# Patient Record
Sex: MALE | Race: WHITE | NOT HISPANIC OR LATINO | Employment: OTHER | URBAN - METROPOLITAN AREA
[De-identification: names, ages, dates, MRNs, and addresses within clinical notes are randomized per-mention and may not be internally consistent; named-entity substitution may affect disease eponyms.]

---

## 2017-01-19 ENCOUNTER — HOSPITAL ENCOUNTER (OUTPATIENT)
Dept: MRI IMAGING | Facility: CLINIC | Age: 67
Discharge: HOME | End: 2017-01-19
Payer: MEDICARE

## 2017-01-19 ENCOUNTER — ALLSCRIPTS OFFICE VISIT (OUTPATIENT)
Dept: OTHER | Facility: OTHER | Age: 67
End: 2017-01-19

## 2017-01-19 DIAGNOSIS — M48.061 SPINAL STENOSIS OF LUMBAR REGION: ICD-10-CM

## 2017-01-19 PROCEDURE — 72148 MRI LUMBAR SPINE W/O DYE: CPT

## 2017-01-31 ENCOUNTER — ALLSCRIPTS OFFICE VISIT (OUTPATIENT)
Dept: OTHER | Facility: OTHER | Age: 67
End: 2017-01-31

## 2017-01-31 DIAGNOSIS — I48.91 ATRIAL FIBRILLATION (HCC): ICD-10-CM

## 2017-01-31 DIAGNOSIS — D50.0 IRON DEFICIENCY ANEMIA DUE TO CHRONIC BLOOD LOSS: ICD-10-CM

## 2017-01-31 DIAGNOSIS — E78.2 MIXED HYPERLIPIDEMIA: ICD-10-CM

## 2017-03-02 ENCOUNTER — ALLSCRIPTS OFFICE VISIT (OUTPATIENT)
Dept: OTHER | Facility: OTHER | Age: 67
End: 2017-03-02

## 2017-03-03 ENCOUNTER — ALLSCRIPTS OFFICE VISIT (OUTPATIENT)
Dept: OTHER | Facility: OTHER | Age: 67
End: 2017-03-03

## 2017-03-03 DIAGNOSIS — S39.012A STRAIN OF MUSCLE, FASCIA AND TENDON OF LOWER BACK, INITIAL ENCOUNTER: ICD-10-CM

## 2017-03-09 ENCOUNTER — GENERIC CONVERSION - ENCOUNTER (OUTPATIENT)
Dept: OTHER | Facility: OTHER | Age: 67
End: 2017-03-09

## 2017-03-23 ENCOUNTER — GENERIC CONVERSION - ENCOUNTER (OUTPATIENT)
Dept: OTHER | Facility: OTHER | Age: 67
End: 2017-03-23

## 2017-03-27 ENCOUNTER — ALLSCRIPTS OFFICE VISIT (OUTPATIENT)
Dept: OTHER | Facility: OTHER | Age: 67
End: 2017-03-27

## 2017-03-29 ENCOUNTER — GENERIC CONVERSION - ENCOUNTER (OUTPATIENT)
Dept: OTHER | Facility: OTHER | Age: 67
End: 2017-03-29

## 2017-04-24 ENCOUNTER — TRANSCRIBE ORDERS (OUTPATIENT)
Dept: ADMINISTRATIVE | Facility: HOSPITAL | Age: 67
End: 2017-04-24

## 2017-04-24 ENCOUNTER — ALLSCRIPTS OFFICE VISIT (OUTPATIENT)
Dept: OTHER | Facility: OTHER | Age: 67
End: 2017-04-24

## 2017-04-24 DIAGNOSIS — I63.9 CEREBRAL INFARCTION (HCC): ICD-10-CM

## 2017-04-24 DIAGNOSIS — R42 DIZZINESS AND GIDDINESS: ICD-10-CM

## 2017-04-24 DIAGNOSIS — I69.990 APRAXIA, LATE EFFECT OF CARDIOVASCULAR DISEASE: Primary | ICD-10-CM

## 2017-05-08 ENCOUNTER — HOSPITAL ENCOUNTER (OUTPATIENT)
Dept: MRI IMAGING | Facility: CLINIC | Age: 67
Discharge: HOME | End: 2017-05-08
Payer: MEDICARE

## 2017-05-08 DIAGNOSIS — I63.9 CEREBRAL INFARCTION (HCC): ICD-10-CM

## 2017-05-08 PROCEDURE — 70551 MRI BRAIN STEM W/O DYE: CPT

## 2017-06-19 ENCOUNTER — APPOINTMENT (OUTPATIENT)
Dept: PHYSICAL THERAPY | Facility: MEDICAL CENTER | Age: 67
End: 2017-06-19
Payer: MEDICARE

## 2017-06-19 DIAGNOSIS — R42 DIZZINESS AND GIDDINESS: ICD-10-CM

## 2017-06-19 PROCEDURE — G8991 OTHER PT/OT GOAL STATUS: HCPCS

## 2017-06-19 PROCEDURE — 97140 MANUAL THERAPY 1/> REGIONS: CPT

## 2017-06-19 PROCEDURE — 97161 PT EVAL LOW COMPLEX 20 MIN: CPT

## 2017-06-19 PROCEDURE — G8990 OTHER PT/OT CURRENT STATUS: HCPCS

## 2017-06-20 ENCOUNTER — GENERIC CONVERSION - ENCOUNTER (OUTPATIENT)
Dept: OTHER | Facility: OTHER | Age: 67
End: 2017-06-20

## 2017-06-27 ENCOUNTER — APPOINTMENT (OUTPATIENT)
Dept: PHYSICAL THERAPY | Facility: MEDICAL CENTER | Age: 67
End: 2017-06-27
Payer: MEDICARE

## 2017-07-24 ENCOUNTER — ALLSCRIPTS OFFICE VISIT (OUTPATIENT)
Dept: OTHER | Facility: OTHER | Age: 67
End: 2017-07-24

## 2017-07-24 DIAGNOSIS — G62.9 POLYNEUROPATHY: ICD-10-CM

## 2017-09-05 ENCOUNTER — TRANSCRIBE ORDERS (OUTPATIENT)
Dept: ADMINISTRATIVE | Facility: HOSPITAL | Age: 67
End: 2017-09-05

## 2017-09-05 ENCOUNTER — ALLSCRIPTS OFFICE VISIT (OUTPATIENT)
Dept: OTHER | Facility: OTHER | Age: 67
End: 2017-09-05

## 2017-09-05 DIAGNOSIS — G95.9 DISEASE OF SPINAL CORD (HCC): Primary | ICD-10-CM

## 2017-09-05 DIAGNOSIS — I48.91 ATRIAL FIBRILLATION (HCC): ICD-10-CM

## 2017-09-05 DIAGNOSIS — I10 ESSENTIAL (PRIMARY) HYPERTENSION: ICD-10-CM

## 2017-09-05 DIAGNOSIS — G14 POSTPOLIO SYNDROME: ICD-10-CM

## 2017-09-05 DIAGNOSIS — G62.9 POLYNEUROPATHY: ICD-10-CM

## 2017-09-05 DIAGNOSIS — G95.9 DISEASE OF SPINAL CORD (HCC): ICD-10-CM

## 2017-09-15 ENCOUNTER — ALLSCRIPTS OFFICE VISIT (OUTPATIENT)
Dept: OTHER | Facility: OTHER | Age: 67
End: 2017-09-15

## 2017-10-10 ENCOUNTER — HOSPITAL ENCOUNTER (OUTPATIENT)
Dept: MRI IMAGING | Facility: CLINIC | Age: 67
Discharge: HOME | End: 2017-10-10
Attending: PSYCHIATRY & NEUROLOGY
Payer: MEDICARE

## 2017-10-10 DIAGNOSIS — G95.9 DISEASE OF SPINAL CORD (HCC): ICD-10-CM

## 2017-10-10 PROCEDURE — 72141 MRI NECK SPINE W/O DYE: CPT

## 2017-11-13 ENCOUNTER — APPOINTMENT (EMERGENCY)
Dept: CT IMAGING | Facility: HOSPITAL | Age: 67
End: 2017-11-13
Payer: MEDICARE

## 2017-11-13 ENCOUNTER — APPOINTMENT (EMERGENCY)
Dept: RADIOLOGY | Facility: HOSPITAL | Age: 67
End: 2017-11-13
Payer: MEDICARE

## 2017-11-13 ENCOUNTER — HOSPITAL ENCOUNTER (EMERGENCY)
Facility: HOSPITAL | Age: 67
Discharge: HOME | End: 2017-11-13
Attending: EMERGENCY MEDICINE | Admitting: EMERGENCY MEDICINE
Payer: MEDICARE

## 2017-11-13 VITALS
TEMPERATURE: 97.4 F | RESPIRATION RATE: 16 BRPM | OXYGEN SATURATION: 96 % | WEIGHT: 210 LBS | DIASTOLIC BLOOD PRESSURE: 81 MMHG | HEIGHT: 66 IN | SYSTOLIC BLOOD PRESSURE: 124 MMHG | BODY MASS INDEX: 33.75 KG/M2 | HEART RATE: 79 BPM

## 2017-11-13 DIAGNOSIS — M10.9 GOUT: Primary | ICD-10-CM

## 2017-11-13 DIAGNOSIS — L03.90 CELLULITIS: ICD-10-CM

## 2017-11-13 LAB
ALBUMIN SERPL BCP-MCNC: 3.4 G/DL (ref 3.5–5)
ALP SERPL-CCNC: 87 U/L (ref 46–116)
ALT SERPL W P-5'-P-CCNC: 25 U/L (ref 12–78)
ANION GAP SERPL CALCULATED.3IONS-SCNC: 9 MMOL/L (ref 4–13)
AST SERPL W P-5'-P-CCNC: 20 U/L (ref 5–45)
BASOPHILS # BLD AUTO: 0.11 THOUSANDS/ΜL (ref 0–0.1)
BASOPHILS NFR BLD AUTO: 1 % (ref 0–1)
BILIRUB SERPL-MCNC: 0.4 MG/DL (ref 0.2–1)
BUN SERPL-MCNC: 15 MG/DL (ref 5–25)
CALCIUM SERPL-MCNC: 9.1 MG/DL (ref 8.3–10.1)
CHLORIDE SERPL-SCNC: 101 MMOL/L (ref 100–108)
CHOLEST SERPL-MCNC: 193 MG/DL (ref 50–200)
CO2 SERPL-SCNC: 29 MMOL/L (ref 21–32)
CREAT SERPL-MCNC: 0.85 MG/DL (ref 0.6–1.3)
DEPRECATED D DIMER PPP: <270 NG/ML (FEU) (ref 0–424)
EOSINOPHIL # BLD AUTO: 0.7 THOUSAND/ΜL (ref 0–0.61)
EOSINOPHIL NFR BLD AUTO: 7 % (ref 0–6)
ERYTHROCYTE [DISTWIDTH] IN BLOOD BY AUTOMATED COUNT: 14.7 % (ref 11.6–15.1)
GFR SERPL CREATININE-BSD FRML MDRD: 90 ML/MIN/1.73SQ M
GLUCOSE SERPL-MCNC: 101 MG/DL (ref 65–140)
HCT VFR BLD AUTO: 43.6 % (ref 36.5–49.3)
HDLC SERPL-MCNC: 47 MG/DL (ref 40–60)
HGB BLD-MCNC: 14.1 G/DL (ref 12–17)
INR PPP: 1.2 (ref 0.86–1.16)
LYMPHOCYTES # BLD AUTO: 2.02 THOUSANDS/ΜL (ref 0.6–4.47)
LYMPHOCYTES NFR BLD AUTO: 19 % (ref 14–44)
MCH RBC QN AUTO: 27.8 PG (ref 26.8–34.3)
MCHC RBC AUTO-ENTMCNC: 32.3 G/DL (ref 31.4–37.4)
MCV RBC AUTO: 86 FL (ref 82–98)
MONOCYTES # BLD AUTO: 1.08 THOUSAND/ΜL (ref 0.17–1.22)
MONOCYTES NFR BLD AUTO: 10 % (ref 4–12)
NEUTROPHILS # BLD AUTO: 6.81 THOUSANDS/ΜL (ref 1.85–7.62)
NEUTS SEG NFR BLD AUTO: 63 % (ref 43–75)
NONHDLC SERPL-MCNC: 146 MG/DL
NRBC BLD AUTO-RTO: 0 /100 WBCS
PLATELET # BLD AUTO: 207 THOUSANDS/UL (ref 149–390)
PMV BLD AUTO: 9.8 FL (ref 8.9–12.7)
POTASSIUM SERPL-SCNC: 3.7 MMOL/L (ref 3.5–5.3)
PROT SERPL-MCNC: 7.2 G/DL (ref 6.4–8.2)
PROTHROMBIN TIME: 15.5 SECONDS (ref 12.1–14.4)
RBC # BLD AUTO: 5.07 MILLION/UL (ref 3.88–5.62)
SODIUM SERPL-SCNC: 139 MMOL/L (ref 136–145)
TROPONIN I SERPL-MCNC: <0.02 NG/ML
TSH SERPL DL<=0.05 MIU/L-ACNC: 2.58 UIU/ML (ref 0.36–3.74)
URATE SERPL-MCNC: 9.6 MG/DL (ref 4.2–8)
WBC # BLD AUTO: 10.77 THOUSAND/UL (ref 4.31–10.16)

## 2017-11-13 PROCEDURE — 71020 HB CHEST X-RAY 2VW FRONTAL&LATL: CPT

## 2017-11-13 PROCEDURE — 82465 ASSAY BLD/SERUM CHOLESTEROL: CPT | Performed by: EMERGENCY MEDICINE

## 2017-11-13 PROCEDURE — 36415 COLL VENOUS BLD VENIPUNCTURE: CPT | Performed by: EMERGENCY MEDICINE

## 2017-11-13 PROCEDURE — 84484 ASSAY OF TROPONIN QUANT: CPT | Performed by: EMERGENCY MEDICINE

## 2017-11-13 PROCEDURE — 85379 FIBRIN DEGRADATION QUANT: CPT | Performed by: EMERGENCY MEDICINE

## 2017-11-13 PROCEDURE — 84443 ASSAY THYROID STIM HORMONE: CPT | Performed by: EMERGENCY MEDICINE

## 2017-11-13 PROCEDURE — 84550 ASSAY OF BLOOD/URIC ACID: CPT | Performed by: EMERGENCY MEDICINE

## 2017-11-13 PROCEDURE — 71275 CT ANGIOGRAPHY CHEST: CPT

## 2017-11-13 PROCEDURE — 85610 PROTHROMBIN TIME: CPT | Performed by: EMERGENCY MEDICINE

## 2017-11-13 PROCEDURE — 85025 COMPLETE CBC W/AUTO DIFF WBC: CPT | Performed by: EMERGENCY MEDICINE

## 2017-11-13 PROCEDURE — 80053 COMPREHEN METABOLIC PANEL: CPT | Performed by: EMERGENCY MEDICINE

## 2017-11-13 PROCEDURE — 83718 ASSAY OF LIPOPROTEIN: CPT | Performed by: EMERGENCY MEDICINE

## 2017-11-13 PROCEDURE — 99284 EMERGENCY DEPT VISIT MOD MDM: CPT

## 2017-11-13 PROCEDURE — 93005 ELECTROCARDIOGRAM TRACING: CPT | Performed by: EMERGENCY MEDICINE

## 2017-11-13 RX ORDER — ASPIRIN 81 MG/1
81 TABLET ORAL DAILY
COMMUNITY
End: 2018-01-30 | Stop reason: SDUPTHER

## 2017-11-13 RX ORDER — BACLOFEN 10 MG/1
10 TABLET ORAL 3 TIMES DAILY
COMMUNITY
End: 2018-07-13 | Stop reason: SDUPTHER

## 2017-11-13 RX ORDER — OMEPRAZOLE 20 MG/1
20 CAPSULE, DELAYED RELEASE ORAL DAILY
COMMUNITY
End: 2018-01-30 | Stop reason: SDUPTHER

## 2017-11-13 RX ORDER — PREDNISONE 20 MG/1
60 TABLET ORAL ONCE
Status: COMPLETED | OUTPATIENT
Start: 2017-11-13 | End: 2017-11-13

## 2017-11-13 RX ORDER — METOPROLOL TARTRATE 50 MG/1
50 TABLET, FILM COATED ORAL 3 TIMES DAILY
COMMUNITY
End: 2018-05-29 | Stop reason: SDUPTHER

## 2017-11-13 RX ORDER — FUROSEMIDE 40 MG/1
40 TABLET ORAL DAILY
COMMUNITY
End: 2018-02-21 | Stop reason: SDUPTHER

## 2017-11-13 RX ORDER — COLCHICINE 0.6 MG/1
0.6 TABLET ORAL DAILY
Qty: 30 TABLET | Refills: 0 | Status: SHIPPED | OUTPATIENT
Start: 2017-11-13 | End: 2018-01-30 | Stop reason: SDUPTHER

## 2017-11-13 RX ORDER — COLCHICINE 0.6 MG/1
0.6 TABLET ORAL ONCE
Status: COMPLETED | OUTPATIENT
Start: 2017-11-13 | End: 2017-11-13

## 2017-11-13 RX ORDER — COLCHICINE 0.6 MG/1
0.6 TABLET ORAL DAILY
COMMUNITY
End: 2018-04-09 | Stop reason: HOSPADM

## 2017-11-13 RX ORDER — DIGOXIN 125 MCG
125 TABLET ORAL DAILY
COMMUNITY
End: 2018-04-09 | Stop reason: HOSPADM

## 2017-11-13 RX ORDER — PREDNISONE 20 MG/1
60 TABLET ORAL DAILY
Qty: 15 TABLET | Refills: 0 | Status: SHIPPED | OUTPATIENT
Start: 2017-11-13 | End: 2017-11-18

## 2017-11-13 RX ORDER — FLUOXETINE 20 MG/1
60 TABLET, FILM COATED ORAL DAILY
COMMUNITY
End: 2018-01-30 | Stop reason: SDUPTHER

## 2017-11-13 RX ORDER — INDOMETHACIN 25 MG/1
25 CAPSULE ORAL
COMMUNITY
End: 2018-03-30 | Stop reason: HOSPADM

## 2017-11-13 RX ORDER — TAMSULOSIN HYDROCHLORIDE 0.4 MG/1
0.4 CAPSULE ORAL
COMMUNITY
End: 2018-05-18 | Stop reason: SDUPTHER

## 2017-11-13 RX ORDER — FLUTICASONE PROPIONATE 50 MCG
1 SPRAY, SUSPENSION (ML) NASAL DAILY
COMMUNITY
End: 2018-05-18 | Stop reason: SDUPTHER

## 2017-11-13 RX ORDER — CEPHALEXIN 500 MG/1
500 CAPSULE ORAL EVERY 6 HOURS SCHEDULED
Qty: 40 CAPSULE | Refills: 0 | Status: SHIPPED | OUTPATIENT
Start: 2017-11-13 | End: 2017-11-23

## 2017-11-13 RX ORDER — LORATADINE 10 MG/1
10 TABLET ORAL DAILY
COMMUNITY
End: 2020-01-01 | Stop reason: HOSPADM

## 2017-11-13 RX ORDER — CEPHALEXIN 250 MG/1
500 CAPSULE ORAL ONCE
Status: COMPLETED | OUTPATIENT
Start: 2017-11-13 | End: 2017-11-13

## 2017-11-13 RX ORDER — ALLOPURINOL 300 MG/1
300 TABLET ORAL DAILY
COMMUNITY
End: 2018-02-21 | Stop reason: CLARIF

## 2017-11-13 RX ADMIN — CEPHALEXIN 500 MG: 250 CAPSULE ORAL at 13:32

## 2017-11-13 RX ADMIN — IOHEXOL 85 ML: 350 INJECTION, SOLUTION INTRAVENOUS at 14:21

## 2017-11-13 RX ADMIN — PREDNISONE 60 MG: 20 TABLET ORAL at 15:58

## 2017-11-13 RX ADMIN — COLCHICINE 0.6 MG: 0.6 TABLET, FILM COATED ORAL at 15:58

## 2017-11-13 NOTE — ED PROVIDER NOTES
History  Chief Complaint   Patient presents with    Foot Swelling     Pt states he has a hx of gout and believes to be having a flare on R foot  Pt noticed R foot swelling and redness last evening  Pt also has a hx of neuropathy and has no feeling in B/L feet     50-year-old male patient presents to the emergency department for evaluation of right foot redness similar to bouts of gout that he has had the past   Initially, the patient only stated this is his complaint, however drain history and physical the patient began stating that he was having shortness of breath which was increased with minimal exertion of less than three steps  This is of concern to me is the patient does have some underlying lung disease, has been lost to follow up with pulmonology, has had mostly inconsequential evaluation done for this any time in the recent past   The patient states the pain is similar to gout however the distribution of the redness on the dorsal surface of the foot is not normal for him  Plan will be to assess cause of her dyspnea including advanced lung disease, lung cancer, asbestosis        History provided by:  Patient   used: No    Shortness of Breath   Severity:  Moderate  Onset quality:  Gradual  Timing:  Intermittent  Progression:  Worsening  Chronicity:  Recurrent  Context: not activity, not animal exposure, not emotional upset, not known allergens, not occupational exposure, not pollens, not strong odors, not URI and not weather changes    Relieved by:  Nothing  Worsened by:  Nothing  Ineffective treatments:  None tried  Associated symptoms: no chest pain, no cough, no hemoptysis, no sputum production, no syncope and no swollen glands        Prior to Admission Medications   Prescriptions Last Dose Informant Patient Reported? Taking?    FLUoxetine (PROzac) 20 MG tablet   Yes Yes   Sig: Take 60 mg by mouth daily   allopurinol (ZYLOPRIM) 300 mg tablet   Yes Yes   Sig: Take 300 mg by mouth daily   apixaban (ELIQUIS) 5 mg   Yes Yes   Sig: Take 5 mg by mouth 2 (two) times a day   aspirin (ECOTRIN LOW STRENGTH) 81 mg EC tablet   Yes Yes   Sig: Take 81 mg by mouth daily   baclofen 10 mg tablet   Yes Yes   Sig: Take 10 mg by mouth 3 (three) times a day   colchicine (COLCRYS) 0 6 mg tablet   Yes Yes   Sig: Take 0 6 mg by mouth daily   digoxin (LANOXIN) 0 125 mg tablet   Yes Yes   Sig: Take 125 mcg by mouth daily   fluticasone (FLONASE) 50 mcg/act nasal spray   Yes Yes   Si spray into each nostril daily   furosemide (LASIX) 40 mg tablet   Yes Yes   Sig: Take 40 mg by mouth daily   indomethacin (INDOCIN) 25 mg capsule   Yes Yes   Sig: Take 25 mg by mouth 3 (three) times a day with meals   loratadine (CLARITIN) 10 mg tablet   Yes Yes   Sig: Take 10 mg by mouth daily   metoprolol tartrate (LOPRESSOR) 50 mg tablet   Yes Yes   Sig: Take 25 mg by mouth every 12 (twelve) hours   omeprazole (PriLOSEC) 20 mg delayed release capsule   Yes Yes   Sig: Take 20 mg by mouth daily   tamsulosin (FLOMAX) 0 4 mg   Yes Yes   Sig: Take 0 4 mg by mouth daily with dinner      Facility-Administered Medications: None       Past Medical History:   Diagnosis Date    [facility]fib ([facility] )     Gout     Neuropathy     Polio        Past Surgical History:   Procedure Laterality Date    COLON SURGERY         History reviewed  No pertinent family history  I have reviewed and agree with the history as documented  Social History   Substance Use Topics    Smoking status: Never Smoker    Smokeless tobacco: Never Used    Alcohol use No        Review of Systems   Respiratory: Positive for shortness of breath  Negative for cough, hemoptysis and sputum production  Cardiovascular: Negative for chest pain and syncope  All other systems reviewed and are negative        Physical Exam  ED Triage Vitals [17 1158]   Temperature Pulse Respirations Blood Pressure SpO2   (!) 97 4 °F (36 3 °C) 83 20 121/71 99 %      Temp Source Heart Rate Source Patient Position - Orthostatic VS BP Location FiO2 (%)   Oral Monitor Sitting Left arm --      Pain Score       4           Orthostatic Vital Signs  Vitals:    11/13/17 1158 11/13/17 1415   BP: 121/71 150/76   Pulse: 83 105   Patient Position - Orthostatic VS: Sitting        Physical Exam   Constitutional: He is oriented to person, place, and time  He appears well-developed and well-nourished  No distress  HENT:   Head: Normocephalic and atraumatic  Right Ear: External ear normal    Left Ear: External ear normal    Eyes: Conjunctivae and EOM are normal  Right eye exhibits no discharge  Left eye exhibits no discharge  No scleral icterus  Neck: Normal range of motion  Neck supple  No JVD present  No tracheal deviation present  No thyromegaly present  Cardiovascular: Normal rate and regular rhythm  Pulmonary/Chest: Effort normal and breath sounds normal  No stridor  No respiratory distress  He has no wheezes  He has no rales  Abdominal: Soft  Bowel sounds are normal  He exhibits no distension  There is no tenderness  Musculoskeletal: Normal range of motion  He exhibits no edema, tenderness or deformity  Neurological: He is alert and oriented to person, place, and time  No cranial nerve deficit  Coordination normal    Skin: Skin is warm and dry  He is not diaphoretic  Psychiatric: He has a normal mood and affect  His behavior is normal    Nursing note and vitals reviewed        ED Medications  Medications   cephalexin (KEFLEX) capsule 500 mg (500 mg Oral Given 11/13/17 1332)   iohexol (OMNIPAQUE) 350 MG/ML injection (SINGLE-DOSE) 85 mL (85 mL Intravenous Given 11/13/17 1421)       Diagnostic Studies  Results Reviewed     Procedure Component Value Units Date/Time    TSH, 3rd generation with T4 reflex [[id number]]  (Normal) Collected:  11/13/17 1329    Lab Status:  Final result Specimen:  Blood from Arm, Left Updated:  11/13/17 1401     TSH 3RD GENERATON 2 575 uIU/mL     Narrative: Patients undergoing fluorescein dye angiography may retain small amounts of fluorescein in the body for 48-72 hours post procedure  Samples containing fluorescein can produce falsely depressed TSH values  If the patient had this procedure,a specimen should be resubmitted post fluorescein clearance  Uric acid [[id number]]  (Abnormal) Collected:  11/13/17 1329    Lab Status:  Final result Specimen:  Blood from Arm, Left Updated:  11/13/17 1401     Uric Acid 9 6 (H) mg/dL     Non-Fasting Lipid Panel with out Triglycerides [[id number]] Collected:  11/13/17 1329    Lab Status:  Final result Specimen:  Blood from Arm, Left Updated:  11/13/17 1401     Cholesterol 193 mg/dL      HDL, Direct 47 mg/dL      Non-HDL-Chol (CHOL-HDL) 146 mg/dl     Narrative:         Cholesterol:         Desirable         <200 mg/dl      Borderline High   200-239 mg/dl      High              >239 mg/dl  HDL Cholesterol:        High    >59 mg/dL      Low     <41 mg/dL  NON-HDL-CHOLESTEROL:          Target Range  <=130mg/dl    Troponin I [[id number]]  (Normal) Collected:  11/13/17 1329    Lab Status:  Final result Specimen:  Blood from Arm, Left Updated:  11/13/17 1400     Troponin I <0 02 ng/mL     Narrative:         [employer] Chemistry analyzer 99% cutoff is > 0 04 ng/mL in network labs    o cTnI 99% cutoff is useful only when applied to patients in the clinical setting of myocardial ischemia  o cTnI 99% cutoff should be interpreted in the context of clinical history, ECG findings and possibly cardiac imaging to establish correct diagnosis  o cTnI 99% cutoff may be suggestive but clearly not indicative of a coronary event without the clinical setting of myocardial ischemia      D-dimer, quantitative [[id number]]  (Normal) Collected:  11/13/17 1329    Lab Status:  Final result Specimen:  Blood from Arm, Left Updated:  11/13/17 1356     D-Dimer, Quant <270 ng/ml (FEU)     Comprehensive metabolic panel [[id number]]  (Abnormal) Collected:  11/13/17 1329 Lab Status:  Final result Specimen:  Blood from Arm, Left Updated:  11/13/17 1354     Sodium 139 mmol/L      Potassium 3 7 mmol/L      Chloride 101 mmol/L      CO2 29 mmol/L      Anion Gap 9 mmol/L      BUN 15 mg/dL      Creatinine 0 85 mg/dL      Glucose 101 mg/dL      Calcium 9 1 mg/dL      AST 20 U/L      ALT 25 U/L      Alkaline Phosphatase 87 U/L      Total Protein 7 2 g/dL      Albumin 3 4 (L) g/dL      Total Bilirubin 0 40 mg/dL      eGFR 90 ml/min/1 73sq m     Narrative:         National Kidney Disease Education Program recommendations are as follows:  GFR calculation is accurate only with a steady state creatinine  Chronic Kidney disease less than 60 ml/min/1 73 sq  meters  Kidney failure less than 15 ml/min/1 73 sq  meters  Protime-INR [[id number]]  (Abnormal) Collected:  11/13/17 1329    Lab Status:  Final result Specimen:  Blood from Arm, Left Updated:  11/13/17 1353     Protime 15 5 (H) seconds      INR 1 20 (H)    CBC and differential [[id number]]  (Abnormal) Collected:  11/13/17 1329    Lab Status:  Final result Specimen:  Blood from Arm, Left Updated:  11/13/17 1336     WBC 10 77 (H) Thousand/uL      RBC 5 07 Million/uL      Hemoglobin 14 1 g/dL      Hematocrit 43 6 %      MCV 86 fL      MCH 27 8 pg      MCHC 32 3 g/dL      RDW 14 7 %      MPV 9 8 fL      Platelets 632 Thousands/uL      nRBC 0 /100 WBCs      Neutrophils Relative 63 %      Lymphocytes Relative 19 %      Monocytes Relative 10 %      Eosinophils Relative 7 (H) %      Basophils Relative 1 %      Neutrophils Absolute 6 81 Thousands/µL      Lymphocytes Absolute 2 02 Thousands/µL      Monocytes Absolute 1 08 Thousand/µL      Eosinophils Absolute 0 70 (H) Thousand/µL      Basophils Absolute 0 11 (H) Thousands/µL                  XR chest 2 views   Final Result by Swathi Plata MD (11/13 1356)   Asbestos related pleural disease      Left lung base infiltrate versus atelectasis      COPD      Prominent right hilum           Findings personally discussed with Dr Mary Arceo at 1:55 PM, 11/13/2017         Workstation performed: [id number]         CTA ED chest PE study    (Results Pending)              Procedures  Procedures       Phone Contacts  ED Phone Contact    ED Course  ED Course                                MDM  Number of Diagnoses or Management Options  Cellulitis: new and requires workup  Gout: new and requires workup     Amount and/or Complexity of Data Reviewed  Clinical lab tests: ordered and reviewed  Tests in the radiology section of CPT®: ordered and reviewed  Decide to obtain previous medical records or to obtain history from someone other than the patient: yes  Review and summarize past medical records: yes  Independent visualization of images, tracings, or specimens: yes    Patient Progress  Patient progress: stable    CritCare Time    Disposition  Final diagnoses:   None     ED Disposition     None      Follow-up Information    None       Patient's Medications   Discharge Prescriptions    No medications on file     No discharge procedures on file      ED Provider  Electronically Signed by           David Choi DO  11/17/17 9126

## 2017-11-13 NOTE — DISCHARGE INSTRUCTIONS
Cellulitis, Ambulatory Care   GENERAL INFORMATION:   Cellulitis  is a skin infection caused by bacteria  Common symptoms include the following:   · Fever    · A red, warm, swollen area on your skin    · Pain when the area is touched    · Bumps or blisters (abscess) that may drain pus    · Bumpy, raised skin that feels like an orange peel  Seek immediate care for the following symptoms:   · An increase in pain, redness, warmth, and size    · Red streaks coming from the infected area    · A thin, gray-brown discharge coming from your infected skin area    · A crackling under your skin when you touch it    · Purple dots or bumps on your skin, or bleeding under your skin    · New swelling and pain in your legs    · Sudden trouble breathing or chest pain  Treatment for cellulitis  may include medicines to treat the bacterial infection or decrease pain  The infection may need to be cleaned out  Damaged, dead, or infected tissue may need to be cut away to help your wound heal   Manage your symptoms:   · Elevate your wound above the level of your heart  as often as you can  This will help decrease swelling and pain  Prop your wound on pillows or blankets to keep it elevated comfortably  · Clean your wound as directed  You may need to wash the wound with soap and water  Look for signs of infection  · Wear pressure stockings as directed  The stockings are tight and put pressure on your legs  This improves blood flow and decreases swelling  Prevent cellulitis:   · Wash your hands often  Use soap and water  Wash your hands after you use the bathroom, change a child's diapers, or sneeze  Wash your hands before you prepare or eat food  Use lotion to prevent dry, cracked skin  · Do not share personal items, such as towels, clothing, and razors  · Clean exercise equipment  with germ-killing  before and after you use it    Follow up with your healthcare provider as directed:  Write down your questions so you remember to ask them during your visits  CARE AGREEMENT:   You have the right to help plan your care  Learn about your health condition and how it may be treated  Discuss treatment options with your caregivers to decide what care you want to receive  You always have the right to refuse treatment  The above information is an  only  It is not intended as medical advice for individual conditions or treatments  Talk to your doctor, nurse or pharmacist before following any medical regimen to see if it is safe and effective for you  © 2014 [address] is for End User's use only and may not be sold, redistributed or otherwise used for commercial purposes  All illustrations and images included in CareNotes® are the copyrighted property of [employer]  or [employer]  Cellulitis, Ambulatory Care   GENERAL INFORMATION:   Cellulitis  is a skin infection caused by bacteria  Gout   WHAT YOU NEED TO KNOW:   Gout is a form of arthritis that causes severe joint pain, redness, swelling, and stiffness  Acute gout pain starts suddenly, gets worse quickly, and stops on its own  Acute gout can become chronic and cause permanent damage to the joints  DISCHARGE INSTRUCTIONS:   Return to the emergency department if:   · You have severe pain in one or more of your joints that you cannot tolerate  · You have a fever or redness that spreads beyond the joint area  Contact your healthcare provider if:   · You have new symptoms, such as a rash, after you start gout treatment  · Your joint pain and swelling do not go away, even after treatment  · You are not urinating as much or as often as you usually do  · You have trouble taking your gout medicines  · You have questions or concerns about your condition or care  Medicines: You may need any of the following:  · Prescription pain medicine  may be given   Ask your healthcare provider how to take this medicine safely  Some prescription pain medicines contain acetaminophen  Do not take other medicines that contain acetaminophen without talking to your healthcare provider  Too much acetaminophen may cause liver damage  Prescription pain medicine may cause constipation  Ask your healthcare provider how to prevent or treat constipation  · NSAIDs , such as ibuprofen, help decrease swelling, pain, and fever  This medicine is available with or without a doctor's order  NSAIDs can cause stomach bleeding or kidney problems in certain people  If you take blood thinner medicine, always ask your healthcare provider if NSAIDs are safe for you  Always read the medicine label and follow directions  · Gout medicine  decreases joint pain and swelling  It may also be given to prevent new gout attacks  · Steroids  reduce inflammation and can help your joint stiffness and pain during gout attacks  · Uric acid medicine  may be given to reduce the amount of uric acid your body makes  Some medicines may help you pass more uric acid when you urinate  · Take your medicine as directed  Contact your healthcare provider if you think your medicine is not helping or if you have side effects  Tell him or her if you are allergic to any medicine  Keep a list of the medicines, vitamins, and herbs you take  Include the amounts, and when and why you take them  Bring the list or the pill bottles to follow-up visits  Carry your medicine list with you in case of an emergency  Follow up with your healthcare provider as directed:  Write down your questions so you remember to ask them during your visits  Manage gout:   · Rest your painful joint so it can heal   Your healthcare provider may recommend crutches or a walker if the affected joint is in a leg  · Apply ice to your joint  Ice decreases pain and swelling  Use an ice pack, or put crushed ice in a plastic bag   Cover the ice pack or bag with a towel before you apply it to your painful joint  Apply ice for 15 to 20 minutes every hour, or as directed  · Elevate your joint  Elevation helps reduce swelling and pain  Raise your joint above the level of your heart as often as you can  Prop your painful joint on pillows to keep it above your heart comfortably  · Go to physical therapy if directed  A physical therapist can teach you exercises to improve flexibility and range of motion  Prevent gout attacks:   · Do not eat high-purine foods  These foods include meats, seafood, asparagus, spinach, cauliflower, and some types of beans  Healthcare providers may tell you to eat more low-fat milk products, such as yogurt  Milk products may decrease your risk for gout attacks  Vitamin C and coffee may also help  Your healthcare provider or dietitian can help you create a meal plan  · Drink more liquids as directed  Liquids such as water help remove uric acid from your body  Ask how much liquid to drink each day and which liquids are best for you  · Manage your weight  Weight loss may decrease the amount of uric acid in your body  Exercise can help you lose weight  Talk to your healthcare provider about the best exercises for you  · Control your blood sugar level if you have diabetes  Keep your blood sugar level in a normal range  This can help prevent gout attacks  · Limit or do not drink alcohol as directed  Alcohol can trigger a gout attack  Alcohol also increases your risk for dehydration  Ask your healthcare provider if alcohol is safe for you  © 2017 [address] Information is for End User's use only and may not be sold, redistributed or otherwise used for commercial purposes  All illustrations and images included in CareNotes® are the copyrighted property of A D [employer] M [employer] Inc  or [employer]  The above information is an  only  It is not intended as medical advice for individual conditions or treatments   Talk to your doctor, nurse or pharmacist before following any medical regimen to see if it is safe and effective for you  Common symptoms include the following:   · Fever    · A red, warm, swollen area on your skin    · Pain when the area is touched    · Bumps or blisters (abscess) that may drain pus    · Bumpy, raised skin that feels like an orange peel  Seek immediate care for the following symptoms:   · An increase in pain, redness, warmth, and size    · Red streaks coming from the infected area    · A thin, gray-brown discharge coming from your infected skin area    · A crackling under your skin when you touch it    · Purple dots or bumps on your skin, or bleeding under your skin    · New swelling and pain in your legs    · Sudden trouble breathing or chest pain  Treatment for cellulitis  may include medicines to treat the bacterial infection or decrease pain  The infection may need to be cleaned out  Damaged, dead, or infected tissue may need to be cut away to help your wound heal   Manage your symptoms:   · Elevate your wound above the level of your heart  as often as you can  This will help decrease swelling and pain  Prop your wound on pillows or blankets to keep it elevated comfortably  · Clean your wound as directed  You may need to wash the wound with soap and water  Look for signs of infection  · Wear pressure stockings as directed  The stockings are tight and put pressure on your legs  This improves blood flow and decreases swelling  Prevent cellulitis:   · Wash your hands often  Use soap and water  Wash your hands after you use the bathroom, change a child's diapers, or sneeze  Wash your hands before you prepare or eat food  Use lotion to prevent dry, cracked skin  · Do not share personal items, such as towels, clothing, and razors  · Clean exercise equipment  with germ-killing  before and after you use it    Follow up with your healthcare provider as directed:  Write down your questions so you remember to ask them during your visits  CARE AGREEMENT:   You have the right to help plan your care  Learn about your health condition and how it may be treated  Discuss treatment options with your caregivers to decide what care you want to receive  You always have the right to refuse treatment  The above information is an  only  It is not intended as medical advice for individual conditions or treatments  Talk to your doctor, nurse or pharmacist before following any medical regimen to see if it is safe and effective for you  © 2014 [address] is for End User's use only and may not be sold, redistributed or otherwise used for commercial purposes  All illustrations and images included in CareNotes® are the copyrighted property of [employer]  or [employer]

## 2017-11-13 NOTE — ED NOTES
Patient transported to [address] [place] [address] , [facility]  11/13/17 4693

## 2017-11-14 LAB
ATRIAL RATE: 82 BPM
QRS AXIS: 56 DEGREES
QRSD INTERVAL: 102 MS
QT INTERVAL: 358 MS
QTC INTERVAL: 447 MS
T WAVE AXIS: -15 DEGREES
VENTRICULAR RATE: 94 BPM

## 2017-12-29 ENCOUNTER — TRANSCRIBE ORDERS (OUTPATIENT)
Dept: ADMINISTRATIVE | Facility: HOSPITAL | Age: 67
End: 2017-12-29

## 2017-12-29 ENCOUNTER — APPOINTMENT (OUTPATIENT)
Dept: LAB | Facility: HOSPITAL | Age: 67
End: 2017-12-29
Attending: FAMILY MEDICINE
Payer: MEDICARE

## 2017-12-29 ENCOUNTER — ALLSCRIPTS OFFICE VISIT (OUTPATIENT)
Dept: OTHER | Facility: OTHER | Age: 67
End: 2017-12-29

## 2017-12-29 DIAGNOSIS — M12.9 ARTHROPATHY: ICD-10-CM

## 2017-12-29 DIAGNOSIS — M10.9 GOUT: ICD-10-CM

## 2017-12-29 LAB
ALBUMIN SERPL BCP-MCNC: 3.7 G/DL (ref 3.5–5)
ALP SERPL-CCNC: 74 U/L (ref 46–116)
ALT SERPL W P-5'-P-CCNC: 27 U/L (ref 12–78)
ANION GAP SERPL CALCULATED.3IONS-SCNC: 4 MMOL/L (ref 4–13)
AST SERPL W P-5'-P-CCNC: 21 U/L (ref 5–45)
BILIRUB SERPL-MCNC: 0.3 MG/DL (ref 0.2–1)
BUN SERPL-MCNC: 24 MG/DL (ref 5–25)
CALCIUM SERPL-MCNC: 9.8 MG/DL (ref 8.3–10.1)
CHLORIDE SERPL-SCNC: 102 MMOL/L (ref 100–108)
CO2 SERPL-SCNC: 33 MMOL/L (ref 21–32)
CREAT SERPL-MCNC: 1.04 MG/DL (ref 0.6–1.3)
ERYTHROCYTE [SEDIMENTATION RATE] IN BLOOD: 23 MM/HOUR (ref 0–10)
GFR SERPL CREATININE-BSD FRML MDRD: 73 ML/MIN/1.73SQ M
GLUCOSE SERPL-MCNC: 116 MG/DL (ref 65–140)
POTASSIUM SERPL-SCNC: 4.9 MMOL/L (ref 3.5–5.3)
PROT SERPL-MCNC: 7.7 G/DL (ref 6.4–8.2)
SODIUM SERPL-SCNC: 139 MMOL/L (ref 136–145)
URATE SERPL-MCNC: 8.5 MG/DL (ref 4.2–8)

## 2017-12-29 PROCEDURE — 36415 COLL VENOUS BLD VENIPUNCTURE: CPT

## 2017-12-29 PROCEDURE — 85652 RBC SED RATE AUTOMATED: CPT

## 2017-12-29 PROCEDURE — 86618 LYME DISEASE ANTIBODY: CPT

## 2017-12-29 PROCEDURE — 80053 COMPREHEN METABOLIC PANEL: CPT

## 2017-12-29 PROCEDURE — 84550 ASSAY OF BLOOD/URIC ACID: CPT

## 2018-01-01 LAB
B BURGDOR IGG SER IA-ACNC: 0.13
B BURGDOR IGM SER IA-ACNC: 0.17

## 2018-01-10 NOTE — RESULT NOTES
Verified Results  [address] [place], [place] [address]neftaly Taveras [id number] 12:08PM Chayo Escort Order Number: [id number]    - Patient Instructions: To schedule this appointment, please contact Central Scheduling at [phone]  Test Name Result Flag Reference   US KIDNEY AND BLADDER (Report)     RENAL ULTRASOUND     INDICATION: Right flank pain  Prior history of nephrolithiasis  COMPARISON: CT 7/25/2012  TECHNIQUE:  Ultrasound of the retroperitoneum was performed with a curvilinear transducer utilizing volumetric sweeps and still imaging techniques  FINDINGS:     KIDNEYS:   Symmetric and normal size  Right kidney: 10 2 x 4 8 cm  Normal echogenicity and contour  No suspicious masses detected  No hydronephrosis  7 mm calculus is seen in the lower pole  No perinephric fluid collections  Left kidney: 9 4 x 5 7 cm  Normal echogenicity and contour  No suspicious masses detected  No hydronephrosis  No shadowing calculi  No perinephric fluid collections  URETERS:   Nonvisualized  BLADDER:    Normally distended  No focal thickening or mass lesions  Bilateral ureteral jets detected  Incidentally, cholelithiasis is seen  IMPRESSION:       1  Nonobstructing 7 mm right lower pole calculus  2  Cholelithiasis incidentally seen          Workstation performed: [id number]     Signed by:   Blue Guerrero MD   10/20/16

## 2018-01-12 VITALS
OXYGEN SATURATION: 94 % | SYSTOLIC BLOOD PRESSURE: 118 MMHG | HEART RATE: 60 BPM | BODY MASS INDEX: 36.96 KG/M2 | WEIGHT: 229 LBS | DIASTOLIC BLOOD PRESSURE: 62 MMHG

## 2018-01-12 VITALS
BODY MASS INDEX: 36.48 KG/M2 | HEART RATE: 65 BPM | OXYGEN SATURATION: 97 % | WEIGHT: 227 LBS | HEIGHT: 66 IN | DIASTOLIC BLOOD PRESSURE: 78 MMHG | SYSTOLIC BLOOD PRESSURE: 124 MMHG

## 2018-01-12 VITALS
SYSTOLIC BLOOD PRESSURE: 110 MMHG | DIASTOLIC BLOOD PRESSURE: 68 MMHG | HEART RATE: 64 BPM | WEIGHT: 229 LBS | HEIGHT: 66 IN | BODY MASS INDEX: 36.8 KG/M2

## 2018-01-13 VITALS
HEART RATE: 79 BPM | DIASTOLIC BLOOD PRESSURE: 82 MMHG | TEMPERATURE: 97.9 F | WEIGHT: 229 LBS | BODY MASS INDEX: 36.8 KG/M2 | HEIGHT: 66 IN | SYSTOLIC BLOOD PRESSURE: 116 MMHG | OXYGEN SATURATION: 97 %

## 2018-01-13 VITALS
BODY MASS INDEX: 36.48 KG/M2 | WEIGHT: 227 LBS | DIASTOLIC BLOOD PRESSURE: 84 MMHG | SYSTOLIC BLOOD PRESSURE: 128 MMHG | HEART RATE: 80 BPM | HEIGHT: 66 IN

## 2018-01-14 VITALS
BODY MASS INDEX: 36.96 KG/M2 | SYSTOLIC BLOOD PRESSURE: 112 MMHG | DIASTOLIC BLOOD PRESSURE: 85 MMHG | WEIGHT: 230 LBS | HEIGHT: 66 IN | HEART RATE: 76 BPM

## 2018-01-14 VITALS
HEART RATE: 82 BPM | DIASTOLIC BLOOD PRESSURE: 69 MMHG | HEIGHT: 66 IN | WEIGHT: 227.13 LBS | SYSTOLIC BLOOD PRESSURE: 133 MMHG | BODY MASS INDEX: 36.5 KG/M2

## 2018-01-15 VITALS
HEART RATE: 99 BPM | WEIGHT: 227 LBS | BODY MASS INDEX: 36.48 KG/M2 | DIASTOLIC BLOOD PRESSURE: 72 MMHG | HEIGHT: 66 IN | OXYGEN SATURATION: 98 % | SYSTOLIC BLOOD PRESSURE: 110 MMHG

## 2018-01-15 NOTE — CONSULTS
Assessment    1  Polio (045 90) (A80 9)   2  Strain of lumbar region, initial encounter (847 2) (S39 012A)   3  Sacroiliitis (720 2) (M46 1)    Plan  Strain of lumbar region, initial encounter    · *1 - [facility]  Consult: please assess for lumbar ROM,  core strengthening, hip girdle/LE strengthening  Status: Active  Requested for:  24EKD3266   Ordered; For: Strain of lumbar region, initial encounter; Ordered By: Coco Rodriguez Performed:  Due: 26KAB2321  Care Summary provided  : Yes   · 1 - Rigo Elmore MD (Anesthesiology) Physician Referral  Consult Only: the expectation  is that the referring provider will communicate back to the patient on treatment options  Evaluation and Treatment: the expectation is that the referred to provider will  communicate back to the patient on treatment options  please assess for SI injections  Status: Active  Requested for: 96RYM8137   Ordered; For: Strain of lumbar region, initial encounter; Ordered By: Coco Rodriguez Performed:  Due: 81JEF1617  Care Summary provided  : Yes   · Follow-up PRN Evaluation and Treatment  Follow-up  Status: Complete  Done:  18NRR7661   Ordered; For: Strain of lumbar region, initial encounter; Ordered By: Coco Rodriguez Performed:  Due: 76LNB7301    Discussion/Summary    Mr Maynard is being referred by Dr Tatyana Duke  He has back pain in the setting of chronic disabilities as well as 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  Today we discussed the conservative options including PT, KYE and medications  I have provided him a script for PT and a referral to Dr Andi Shukla  I reassured him that I see no compelling reason for surgery of his lumbar spine  I will see him on a PRN basis  The patient has the current Goals: Improve function  The patent has the current Barriers: None  Patient is able to Self-Care  Patient agrees and allows to involve family/caregiver in development of care plan: Wife- Jesenia       Self Referrals: No Chief Complaint    1  Back Pain  Severe childhood polio with marked UE and LE deficits, relies on bilateral LE bracing for mobility  Episodic bilateral low paraspinal pain, worse with activity  No PT, KYE and medication  History of Present Illness    [facility] presents with complaints of gradual onset of occasional episodes of mild bilateral lower back pain, non-radiating  The patient reports no pain  Episodes started about 3 years ago  Symptoms are made worse by prolonged sitting  Symptoms are unchanged  Associated symptoms include stiffness, but no spasm, no fever, no night sweats, no abdominal pain, no general malaise, no weight loss, no arm numbness, no arm weakness, no urinary incontinence, no fecal incontinence, no urinary retention and no rash localized to the area of pain    leg numbness (bilateral LE numbness)  leg weakness (bilateral LE weakness)  Review of Systems    Constitutional: no fever and no chills  Eyes: no eyesight problems  ENT: no hearing loss  Cardiovascular: no chest pain and no palpitations  Respiratory: no shortness of breath, no cough and no wheezing  Gastrointestinal: no nausea  Genitourinary: no incontinence  Musculoskeletal: arthralgias and joint stiffness, but as noted in HPI, no joint swelling, no limb pain, no myalgias and no limb swelling  Neurological: numbness, tingling, limb weakness and difficulty walking, but as noted in HPI, no headache, no confusion, no dizziness, no convulsions and no fainting  Psychiatric: no anxiety and no depression  Endocrine: no muscle weakness  Hematologic/Lymphatic: a tendency for easy bleeding and a tendency for easy bruising  ROS reviewed  Active Problems    1  Anemia due to chronic blood loss (280 0) (D50 0)   2  Arthropathy (716 90) (M12 9)   3  Atrial fibrillation (427 31) (I48 91)   4  Bilateral edema of lower extremity (782 3) (R60 0)   5  BPH (benign prostatic hypertrophy) (600 00) (N40 0)   6  Bronchitis, acute (466 0) (J20 9)   7  Chronic rhinitis (472 0) (J31 0)   8  Encounter for prostate cancer screening (V76 44) (Z12 5)   9  Essential hypertension (401 9) (I10)   10  Facet joint disease of lumbosacral region (724 9) (M12 88)   11  Flank pain (789 09) (R10 9)   12  Folliculitis (839 7) (G25 4)   13  Gout, arthritis (274 00) (M10 9)   14  Hematuria (599 70) (R31 9)   15  History of esophagogastroduodenoscopy (EGD) (V15 29) (Z98 890)   16  History of kidney stones (V13 01) (Z87 442)   17  Lower extremity edema (782 3) (R60 0)   18  Medicare annual wellness visit, subsequent (V70 0) (Z00 00)   19  Mixed hyperlipidemia (272 2) (E78 2)   20  Need for influenza vaccination (V04 81) (Z23)   21  Need for pneumococcal vaccination (V03 82) (Z23)   22  Need for Tdap vaccination (V06 1) (Z23)   23  Osteoporosis (733 00) (M81 0)   24  Peptic reflux disease (530 81) (K21 9)   25  Peripheral neuropathy (356 9) (G62 9)   26  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)   27  Prostate cancer screening (V76 44) (Z12 5)   28  Raynaud's disease without gangrene (443 0) (I73 00)   29  Screening for genitourinary condition (V81 6) (Z13 89)   30  Strain of lumbar region, initial encounter (847 2) (S39 012A)   31  Subconjunctival hemorrhage of right eye (372 72) (H11 31)   32  Thoracic sprain (847 1) (S23 9XXA)   33  URI (upper respiratory infection) (465 9) (J06 9)    Past Medical History    1  History of Acute upper respiratory infection (465 9) (J06 9)   2  History of Asbestosis (0681 664 48 54) (J61)   3  History of EKG (V15 89) (Z92 89)   4  History of renal calculi (V13 01) (Z87 442)   5  History of Leukocytosis (288 60) (D72 829)   6  History of Lumbar spinal stenosis (724 02) (M48 06)   7  History of Malaise and fatigue (780 79) (R53 81,R53 83)   8  History of Neuropathy (355 9) (G62 9)   9  History of Pain in limb (729 5) (M79 609)    The active problems and past medical history were reviewed and updated today  Surgical History    1  History of Partial Colectomy   2  History of Percutaneous Lithotomy   3  History of Ventral Hernia Repair    The surgical history was reviewed and updated today  Family History  Mother    1  Denied: Family history of mental disorder   2  Family history of Hodgkin's disease  Father    3  Family history of Polio  Brother    4  Family history of    5  Family history of diabetes mellitus (V18 0) (Z83 3)   6  Family history of hepatic cirrhosis (V18 59) (Z83 79)  Maternal Grandmother    7  Family history of malignant neoplasm (V16 9) (Z80 9)    The family history was reviewed and updated today  Social History    · Completed high school   ·    · Never a smoker   · No alcohol use   · No drug use   · Occupation   · Retired   · Two children  The social history was reviewed and updated today  Current Meds   1  Aspirin 81 MG TABS; Take 1 tablet daily; Therapy: (Recorded:65Ijk5076) to Recorded   2  Baclofen 10 MG Oral Tablet; TAKE 1 TABLET 3 times a day; Therapy: 77IIT8949 to (Evaluate:2017)  Requested for: 2016; Last   Rx:2016 Ordered   3  Colchicine 0 6 MG Oral Tablet; TAKE 1 TABLET BY MOUTH TWICE A DAY AS NEEDED   FOR GOUT;   Therapy: 37SDK3052 to (Evaluate:63Eyo1634)  Requested for: 67SSV3348; Last   Rx:2016 Ordered   4  Digoxin 125 MCG Oral Tablet; take 1 tablet every day; Last Rx:2017 Ordered   5  DULoxetine HCl - 60 MG Oral Capsule Delayed Release Particles; TAKE ONE CAPSULE   BY MOUTH AT BEDTIME; Therapy: 56WSH2978 to (Evaluate:2017)  Requested for: 43YGL4324; Last   Rx:53Opn9140 Ordered   6  Eliquis 5 MG Oral Tablet; Take 1 tablet twice daily; Therapy: (Recorded:2016) to Recorded   7  Fluticasone Propionate 50 MCG/ACT Nasal Suspension; USE 1 TO 2 SPRAYS IN EACH   NOSTRIL ONCE DAILY; Therapy: (Recorded:2016) to Recorded   8  Furosemide 40 MG Oral Tablet; TAKE 1 TABLET EVERY MORNING;    Therapy: 06VAS1355 to (Nelson Bella)  Requested for: 91QSP1640; Last   V35FWI4151 Ordered   9  Loratadine 10 MG Oral Capsule; take 1 capsule daily; Therapy: (Recorded:2015) to Recorded   10  Metoprolol Tartrate 50 MG Oral Tablet; TAKE 1 TABLET EVERY 12 HOURS DAILY; Therapy: 17XNV4682 to (Evaluate:2015) Recorded   11  Omeprazole 20 MG Oral Capsule Delayed Release; take 1 capsule daily; Therapy: 88KVS3217 to (Pasqual Ser)  Requested for: 17OSB1260; Last    Rx:34Rqd1606 Ordered   12  Tamsulosin HCl - 0 4 MG Oral Capsule; TAKE 1 CAPSULE BY MOUTH EVERY NIGHT AT    BEDTIME; Therapy: 44NFI2686 to (Evaluate:2017)  Requested for: 43IZR5538; Last    Rx:2016 Ordered   13  Vitamin B-12 1000 MCG Oral Tablet; TAKE 1 TABLET DAILY AS DIRECTED; Therapy: (Recorded:82Wxs0797) to Recorded    The medication list was reviewed and updated today  Allergies    1  No Known Drug Allergies    2  No Known Environmental Allergies   3  No Known Food Allergies    Vitals  Vital Signs    Recorded: 78FGG0459 10:19AM   Temperature 98 1 F   Heart Rate 83   Respiration 16   Systolic 657   Diastolic 70   Height 5 ft 6 in   Weight 227 lb    BMI Calculated 36 64   BSA Calculated 2 11     Physical Exam   (severely limited LE exam, marked paravertebral spasm, point tenderness bilateral SI joint)   Constitutional Patient appears healthy and well developed  No signs of acute distress present  Eyes Vision is grossly normal  Discs flat with sharp margins  Respiratory Auscultation of lungs: Clear to auscultation bilaterally  Cardiovascular Auscultation of heart: Rate is regular  Rhythm is regular  No heart murmur appreciated  Carotid pulses: 2+ and equal bilaterally, without bruits  Peripheral vascular exam: Pedal Pulses: 2+ and equal bilaterally  Radial pulses: 2+ and equal bilaterally  Extremities: No edema of the lower limbs bilaterally  Abdomen The abdomen is flat  No abdominal scars  Abdomen is soft, nontender, and nondistended    Anus, perineum, and rectum: Exam deferred  Neurologic - Mental Status: Alert and Oriented x3  Mood and affect: Affect is normal   Memory is grossly intact  Attention is WNL  Speech is articulated and fluent  Knowledge and vocabulary consistent with education  Cranial Nerve Exam:  2nd cranial nerve: Visual field was full to confrontation  3rd, 4th, and 6th cranial nerves: Normal with no deficit  5th cranial nerve: Sensation was intact in all three divisions to light touch and temperature  Motor function was intact  7th cranial nerve: Face symmetrical at grimace and at rest  8th cranial nerve: Grossly intact to finger rub bilaterally  9th and 10th cranial nerves: Uvula is midline  11th cranial nerve: Shoulder shrug equal bilaterally  12th cranial nerve: Tongue mideline, no atrophy present  Motor System General Motor Strength: No pronator drift and no parietal drift  Motor System - Upper Extremities: Normal to inspection and palpation  Strength: Deltoids 5/5 bilaterally  Biceps 5/5 bilaterally  Triceps 5/5 bilaterally  Extensor carpi radials is 5/5 bilaterally  Extensor digitorum 5/5 bilaterally  Intrinsic 5/5 bilaterally   5/5 bilaterally  Muscle tone: Normal bilaterally  Muscle Bulk: Normal bilaterally  Motor System - Lower Extremities: Normal to inspection and palpation  Strength: Iliopsoas 5/5 bilaterally  Quadriceps 5/5 bilaterally  Hamstrings 5/5 bilaterally  Gastrocnemius 5/5 bilaterally  Muscle tone: Normal bilaterally  Reflexes: Biceps reflexes are 2+ bilaterally  Triceps reflexes are 2+ bilaterally  Achilles reflexes are 2+ bilaterally  Babinski's reflex is 2+ down going bilaterally  Ankle clonus is absent bilaterally  Coordination: Finger to nose was normal    Sensory: Sensation grossly intact to light touch  Sensation grossly intact to light touch  Gait and Station: [place] with a normal gait  Results/Data    I personally reviewed the MRI in detail with the patient      MRI Review mild age related lumbar disc degneration without compelling central or foraminal stenosis, small incidental conus extra-axial lesion likely lipoma        Future Appointments    Date/Time Provider Specialty Site   08/01/2017 02:30 PM Ela Pearce, [address] [place] [address]   09/05/2017 10:40 AM Dayanna Osei MD Neurology [facility] [address] [place] [address]     Signatures   Electronically signed by : DAWN Hernandez ; Mar  3 2017 10:56AM EST                       (Author)

## 2018-01-22 VITALS
OXYGEN SATURATION: 97 % | BODY MASS INDEX: 35.52 KG/M2 | WEIGHT: 221 LBS | SYSTOLIC BLOOD PRESSURE: 110 MMHG | HEIGHT: 66 IN | DIASTOLIC BLOOD PRESSURE: 70 MMHG | HEART RATE: 77 BPM

## 2018-01-22 VITALS
WEIGHT: 227 LBS | TEMPERATURE: 98.1 F | HEART RATE: 83 BPM | RESPIRATION RATE: 16 BRPM | SYSTOLIC BLOOD PRESSURE: 112 MMHG | DIASTOLIC BLOOD PRESSURE: 70 MMHG | HEIGHT: 66 IN | BODY MASS INDEX: 36.48 KG/M2

## 2018-01-30 ENCOUNTER — OFFICE VISIT (OUTPATIENT)
Dept: FAMILY MEDICINE CLINIC | Facility: CLINIC | Age: 68
End: 2018-01-30
Payer: MEDICARE

## 2018-01-30 DIAGNOSIS — M79.89 BILATERAL HAND SWELLING: Primary | ICD-10-CM

## 2018-01-30 PROBLEM — G95.9 CERVICAL MYELOPATHY (HCC): Status: ACTIVE | Noted: 2017-09-05

## 2018-01-30 PROBLEM — R29.898 BILATERAL ARM WEAKNESS: Status: ACTIVE | Noted: 2017-03-03

## 2018-01-30 PROBLEM — G47.33 OBSTRUCTIVE SLEEP APNEA: Status: ACTIVE | Noted: 2017-09-15

## 2018-01-30 PROCEDURE — 99213 OFFICE O/P EST LOW 20 MIN: CPT | Performed by: FAMILY MEDICINE

## 2018-01-30 RX ORDER — PREDNISONE 10 MG/1
TABLET ORAL
Qty: 30 TABLET | Refills: 0 | Status: SHIPPED | OUTPATIENT
Start: 2018-01-30 | End: 2018-02-21 | Stop reason: ALTCHOICE

## 2018-01-30 NOTE — PROGRESS NOTES
Assessment/Plan:      Diagnoses and all orders for this visit:    Bilateral hand swelling  -     predniSONE 10 mg tablet; Five tablets daily for 2 days then decrease by 1 tablet every 2 days until finished  -     RF Screen w/ Reflex to [place]; Future  -     Ambulatory referral to Rheumatology; Future       Take the prednisone as prescribed get blood work done tomorrow for the rheumatoid factor tomorrow, make appt with rheumatologist call if symptoms worsen  Subjective:     Patient ID: Javed Omer is a 75 y o  male  Hand Pain    The incident occurred more than 1 week ago  There was no injury mechanism  The pain is present in the left hand and right hand  The quality of the pain is described as aching  The pain does not radiate  The pain is moderate  The pain has been constant since the incident  Pertinent negatives include no chest pain or numbness  The symptoms are aggravated by movement  He has tried immobilization and NSAIDs for the symptoms  The treatment provided no relief  Review of Systems   Constitutional: Negative for chills, fatigue and fever  HENT: Negative for congestion, ear pain, hearing loss, postnasal drip, rhinorrhea and sore throat  Eyes: Negative for pain and visual disturbance  Respiratory: Negative for chest tightness, shortness of breath and wheezing  Cardiovascular: Negative for chest pain and leg swelling  Gastrointestinal: Negative for abdominal distention, abdominal pain, constipation, diarrhea and vomiting  Endocrine: Negative for cold intolerance and heat intolerance  Genitourinary: Negative for difficulty urinating, frequency and urgency  Musculoskeletal: Positive for arthralgias  Negative for gait problem  Skin: Negative for color change  Neurological: Negative for dizziness, tremors, syncope, numbness and headaches  Hematological: Negative for adenopathy  Psychiatric/Behavioral: Negative for agitation, confusion and sleep disturbance   The patient is not nervous/anxious  Objective:     Physical Exam   Constitutional: He appears well-developed  HENT:   Head: Normocephalic  Eyes: Conjunctivae are normal    Neck: Normal range of motion  Cardiovascular: An irregularly irregular rhythm present  Pulmonary/Chest: Effort normal    Abdominal: Soft  Musculoskeletal:        Right hand: He exhibits swelling  Left hand: He exhibits swelling

## 2018-01-31 ENCOUNTER — APPOINTMENT (OUTPATIENT)
Dept: LAB | Facility: HOSPITAL | Age: 68
End: 2018-01-31
Attending: FAMILY MEDICINE
Payer: MEDICARE

## 2018-01-31 DIAGNOSIS — M79.89 BILATERAL HAND SWELLING: ICD-10-CM

## 2018-01-31 PROCEDURE — 36415 COLL VENOUS BLD VENIPUNCTURE: CPT

## 2018-01-31 PROCEDURE — 86430 RHEUMATOID FACTOR TEST QUAL: CPT

## 2018-02-01 LAB — RHEUMATOID FACT SER QL LA: NEGATIVE

## 2018-02-02 ENCOUNTER — TELEPHONE (OUTPATIENT)
Dept: FAMILY MEDICINE CLINIC | Facility: CLINIC | Age: 68
End: 2018-02-02

## 2018-02-02 NOTE — TELEPHONE ENCOUNTER
Dr Em Mc RECOMMEND OR DO YOU HAVE ANY PULL TO GET HIM IN ? SHOULD HE BE CONTINUING TO TAKE THE PREDNISONE?  ALMOST OUT - CALL BACK

## 2018-02-02 NOTE — TELEPHONE ENCOUNTER
Yes I want him to finish the prednisone and I recommend he see Dr Parveen Kenney instead of Dr Kade Shell

## 2018-02-05 ENCOUNTER — TELEPHONE (OUTPATIENT)
Dept: FAMILY MEDICINE CLINIC | Facility: CLINIC | Age: 68
End: 2018-02-05

## 2018-02-05 DIAGNOSIS — G95.9 CERVICAL MYELOPATHY (HCC): Primary | ICD-10-CM

## 2018-02-05 RX ORDER — GABAPENTIN 300 MG/1
300 CAPSULE ORAL 2 TIMES DAILY
Qty: 60 CAPSULE | Refills: 1 | Status: SHIPPED | OUTPATIENT
Start: 2018-02-05 | End: 2018-02-21 | Stop reason: CLARIF

## 2018-02-05 NOTE — TELEPHONE ENCOUNTER
Pt does want to start Gabapentin  [facility]    Asking if he should still use Prednisone as well-he states he has 5 pills left

## 2018-02-05 NOTE — TELEPHONE ENCOUNTER
Pt needs more prednisone - he has such pain  all the time - do you think he should be on nerve medication ? Or anything that can help? Can you call Dariel Cohn - she is very upset  !   [phone]

## 2018-02-16 ENCOUNTER — TELEPHONE (OUTPATIENT)
Dept: FAMILY MEDICINE CLINIC | Facility: CLINIC | Age: 68
End: 2018-02-16

## 2018-02-16 DIAGNOSIS — M10.9 GOUT, ARTHRITIS: Primary | ICD-10-CM

## 2018-02-16 RX ORDER — METHYLPREDNISOLONE 4 MG/1
TABLET ORAL
Qty: 21 TABLET | Refills: 0 | Status: SHIPPED | OUTPATIENT
Start: 2018-02-16 | End: 2018-04-09 | Stop reason: HOSPADM

## 2018-02-21 ENCOUNTER — OFFICE VISIT (OUTPATIENT)
Dept: CARDIOLOGY CLINIC | Facility: CLINIC | Age: 68
End: 2018-02-21
Payer: MEDICARE

## 2018-02-21 VITALS
DIASTOLIC BLOOD PRESSURE: 68 MMHG | HEART RATE: 87 BPM | HEIGHT: 66 IN | SYSTOLIC BLOOD PRESSURE: 124 MMHG | WEIGHT: 230 LBS | OXYGEN SATURATION: 97 % | BODY MASS INDEX: 36.96 KG/M2

## 2018-02-21 DIAGNOSIS — G47.33 OSA ON CPAP: ICD-10-CM

## 2018-02-21 DIAGNOSIS — E66.9 OBESITY (BMI 30-39.9): ICD-10-CM

## 2018-02-21 DIAGNOSIS — I48.0 PAROXYSMAL ATRIAL FIBRILLATION (HCC): Primary | ICD-10-CM

## 2018-02-21 DIAGNOSIS — I10 ESSENTIAL HYPERTENSION: ICD-10-CM

## 2018-02-21 DIAGNOSIS — Z99.89 OSA ON CPAP: ICD-10-CM

## 2018-02-21 PROCEDURE — 99204 OFFICE O/P NEW MOD 45 MIN: CPT | Performed by: INTERNAL MEDICINE

## 2018-02-21 RX ORDER — FUROSEMIDE 40 MG/1
40 TABLET ORAL DAILY PRN
Qty: 30 TABLET | Refills: 2
Start: 2018-02-21 | End: 2018-03-30 | Stop reason: HOSPADM

## 2018-02-21 NOTE — PROGRESS NOTES
CARDIOLOGY OFFICE VISIT  [facility]  [address], [address], [address]  Tel: [phone]      NAME: Brenna Bautista  AGE: 75 y o  SEX: male  : 1950   MRN: [id number]      Chief Complaint:  Chief Complaint   Patient presents with   Mavis Cousin Establish Care     Ref by Dr Ru Garay         History of Present Illness:   Referred by Dr Ru Garay  Paroxysmally atrial fibrillation -  Patient was seeing Dr Connor  before but wants to be followed up by our practice  He is on metoprolol tartrate for rate control and Eliquis for anticoagulation  Rare episodes of palpitations that last for a very short period of time  Denies bleeding from any site, chest pain / pressure / tightness, SOB, lightheadedness, syncope, swelling feet, orthopnea, PND, claudication  CHADS2-VASc score = 2 (HTN, Age x 1)  HTN -  Has been hypertensive for many years  Taking medications regularly  Denies lightheadedness, headache, medication side effects  Obesity -  Trying to lose weight  DORA - states he uses CPAP regularly    Patient has post-polio syndrome but for which he wears leg braces    Also has  Peripheral neuropathy and cervical myelopathy    Is on furosemide 40 mg daily because he states "he retains fluid "      Past Medical History:  Past Medical History:   Diagnosis Date    A-fib ([facility] [address] )     Gout     HTN (hypertension)     Hyperlipidemia     Neuropathy     Polio     Sleep apnea          Past Surgical History:  Past Surgical History:   Procedure Laterality Date    COLON SURGERY      HERNIA REPAIR           Family History:  Non-contributory      Social History:  Social History     Social History    Marital status: /Civil Union     Spouse name: N/A    Number of children: N/A    Years of education: N/A     Social History Main Topics    Smoking status: Never Smoker    Smokeless tobacco: Never Used    Alcohol use No    Drug use: No    Sexual activity: Not on file     Other Topics Concern    Not on file     Social History Narrative    No narrative on file         Active Problems:  Patient Active Problem List   Diagnosis    Atrial fibrillation ([address] )    Bilateral arm weakness    Bilateral edema of lower extremity    Benign prostatic hyperplasia    Cervical myelopathy (HCC)    Essential hypertension    Gout, arthritis    Mixed hyperlipidemia    DORA on CPAP    Osteoporosis    Peptic reflux disease    Post-polio syndrome    Obesity (BMI 30-39  9)         The following portions of the patient's history were reviewed and updated as appropriate: past medical history, past surgical history, past family history,  past social history, current medications, allergies and problem list       Review of Systems:  Constitutional: Denies fever, chills, fatigue  Eyes: Denies eye redness, eye discharge, double vision  ENT: Denies hearing loss, tinnitus, sneezing, nasal discharge, sore throat   Respiratory: Denies cough, expectoration, hemoptysis, shortness of breath  Cardiovascular: Denies chest pain, orthopnea, PND, lower extremity swelling  Palpitations+  Gastrointestinal: Denies abdominal pain, nausea, vomiting, hematemesis, diarrhea, bloody stools  Genito-Urinary: Denies dysuria, incontinence  Neurologic: Denies confusion, lightheadedness, syncope, headache, focal weakness, sensory changes, seizures  Endocrine: Denies polyuria, polydipsia, temperature intolerance  Allergy and Immunology: Denies hives, insect bite sensitivity  Hematological and Lymphatic: Denies bleeding problems, swollen glands   Psychological: Denies depression, suicidal ideation, anxiety, panic  Dermatological: Denies pruritus, rash, skin lesion changes      Vitals:  Vitals:    02/21/18 0830   BP: 124/68   Pulse: 87   SpO2: 97%       Body mass index is 37 69 kg/m²      Weight (last 2 days)     Date/Time   Weight    02/21/18 0830  104 (230)                Physical Examination:  General: Patient is not in acute distress  Wearing leg braces  Awake, alert, oriented in time, place and person  Responding to commands  Head: Normocephalic  Atraumatic  Eyes: Both pupils normal sized, round and reactive to light  Nonicteric  ENT: Normal external ear canals  Nares normal, no drainage  Lips and oral mucosa normal  Neck: Supple  JVP not raised  Trachea central  No thyromegaly  Lungs: Bilateral bronchovascular breath sounds with no crackles or rhonchi  Chest wall: No tenderness  Cardiovascular: RRR  S1 and S2 normal  No murmur, rub or gallop  Gastrointestinal: Abdomen soft, nontender  No guarding or rigidity  Liver and spleen not palpable  Bowel sounds present  Neurologic: Patient is awake, alert, oriented in time, place and person  Responding to command  Moving all extremities  Integumentary:  No skin rash  Lymphatic: No cervical lymphadenopathy  Back: Symmetric  No CVA tenderness  Extremities: No clubbing, cyanosis or edema   Wearing leg braces and using cane for support      Laboratory Results:  CBC with diff:   Lab Results   Component Value Date    WBC 10 77 (H) 11/13/2017    RBC 5 07 11/13/2017    HGB 14 1 11/13/2017    HCT 43 6 11/13/2017    MCV 86 11/13/2017    MCH 27 8 11/13/2017    RDW 14 7 11/13/2017     11/13/2017       CMP:  Lab Results   Component Value Date    CREATININE 1 04 12/29/2017    BUN 24 12/29/2017     12/29/2017    K 4 9 12/29/2017     12/29/2017    CO2 33 (H) 12/29/2017    GLUCOSE 116 12/29/2017    PROT 7 7 12/29/2017    PROT 6 8 07/21/2015    ALKPHOS 74 12/29/2017    ALT 27 12/29/2017    AST 21 12/29/2017       No results found for: HGBA1C, MG, PHOS    Lab Results   Component Value Date    TROPONINI <0 02 11/13/2017       Lipid Profile:   Lab Results   Component Value Date    CHOL 193 11/13/2017     Lab Results   Component Value Date    HDL 47 11/13/2017       Medications:    Current Outpatient Prescriptions:     apixaban (ELIQUIS) 5 mg, Take 5 mg by mouth 2 (two) times a day, Disp: , Rfl:     baclofen 10 mg tablet, Take 10 mg by mouth 3 (three) times a day, Disp: , Rfl:     colchicine (COLCRYS) 0 6 mg tablet, Take 0 6 mg by mouth daily, Disp: , Rfl:     digoxin (LANOXIN) 0 125 mg tablet, Take 125 mcg by mouth daily, Disp: , Rfl:     fluticasone (FLONASE) 50 mcg/act nasal spray, 1 spray into each nostril daily, Disp: , Rfl:     furosemide (LASIX) 40 mg tablet, Take 40 mg by mouth daily, Disp: , Rfl:     indomethacin (INDOCIN) 25 mg capsule, Take 25 mg by mouth 3 (three) times a day with meals, Disp: , Rfl:     loratadine (CLARITIN) 10 mg tablet, Take 10 mg by mouth daily, Disp: , Rfl:     Methylprednisolone 4 MG TBPK, Use as directed on package, Disp: 21 tablet, Rfl: 0    metoprolol tartrate (LOPRESSOR) 50 mg tablet, Take 25 mg by mouth every 12 (twelve) hours, Disp: , Rfl:     tamsulosin (FLOMAX) 0 4 mg, Take 0 4 mg by mouth daily with dinner, Disp: , Rfl:     allopurinol (ZYLOPRIM) 300 mg tablet, Take 300 mg by mouth daily, Disp: , Rfl:     gabapentin (NEURONTIN) 300 mg capsule, Take 1 capsule (300 mg total) by mouth 2 (two) times a day, Disp: 60 capsule, Rfl: 1    predniSONE 10 mg tablet, Five tablets daily for 2 days then decrease by 1 tablet every 2 days until finished, Disp: 30 tablet, Rfl: 0      Allergies:  No Known Allergies      Assessment and Plan:  1  Paroxysmal atrial fibrillation ([facility] [address] )   I had a detailed discussion with the patient and spouse regarding atrial fibrillation -- its pathophysiology, stroke risk, anticoagulation, bleeding risk, rate versus rhythm control etc   Multiple questions were answered  Continue Eliquis for anticoagulation  Continue metoprolol for rate control  Patient can take 25 mg metoprolol extra on days when he has palpitations  2D echocardiogram ordered for evaluation  - Echo complete with contrast if indicated; Future    2  Essential hypertension   BP stable  Continue current medications    3   DORA on CPAP   counseled to continue using CPAP regularly    4  Obesity (BMI 30-39 9)   counseled to try to lose weight    Recommend aggressive risk factor modification and therapeutic lifestyle changes  Low-salt, low-calorie, low-fat, low-cholesterol diet with regular exercise and to optimize weight  I will defer the ordering and monitoring of necessity lab studies to you, but I am available and happy to review and manage any of the data at your request in the future  Discussed concepts of atherosclerosis, including signs and symptoms of cardiac disease  Previous studies were reviewed  Safety measures were reviewed  Questions were entertained and answered  Patient was advised to report any problems requiring medical attention  Follow-up with PCP and appropriate specialist and lab work as discussed  Return for follow up visit as scheduled or earlier, if needed  Further recommendations will be forthcoming after the above testing is performed and results available  Thank you for allowing me to participate in the care and evaluation of your patient  Should you have any questions, please feel free to contact me        Ellis Selby MD  2/21/2018,9:08 AM

## 2018-02-22 DIAGNOSIS — M1A.0720 CHRONIC GOUT OF LEFT FOOT, UNSPECIFIED CAUSE: Primary | ICD-10-CM

## 2018-02-22 RX ORDER — ALLOPURINOL 300 MG/1
TABLET ORAL
Qty: 30 TABLET | Refills: 5 | Status: SHIPPED | OUTPATIENT
Start: 2018-02-22 | End: 2018-05-18 | Stop reason: SDUPTHER

## 2018-02-26 ENCOUNTER — TELEPHONE (OUTPATIENT)
Dept: FAMILY MEDICINE CLINIC | Facility: CLINIC | Age: 68
End: 2018-02-26

## 2018-02-26 NOTE — TELEPHONE ENCOUNTER
Pt wife called and wanted to let you know he is currently at [facility] in-patient, pt fell and broke r leg   He broke his femur, had surgery and place screws

## 2018-03-06 ENCOUNTER — TELEPHONE (OUTPATIENT)
Dept: FAMILY MEDICINE CLINIC | Facility: CLINIC | Age: 68
End: 2018-03-06

## 2018-03-06 NOTE — TELEPHONE ENCOUNTER
I would recommend she have the nursing staff notify his attending physician there, he likely is sundowning and he may need to be on something different  Unfortunately I cannot prescribe something there since I am not on staff there

## 2018-03-06 NOTE — TELEPHONE ENCOUNTER
Pt broke leg and was in [facility] - he was d/c to [facility] - he is having severe hallucinations - they put him on Duloxetine for severe depression - Jesenia states that he does not have severe depression- she is extremely upset and would appreciate a call from you to advise her what she can do - [phone]

## 2018-03-13 ENCOUNTER — TELEPHONE (OUTPATIENT)
Dept: CARDIOLOGY CLINIC | Facility: CLINIC | Age: 68
End: 2018-03-13

## 2018-03-13 NOTE — TELEPHONE ENCOUNTER
PT WAS SCHEDULED TO HAVE AN ECHO AND CANCELED DUE TO PT BREAKING HIS LEG  PT WAS AT [facility] [address] AND HIS WIFE BELIEVES THEY DID AN ECHO AT [facility] [address]  PT IS CURRENTLY AT [facility] AND PT WIFE SAID HIS BP HAS BEEN RUNNING LOW  PLEASE CALL HER TO DISCUSS  Nan Loredo

## 2018-03-13 NOTE — TELEPHONE ENCOUNTER
Left message for pt - advised NO echo done at [facility] to call back regarding BP

## 2018-03-24 ENCOUNTER — APPOINTMENT (EMERGENCY)
Dept: CT IMAGING | Facility: HOSPITAL | Age: 68
DRG: 309 | End: 2018-03-24
Payer: MEDICARE

## 2018-03-24 ENCOUNTER — APPOINTMENT (EMERGENCY)
Dept: RADIOLOGY | Facility: HOSPITAL | Age: 68
DRG: 309 | End: 2018-03-24
Payer: MEDICARE

## 2018-03-24 ENCOUNTER — HOSPITAL ENCOUNTER (INPATIENT)
Facility: HOSPITAL | Age: 68
LOS: 6 days | DRG: 309 | End: 2018-03-30
Attending: EMERGENCY MEDICINE | Admitting: GENERAL PRACTICE
Payer: MEDICARE

## 2018-03-24 DIAGNOSIS — I48.0 PAROXYSMAL ATRIAL FIBRILLATION (HCC): Primary | ICD-10-CM

## 2018-03-24 DIAGNOSIS — A80.9 POLIO: ICD-10-CM

## 2018-03-24 DIAGNOSIS — L89.159 SACRAL DECUBITUS ULCER: ICD-10-CM

## 2018-03-24 DIAGNOSIS — R19.7 DIARRHEA: ICD-10-CM

## 2018-03-24 DIAGNOSIS — L89.609: ICD-10-CM

## 2018-03-24 PROBLEM — E87.6 HYPOKALEMIA: Status: ACTIVE | Noted: 2018-03-24

## 2018-03-24 PROBLEM — D72.829 LEUKOCYTOSIS: Status: ACTIVE | Noted: 2018-03-24

## 2018-03-24 PROBLEM — E87.1 HYPONATREMIA: Status: ACTIVE | Noted: 2018-03-24

## 2018-03-24 LAB
ALBUMIN SERPL BCP-MCNC: 2.8 G/DL (ref 3.5–5)
ALP SERPL-CCNC: 116 U/L (ref 46–116)
ALT SERPL W P-5'-P-CCNC: 15 U/L (ref 12–78)
ANION GAP SERPL CALCULATED.3IONS-SCNC: 8 MMOL/L (ref 4–13)
APTT PPP: 50 SECONDS (ref 23–35)
AST SERPL W P-5'-P-CCNC: 24 U/L (ref 5–45)
BASOPHILS # BLD AUTO: 0.06 THOUSANDS/ΜL (ref 0–0.1)
BASOPHILS NFR BLD AUTO: 0 % (ref 0–1)
BILIRUB DIRECT SERPL-MCNC: 0.15 MG/DL (ref 0–0.2)
BILIRUB SERPL-MCNC: 0.5 MG/DL (ref 0.2–1)
BILIRUB UR QL STRIP: NEGATIVE
BUN SERPL-MCNC: 10 MG/DL (ref 5–25)
CALCIUM SERPL-MCNC: 9.2 MG/DL (ref 8.3–10.1)
CHLORIDE SERPL-SCNC: 85 MMOL/L (ref 100–108)
CLARITY UR: CLEAR
CO2 SERPL-SCNC: 37 MMOL/L (ref 21–32)
COLOR UR: YELLOW
CREAT SERPL-MCNC: 1.07 MG/DL (ref 0.6–1.3)
DIGOXIN SERPL-MCNC: 1.6 NG/ML (ref 0.8–2)
EOSINOPHIL # BLD AUTO: 0.09 THOUSAND/ΜL (ref 0–0.61)
EOSINOPHIL NFR BLD AUTO: 1 % (ref 0–6)
ERYTHROCYTE [DISTWIDTH] IN BLOOD BY AUTOMATED COUNT: 13.9 % (ref 11.6–15.1)
GFR SERPL CREATININE-BSD FRML MDRD: 71 ML/MIN/1.73SQ M
GLUCOSE SERPL-MCNC: 125 MG/DL (ref 65–140)
GLUCOSE UR STRIP-MCNC: NEGATIVE MG/DL
HCT VFR BLD AUTO: 42 % (ref 36.5–49.3)
HGB BLD-MCNC: 13.5 G/DL (ref 12–17)
HGB UR QL STRIP.AUTO: NEGATIVE
INR PPP: 1.73 (ref 0.86–1.16)
KETONES UR STRIP-MCNC: NEGATIVE MG/DL
LACTATE SERPL-SCNC: 1.5 MMOL/L (ref 0.5–2)
LACTATE SERPL-SCNC: 1.9 MMOL/L (ref 0.5–2)
LACTATE SERPL-SCNC: 3.2 MMOL/L (ref 0.5–2)
LEUKOCYTE ESTERASE UR QL STRIP: NEGATIVE
LYMPHOCYTES # BLD AUTO: 0.99 THOUSANDS/ΜL (ref 0.6–4.47)
LYMPHOCYTES NFR BLD AUTO: 6 % (ref 14–44)
MCH RBC QN AUTO: 27.4 PG (ref 26.8–34.3)
MCHC RBC AUTO-ENTMCNC: 32.1 G/DL (ref 31.4–37.4)
MCV RBC AUTO: 85 FL (ref 82–98)
MONOCYTES # BLD AUTO: 1.7 THOUSAND/ΜL (ref 0.17–1.22)
MONOCYTES NFR BLD AUTO: 10 % (ref 4–12)
NEUTROPHILS # BLD AUTO: 13.62 THOUSANDS/ΜL (ref 1.85–7.62)
NEUTS SEG NFR BLD AUTO: 82 % (ref 43–75)
NITRITE UR QL STRIP: NEGATIVE
NRBC BLD AUTO-RTO: 0 /100 WBCS
PH UR STRIP.AUTO: 5.5 [PH] (ref 4.5–8)
PLATELET # BLD AUTO: 394 THOUSANDS/UL (ref 149–390)
PMV BLD AUTO: 9.1 FL (ref 8.9–12.7)
POTASSIUM SERPL-SCNC: 3.1 MMOL/L (ref 3.5–5.3)
PROT SERPL-MCNC: 7.6 G/DL (ref 6.4–8.2)
PROT UR STRIP-MCNC: NEGATIVE MG/DL
PROTHROMBIN TIME: 20.7 SECONDS (ref 12.1–14.4)
RBC # BLD AUTO: 4.92 MILLION/UL (ref 3.88–5.62)
SODIUM SERPL-SCNC: 130 MMOL/L (ref 136–145)
SP GR UR STRIP.AUTO: <=1.005 (ref 1–1.03)
TROPONIN I SERPL-MCNC: <0.02 NG/ML
UROBILINOGEN UR QL STRIP.AUTO: 0.2 E.U./DL
WBC # BLD AUTO: 16.62 THOUSAND/UL (ref 4.31–10.16)

## 2018-03-24 PROCEDURE — 74177 CT ABD & PELVIS W/CONTRAST: CPT

## 2018-03-24 PROCEDURE — 80162 ASSAY OF DIGOXIN TOTAL: CPT | Performed by: PHYSICIAN ASSISTANT

## 2018-03-24 PROCEDURE — 80048 BASIC METABOLIC PNL TOTAL CA: CPT | Performed by: PHYSICIAN ASSISTANT

## 2018-03-24 PROCEDURE — 96374 THER/PROPH/DIAG INJ IV PUSH: CPT

## 2018-03-24 PROCEDURE — 85610 PROTHROMBIN TIME: CPT | Performed by: PHYSICIAN ASSISTANT

## 2018-03-24 PROCEDURE — 83605 ASSAY OF LACTIC ACID: CPT | Performed by: PHYSICIAN ASSISTANT

## 2018-03-24 PROCEDURE — 96375 TX/PRO/DX INJ NEW DRUG ADDON: CPT

## 2018-03-24 PROCEDURE — 85730 THROMBOPLASTIN TIME PARTIAL: CPT | Performed by: PHYSICIAN ASSISTANT

## 2018-03-24 PROCEDURE — 84484 ASSAY OF TROPONIN QUANT: CPT | Performed by: PHYSICIAN ASSISTANT

## 2018-03-24 PROCEDURE — 36415 COLL VENOUS BLD VENIPUNCTURE: CPT | Performed by: PHYSICIAN ASSISTANT

## 2018-03-24 PROCEDURE — 71275 CT ANGIOGRAPHY CHEST: CPT

## 2018-03-24 PROCEDURE — 80076 HEPATIC FUNCTION PANEL: CPT | Performed by: PHYSICIAN ASSISTANT

## 2018-03-24 PROCEDURE — 96361 HYDRATE IV INFUSION ADD-ON: CPT

## 2018-03-24 PROCEDURE — 85025 COMPLETE CBC W/AUTO DIFF WBC: CPT | Performed by: PHYSICIAN ASSISTANT

## 2018-03-24 PROCEDURE — 81003 URINALYSIS AUTO W/O SCOPE: CPT | Performed by: PHYSICIAN ASSISTANT

## 2018-03-24 PROCEDURE — 99285 EMERGENCY DEPT VISIT HI MDM: CPT

## 2018-03-24 PROCEDURE — 71046 X-RAY EXAM CHEST 2 VIEWS: CPT

## 2018-03-24 PROCEDURE — 99223 1ST HOSP IP/OBS HIGH 75: CPT | Performed by: PHYSICIAN ASSISTANT

## 2018-03-24 PROCEDURE — 87040 BLOOD CULTURE FOR BACTERIA: CPT | Performed by: PHYSICIAN ASSISTANT

## 2018-03-24 PROCEDURE — 93005 ELECTROCARDIOGRAM TRACING: CPT

## 2018-03-24 RX ORDER — METOPROLOL TARTRATE 5 MG/5ML
5 INJECTION INTRAVENOUS EVERY 6 HOURS PRN
Status: DISCONTINUED | OUTPATIENT
Start: 2018-03-24 | End: 2018-03-28

## 2018-03-24 RX ORDER — LORATADINE 10 MG/1
10 TABLET ORAL DAILY
Status: DISCONTINUED | OUTPATIENT
Start: 2018-03-25 | End: 2018-03-30 | Stop reason: HOSPADM

## 2018-03-24 RX ORDER — ONDANSETRON 2 MG/ML
4 INJECTION INTRAMUSCULAR; INTRAVENOUS EVERY 6 HOURS PRN
Status: DISCONTINUED | OUTPATIENT
Start: 2018-03-24 | End: 2018-03-30 | Stop reason: HOSPADM

## 2018-03-24 RX ORDER — ONDANSETRON 2 MG/ML
4 INJECTION INTRAMUSCULAR; INTRAVENOUS ONCE
Status: COMPLETED | OUTPATIENT
Start: 2018-03-24 | End: 2018-03-24

## 2018-03-24 RX ORDER — POTASSIUM CHLORIDE 20 MEQ/1
40 TABLET, EXTENDED RELEASE ORAL ONCE
Status: COMPLETED | OUTPATIENT
Start: 2018-03-24 | End: 2018-03-24

## 2018-03-24 RX ORDER — TAMSULOSIN HYDROCHLORIDE 0.4 MG/1
0.4 CAPSULE ORAL
Status: DISCONTINUED | OUTPATIENT
Start: 2018-03-25 | End: 2018-03-30 | Stop reason: HOSPADM

## 2018-03-24 RX ORDER — ALLOPURINOL 300 MG/1
300 TABLET ORAL DAILY
Status: DISCONTINUED | OUTPATIENT
Start: 2018-03-25 | End: 2018-03-30 | Stop reason: HOSPADM

## 2018-03-24 RX ORDER — METOPROLOL TARTRATE 5 MG/5ML
5 INJECTION INTRAVENOUS ONCE
Status: COMPLETED | OUTPATIENT
Start: 2018-03-24 | End: 2018-03-24

## 2018-03-24 RX ORDER — FLUTICASONE PROPIONATE 50 MCG
1 SPRAY, SUSPENSION (ML) NASAL DAILY
Status: DISCONTINUED | OUTPATIENT
Start: 2018-03-25 | End: 2018-03-30 | Stop reason: HOSPADM

## 2018-03-24 RX ORDER — DIGOXIN 125 MCG
125 TABLET ORAL DAILY
Status: DISCONTINUED | OUTPATIENT
Start: 2018-03-25 | End: 2018-03-30 | Stop reason: HOSPADM

## 2018-03-24 RX ORDER — BACLOFEN 10 MG/1
10 TABLET ORAL 3 TIMES DAILY PRN
Status: DISCONTINUED | OUTPATIENT
Start: 2018-03-24 | End: 2018-03-30 | Stop reason: HOSPADM

## 2018-03-24 RX ADMIN — POTASSIUM CHLORIDE 40 MEQ: 1500 TABLET, EXTENDED RELEASE ORAL at 22:56

## 2018-03-24 RX ADMIN — SODIUM CHLORIDE 1000 ML: 0.9 INJECTION, SOLUTION INTRAVENOUS at 14:59

## 2018-03-24 RX ADMIN — METOPROLOL TARTRATE 25 MG: 25 TABLET ORAL at 22:56

## 2018-03-24 RX ADMIN — APIXABAN 5 MG: 5 TABLET, FILM COATED ORAL at 22:57

## 2018-03-24 RX ADMIN — METOPROLOL TARTRATE 5 MG: 5 INJECTION, SOLUTION INTRAVENOUS at 14:59

## 2018-03-24 RX ADMIN — IOHEXOL 100 ML: 350 INJECTION, SOLUTION INTRAVENOUS at 16:20

## 2018-03-24 RX ADMIN — SODIUM CHLORIDE 2045 ML: 0.9 INJECTION, SOLUTION INTRAVENOUS at 16:36

## 2018-03-24 RX ADMIN — ONDANSETRON 4 MG: 2 INJECTION INTRAMUSCULAR; INTRAVENOUS at 14:59

## 2018-03-24 NOTE — ED PROVIDER NOTES
History  Chief Complaint   Patient presents with    Nausea     pt reports not feeling good pt has a-fib  pt reports vomitting this morning and on the way here  pt lives at [place]  pt reports not wanting to eat but is drinking  29-year-old male with past medical history significant for paroxysmal atrial fibrillation on Eliquis, digoxin and metoprolol for rate control, gout, polio and recent right lower extremity fracture status post surgical fixation at [facility] 1 month ago presents to the emergency department with chief complaint of fevers, tachycardia and nausea  Onset of symptoms reported as this morning  Patient reports symptoms have been ongoing for approximately 1 month since his surgery  Location of symptoms reported as diffuse with multiples location is reported  Quality is reported as nausea with 1 episode of vomiting, lightheadedness, sensation of rapid heart rates and fevers  Severity is reported as moderate  Associated symptoms:  Positive for nausea  Positive for 1 episode of vomiting  Positive for constipation  Positive for fevers  Denies chest pain  Positive for palpitations  Positive for shortness of breath  Modifying factors:  Patient reports he has been at [facility] rehab facility since his surgery  He reports that he has been given changing doses of metoprolol for atrial fibrillation rate control, has been given Tylenol for fevers has been given antibiotics including vancomycin but is unsure what these have for given for  He reports his symptoms have gotten significantly worse today prompting him to come to ED for further evaluation  Medical summary: review of past visits via [employer] demonstrates patient was last seen in ED on 11/13/2017 for evaluation of gout  History provided by:  Patient and spouse   used: No        Prior to Admission Medications   Prescriptions Last Dose Informant Patient Reported? Taking?    Methylprednisolone 4 MG TBPK   No No Sig: Use as directed on package   allopurinol (ZYLOPRIM) 300 mg tablet   No No   Sig: TAKE 1 TABLET BY MOUTH EVERY DAY   apixaban (ELIQUIS) 5 mg   Yes No   Sig: Take 5 mg by mouth 2 (two) times a day   baclofen 10 mg tablet   Yes No   Sig: Take 10 mg by mouth 3 (three) times a day   colchicine (COLCRYS) 0 6 mg tablet   Yes No   Sig: Take 0 6 mg by mouth daily   digoxin (LANOXIN) 0 125 mg tablet   Yes No   Sig: Take 125 mcg by mouth daily   fluticasone (FLONASE) 50 mcg/act nasal spray   Yes No   Si spray into each nostril daily   furosemide (LASIX) 40 mg tablet   No No   Sig: Take 1 tablet (40 mg total) by mouth daily as needed (ankle swelling)   indomethacin (INDOCIN) 25 mg capsule   Yes No   Sig: Take 25 mg by mouth 3 (three) times a day with meals   loratadine (CLARITIN) 10 mg tablet   Yes No   Sig: Take 10 mg by mouth daily   metoprolol tartrate (LOPRESSOR) 50 mg tablet   Yes No   Sig: Take 25 mg by mouth every 12 (twelve) hours   tamsulosin (FLOMAX) 0 4 mg   Yes No   Sig: Take 0 4 mg by mouth daily with dinner      Facility-Administered Medications: None       Past Medical History:   Diagnosis Date    A-fib ([facility] )     Gout     HTN (hypertension)     Neuropathy     Polio     Sleep apnea        Past Surgical History:   Procedure Laterality Date    COLON SURGERY      FEMUR FRACTURE SURGERY Right     HERNIA REPAIR         History reviewed  No pertinent family history  I have reviewed and agree with the history as documented  Social History   Substance Use Topics    Smoking status: Never Smoker    Smokeless tobacco: Never Used    Alcohol use No        Review of Systems   Constitutional: Positive for appetite change, fatigue and fever  Negative for activity change, chills, diaphoresis and unexpected weight change     HENT: Negative for congestion, dental problem, drooling, ear discharge, ear pain, facial swelling, hearing loss, mouth sores, nosebleeds, postnasal drip, rhinorrhea, sinus pain, sinus pressure, sneezing, sore throat, tinnitus, trouble swallowing and voice change  Eyes: Negative for photophobia, pain, discharge, redness, itching and visual disturbance  Respiratory: Positive for cough and shortness of breath  Negative for apnea, choking, chest tightness and wheezing  Cardiovascular: Positive for palpitations  Negative for chest pain and leg swelling  Gastrointestinal: Positive for constipation, nausea and vomiting  Negative for abdominal distention, abdominal pain, anal bleeding, blood in stool and diarrhea  Endocrine: Negative for cold intolerance, heat intolerance, polydipsia, polyphagia and polyuria  Genitourinary: Positive for difficulty urinating  Negative for dysuria, flank pain, frequency, hematuria and urgency  Musculoskeletal: Positive for arthralgias, gait problem and joint swelling  Negative for back pain, myalgias, neck pain and neck stiffness  Skin: Positive for wound  Negative for color change, pallor and rash  Allergic/Immunologic: Negative for environmental allergies, food allergies and immunocompromised state  Neurological: Negative for dizziness, tremors, seizures, syncope, facial asymmetry, speech difficulty, weakness, light-headedness, numbness and headaches  Hematological: Negative for adenopathy  Does not bruise/bleed easily  Psychiatric/Behavioral: Negative for agitation, confusion and hallucinations  The patient is not nervous/anxious  All other systems reviewed and are negative        Physical Exam  ED Triage Vitals   Temperature Pulse Respirations Blood Pressure SpO2   03/24/18 1345 03/24/18 1345 03/24/18 1345 03/24/18 1345 03/24/18 1345   99 2 °F (37 3 °C) (!) 145 16 124/65 94 %      Temp Source Heart Rate Source Patient Position - Orthostatic VS BP Location FiO2 (%)   03/24/18 1345 03/24/18 1345 03/24/18 1345 03/24/18 1345 03/24/18 1755   Oral Monitor Lying Left arm 99      Pain Score       03/24/18 1345       No Pain           Orthostatic Vital Signs  Vitals:    03/24/18 1815 03/24/18 1830 03/24/18 1845 03/24/18 1900   BP: 107/63 100/62  109/62   Pulse: (!) 108 (!) 112 104 95   Patient Position - Orthostatic VS:           Physical Exam   Constitutional: He is oriented to person, place, and time  He appears well-developed and well-nourished  No distress  /65 (BP Location: Left arm)   Pulse (!) 145   Temp 99 2 °F (37 3 °C) (Oral)   Resp 20   Ht 5' 6" (1 676 m)   Wt 102 kg (224 lb 6 9 oz)   SpO2 934%   BMI 36 22 kg/m²     o2 sat 94% room air - low/patient observed  Pulse tachycardic - suspect afib, blood pressure normal, continue to monitor  HENT:   Head: Normocephalic and atraumatic  Right Ear: External ear normal    Left Ear: External ear normal    Nose: Nose normal    Mouth/Throat: Oropharynx is clear and moist  No oropharyngeal exudate  Eyes: Conjunctivae and EOM are normal  Pupils are equal, round, and reactive to light  Right eye exhibits no discharge  Left eye exhibits no discharge  No scleral icterus  Neck: Normal range of motion  Neck supple  No JVD present  No tracheal deviation present  No thyromegaly present  Cardiovascular: Intact distal pulses  An irregularly irregular rhythm present  Tachycardia present  Pulmonary/Chest: Effort normal and breath sounds normal  No stridor  No respiratory distress  He has no wheezes  He has no rales  He exhibits no tenderness  Abdominal: Soft  Bowel sounds are normal  He exhibits distension  He exhibits no mass  There is no tenderness  There is no rebound and no guarding  Musculoskeletal: He exhibits edema  He exhibits no tenderness or deformity  There is edema to bilateral lower extremities, right greater than left  There is some atrophy to muscles of bilateral lower legs  Cap refill less  Than 3 seconds to all toes  Lymphadenopathy:     He has no cervical adenopathy  Neurological: He is alert and oriented to person, place, and time  He displays normal reflexes  No cranial nerve deficit or sensory deficit  He exhibits abnormal muscle tone  Coordination normal    Skin: Skin is warm and dry  Capillary refill takes less than 2 seconds  No rash noted  He is not diaphoretic  No erythema  No pallor  Right knee surgical wound present - intact, no dehiscence,  There is no surrounding erythema, bleeding or purulent drainage  Patient does have decubitus ulcer to medial surface of calcaneous on the right  There is additionally a stage 1 sacral decubitus to left upper buttocks  Psychiatric: He has a normal mood and affect  His behavior is normal  Judgment and thought content normal    Nursing note and vitals reviewed  ED Medications  Medications   sodium chloride 0 9 % bolus 1,000 mL (0 mL Intravenous Stopped 3/24/18 1559)   ondansetron (ZOFRAN) injection 4 mg (4 mg Intravenous Given 3/24/18 1459)   metoprolol (LOPRESSOR) injection 5 mg (5 mg Intravenous Given 3/24/18 1459)   sodium chloride 0 9 % bolus 2,045 mL (0 mL Intravenous Stopped 3/24/18 1736)   iohexol (OMNIPAQUE) 350 MG/ML injection (MULTI-DOSE) 100 mL (100 mL Intravenous Given 3/24/18 1620)       Diagnostic Studies  Results Reviewed     Procedure Component Value Units Date/Time    Lactic acid x2 [[id number]]  (Normal) Collected:  03/24/18 1814    Lab Status:  Final result Specimen:  Blood from Arm, Right Updated:  03/24/18 1842     LACTIC ACID 1 5 mmol/L     Narrative:         Result may be elevated if tourniquet was used during collection      UA w Reflex to Microscopic w Reflex to Culture [[id number]]  (Normal) Collected:  03/24/18 1737    Lab Status:  Final result Specimen:  Urine from Urine, Indwelling Gamino Catheter Updated:  03/24/18 1750     Color, UA Yellow     Clarity, UA Clear     Specific Gravity, UA <=1 005     pH, UA 5 5     Leukocytes, UA Negative     Nitrite, UA Negative     Protein, UA Negative mg/dl      Glucose, UA Negative mg/dl      Ketones, UA Negative mg/dl      Urobilinogen, UA 0 2 E U /dl Bilirubin, UA Negative     Blood, UA Negative    Lactic acid x2 [[id number]]  (Normal) Collected:  03/24/18 1710    Lab Status:  Final result Specimen:  Blood from Hand, Left Updated:  03/24/18 1738     LACTIC ACID 1 9 mmol/L     Narrative:         Result may be elevated if tourniquet was used during collection  Lactic acid, plasma [[id number]]  (Abnormal) Collected:  03/24/18 1453    Lab Status:  Final result Specimen:  Blood from Arm, Right Updated:  03/24/18 1550     LACTIC ACID 3 2 (HH) mmol/L     Narrative:         Result may be elevated if tourniquet was used during collection  Basic metabolic panel [[id number]]  (Abnormal) Collected:  03/24/18 1453    Lab Status:  Final result Specimen:  Blood from Arm, Right Updated:  03/24/18 1532     Sodium 130 (L) mmol/L      Potassium 3 1 (L) mmol/L      Chloride 85 (L) mmol/L      CO2 37 (H) mmol/L      Anion Gap 8 mmol/L      BUN 10 mg/dL      Creatinine 1 07 mg/dL      Glucose 125 mg/dL      Calcium 9 2 mg/dL      eGFR 71 ml/min/1 73sq m     Narrative:         National Kidney Disease Education Program recommendations are as follows:  GFR calculation is accurate only with a steady state creatinine  Chronic Kidney disease less than 60 ml/min/1 73 sq  meters  Kidney failure less than 15 ml/min/1 73 sq  meters      Hepatic function panel [[id number]]  (Abnormal) Collected:  03/24/18 1453    Lab Status:  Final result Specimen:  Blood from Arm, Right Updated:  03/24/18 1532     Total Bilirubin 0 50 mg/dL      Bilirubin, Direct 0 15 mg/dL      Alkaline Phosphatase 116 U/L      AST 24 U/L      ALT 15 U/L      Total Protein 7 6 g/dL      Albumin 2 8 (L) g/dL     Troponin I [[id number]]  (Normal) Collected:  03/24/18 1453    Lab Status:  Final result Specimen:  Blood from Arm, Right Updated:  03/24/18 1530     Troponin I <0 02 ng/mL     Narrative:         [employer] Chemistry analyzer 99% cutoff is > 0 04 ng/mL in network labs    o cTnI 99% cutoff is useful only when applied to patients in the clinical setting of myocardial ischemia  o cTnI 99% cutoff should be interpreted in the context of clinical history, ECG findings and possibly cardiac imaging to establish correct diagnosis  o cTnI 99% cutoff may be suggestive but clearly not indicative of a coronary event without the clinical setting of myocardial ischemia  Digoxin level [[id number]]  (Normal) Collected:  03/24/18 1453    Lab Status:  Final result Specimen:  Blood from Arm, Right Updated:  03/24/18 1529     Digoxin Lvl 1 6 ng/mL     Blood culture #1 [[id number]] Collected:  03/24/18 1521    Lab Status: In process Specimen:  Blood from Arm, Left Updated:  03/24/18 1524    APTT [[id number]]  (Abnormal) Collected:  03/24/18 1453    Lab Status:  Final result Specimen:  Blood from Arm, Right Updated:  03/24/18 1524     PTT 50 (H) seconds     Narrative: Therapeutic Heparin Range = 60-90 seconds    Protime-INR [[id number]]  (Abnormal) Collected:  03/24/18 1453    Lab Status:  Final result Specimen:  Blood from Arm, Right Updated:  03/24/18 1524     Protime 20 7 (H) seconds      INR 1 73 (H)    CBC and differential [[id number]]  (Abnormal) Collected:  03/24/18 1453    Lab Status:  Final result Specimen:  Blood from Arm, Right Updated:  03/24/18 1512     WBC 16 62 (H) Thousand/uL      RBC 4 92 Million/uL      Hemoglobin 13 5 g/dL      Hematocrit 42 0 %      MCV 85 fL      MCH 27 4 pg      MCHC 32 1 g/dL      RDW 13 9 %      MPV 9 1 fL      Platelets 186 (H) Thousands/uL      nRBC 0 /100 WBCs      Neutrophils Relative 82 (H) %      Lymphocytes Relative 6 (L) %      Monocytes Relative 10 %      Eosinophils Relative 1 %      Basophils Relative 0 %      Neutrophils Absolute 13 62 (H) Thousands/µL      Lymphocytes Absolute 0 99 Thousands/µL      Monocytes Absolute 1 70 (H) Thousand/µL      Eosinophils Absolute 0 09 Thousand/µL      Basophils Absolute 0 06 Thousands/µL     Blood culture #2 [[id number]] Collected:  03/24/18 1453    Lab Status:   In process Specimen:  Blood from Arm, Right Updated:  03/24/18 1509                 PE Study with CT Abdomen and Pelvis with contrast   Final Result by Ifeanyi Whitten MD (03/24 1709)   1  No pulmonary embolus  2   Scattered calcified and noncalcified pleural plaques  3   Right greater than left bibasilar dependent atelectasis  4   Gallbladder distention without other evidence of acute cholecystitis  If there is focal right upper quadrant pain further evaluation with gallbladder ultrasound could be considered  5   Bilateral renal calculi without obstructive uropathy  6   Colonic diverticulosis without evidence of acute diverticulitis  7   Stable prominent right hilar lymph node measuring up to 1 5 cm short axis diameter with scattered subcentimeter mediastinal  lymph nodes  Workstation performed: [id number]         XR chest 2 views    (Results Pending)              Procedures  ECG 12 Lead Documentation  Date/Time: 3/24/2018 1:40 PM  Performed by: Sheldon Abreu  Authorized by: Estela Hutton     Indications / Diagnosis:  Atrial fibrillation  ECG reviewed by me, the ED Provider: yes    Patient location:  ED  Previous ECG:     Previous ECG:  Compared to current    Comparison ECG info:  Nov 13, 2017    Similarity:  Changes noted    Comparison to cardiac monitor: Yes    Interpretation:     Interpretation: normal    Rate:     ECG rate:  134    ECG rate assessment: tachycardic    Rhythm:     Rhythm: atrial fibrillation    Ectopy:     Ectopy: none    QRS:     QRS axis:  Normal    QRS intervals:  Normal  Conduction:     Conduction: abnormal      Abnormal conduction: LAFB    ST segments:     ST segments:  Abnormal    Depression:  II, III, V5 and V6  T waves:     T waves: normal             Phone Contacts  ED Phone Contact    ED Course  ED Course as of Mar 24 1929   Sat Mar 24, 2018   1604 Re-evaluation - pulse improved to 85 after iv lopressor and iv fluids  Blood pressure stable  Workup ongoing - concerning for possible sepsis however elevated HR may be related to atrial fibrillation                   HEART Risk Score    Flowsheet Row Most Recent Value   History  0 Filed at: 03/24/2018 1926   ECG  2 Filed at: 03/24/2018 1926   Age  2 Filed at: 03/24/2018 1926   Risk Factors  2 Filed at: 03/24/2018 1926   Troponin  0 Filed at: 03/24/2018 1926   Heart Score Risk Calculator   History  0 Filed at: 03/24/2018 1926   ECG  2 Filed at: 03/24/2018 1926   Age  2 Filed at: 03/24/2018 1926   Risk Factors  2 Filed at: 03/24/2018 1926   Troponin  0 Filed at: 03/24/2018 1926   HEART Score  6 Filed at: 03/24/2018 1926   HEART Score  6 Filed at: 03/24/2018 1926      decubitus to right heel              MARYBEL Risk Score    Flowsheet Row Most Recent Value   Age >= 72  1 Filed at: 03/24/2018 1926   Known CAD (stenosis >= 50%)  0 Filed at: 03/24/2018 1926   Recent (<=24 hrs) Service Angina  0 Filed at: 03/24/2018 1926   ST Deviation >= 0 5 mm  1 Filed at: 03/24/2018 1926   3+ CAD Risk Factors (FHx, HTN, HLP, DM, Smoker)  1 Filed at: 03/24/2018 1926   Aspirin Use Past 7 Days  0 Filed at: 03/24/2018 1926   Elevated Cardiac Markers  0 Filed at: 03/24/2018 1926   MARYBEL Risk Score (Calculated)  3 Filed at: 03/24/2018 1926              MDM  Number of Diagnoses or Management Options  Decubitus ulcer of heel: established and worsening  Paroxysmal atrial fibrillation ([facility] ): established and worsening  Polio:   Sacral decubitus ulcer: established and worsening  Diagnosis management comments: ddx includes but is not limited to pneumonia, surgical site infection, intraabdominal infection, mesenteric ischemia, uncontrolled afib, ACS, [place], dehydration, pulmonary edema, pulmonary embolism, uti, atelectasis, medication effect, digoxin toxicity, plan workup including iv fluids, rate control with metoprolol (patient and family report that patient's metoprolol dose for control of atrial fibrillation has been changed freuqently at rehab recently due to high and low blood pressures  )  Check cxr, consider ct chest abdomen, pelvis, check ekg, monitor HR,  Check labs  Unclear if sepsis is source for tachycardia vs uncontrolled afib  Patient has been on vancomycin and other antibiotics while at [facility] - unclear if partially treated sepsis or other source for uncontrolled afib  Lab/rad results reviewed:  Ct scan report reviewed:    1   No pulmonary embolus  2   Scattered calcified and noncalcified pleural plaques  3   Right greater than left bibasilar dependent atelectasis  4   Gallbladder distention without other evidence of acute cholecystitis   If there is focal right upper quadrant pain further evaluation with gallbladder ultrasound could be considered  5   Bilateral renal calculi without obstructive uropathy  6   Colonic diverticulosis without evidence of acute diverticulitis  7   Stable prominent right hilar lymph node measuring up to 1 5 cm short axis diameter with scattered subcentimeter mediastinal  lymph nodes    Lab results reviewed  CBC remarkable for white blood cell count elevated at 16 6  Hemoglobin and hematocrit are normal   INR is elevated at 1 73  Patient is taking Eliquis  Basic metabolic panel remarkable for sodium low at 130, potassium low at 3 1, CO2 elevated at 37, chloride low at 85  BUN and creatinine are normal   Hepatic function panel remarkable for albumin low at 2 8 otherwise normal  Troponin normal at less than 0 02  Digoxin level normal at 1 6  Lactic acid initially elevated at 3 2     chest xray images independently visualized and interpreted by me  No acute pneumothorax or infiltrate  1902: Re-evaluation:  Repeat lactate improved at 1 5 after IV fluids  Heart rate is improved ranging between 87 and 104  Urinalysis was reviewed and was negative  Patient did have over 700 cc of urine output upon catheterization  I reviewed all test results with patient and family members at bedside  At this time no definitive source for infection is identified  Discussed with family suspect that patient may be intermittently retaining urine  He reports he has had problems with this in the past which has been exacerbated by recent bouts of constipation  No signs of urinary tract infection however question if this is source for patient's intermittent fevers and tachycardia  Also suspect patient is experiencing effects of having constantly changing metoprolol dose with multiple doses held while at rehab  Patient does have a sacral decubitus on the left buttocks and a small decubitus on the right ankle  I do not suspect at this time these are sources of infection  There is no sign of right knee redness or warmth to suggest joint infection  Patient's surgical wound is well-appearing with no redness dehiscence or purulent drainage  Will admit patient for further monitoring of atrial fibrillation which has improved after iv fluids and metoprolol  Patient has not been hypotensive here in ed and has had no fevers  UA demonstrates no infections, lungs with asbestosis findings but no acute infiltrate on ct  No acute intraabdominal infection noted  Case discussed with Dr Cecile Baker will admit, no antibiotics at this time as no source for infection  Suspect initial abnormal vital signs (tachycardia) due to atrial fibrillation  Patient's family requesting reevaluation of rehab placement from [facility]                   Amount and/or Complexity of Data Reviewed  Clinical lab tests: ordered and reviewed  Tests in the radiology section of CPT®: ordered and reviewed  Tests in the medicine section of CPT®: ordered and reviewed  Discussion of test results with the performing providers: yes  Obtain history from someone other than the patient: yes (Wife, children at bedside  )  Review and summarize past medical records: yes  Discuss the patient with other providers: yes  Independent visualization of images, tracings, or specimens: yes    Patient Progress  Patient progress: stable    CritCare Time    Disposition  Final diagnoses:   Paroxysmal atrial fibrillation ([facility] [address] )   Polio   Sacral decubitus ulcer   Decubitus ulcer of heel     Time reflects when diagnosis was documented in both MDM as applicable and the Disposition within this note     Time User Action Codes Description Comment    3/24/2018  7:16 PM Leotis House Add [I48 0] Paroxysmal atrial fibrillation ([facility] [address] )     3/24/2018  7:17 PM Harry Ahumada [A80 9] Polio     3/24/2018  7:17 PM Leotis House Add [L89 [id number]] Sacral decubitus ulcer     3/24/2018  7:17 PM Leotis House Add [L89 [id number]] Decubitus ulcer of heel       ED Disposition     ED Disposition Condition Comment    Admit  Case was discussed with Dr Blue Hernandez and the patient's admission status was agreed to be Admission Status: inpatient status to the service of Dr Blue Hernandez   Follow-up Information    None       Patient's Medications   Discharge Prescriptions    No medications on file     No discharge procedures on file      ED Provider  Electronically Signed by           Anil Mcginnis PA-C  03/24/18 [address] [facility] Khari Gandara PA-C  03/24/18 0553

## 2018-03-25 ENCOUNTER — APPOINTMENT (INPATIENT)
Dept: NON INVASIVE DIAGNOSTICS | Facility: HOSPITAL | Age: 68
DRG: 309 | End: 2018-03-25
Payer: MEDICARE

## 2018-03-25 ENCOUNTER — APPOINTMENT (INPATIENT)
Dept: RADIOLOGY | Facility: HOSPITAL | Age: 68
DRG: 309 | End: 2018-03-25
Payer: MEDICARE

## 2018-03-25 LAB
ANION GAP SERPL CALCULATED.3IONS-SCNC: 4 MMOL/L (ref 4–13)
ANION GAP SERPL CALCULATED.3IONS-SCNC: 5 MMOL/L (ref 4–13)
BUN SERPL-MCNC: 7 MG/DL (ref 5–25)
BUN SERPL-MCNC: 7 MG/DL (ref 5–25)
CALCIUM SERPL-MCNC: 8.2 MG/DL (ref 8.3–10.1)
CALCIUM SERPL-MCNC: 8.4 MG/DL (ref 8.3–10.1)
CHLORIDE SERPL-SCNC: 94 MMOL/L (ref 100–108)
CHLORIDE SERPL-SCNC: 94 MMOL/L (ref 100–108)
CO2 SERPL-SCNC: 36 MMOL/L (ref 21–32)
CO2 SERPL-SCNC: 37 MMOL/L (ref 21–32)
CREAT SERPL-MCNC: 0.69 MG/DL (ref 0.6–1.3)
CREAT SERPL-MCNC: 0.71 MG/DL (ref 0.6–1.3)
ERYTHROCYTE [DISTWIDTH] IN BLOOD BY AUTOMATED COUNT: 14.1 % (ref 11.6–15.1)
GFR SERPL CREATININE-BSD FRML MDRD: 96 ML/MIN/1.73SQ M
GFR SERPL CREATININE-BSD FRML MDRD: 98 ML/MIN/1.73SQ M
GLUCOSE SERPL-MCNC: 114 MG/DL (ref 65–140)
GLUCOSE SERPL-MCNC: 122 MG/DL (ref 65–140)
HCT VFR BLD AUTO: 35.8 % (ref 36.5–49.3)
HGB BLD-MCNC: 11.4 G/DL (ref 12–17)
MAGNESIUM SERPL-MCNC: 1.5 MG/DL (ref 1.6–2.6)
MCH RBC QN AUTO: 27.7 PG (ref 26.8–34.3)
MCHC RBC AUTO-ENTMCNC: 31.8 G/DL (ref 31.4–37.4)
MCV RBC AUTO: 87 FL (ref 82–98)
PLATELET # BLD AUTO: 314 THOUSANDS/UL (ref 149–390)
PMV BLD AUTO: 9 FL (ref 8.9–12.7)
POTASSIUM SERPL-SCNC: 3.2 MMOL/L (ref 3.5–5.3)
POTASSIUM SERPL-SCNC: 3.3 MMOL/L (ref 3.5–5.3)
RBC # BLD AUTO: 4.12 MILLION/UL (ref 3.88–5.62)
SODIUM SERPL-SCNC: 135 MMOL/L (ref 136–145)
SODIUM SERPL-SCNC: 135 MMOL/L (ref 136–145)
TSH SERPL DL<=0.05 MIU/L-ACNC: 4.34 UIU/ML (ref 0.36–3.74)
WBC # BLD AUTO: 10.81 THOUSAND/UL (ref 4.31–10.16)

## 2018-03-25 PROCEDURE — 87147 CULTURE TYPE IMMUNOLOGIC: CPT | Performed by: PODIATRIST

## 2018-03-25 PROCEDURE — 99232 SBSQ HOSP IP/OBS MODERATE 35: CPT | Performed by: GENERAL PRACTICE

## 2018-03-25 PROCEDURE — 80048 BASIC METABOLIC PNL TOTAL CA: CPT | Performed by: PHYSICIAN ASSISTANT

## 2018-03-25 PROCEDURE — 85027 COMPLETE CBC AUTOMATED: CPT | Performed by: PHYSICIAN ASSISTANT

## 2018-03-25 PROCEDURE — 84443 ASSAY THYROID STIM HORMONE: CPT | Performed by: PHYSICIAN ASSISTANT

## 2018-03-25 PROCEDURE — 87070 CULTURE OTHR SPECIMN AEROBIC: CPT | Performed by: PODIATRIST

## 2018-03-25 PROCEDURE — 87205 SMEAR GRAM STAIN: CPT | Performed by: PODIATRIST

## 2018-03-25 PROCEDURE — 87493 C DIFF AMPLIFIED PROBE: CPT | Performed by: PHYSICIAN ASSISTANT

## 2018-03-25 PROCEDURE — 73552 X-RAY EXAM OF FEMUR 2/>: CPT

## 2018-03-25 PROCEDURE — 83735 ASSAY OF MAGNESIUM: CPT | Performed by: GENERAL PRACTICE

## 2018-03-25 PROCEDURE — 87186 SC STD MICRODIL/AGAR DIL: CPT | Performed by: PODIATRIST

## 2018-03-25 PROCEDURE — 87075 CULTR BACTERIA EXCEPT BLOOD: CPT | Performed by: PHYSICIAN ASSISTANT

## 2018-03-25 PROCEDURE — 87798 DETECT AGENT NOS DNA AMP: CPT | Performed by: PHYSICIAN ASSISTANT

## 2018-03-25 PROCEDURE — 87077 CULTURE AEROBIC IDENTIFY: CPT | Performed by: PODIATRIST

## 2018-03-25 RX ORDER — POTASSIUM CHLORIDE 20 MEQ/1
40 TABLET, EXTENDED RELEASE ORAL ONCE
Status: COMPLETED | OUTPATIENT
Start: 2018-03-25 | End: 2018-03-25

## 2018-03-25 RX ADMIN — APIXABAN 5 MG: 5 TABLET, FILM COATED ORAL at 10:23

## 2018-03-25 RX ADMIN — APIXABAN 5 MG: 5 TABLET, FILM COATED ORAL at 17:42

## 2018-03-25 RX ADMIN — METOPROLOL TARTRATE 25 MG: 25 TABLET ORAL at 10:22

## 2018-03-25 RX ADMIN — TAMSULOSIN HYDROCHLORIDE 0.4 MG: 0.4 CAPSULE ORAL at 17:42

## 2018-03-25 RX ADMIN — DIGOXIN 125 MCG: 0.12 TABLET ORAL at 10:21

## 2018-03-25 RX ADMIN — ALLOPURINOL 300 MG: 300 TABLET ORAL at 10:22

## 2018-03-25 RX ADMIN — ONDANSETRON 4 MG: 2 INJECTION INTRAMUSCULAR; INTRAVENOUS at 14:45

## 2018-03-25 RX ADMIN — METOPROLOL TARTRATE 25 MG: 25 TABLET ORAL at 20:17

## 2018-03-25 RX ADMIN — LORATADINE 10 MG: 10 TABLET ORAL at 10:22

## 2018-03-25 RX ADMIN — POTASSIUM CHLORIDE 40 MEQ: 1500 TABLET, EXTENDED RELEASE ORAL at 10:22

## 2018-03-25 RX ADMIN — FLUTICASONE PROPIONATE 1 SPRAY: 50 SPRAY, METERED NASAL at 10:24

## 2018-03-25 NOTE — PROGRESS NOTES
Pt in bed resting  Nausea is gone since the zofran  Pt visiting with family  No complaints at this time  Call bell in reach

## 2018-03-25 NOTE — ASSESSMENT & PLAN NOTE
· Continue allopurinol  · Patient with slight bump in creatinine, will hold off on colchicine and indomethacin at this time  · Does not seem to be in acute exacerbation

## 2018-03-25 NOTE — CONSULTS
Consultation - Cardiology   Dusty Ramos 75 y o  male MRN: [id number]  Unit/Bed#: -01 Encounter: [id number]    Assessment/Plan     Assessment:  Atrial fibrillation  Plan:  1  Continue Eliquis  2  Echocardiogram has leonard ordered  History of Present Illness   Physician Requesting Consult: Isabela Michaels, *  Reason for Consult / Principal Problem: Atrial fibrillation  HPI: Dusty Ramos is a 75y o  year old male who presents with fever and chills x1 day  Patient's family reports that the patient sustained a fall about 1 month ago at home and was admitted to [facility]   He was then discharged to [facility] and subsequently had fevers, tachycardia and chills for which he was readmitted  Consults    Review of Systems   Constitutional: Positive for chills and fever  Respiratory: Negative for cough, choking, chest tightness, wheezing and stridor  Cardiovascular: Negative for chest pain, palpitations and leg swelling  Historical Information   Past Medical History:   Diagnosis Date    A-fib ([facility] [address] )     Gout     HTN (hypertension)     Neuropathy     Polio     Sleep apnea      Past Surgical History:   Procedure Laterality Date    COLON SURGERY      FEMUR FRACTURE SURGERY Right     HERNIA REPAIR       History   Alcohol Use No     History   Drug Use No     History   Smoking Status    Never Smoker   Smokeless Tobacco    Never Used     Family History: History reviewed  No pertinent family history      Meds/Allergies   current meds:   Current Facility-Administered Medications   Medication Dose Route Frequency    allopurinol (ZYLOPRIM) tablet 300 mg  300 mg Oral Daily    apixaban (ELIQUIS) tablet 5 mg  5 mg Oral BID    baclofen tablet 10 mg  10 mg Oral TID PRN    digoxin (LANOXIN) tablet 125 mcg  125 mcg Oral Daily    fluticasone (FLONASE) 50 mcg/act nasal spray 1 spray  1 spray Nasal Daily    loratadine (CLARITIN) tablet 10 mg  10 mg Oral Daily    metoprolol (LOPRESSOR) injection 5 mg  5 mg Intravenous Q6H PRN    metoprolol tartrate (LOPRESSOR) tablet 25 mg  25 mg Oral Q12H [address]    ondansetron (ZOFRAN) injection 4 mg  4 mg Intravenous Q6H PRN    tamsulosin (FLOMAX) capsule 0 4 mg  0 4 mg Oral Daily With Dinner    and PTA meds:   Prior to Admission Medications   Prescriptions Last Dose Informant Patient Reported? Taking? Methylprednisolone 4 MG TBPK   No No   Sig: Use as directed on package   allopurinol (ZYLOPRIM) 300 mg tablet   No Yes   Sig: TAKE 1 TABLET BY MOUTH EVERY DAY   apixaban (ELIQUIS) 5 mg   Yes Yes   Sig: Take 5 mg by mouth 2 (two) times a day   baclofen 10 mg tablet   Yes Yes   Sig: Take 10 mg by mouth 3 (three) times a day   colchicine (COLCRYS) 0 6 mg tablet   Yes Yes   Sig: Take 0 6 mg by mouth daily   digoxin (LANOXIN) 0 125 mg tablet   Yes Yes   Sig: Take 125 mcg by mouth daily   fluticasone (FLONASE) 50 mcg/act nasal spray   Yes Yes   Si spray into each nostril daily   furosemide (LASIX) 40 mg tablet   No Yes   Sig: Take 1 tablet (40 mg total) by mouth daily as needed (ankle swelling)   indomethacin (INDOCIN) 25 mg capsule   Yes No   Sig: Take 25 mg by mouth 3 (three) times a day with meals   loratadine (CLARITIN) 10 mg tablet   Yes Yes   Sig: Take 10 mg by mouth daily   metoprolol tartrate (LOPRESSOR) 50 mg tablet   Yes Yes   Sig: Take 25 mg by mouth every 12 (twelve) hours   tamsulosin (FLOMAX) 0 4 mg   Yes Yes   Sig: Take 0 4 mg by mouth daily with dinner      Facility-Administered Medications: None     Allergies   Allergen Reactions    Cymbalta [Duloxetine Hcl]      hallucinations       Objective   Vitals: Blood pressure 132/84, pulse 84, temperature 98 °F (36 7 °C), temperature source Oral, resp  rate 18, height 5' 6" (1 676 m), weight 102 kg (224 lb 6 9 oz), SpO2 99 %    Orthostatic Blood Pressures    Flowsheet Row Most Recent Value   Blood Pressure  132/84 filed at 2018 0700   Patient Position - Orthostatic VS  Lying filed at 2018 0700 Intake/Output Summary (Last 24 hours) at 03/25/18 1119  Last data filed at 03/25/18 1016   Gross per 24 hour   Intake                0 ml   Output             3400 ml   Net            -3400 ml       Invasive Devices     Peripheral Intravenous Line            Peripheral IV 03/24/18 Right Antecubital less than 1 day                Physical Exam   Constitutional: He appears well-developed and well-nourished  No distress  HENT:   Head: Normocephalic and atraumatic  Cardiovascular: Normal rate  An irregularly irregular rhythm present  Exam reveals no friction rub  No murmur heard  Pulmonary/Chest: Effort normal and breath sounds normal    Skin: He is not diaphoretic  Lab Results:   I have personally reviewed pertinent lab results  CBC with diff:   Results from last 7 days  Lab Units 03/25/18  0507   WBC Thousand/uL 10 81*   RBC Million/uL 4 12   HEMOGLOBIN g/dL 11 4*   HEMATOCRIT % 35 8*   MCV fL 87   MCH pg 27 7   MCHC g/dL 31 8   RDW % 14 1   MPV fL 9 0   PLATELETS Thousands/uL 314     CMP:   Results from last 7 days  Lab Units 03/25/18  1016  03/24/18  1453   SODIUM mmol/L 135*  < > 130*   POTASSIUM mmol/L 3 3*  < > 3 1*   CHLORIDE mmol/L 94*  < > 85*   CO2 mmol/L 37*  < > 37*   ANION GAP mmol/L 4  < > 8   BUN mg/dL 7  < > 10   CREATININE mg/dL 0 69  < > 1 07   GLUCOSE RANDOM mg/dL 122  < > 125   CALCIUM mg/dL 8 4  < > 9 2   AST U/L  --   --  24   ALT U/L  --   --  15   ALK PHOS U/L  --   --  116   TOTAL PROTEIN g/dL  --   --  7 6   BILIRUBIN TOTAL mg/dL  --   --  0 50   EGFR ml/min/1 73sq m 98  < > 71   < > = values in this interval not displayed    Troponin:   0  Lab Value Date/Time   TROPONINI <0 02 03/24/2018 1453   TROPONINI <0 02 11/13/2017 1329     BNP:   Results from last 7 days  Lab Units 03/25/18  1016   SODIUM mmol/L 135*   POTASSIUM mmol/L 3 3*   CHLORIDE mmol/L 94*   CO2 mmol/L 37*   ANION GAP mmol/L 4   BUN mg/dL 7   CREATININE mg/dL 0 69   GLUCOSE RANDOM mg/dL 122   CALCIUM mg/dL 8 4   EGFR ml/min/1 73sq m 98     Coags:   Results from last 7 days  Lab Units 03/24/18  1453   PTT seconds 50*   INR  1 73*     TSH:   Results from last 7 days  Lab Units 03/25/18  1016   TSH 3RD GENERATON uIU/mL 4 336*     Magnesium:   Results from last 7 days  Lab Units 03/25/18  1016   MAGNESIUM mg/dL 1 5*     Lipid Profile:     Imaging: I have personally reviewed pertinent reports  EKG: Pending  VTE Prophylaxis:     Code Status: Level 1 - Full Code  Advance Directive and Living Will:      Power of :    POLST:      Counseling / Coordination of Care  Total floor / unit time spent today 60 minutes  Greater than 50% of total time was spent with the patient and / or family counseling and / or coordination of care  A description of the counseling / coordination of care: 61

## 2018-03-25 NOTE — PROGRESS NOTES
[facility] Internal Medicine Progress Note  Patient: Abdiaziz Phelan 75 y o  male   MRN: [id number]  PCP: Kaleb Singh DO  Unit/Bed#: MS 318[facility]iero Encounter: [id number]  Date Of Visit: 03/25/18    Assessment:    Principal Problem:    Paroxysmal atrial fibrillation (HCC)  Active Problems:    Gout, arthritis    Polio    Decubitus ulcer of heel    Leukocytosis    Hypokalemia    Hyponatremia      Plan:    Paroxysmal atrial fibrillation (HCC)   Assessment & Plan     · Continue Eliquis for anticoagulation  · Patient was tachycardic on admission at heart rate of 145, has improved with dose of Lopressor given in the ED  ? Tachycardia has improved, continue patient's digoxin and Lopressor  ? Telemetry monitoring  ? Will add p r n  metoprolol if continued episodes of tachycardia, however with parameters as patient's BP has been soft  ?  Cardiology consult  obtain echocardiogram as this was ordered outpatient by Cardiology and was never obtained          Hyponatremia   Assessment & Plan     · Improved with IVF  · holding patient's Lasix at this time          Hypokalemia   Assessment & Plan     · Replace and check magnesium          Leukocytosis   Assessment & Plan     · WBC improvd today; not on abx  · Blood cultures pending, C diff pending  · GB distended on ct scan; check ruq us  · UA negative, was having difficulties urinating and barroso catheter was placed in the ED  · CTA and CT abdomen and pelvis without evidence of acute infectious process, afebrile and tachycardia has improved, patient is nontoxic appearing and there is no signs of infection on exam   · influenza/respiratory panel due to patient with recent cough, evidence of stable mediastinal lymphadenopathy on imaging   · Did have elevated lactic on admission as well but this resolved with IVF administration          Decubitus ulcer of heel and sacral decubitus   Assessment & Plan     · Podiatry consult ; wound consult  · Is currently dressed clean dry and intact on the right foot  No erythema noted  · Patient does have some small abrasions on the left foot as well, some mild erythema however this appears to be more irritation then a cellulitis  Does not appear to be infected, no warmth          Polio   Assessment & Plan     · Patient with post-polio syndrome  · Does wear leg braces to get around, however does have neuropathy as well and has developed a sacral wound of the right medial heel due to leg braces creating abrasions on patient's skin without realization  · Will need P T /OT evaluations, patient recently from [facility] however patient and family do not want to go back  They would like to see if patient can get transferred to [facility] after hospitalization  Case management consult           Gout, arthritis   Assessment & Plan     · Continue allopurinol         Recent right femur surgery s/p fall and fracture-will xray appears to be healling well  Hypokalemia; ck mg and replace    VTE Pharmacologic Prophylaxis:   Pharmacologic: Apixaban (Eliquis)  Mechanical VTE Prophylaxis in Place: Yes    Patient Centered Rounds: I have performed bedside rounds with nursing staff today  Discussions with Specialists or Other Care Team Provider:     Education and Discussions with Family / Patient:     Time Spent for Care: 30 minutes  More than 50% of total time spent on counseling and coordination of care as described above  Current Length of Stay: 1 day(s)    Current Patient Status: Inpatient   Certification Statement: The patient will continue to require additional inpatient hospital stay due to afib    Discharge Plan: STR    Code Status: Level 1 - Full Code      Subjective:   Feels better today; less nausea and abdominal pain    Objective:     Vitals:   Temp (24hrs), Av 5 °F (36 9 °C), Min:98 °F (36 7 °C), Max:99 2 °F (37 3 °C)    HR:  [] 84  Resp:  [12-20] 18  BP: (100-140)/(51-90) 132/84  SpO2:  [90 %-99 %] 99 %  Body mass index is 36 22 kg/m²       Input and Output Summary (last 24 hours): Intake/Output Summary (Last 24 hours) at 03/25/18 0816  Last data filed at 03/25/18 0252   Gross per 24 hour   Intake                0 ml   Output             2600 ml   Net            -2600 ml       Physical Exam:     Physical Exam   Constitutional: He is oriented to person, place, and time  He appears well-developed and well-nourished  HENT:   Head: Normocephalic and atraumatic  Eyes: Pupils are equal, round, and reactive to light  Cardiovascular:   irreg irreg   Pulmonary/Chest: Effort normal and breath sounds normal    Abdominal: Soft  He exhibits no distension  Musculoskeletal: He exhibits no edema  Right lateral thigh surgical wound without erythema granulating well   Neurological: He is alert and oriented to person, place, and time  Additional Data:     Labs:      Results from last 7 days  Lab Units 03/25/18  0507 03/24/18  1453   WBC Thousand/uL 10 81* 16 62*   HEMOGLOBIN g/dL 11 4* 13 5   HEMATOCRIT % 35 8* 42 0   PLATELETS Thousands/uL 314 394*   NEUTROS PCT %  --  82*   LYMPHS PCT %  --  6*   MONOS PCT %  --  10   EOS PCT %  --  1       Results from last 7 days  Lab Units 03/25/18  0507 03/24/18  1453   SODIUM mmol/L 135* 130*   POTASSIUM mmol/L 3 2* 3 1*   CHLORIDE mmol/L 94* 85*   CO2 mmol/L 36* 37*   BUN mg/dL 7 10   CREATININE mg/dL 0 71 1 07   CALCIUM mg/dL 8 2* 9 2   TOTAL PROTEIN g/dL  --  7 6   BILIRUBIN TOTAL mg/dL  --  0 50   ALK PHOS U/L  --  116   ALT U/L  --  15   AST U/L  --  24   GLUCOSE RANDOM mg/dL 114 125       Results from last 7 days  Lab Units 03/24/18  1453   INR  1 73*       * I Have Reviewed All Lab Data Listed Above  * Additional Pertinent Lab Tests Reviewed:  All Labs Within Last 24 Hours Reviewed    Imaging:    Imaging Reports Reviewed Today Include:   Imaging Personally Reviewed by Myself Includes:      Recent Cultures (last 7 days):           Last 24 Hours Medication List:     Current Facility-Administered Medications:  allopurinol 300 mg Oral Daily Stefanie Mata PA-C   apixaban 5 mg Oral BID Aloysius Schilder, PA-C   baclofen 10 mg Oral TID PRN Aloysius Schilder, PA-C   digoxin 125 mcg Oral Daily Stefanie Mata PA-C   fluticasone 1 spray Nasal Daily Stefanie Mata PA-C   loratadine 10 mg Oral Daily Stefanie Mata PA-C   metoprolol 5 mg Intravenous Q6H PRN Aloysius Schilder, PA-C   metoprolol tartrate 25 mg Oral Q12H [address] Stefanie Mata PA-C   ondansetron 4 mg Intravenous Q6H PRN Aloysius Schilder, PA-C   tamsulosin 0 4 mg Oral Daily With Dinner Aloysius Schilder, PA-C        Today, Patient Was Seen By: Aura Cotton MD    ** Please Note: Dragon 360 Dictation voice to text software may have been used in the creation of this document   **

## 2018-03-25 NOTE — ASSESSMENT & PLAN NOTE
· Sodium 130 on admission  · Patient is asymptomatic, likely due to volume depletion  · Patient received significant amount of IV fluid in the ED, check BMP now and in the a m    · Will hold patient's Lasix at this time

## 2018-03-25 NOTE — ASSESSMENT & PLAN NOTE
· Podiatry consult  · Is currently dressed clean dry and intact on the right foot  No erythema noted  · Patient does have some small abrasions on the left foot as well, some mild erythema however this appears to be more irritation then a cellulitis    Does not appear to be infected,

## 2018-03-25 NOTE — CASE MANAGEMENT
Initial Clinical Review    Admission: Date/Time/Statement: 3/24/18 @ [facility] This Encounter   Procedures    Inpatient Admission (expected length of stay for this patient is greater than two midnights)     Standing Status:   Standing     Number of Occurrences:   1     Order Specific Question:   Admitting Physician     Answer:   Elia Ochoa [[id number]]     Order Specific Question:   Level of Care     Answer:   Med Surg [16]     Order Specific Question:   Estimated length of stay     Answer:   More than 2 Midnights     Order Specific Question:   Certification     Answer:   I certify that inpatient services are medically necessary for this patient for a duration of greater than two midnights  See H&P and MD Progress Notes for additional information about the patient's course of treatment  ED: Date/Time/Mode of Arrival:   ED Arrival Information     Expected Arrival Acuity Means of Arrival Escorted By Service Admission Type    3/24/2018 13:04 3/24/2018 13:27 Urgent Ambulance [facility] EMS General Medicine Urgent    Arrival Complaint    chills/fever/recent PNA          Chief Complaint:   Chief Complaint   Patient presents with    Nausea     pt reports not feeling good pt has a-fib  pt reports vomitting this morning and on the way here  pt lives at [place]  pt reports not wanting to eat but is drinking  History of Illness:   Leonor Ferrari is a 75 y o  male with significant past medical history of atrial fibrillation, hypertension, DORA, gout, polio and peripheral neuropathy who presents with fever and chills x1 day  Patient's family reports that the patient sustained a fall about 1 month ago at home and was admitted to [facility]   He was then discharged to [facility] and subsequently had fevers, tachycardia and chills for which she was readmitted  Patient states that he was put on antibiotics but they did not ever find a source of infection    Patient also reported that he had severe constipation and fecal impaction during this hospital stay and was given an enema  Reports that he has had loose stools over the past few days  He states that he was having some abdominal cramping and continues to have loose stools that fill up the bedpan "  He reports shaking chills and nausea since this morning  Patient's family reports that this is a continuous cycle  With the patient gets admitted and has fevers and then is discharged and continues to have chills and fevers  Patient reports that he was tested for the flu at previous hospitalization and was negative  Patient and patient's family would not like to go back to [facility] and would like to go to [facility] instead after discharge  ED Vital Signs:   ED Triage Vitals   Temperature Pulse Respirations Blood Pressure SpO2   03/24/18 1345 03/24/18 1345 03/24/18 1345 03/24/18 1345 03/24/18 1345   99 2 °F (37 3 °C) (!) 145 16 124/65 94 %      Temp Source Heart Rate Source Patient Position - Orthostatic VS BP Location FiO2 (%)   03/24/18 1345 03/24/18 1345 03/24/18 1345 03/24/18 1345 03/24/18 1755   Oral Monitor Lying Left arm 99      Pain Score       03/24/18 1345       No Pain        Wt Readings from Last 1 Encounters:   03/24/18 102 kg (224 lb 6 9 oz)       Vital Signs (abnormal):    above    Abnormal Labs/Diagnostic Test Results:   Lactic  Acid    3 2  NA  130  K  3 1  Chloride   85  Co2    37  Albumin   2 8  PT  20 7     INR   1 73    PTT   50  WBC   16 62  Abs  neutro    13 62  Platelets   015  Ct  Abd/pelvis:    No pulmonary embolus  2   Scattered calcified and noncalcified pleural plaques  3   Right greater than left bibasilar dependent atelectasis  4   Gallbladder distention without other evidence of acute cholecystitis   If there is focal right upper quadrant pain further evaluation with gallbladder ultrasound could be considered  5   Bilateral renal calculi without obstructive uropathy    6   Colonic diverticulosis without evidence of acute diverticulitis  7   Stable prominent right hilar lymph node measuring up to 1 5 cm short axis diameter with scattered subcentimeter mediastinal  lymph nodes  CXR:  [facility]    ED Treatment:   Medication Administration from 03/24/2018 1304 to 03/24/2018 2019       Date/Time Order Dose Route Action Action by Comments     03/24/2018 1559 sodium chloride 0 9 % bolus 1,000 mL 0 mL Intravenous Stopped Rigo Tarango RN      03/24/2018 1459 sodium chloride 0 9 % bolus 1,000 mL 1,000 mL Intravenous [address] [facility]      03/24/2018 1459 ondansetron (ZOFRAN) injection 4 mg 4 mg Intravenous Given Lucia Cruz RN      03/24/2018 1459 metoprolol (LOPRESSOR) injection 5 mg 5 mg Intravenous Given Lucia Cruz RN      03/24/2018 1736 sodium chloride 0 9 % bolus 2,045 mL 0 mL Intravenous Stopped Rigo Tarango RN      03/24/2018 1636 sodium chloride 0 9 % bolus 2,045 mL 2,045 mL Intravenous [address] [facility]      03/24/2018 1620 iohexol (OMNIPAQUE) 350 MG/ML injection (MULTI-DOSE) 100 mL 100 mL Intravenous Given Mikki Grace           Past Medical/Surgical History:    Active Ambulatory Problems     Diagnosis Date Noted    Paroxysmal atrial fibrillation ([facility] ) 11/21/2014    Bilateral arm weakness 03/03/2017    Bilateral edema of lower extremity 01/20/2015    Benign prostatic hyperplasia 11/21/2014    Cervical myelopathy ([facility] ) 09/05/2017    Essential hypertension 11/21/2014    Gout, arthritis 11/14/2016    Mixed hyperlipidemia 11/21/2014    DORA on CPAP 09/15/2017    Osteoporosis 11/21/2014    Peptic reflux disease 11/21/2014    Post-polio syndrome 01/20/2015    Obesity (BMI 30-39 9) 02/21/2018     Resolved Ambulatory Problems     Diagnosis Date Noted    No Resolved Ambulatory Problems     Past Medical History:   Diagnosis Date    A-fib ([facility] )     Gout     HTN (hypertension)     Neuropathy     Polio     Sleep apnea        Admitting Diagnosis: Nausea [R11 0]  Paroxysmal atrial fibrillation (HCC) [I48 0]  Polio [A80 9]  Sacral decubitus ulcer [L89 159]  Decubitus ulcer of heel [L89 609]    Age/Sex: 75 y o  male    Assessment/Plan:     Paroxysmal atrial fibrillation (HCC)   Assessment & Plan     · Continue Eliquis for anticoagulation  · Patient was tachycardic on admission at heart rate of 145, has improved with dose of Lopressor given in the ED  ? Tachycardia has improved, continue patient's digoxin and Lopressor  ? Telemetry monitoring  ? Will add p r n  metoprolol if continued episodes of tachycardia, however with parameters as patient's BP has been soft  ? Cardiology consult, patient follows with Dr Nona Bruno as an outpatient  ? Will obtain echocardiogram as this was ordered outpatient by Cardiology and was never obtained          Hyponatremia   Assessment & Plan     · Sodium 130 on admission  · Patient is asymptomatic, likely due to volume depletion  · Patient received significant amount of IV fluid in the ED, will hold off on additional IV hydration at this time and check BMP now and in the a m  · Will hold patient's Lasix at this time        Hypokalemia   Assessment & Plan     · 3 1 on admission  · Will replete with 40 mEq K-Dur  · Check BMP in the a m           Leukocytosis   Assessment & Plan     · WBC 16 on admission, did meet SIRS criteria with tachycardia and elevated WBC  · Blood cultures pending, C diff pending  · UA negative, was having difficulties urinating and barroso catheter was placed in the ED  · CTA and CT abdomen and pelvis without evidence of acute infectious process, afebrile and tachycardia has improved, patient is nontoxic appearing and there is no signs of infection on exam   Will hold off on antibiotics at this time  · Patient complaining of loose stools and diarrhea since last hospitalization, will check C diff and place contact precautions    · Will obtain influenza/respiratory panel due to patient with recent cough, evidence of stable mediastinal lymphadenopathy on imaging   · Did have elevated lactic on admission as well but this resolved with IVF administration        Decubitus ulcer of heel   Assessment & Plan     · Podiatry consult  · Is currently dressed clean dry and intact on the right foot  No erythema noted  · Patient does have some small abrasions on the left foot as well, some mild erythema however this appears to be more irritation then a cellulitis  Does not appear to be infected, no warmth          Polio   Assessment & Plan     · Patient with post-polio syndrome  · Does wear leg braces to get around, however does have neuropathy as well and has developed a sacral wound of the right medial heel due to leg braces creating abrasions on patient's skin without realization  · Will need P T /OT evaluations, patient recently from [place] however patient and family do not want to go back  They would like to see if patient can get transferred to [facility] after hospitalization  Case management consult  Gout, arthritis   Assessment & Plan     · Continue allopurinol  · Patient with slight bump in creatinine from previous records, however does not meet criteria for SRINIVAS  will hold off on colchicine and indomethacin at this time  · Does not seem to be in acute exacerbation      Anticipated Length of Stay:  Patient will be admitted on an Inpatient basis with an anticipated length of stay of  > 2 midnights     Justification for Hospital Stay:  Telemetry monitoring, echo, cardiology evaluation, evaluation of leukocytosis          Admission Orders:   IP   3/24  @    8698  Scheduled Meds:   Current Facility-Administered Medications:  allopurinol 300 mg Oral Daily Stefanie Mata PA-C   apixaban 5 mg Oral BID Stefanie Mata PA-C   baclofen 10 mg Oral TID PRN Kiera Johns PA-C   digoxin 125 mcg Oral Daily Stefanie Mata PA-C   fluticasone 1 spray Nasal Daily Stefanie Mata PA-C   loratadine 10 mg Oral Daily Kiera Johns PA-C metoprolol 5 mg Intravenous Q6H PRN Bernice Amado PA-C   metoprolol tartrate 25 mg Oral Q12H [facility] Stefanie Mata PA-C   ondansetron 4 mg Intravenous Q6H PRN Bernice Amado PA-C   tamsulosin 0 4 mg Oral Daily With Dinner Stefanie Mata PA-C     Continuous Infusions:    PRN Meds: baclofen    metoprolol    ondansetron     Tele  Reg diet  Stool c/diff  Contact isolation  Cons cardiology  PT/OT  2 DE  Cons podiatry  Cons wound care  Droplet precautions  Wound  c/s

## 2018-03-25 NOTE — ASSESSMENT & PLAN NOTE
· Continue Eliquis for anticoagulation  · Patient was tachycardic on admission at heart rate of 145, has improved with dose of Lopressor given in the ED  · Tachycardia has improved, continue patient's digoxin and Lopressor  · Telemetry monitoring  · Will add p r n  metoprolol if continued episodes of tachycardia, however with parameters as patient's BP has been soft  · Cardiology consult, patient follows with Dr Logan Frias as an outpatient  · Will obtain echocardiogram as this was ordered outpatient by Cardiology and was never obtained

## 2018-03-25 NOTE — H&P
H&P- Jeannie Caceres 1950, 75 y o  male MRN: [id number]    Unit/Bed#: -01 Encounter: [id number]    Primary Care Provider: Cary Holley DO   Date and time admitted to hospital: 3/24/2018  1:28 PM       DOS: 3/24/2018      * Paroxysmal atrial fibrillation ([address] )   Assessment & Plan    · Continue Eliquis for anticoagulation  · Patient was tachycardic on admission at heart rate of 145, has improved with dose of Lopressor given in the ED  · Tachycardia has improved, continue patient's digoxin and Lopressor  · Telemetry monitoring  · Will add p r n  metoprolol if continued episodes of tachycardia, however with parameters as patient's BP has been soft  · Cardiology consult, patient follows with Dr Kit Penaloza as an outpatient  · Will obtain echocardiogram as this was ordered outpatient by Cardiology and was never obtained        Hyponatremia   Assessment & Plan    · Sodium 130 on admission  · Patient is asymptomatic, likely due to volume depletion  · Patient received significant amount of IV fluid in the ED, will hold off on additional IV hydration at this time and check BMP now and in the a m  · Will hold patient's Lasix at this time        Hypokalemia   Assessment & Plan    · 3 1 on admission  · Will replete with 40 mEq K-Dur  · Check BMP in the a m  Leukocytosis   Assessment & Plan    · WBC 16 on admission, did meet SIRS criteria with tachycardia and elevated WBC  · Blood cultures pending, C diff pending  · UA negative, was having difficulties urinating and barroso catheter was placed in the ED  · CTA and CT abdomen and pelvis without evidence of acute infectious process, afebrile and tachycardia has improved, patient is nontoxic appearing and there is no signs of infection on exam   Will hold off on antibiotics at this time  · Patient complaining of loose stools and diarrhea since last hospitalization, will check C diff and place contact precautions    · Will obtain influenza/respiratory panel due to patient with recent cough, evidence of stable mediastinal lymphadenopathy on imaging   · Did have elevated lactic on admission as well but this resolved with IVF administration        Decubitus ulcer of heel   Assessment & Plan    · Podiatry consult  · Is currently dressed clean dry and intact on the right foot  No erythema noted  · Patient does have some small abrasions on the left foot as well, some mild erythema however this appears to be more irritation then a cellulitis  Does not appear to be infected, no warmth        Polio   Assessment & Plan    · Patient with post-polio syndrome  · Does wear leg braces to get around, however does have neuropathy as well and has developed a sacral wound of the right medial heel due to leg braces creating abrasions on patient's skin without realization  · Will need P T /OT evaluations, patient recently from [facility] however patient and family do not want to go back  They would like to see if patient can get transferred to [place] after hospitalization  Case management consult  Gout, arthritis   Assessment & Plan    · Continue allopurinol  · Patient with slight bump in creatinine from previous records, however does not meet criteria for SRINIVAS  will hold off on colchicine and indomethacin at this time  · Does not seem to be in acute exacerbation          VTE Prophylaxis: Apixaban (Eliquis)  / sequential compression device   Code Status:  Level 1-full code, discussed with patient at bedside  POLST: There is no POLST form on file for this patient (pre-hospital)  Discussion with family:  Discussed with patient and patient's son and wife at bedside    Anticipated Length of Stay:  Patient will be admitted on an Inpatient basis with an anticipated length of stay of  > 2 midnights     Justification for Hospital Stay:  Telemetry monitoring, echo, cardiology evaluation, evaluation of leukocytosis    Total Time for Visit, including Counseling / Coordination of Care: 45 minutes  Greater than 50% of this total time spent on direct patient counseling and coordination of care  Chief Complaint:   "this keeps happening to me "    History of Present Illness:    Skye Shah is a 75 y o  male with significant past medical history of atrial fibrillation, hypertension, DORA, gout, polio and peripheral neuropathy who presents with fever and chills x1 day  Patient's family reports that the patient sustained a fall about 1 month ago at home and was admitted to [facility]   He was then discharged to [facility] and subsequently had fevers, tachycardia and chills for which she was readmitted  Patient states that he was put on antibiotics but they did not ever find a source of infection  Patient also reported that he had severe constipation and fecal impaction during this hospital stay and was given an enema  Reports that he has had loose stools over the past few days  He states that he was having some abdominal cramping and continues to have loose stools that fill up the bedpan "  He reports shaking chills and nausea since this morning  Patient's family reports that this is a continuous cycle  With the patient gets admitted and has fevers and then is discharged and continues to have chills and fevers  Patient reports that he was tested for the flu at previous hospitalization and was negative  Patient and patient's family would not like to go back to [facility] and would like to go to [employer] instead after discharge  Review of Systems:    Review of Systems   Constitutional: Positive for chills and fever (101 1F)  Negative for diaphoresis  HENT: Positive for congestion  Negative for rhinorrhea, sinus pressure, sneezing, sore throat and tinnitus  Eyes: Negative for pain, redness and visual disturbance  Respiratory: Positive for cough (productive of white mucus)  Negative for chest tightness and shortness of breath      Cardiovascular: Positive for palpitations and leg swelling  Negative for chest pain  Gastrointestinal: Positive for diarrhea and nausea  Negative for abdominal pain, constipation and vomiting  Genitourinary: Positive for difficulty urinating  Negative for dysuria and hematuria  Musculoskeletal: Positive for gait problem and myalgias  Skin: Positive for wound  Negative for pallor and rash  Neurological: Negative for dizziness, light-headedness and headaches  Past Medical and Surgical History:     Past Medical History:   Diagnosis Date    A[facility]     Gout     HTN (hypertension)     Neuropathy     Polio     Sleep apnea        Past Surgical History:   Procedure Laterality Date    COLON SURGERY      FEMUR FRACTURE SURGERY Right     HERNIA REPAIR         Meds/Allergies:    Prior to Admission medications    Medication Sig Start Date End Date Taking?  Authorizing Provider   allopurinol (ZYLOPRIM) 300 mg tablet TAKE 1 TABLET BY MOUTH EVERY DAY 2/22/18   Louis Rush DO   apixaban (ELIQUIS) 5 mg Take 5 mg by mouth 2 (two) times a day    Historical Provider, MD   baclofen 10 mg tablet Take 10 mg by mouth 3 (three) times a day    Historical Provider, MD   colchicine (COLCRYS) 0 6 mg tablet Take 0 6 mg by mouth daily    Historical Provider, MD   digoxin (LANOXIN) 0 125 mg tablet Take 125 mcg by mouth daily    Historical Provider, MD   fluticasone (FLONASE) 50 mcg/act nasal spray 1 spray into each nostril daily    Historical Provider, MD   furosemide (LASIX) 40 mg tablet Take 1 tablet (40 mg total) by mouth daily as needed (ankle swelling) 2/21/18   Saturnino Donald MD   indomethacin (INDOCIN) 25 mg capsule Take 25 mg by mouth 3 (three) times a day with meals    Historical Provider, MD   loratadine (CLARITIN) 10 mg tablet Take 10 mg by mouth daily    Historical Provider, MD   Methylprednisolone 4 MG TBPK Use as directed on package 2/16/18   Louis Rush DO   metoprolol tartrate (LOPRESSOR) 50 mg tablet Take 25 mg by mouth every 12 (twelve) hours Historical Provider, MD   tamsulosin (FLOMAX) 0 4 mg Take 0 4 mg by mouth daily with dinner    Historical Provider, MD     I have reviewed home medications using allscripts  Allergies: Allergies   Allergen Reactions    Cymbalta [Duloxetine Hcl]      hallucinations       Social History:     Marital Status: /Civil Union   Occupation:   Patient Pre-hospital Living Situation: Patient resides at [facility], but has only been there for about 13 days  Patient Pre-hospital Level of Mobility: Uses a cane at baseline, leg braces  Patient Pre-hospital Diet Restrictions: None  Substance Use History:   History   Alcohol Use No     History   Smoking Status    Never Smoker   Smokeless Tobacco    Never Used     History   Drug Use No       Family History:    non-contributory    Physical Exam:     Vitals:   Blood Pressure: 114/64 (03/24/18 2052)  Pulse: 94 (03/24/18 2052)  Temperature: 98 5 °F (36 9 °C) (03/24/18 2052)  Temp Source: Oral (03/24/18 2052)  Respirations: 18 (03/24/18 2052)  Height: 5' 6" (167 6 cm) (03/24/18 1345)  Weight - Scale: 102 kg (224 lb 6 9 oz) (03/24/18 1345)  SpO2: 97 % (03/24/18 2052)    Physical Exam   Constitutional: No distress  Patient is in no acute distress resting comfortably in his hospital bed accompanied by son and wife  Nasal cannula in place  No shortness of breath noted on exam    HENT:   Head: Normocephalic and atraumatic  Eyes: Conjunctivae are normal    Cardiovascular: Normal rate and intact distal pulses  Irregular rhythm   Pulmonary/Chest: Effort normal and breath sounds normal  No respiratory distress  He has no wheezes  He has no rales  Abdominal: Soft  Bowel sounds are normal  He exhibits no distension  There is no tenderness  There is no rebound  Musculoskeletal: He exhibits edema (Mild pedal edema bilaterally)  Neurological: He is alert  Skin: Skin is warm and dry  He is not diaphoretic  No erythema  Patient with wound of the right medial heel  Dressed and is clean dry and intact  No erythema noted around dressing  2 blisters of the left lateral foot with no drainage, no warmth, no signs of infection  Psychiatric: He has a normal mood and affect  Vitals reviewed  Additional Data:     Lab Results: I have personally reviewed pertinent reports  Results from last 7 days  Lab Units 03/24/18  1453   WBC Thousand/uL 16 62*   HEMOGLOBIN g/dL 13 5   HEMATOCRIT % 42 0   PLATELETS Thousands/uL 394*   NEUTROS PCT % 82*   LYMPHS PCT % 6*   MONOS PCT % 10   EOS PCT % 1       Results from last 7 days  Lab Units 03/24/18  1453   SODIUM mmol/L 130*   POTASSIUM mmol/L 3 1*   CHLORIDE mmol/L 85*   CO2 mmol/L 37*   BUN mg/dL 10   CREATININE mg/dL 1 07   CALCIUM mg/dL 9 2   TOTAL PROTEIN g/dL 7 6   BILIRUBIN TOTAL mg/dL 0 50   ALK PHOS U/L 116   ALT U/L 15   AST U/L 24   GLUCOSE RANDOM mg/dL 125       Results from last 7 days  Lab Units 03/24/18  1453   INR  1 73*       Imaging: I have personally reviewed pertinent reports  PE Study with CT Abdomen and Pelvis with contrast   Final Result by Joshua Izquierdo MD (03/24 1709)   1  No pulmonary embolus  2   Scattered calcified and noncalcified pleural plaques  3   Right greater than left bibasilar dependent atelectasis  4   Gallbladder distention without other evidence of acute cholecystitis  If there is focal right upper quadrant pain further evaluation with gallbladder ultrasound could be considered  5   Bilateral renal calculi without obstructive uropathy  6   Colonic diverticulosis without evidence of acute diverticulitis  7   Stable prominent right hilar lymph node measuring up to 1 5 cm short axis diameter with scattered subcentimeter mediastinal  lymph nodes                       Workstation performed: [id number]         XR chest 2 views    (Results Pending)       EKG, Pathology, and Other Studies Reviewed on Admission:   · CTA and CT abdomen pelvis results reviewed    Hoa / Tien Records Reviewed: Yes     ** Please Note: This note has been constructed using a voice recognition system   **

## 2018-03-25 NOTE — ASSESSMENT & PLAN NOTE
· Patient with post-polio syndrome  · Does wear leg braces to get around, however does have neuropathy as well and has developed a sacral wound of the right medial heel due to leg braces creating abrasions on patient's skin without realization  · Will need P T /OT evaluations, patient recently from [facility] however patient and family do not want to go back  They would like to see if patient can get transferred to [facility] after hospitalization  Case management consult

## 2018-03-25 NOTE — ASSESSMENT & PLAN NOTE
· WBC 16 on admission, did meet SIRS criteria with tachycardia and elevated WBC  · Blood cultures pending, C diff pending  · UA negative  · CTA and CT abdomen and pelvis without evidence of acute infectious process, afebrile and tachycardia has improved, patient is nontoxic appearing and there is no signs of infection on exam   Will hold off on antibiotics at this time  · Patient complaining of loose stools and diarrhea since last hospitalization, will check C diff and place contact precautions

## 2018-03-25 NOTE — CONSULTS
Consult - Sarbjit Maynard 75 y o  male MRN: [id number]  Unit/Bed#: -01 Encounter: [id number]    Assessment/Plan     Assessment:  Ulcer posterior right ankle  Patient reports skin breakdown after use of right lower extremity immobilizer after recent ORIF right femoral fracture  Post polio with bilateral lower extremity weakness, right weaker than left  Diminished sensation with peripheral neuropathy  Plan:  Reviewed pathophysiology and treatment of skin ulceration  Supportive/symptomatic regimen emphasizes keeping the area of the ulceration clean and covered and to keep pressure off of the skin  Today there is selective debridement of superficial loose skin and slough  The wound was swabbed for cultures  Nursing orders are given for daily wound care  Wound care nurse has been consulted for evaluation and definitive therapeutic dressing  Inflatable heel offloading devices were applied bilaterally  Recommendation at this time is careful inspection of any braces or immobilizers to be sure that no significant pressure is applied to the skin  The patient will follow up with his podiatrist Dr Noreen Mehta after discharge  History of Present Illness     HPI:  Yuli Garcia is a 75 y o  male who presents with an ulcer in the posterior right ankle  Patient reports that the skin breakdown is on account of friction from a lower extremity immobilizer which was applied after recent ORIF of a right distal femoral fracture  Consults  Review of Systems   Constitutional:  History of recent fevers and chills  HENT: Negative  Eyes: Negative  Respiratory: Negative  Cardiovascular:  History of tachycardia  Gastrointestinal: Negative  Musculoskeletal:  Bilateral lower extremity weakness secondary to polio  Right side is weaker than left  Skin:  Recent history of right posterior ankle skin breakdown      Neurological:  Patient reports peripheral neuropathy in the hands and feet for the past 5 years or so  Psych: negative  Historical Information   Past Medical History:   Diagnosis Date    A-fib ([facility] [address] )     Gout     HTN (hypertension)     Neuropathy     Polio     Sleep apnea      Past Surgical History:   Procedure Laterality Date    COLON SURGERY      FEMUR FRACTURE SURGERY Right     HERNIA REPAIR       Social History   History   Alcohol Use No     History   Drug Use No     History   Smoking Status    Never Smoker   Smokeless Tobacco    Never Used     Family History: History reviewed  No pertinent family history      Meds/Allergies   Prescriptions Prior to Admission   Medication    allopurinol (ZYLOPRIM) 300 mg tablet    apixaban (ELIQUIS) 5 mg    baclofen 10 mg tablet    colchicine (COLCRYS) 0 6 mg tablet    digoxin (LANOXIN) 0 125 mg tablet    fluticasone (FLONASE) 50 mcg/act nasal spray    furosemide (LASIX) 40 mg tablet    loratadine (CLARITIN) 10 mg tablet    metoprolol tartrate (LOPRESSOR) 50 mg tablet    tamsulosin (FLOMAX) 0 4 mg    indomethacin (INDOCIN) 25 mg capsule    Methylprednisolone 4 MG TBPK     Allergies   Allergen Reactions    Cymbalta [Duloxetine Hcl]      hallucinations       Objective   First Vitals:   Blood Pressure: 124/65 (03/24/18 1345)  Pulse: (!) 145 (03/24/18 1345)  Temperature: 99 2 °F (37 3 °C) (03/24/18 1345)  Temp Source: Oral (03/24/18 1345)  Respirations: 16 (03/24/18 1345)  Height: 5' 6" (167 6 cm) (03/24/18 1345)  Weight - Scale: 102 kg (224 lb 6 9 oz) (03/24/18 1345)  SpO2: 94 % (03/24/18 1345)    Current Vitals:   Blood Pressure: 132/84 (03/25/18 0700)  Pulse: 84 (03/25/18 0700)  Temperature: 98 °F (36 7 °C) (03/25/18 0700)  Temp Source: Oral (03/25/18 0700)  Respirations: 18 (03/25/18 0700)  Height: 5' 6" (167 6 cm) (03/24/18 1345)  Weight - Scale: 102 kg (224 lb 6 9 oz) (03/24/18 1345)  SpO2: 99 % (03/25/18 0309)        /84 (BP Location: Left arm)   Pulse 84   Temp 98 °F (36 7 °C) (Oral)   Resp 18   Ht 5' 6" (1 676 m)   Wt 102 kg (224 lb 6 9 oz)   SpO2 99%   BMI 36 22 kg/m²      General Appearance:    Alert, cooperative, no distress   Head:    Normocephalic, without obvious abnormality, atraumatic   Eyes:    PERRL, conjunctiva/corneas clear, EOM's intact        Nose:   Moist mucous membranes   Neck:   Supple, symmetrical, trachea midline   Back:     Symmetric   Lungs:     Respirations unlabored   Heart:    Regular rate and rhythm, S1 and S2 normal, no murmur, rub   or gallop   Abdomen:     Soft, non-tender   Extremities: There is bilateral lower extremity weakness and stiffness  More pronounced on the right  There is equinus type foot present bilaterally  There is no acute pain to palpation or range of motion  Pulses:   Pedal pulses are not palpable  There is no cyanosis or gangrene  There is no sign of acute evolving dysvascularity  Skin:   The skin is warm and dry  There is bilateral lower extremity edema  This appears chronic in nature  There is a skin excoriation in the posterior ankle just above the right heel  An irregular shaped superficial ulceration is present  There is some loose skin and slough  There is some firmly adherent thin yellow fibrinous exudate  There is no deep track or undermining  There is no drainage or pus  There is some minimal surrounding erythema  There is no palpable fluctuance or induration  There is no palpable skin crepitus  There is no ascending streak  Neurologic:   Gross sensation is diminished  Protective sensation is absent in the feet             Lab Results:   Admission on 03/24/2018   Component Date Value    WBC 03/24/2018 16 62*    RBC 03/24/2018 4 92     Hemoglobin 03/24/2018 13 5     Hematocrit 03/24/2018 42 0     MCV 03/24/2018 85     MCH 03/24/2018 27 4     MCHC 03/24/2018 32 1     RDW 03/24/2018 13 9     MPV 03/24/2018 9 1     Platelets 73/82/8318 394*    nRBC 03/24/2018 0     Neutrophils Relative 03/24/2018 82*    Lymphocytes Relative 03/24/2018 6*    Monocytes Relative 03/24/2018 10     Eosinophils Relative 03/24/2018 1     Basophils Relative 03/24/2018 0     Neutrophils Absolute 03/24/2018 13 62*    Lymphocytes Absolute 03/24/2018 0 99     Monocytes Absolute 03/24/2018 1 70*    Eosinophils Absolute 03/24/2018 0 09     Basophils Absolute 03/24/2018 0 06     Protime 03/24/2018 20 7*    INR 03/24/2018 1 73*    PTT 03/24/2018 50*    Sodium 03/24/2018 130*    Potassium 03/24/2018 3 1*    Chloride 03/24/2018 85*    CO2 03/24/2018 37*    Anion Gap 03/24/2018 8     BUN 03/24/2018 10     Creatinine 03/24/2018 1 07     Glucose 03/24/2018 125     Calcium 03/24/2018 9 2     eGFR 03/24/2018 71     Total Bilirubin 03/24/2018 0 50     Bilirubin, Direct 03/24/2018 0 15     Alkaline Phosphatase 03/24/2018 116     AST 03/24/2018 24     ALT 03/24/2018 15     Total Protein 03/24/2018 7 6     Albumin 03/24/2018 2 8*    Color, UA 03/24/2018 Yellow     Clarity, UA 03/24/2018 Clear     Specific Gravity, UA 03/24/2018 <=1 005     pH, UA 03/24/2018 5 5     Leukocytes, UA 03/24/2018 Negative     Nitrite, UA 03/24/2018 Negative     Protein, UA 03/24/2018 Negative     Glucose, UA 03/24/2018 Negative     Ketones, UA 03/24/2018 Negative     Urobilinogen, UA 03/24/2018 0 2     Bilirubin, UA 03/24/2018 Negative     Blood, UA 03/24/2018 Negative     Troponin I 03/24/2018 <0 02     LACTIC ACID 03/24/2018 3 2*    Digoxin Lvl 03/24/2018 1 6     LACTIC ACID 03/24/2018 1 9     LACTIC ACID 03/24/2018 1 5     TSH 3RD GENERATON 03/25/2018 4 336*    Sodium 03/25/2018 135*    Potassium 03/25/2018 3 3*    Chloride 03/25/2018 94*    CO2 03/25/2018 37*    Anion Gap 03/25/2018 4     BUN 03/25/2018 7     Creatinine 03/25/2018 0 69     Glucose 03/25/2018 122     Calcium 03/25/2018 8 4     eGFR 03/25/2018 98     Sodium 03/25/2018 135*    Potassium 03/25/2018 3 2*    Chloride 03/25/2018 94*    CO2 03/25/2018 36*    Anion Gap 03/25/2018 5     BUN 03/25/2018 7     Creatinine 03/25/2018 0 71     Glucose 03/25/2018 114     Calcium 03/25/2018 8 2*    eGFR 03/25/2018 96     WBC 03/25/2018 10 81*    RBC 03/25/2018 4 12     Hemoglobin 03/25/2018 11 4*    Hematocrit 03/25/2018 35 8*    MCV 03/25/2018 87     MCH 03/25/2018 27 7     MCHC 03/25/2018 31 8     RDW 03/25/2018 14 1     Platelets 33/92/9747 314     MPV 03/25/2018 9 0     Magnesium 03/25/2018 1 5*                   Invalid input(s): LABAEARO            Imaging: I have personally reviewed pertinent films in PACS  EKG, Pathology, and Other Studies: I have personally reviewed pertinent reports        Code Status: Level 1 - Full Code  Advance Directive and Living Will:      Power of :    POLST:

## 2018-03-26 ENCOUNTER — APPOINTMENT (INPATIENT)
Dept: ULTRASOUND IMAGING | Facility: HOSPITAL | Age: 68
DRG: 309 | End: 2018-03-26
Payer: MEDICARE

## 2018-03-26 ENCOUNTER — APPOINTMENT (INPATIENT)
Dept: NON INVASIVE DIAGNOSTICS | Facility: HOSPITAL | Age: 68
DRG: 309 | End: 2018-03-26
Payer: MEDICARE

## 2018-03-26 LAB
ALBUMIN SERPL BCP-MCNC: 2.4 G/DL (ref 3.5–5)
ALP SERPL-CCNC: 91 U/L (ref 46–116)
ALT SERPL W P-5'-P-CCNC: 7 U/L (ref 12–78)
ANION GAP SERPL CALCULATED.3IONS-SCNC: 5 MMOL/L (ref 4–13)
AST SERPL W P-5'-P-CCNC: 21 U/L (ref 5–45)
ATRIAL RATE: 78 BPM
ATRIAL RATE: 78 BPM
BASOPHILS # BLD AUTO: 0.06 THOUSANDS/ΜL (ref 0–0.1)
BASOPHILS NFR BLD AUTO: 1 % (ref 0–1)
BILIRUB SERPL-MCNC: 0.4 MG/DL (ref 0.2–1)
BUN SERPL-MCNC: 6 MG/DL (ref 5–25)
C DIFF TOX GENS STL QL NAA+PROBE: NORMAL
CALCIUM SERPL-MCNC: 8.8 MG/DL (ref 8.3–10.1)
CHLORIDE SERPL-SCNC: 94 MMOL/L (ref 100–108)
CO2 SERPL-SCNC: 36 MMOL/L (ref 21–32)
CREAT SERPL-MCNC: 0.72 MG/DL (ref 0.6–1.3)
EOSINOPHIL # BLD AUTO: 0.31 THOUSAND/ΜL (ref 0–0.61)
EOSINOPHIL NFR BLD AUTO: 3 % (ref 0–6)
ERYTHROCYTE [DISTWIDTH] IN BLOOD BY AUTOMATED COUNT: 14.2 % (ref 11.6–15.1)
FLUAV AG SPEC QL: NORMAL
FLUBV AG SPEC QL: NORMAL
GFR SERPL CREATININE-BSD FRML MDRD: 96 ML/MIN/1.73SQ M
GLUCOSE SERPL-MCNC: 120 MG/DL (ref 65–140)
HCT VFR BLD AUTO: 36.1 % (ref 36.5–49.3)
HGB BLD-MCNC: 11.2 G/DL (ref 12–17)
LYMPHOCYTES # BLD AUTO: 1.63 THOUSANDS/ΜL (ref 0.6–4.47)
LYMPHOCYTES NFR BLD AUTO: 15 % (ref 14–44)
MCH RBC QN AUTO: 27.2 PG (ref 26.8–34.3)
MCHC RBC AUTO-ENTMCNC: 31 G/DL (ref 31.4–37.4)
MCV RBC AUTO: 88 FL (ref 82–98)
MONOCYTES # BLD AUTO: 1.22 THOUSAND/ΜL (ref 0.17–1.22)
MONOCYTES NFR BLD AUTO: 11 % (ref 4–12)
NEUTROPHILS # BLD AUTO: 7.7 THOUSANDS/ΜL (ref 1.85–7.62)
NEUTS SEG NFR BLD AUTO: 70 % (ref 43–75)
NRBC BLD AUTO-RTO: 0 /100 WBCS
PLATELET # BLD AUTO: 295 THOUSANDS/UL (ref 149–390)
PMV BLD AUTO: 8.5 FL (ref 8.9–12.7)
POTASSIUM SERPL-SCNC: 3.5 MMOL/L (ref 3.5–5.3)
PROT SERPL-MCNC: 6.7 G/DL (ref 6.4–8.2)
QRS AXIS: 73 DEGREES
QRS AXIS: 73 DEGREES
QRSD INTERVAL: 90 MS
QRSD INTERVAL: 90 MS
QT INTERVAL: 310 MS
QT INTERVAL: 310 MS
QTC INTERVAL: 440 MS
QTC INTERVAL: 440 MS
RBC # BLD AUTO: 4.12 MILLION/UL (ref 3.88–5.62)
RSV B RNA SPEC QL NAA+PROBE: NORMAL
SODIUM SERPL-SCNC: 135 MMOL/L (ref 136–145)
T WAVE AXIS: -69 DEGREES
T WAVE AXIS: -69 DEGREES
T4 FREE SERPL-MCNC: 1.27 NG/DL (ref 0.76–1.46)
VENTRICULAR RATE: 121 BPM
VENTRICULAR RATE: 121 BPM
WBC # BLD AUTO: 11.04 THOUSAND/UL (ref 4.31–10.16)

## 2018-03-26 PROCEDURE — G8988 SELF CARE GOAL STATUS: HCPCS

## 2018-03-26 PROCEDURE — 93010 ELECTROCARDIOGRAM REPORT: CPT | Performed by: INTERNAL MEDICINE

## 2018-03-26 PROCEDURE — 99232 SBSQ HOSP IP/OBS MODERATE 35: CPT | Performed by: INTERNAL MEDICINE

## 2018-03-26 PROCEDURE — 97167 OT EVAL HIGH COMPLEX 60 MIN: CPT

## 2018-03-26 PROCEDURE — 99232 SBSQ HOSP IP/OBS MODERATE 35: CPT | Performed by: PHYSICIAN ASSISTANT

## 2018-03-26 PROCEDURE — 80053 COMPREHEN METABOLIC PANEL: CPT | Performed by: GENERAL PRACTICE

## 2018-03-26 PROCEDURE — 93005 ELECTROCARDIOGRAM TRACING: CPT

## 2018-03-26 PROCEDURE — 84439 ASSAY OF FREE THYROXINE: CPT | Performed by: PHYSICIAN ASSISTANT

## 2018-03-26 PROCEDURE — 76705 ECHO EXAM OF ABDOMEN: CPT

## 2018-03-26 PROCEDURE — 93306 TTE W/DOPPLER COMPLETE: CPT

## 2018-03-26 PROCEDURE — G8987 SELF CARE CURRENT STATUS: HCPCS

## 2018-03-26 PROCEDURE — 85025 COMPLETE CBC W/AUTO DIFF WBC: CPT | Performed by: GENERAL PRACTICE

## 2018-03-26 PROCEDURE — 93306 TTE W/DOPPLER COMPLETE: CPT | Performed by: INTERNAL MEDICINE

## 2018-03-26 RX ADMIN — METOPROLOL TARTRATE 25 MG: 25 TABLET ORAL at 08:38

## 2018-03-26 RX ADMIN — METOPROLOL TARTRATE 25 MG: 25 TABLET ORAL at 20:58

## 2018-03-26 RX ADMIN — APIXABAN 5 MG: 5 TABLET, FILM COATED ORAL at 18:50

## 2018-03-26 RX ADMIN — LORATADINE 10 MG: 10 TABLET ORAL at 08:38

## 2018-03-26 RX ADMIN — DIGOXIN 125 MCG: 0.12 TABLET ORAL at 08:38

## 2018-03-26 RX ADMIN — APIXABAN 5 MG: 5 TABLET, FILM COATED ORAL at 08:38

## 2018-03-26 RX ADMIN — TAMSULOSIN HYDROCHLORIDE 0.4 MG: 0.4 CAPSULE ORAL at 18:50

## 2018-03-26 RX ADMIN — METOPROLOL TARTRATE 5 MG: 5 INJECTION, SOLUTION INTRAVENOUS at 03:32

## 2018-03-26 RX ADMIN — ALLOPURINOL 300 MG: 300 TABLET ORAL at 08:38

## 2018-03-26 RX ADMIN — FLUTICASONE PROPIONATE 1 SPRAY: 50 SPRAY, METERED NASAL at 08:41

## 2018-03-26 NOTE — ASSESSMENT & PLAN NOTE
Sodium 130 on admission, asymptomatic, likely due to volume depletion  · Improved with hydration, continue to monitor given on-going GI issues

## 2018-03-26 NOTE — ASSESSMENT & PLAN NOTE
Patient with post-polio syndrome  · Does wear leg braces to get around, however has neuropathy as well and has developed a decub ulcer of right medial heel due to leg braces creating abrasions on patient's skin without realization  · Patient recently at [facility] however patient and family do not want to go back  They would like to see if patient can get transferred to [facility] after hospitalization    Case management following  · Podiatry consult appreciated

## 2018-03-26 NOTE — PLAN OF CARE
Problem: DISCHARGE PLANNING - CARE MANAGEMENT  Goal: Discharge to post-acute care or home with appropriate resources  INTERVENTIONS:  - Conduct assessment to determine patient/family and health care team treatment goals, and need for post-acute services based on payer coverage, community resources, and patient preferences, and barriers to discharge  - Address psychosocial, clinical, and financial barriers to discharge as identified in assessment in conjunction with the patient/family and health care team  - Arrange appropriate level of post-acute services according to patients   needs and preference and payer coverage in collaboration with the physician and health care team  - Communicate with and update the patient/family, physician, and health care team regarding progress on the discharge plan  - Arrange appropriate transportation to post-acute venues  Outcome: Progressing  Patient desires to [facility] acute rehab  Referral sent this day

## 2018-03-26 NOTE — PROGRESS NOTES
Progress Note - Cardiology     Christianne Garcia 75 y o  male MRN: [id number]  Unit/Bed#: -01 Encounter: [id number]      Subjective:   Patient reports that he is feeling overall well  He denies SOB out of the ordinary for him and has not had any recent episodes of palpitations  Objective:   Vitals:  Vitals:    18 1524   BP: 95/61   Pulse: 95   Resp: 18   Temp: 98 °F (36 7 °C)   SpO2: 94%       Body mass index is 36 22 kg/m²  Systolic (18SFJ), JOURDAN , Min:95 , RCP:428     Diastolic (43YLD), JSR:19, Min:56, Max:67      Intake/Output Summary (Last 24 hours) at 18 1548  Last data filed at 18 1250   Gross per 24 hour   Intake              720 ml   Output              400 ml   Net              320 ml     Weight (last 2 days)     Date/Time   Weight    18 1345  102 (224 43)              PHYSICAL EXAM:  General: Patient is not in acute distress  Laying comfortably in the bed  Head: Normocephalic  Atraumatic  HEENT: Both pupils normal sized, round and reactive to light  Nonicteric  Neck: JVP not raised  Trachea central    Respiratory: Bilateral bronchovascular breath sounds with no crackles or rhonchi  Cardiovascular: irregular  S1 and S2 normal  No murmur, rub or gallop  GI: Abdomen soft, nontender  No guarding or rigidity  Neurologic: Patient is awake, alert, responding to command   Moving all extremities  Integumentary:  No skin rash  Extremities: trace edema     LABORATORY RESULTS:    Results from last 7 days  Lab Units 18  1453   TROPONIN I ng/mL <0 02     CBC with diff:   Results from last 7 days  Lab Units 18  0537 18  0507 18  1453   WBC Thousand/uL 11 04* 10 81* 16 62*   HEMOGLOBIN g/dL 11 2* 11 4* 13 5   HEMATOCRIT % 36 1* 35 8* 42 0   MCV fL 88 87 85   PLATELETS Thousands/uL 295 314 394*   MCH pg 27 2 27 7 27 4   MCHC g/dL 31 0* 31 8 32 1   RDW % 14 2 14 1 13 9   MPV fL 8 5* 9 0 9 1   NRBC AUTO /100 WBCs 0  --  0       CMP:  Results from last 7 days  Lab Units 03/26/18  0537 03/25/18  1016 03/25/18  0507 03/24/18  1453   SODIUM mmol/L 135* 135* 135* 130*   POTASSIUM mmol/L 3 5 3 3* 3 2* 3 1*   CHLORIDE mmol/L 94* 94* 94* 85*   CO2 mmol/L 36* 37* 36* 37*   ANION GAP mmol/L 5 4 5 8   BUN mg/dL 6 7 7 10   CREATININE mg/dL 0 72 0 69 0 71 1 07   GLUCOSE RANDOM mg/dL 120 122 114 125   CALCIUM mg/dL 8 8 8 4 8 2* 9 2   AST U/L 21  --   --  24   ALT U/L 7*  --   --  15   ALK PHOS U/L 91  --   --  116   TOTAL PROTEIN g/dL 6 7  --   --  7 6   BILIRUBIN TOTAL mg/dL 0 40  --   --  0 50   EGFR ml/min/1 73sq m 96 98 96 71       BMP:  Results from last 7 days  Lab Units 03/26/18  0537 03/25/18  1016 03/25/18  0507   SODIUM mmol/L 135* 135* 135*   POTASSIUM mmol/L 3 5 3 3* 3 2*   CHLORIDE mmol/L 94* 94* 94*   CO2 mmol/L 36* 37* 36*   BUN mg/dL 6 7 7   CREATININE mg/dL 0 72 0 69 0 71   GLUCOSE RANDOM mg/dL 120 122 114   CALCIUM mg/dL 8 8 8 4 8 2*                Results from last 7 days  Lab Units 03/25/18  1016   MAGNESIUM mg/dL 1 5*               Results from last 7 days  Lab Units 03/26/18  0537 03/25/18  1016   TSH 3RD GENERATON uIU/mL  --  4 336*   FREE T4 ng/dL 1 27  --          Results from last 7 days  Lab Units 03/24/18  1453   INR  1 73*       Lipid Profile:   Lab Results   Component Value Date    CHOL 193 11/13/2017     Lab Results   Component Value Date    HDL 47 11/13/2017           Meds/Allergies   all current active meds have been reviewed  Prescriptions Prior to Admission   Medication    allopurinol (ZYLOPRIM) 300 mg tablet    apixaban (ELIQUIS) 5 mg    baclofen 10 mg tablet    colchicine (COLCRYS) 0 6 mg tablet    digoxin (LANOXIN) 0 125 mg tablet    fluticasone (FLONASE) 50 mcg/act nasal spray    furosemide (LASIX) 40 mg tablet    loratadine (CLARITIN) 10 mg tablet    metoprolol tartrate (LOPRESSOR) 50 mg tablet    tamsulosin (FLOMAX) 0 4 mg    indomethacin (INDOCIN) 25 mg capsule    Methylprednisolone 4 MG TBPK              Assessment and Plan:  Paroxysmal atrial fibrillation  -ventricular response currently controlled on digoxin and metoprolol  -heart rates overall stable, the patient is having intermittent brief episodes of tachycardia which is acceptably especially given question underlying infection  -on Eliquis for anticoagulation, CHADS VASC score 2 (age, HTN)    Hypertension- BP typically soft, pt asymptomatic continue to monitor    ** Please Note: Dragon 360 Dictation voice to text software may have been used in the creation of this document   **

## 2018-03-26 NOTE — ASSESSMENT & PLAN NOTE
Continue Eliquis for anticoagulation, rate now controlled  She was tachy to the 140s on admission    · Continue digoxin and Lopressor, telemetry   · Cardiology following, echo pending

## 2018-03-26 NOTE — PROGRESS NOTES
Progress Note - Dusty Deal 1950, 75 y o  male MRN: [id number]    Unit/Bed#: -01 Encounter: [id number]    Primary Care Provider: Malena Lares DO   Date and time admitted to hospital: 3/24/2018  1:28 PM    * Paroxysmal atrial fibrillation [facility])   Assessment & Plan    Continue Eliquis for anticoagulation, rate now controlled  She was tachy to the 140s on admission  · Continue digoxin and Lopressor, telemetry   · Cardiology following, echo pending        Leukocytosis   Assessment & Plan    WBC 16 on admission, did meet SIRS criteria with tachycardia and ^ WBC  · Influenza pending, blood cultures NGTD x24 hours, C diff pending, contact precautions  · UA negative  · CTA and CT abdomen and pelvis without evidence of acute infectious process  · Agree with holding on antibiotics at this time        Decubitus ulcer of heel   Assessment & Plan    Present on admit, secondary to neuropathy and polio brace  · Continue wound care per podiatry recommendations, follow up with his podiatrist outpatient  · No signs of infection such as drainage, foul odor, or worsening erythema  Monitor        Polio   Assessment & Plan    Patient with post-polio syndrome  · Does wear leg braces to get around, however has neuropathy as well and has developed a decub ulcer of right medial heel due to leg braces creating abrasions on patient's skin without realization  · Patient recently at [facility] however patient and family do not want to go back  They would like to see if patient can get transferred to [facility] after hospitalization    Case management following  · Podiatry consult appreciated        Gout, arthritis   Assessment & Plan    Continue allopurinol  · Does not seem to be in acute exacerbation        Hypokalemia   Assessment & Plan    3 1 on admission, improved, monitor        Hyponatremia   Assessment & Plan    Sodium 130 on admission, asymptomatic, likely due to volume depletion  · Improved with hydration, continue to monitor given on-going GI issues          VTE Pharmacologic Prophylaxis:   Pharmacologic: Apixaban (Eliquis)  Mechanical VTE Prophylaxis in Place: Yes    Patient Centered Rounds: I have performed bedside rounds with nursing staff today  Discussions with Specialists or Other Care Team Provider: cardiology notes reviewed, podiatry notes reviewed; waiting for cardiology re-eval    Education and Discussions with Family / Patient: wife and other family member present updated at the bedside on current situation and recommendations    Time Spent for Care: 20 minutes  More than 50% of total time spent on counseling and coordination of care as described above  Current Length of Stay: 2 day(s)    Current Patient Status: Inpatient   Certification Statement: The patient will continue to require additional inpatient hospital stay due to await improved HR control    Discharge Plan: not cleared at this time, anticipate in the next 24-48 hours     Code Status: Level 1 - Full Code    CC:  Palpitations  Subjective:   Patient seen examined sitting up in bed, his wife and other family member at bedside  He is still having some palpitations intermittently, denies any chest pain or shortness of breath  Overall, he feels a little improved  He is still having some loose stools, no blood  Tolerating diet well  Denies any upper GI symptoms  Denies fevers or chills  Objective:     Vitals:   Temp (24hrs), Av 3 °F (36 8 °C), Min:97 8 °F (36 6 °C), Max:98 8 °F (37 1 °C)    HR:  [] 81  Resp:  [18] 18  BP: (103-131)/(56-67) 115/66  SpO2:  [92 %-96 %] 96 %  Body mass index is 36 22 kg/m²  Input and Output Summary (last 24 hours):        Intake/Output Summary (Last 24 hours) at 18 0907  Last data filed at 18 1929   Gross per 24 hour   Intake                0 ml   Output             1700 ml   Net            -1700 ml       Physical Exam:     Physical Exam   Constitutional: Vital signs are normal  He appears well-developed and well-nourished  No distress  Pleasant obese gentleman in NAD, he is able to to speak full sentences, he does "feel" palpitations   Cardiovascular: S1 normal, S2 normal and intact distal pulses  An irregularly irregular rhythm present  Tachycardia present  No murmur heard  Pulmonary/Chest: Effort normal and breath sounds normal  No respiratory distress  He has no wheezes  He has no rhonchi  He has no rales  Abdominal: Soft  Bowel sounds are normal  He exhibits no distension  There is no tenderness  obese   Musculoskeletal: He exhibits no edema  Nursing note and vitals reviewed  Additional Data:     Labs:      Results from last 7 days  Lab Units 03/26/18  0537   WBC Thousand/uL 11 04*   HEMOGLOBIN g/dL 11 2*   HEMATOCRIT % 36 1*   PLATELETS Thousands/uL 295   NEUTROS PCT % 70   LYMPHS PCT % 15   MONOS PCT % 11   EOS PCT % 3       Results from last 7 days  Lab Units 03/26/18  0537   SODIUM mmol/L 135*   POTASSIUM mmol/L 3 5   CHLORIDE mmol/L 94*   CO2 mmol/L 36*   BUN mg/dL 6   CREATININE mg/dL 0 72   CALCIUM mg/dL 8 8   TOTAL PROTEIN g/dL 6 7   BILIRUBIN TOTAL mg/dL 0 40   ALK PHOS U/L 91   ALT U/L 7*   AST U/L 21   GLUCOSE RANDOM mg/dL 120       Results from last 7 days  Lab Units 03/24/18  1453   INR  1 73*       * I Have Reviewed All Lab Data Listed Above  * Additional Pertinent Lab Tests Reviewed: [address] Admission Reviewed    Imaging:    Imaging Reports Reviewed Today Include: RUQ u/s pending, echo pending  Imaging Personally Reviewed by Myself Includes:  Telemetry events reviewed     Recent Cultures (last 7 days):       Results from last 7 days  Lab Units 03/25/18  1033 03/24/18  1521 03/24/18  1453   BLOOD CULTURE   --  No Growth at 24 hrs  No Growth at 24 hrs     INFLUENZA A PCR  None Detected  --   --    INFLUENZA B PCR  None Detected  --   --    RSV PCR  None Detected  --   --        Last 24 Hours Medication List:     Current Facility-Administered Medications:  allopurinol 300 mg Oral Daily Stefanie aMta PA-C   apixaban 5 mg Oral BID Amanda Pacheco PA-C   baclofen 10 mg Oral TID PRN Amanda Pacheco PA-C   digoxin 125 mcg Oral Daily Stefanie Mata PA-C   fluticasone 1 spray Nasal Daily Stefanie Mata PA-C   loratadine 10 mg Oral Daily Stefanie Mata PA-C   metoprolol 5 mg Intravenous Q6H PRN Amanda Pacheco PA-C   metoprolol tartrate 25 mg Oral Q12H [address] Stefanie Mata PA-C   ondansetron 4 mg Intravenous Q6H PRN Amanda Pacheco PA-C   tamsulosin 0 4 mg Oral Daily With Dinner Amanda Pacheco PA-C        Today, Patient Was Seen By: Hema Tolentino PA-C    ** Please Note: Dictation voice to text software may have been used in the creation of this document   **

## 2018-03-26 NOTE — ASSESSMENT & PLAN NOTE
Present on admit, secondary to neuropathy and polio brace  · Continue wound care per podiatry recommendations, follow up with his podiatrist outpatient  · No signs of infection such as drainage, foul odor, or worsening erythema    Monitor

## 2018-03-26 NOTE — SOCIAL WORK
Cm met with patient at bedside, patient alert and oriented during interview  Patient reports residing in a private home with his wife 5STE  Patient reports his home is functional for him as he has handicap accessible bathroom and a chairlift  Patient reports being using a cane to ambulate and independent with ADL's  Patient reports filling his prescriptions at [facility] of [address] [place] [address] and has rx coverage  Patient reports being compliant with MD appointments and has adequate transportation within the community  Patient reports having his advanced directives and POA in place and has strong family and community supports  Patient reports an interested in [facility] rehab and reports having a hx with [address]  However, reports if denied by [facility] will discharge home  Referral sent to [facility] for review  Cm team will continue to follow and assess  No reported needs at this time  CM reviewed discharge planning process including the following: identifying help at home, patient preference for discharge planning needs, pharmacy preference, and availability of treatment team to discuss questions or concerns patient and/or family may have regarding understanding medications and recognizing signs and symptoms once discharged  CM also encouraged patient to follow up with all recommended appointments after discharge  Patient advised of importance for patient and family to participate in managing patients medical well being

## 2018-03-26 NOTE — CONSULTS
Progress Note - Wound   Heather Barnard 75 y o  male MRN: [id number]  Unit/Bed#: -01 Encounter: [id number]      Assessment:  Patient is 75year old male who presents with fevers and chills  Admitted with a-fib  Patient has a history of - a-fib, gout, HTN, neuropathy, polio and sleep apnea  Wound care consulted for R heel wound  Patient is being followed by podiatry for this wound - patient states he sustained this wound from an immobilizer  This wound is POA and is confirmed POA by patient and his wife who is also at the bedside  Patient cooperative with assessment  Assist of one with turning, continent of bowel and bladder per patient  L heel is intact with no redness or wounds noted  Dressing clean and dry and intact to R foot  Wound care as per podiatry to R heel  Recommend offloading of pressure using pillows and hydraguard cream to the lower extremities  Wife concerned with patients sacrum/buttocks area, states he had wounds in the past  On assessment intact scarring noted to the sacrum, no open aspects noted  B/l buttocks are intact with areas of intact blanchable redness and hyperpigmentation  No open areas noted  Recommend offloading of pressure and hydraguard cream  Reviewed the importance of offloading of pressure via weight shifting, turning and repositioning  Both patient and his wife verbalized understanding of plan of care and use of hydraguard cream  Tube given to wife  Wound care will sign off  Recommend preventative skin care plan  Primary nurse aware of plan of care/     Plan:   1  Wound care to R heel as per podiatry  2  Apply hydraguard to b/l heels, buttocks and sacrum BID and PRN for prevention and protection  3  Apply skin nourishing cream to the entire skin daily for moisture   4  Elevate heels off of bed with pillows to offload   5  Turn and reposition patient every 2 hours   6   Soft care cushion to chair when OOB     Objective:    Vitals: Blood pressure 115/66, pulse 81, temperature 98 6 °F (37 °C), temperature source Oral, resp  rate 18, height 5' 6" (1 676 m), weight 102 kg (224 lb 6 9 oz), SpO2 96 %  ,Body mass index is 36 22 kg/m²  Recommendations written as orders  Wound care will sign off as podiatry is following R heel wound    Ngozi Travis RN BSN

## 2018-03-26 NOTE — PLAN OF CARE
Problem: OCCUPATIONAL THERAPY ADULT  Goal: Performs self-care activities at highest level of function for planned discharge setting  See evaluation for individualized goals  Treatment Interventions: ADL retraining, Functional transfer training, UE strengthening/ROM, Endurance training, Patient/family training, Equipment evaluation/education, Energy conservation, Compensatory technique education          See flowsheet documentation for full assessment, interventions and recommendations  Limitation: Decreased ADL status, Decreased UE strength, Decreased cognition, Decreased endurance, Decreased high-level ADLs, Decreased self-care trans  Prognosis: Good  Assessment: Pt is a 75 y o  male seen for OT evaluation s/p admit to [facility] on 3/24/2018 w/ Paroxysmal atrial fibrillation ([facility] [address] )  Comorbidities affecting pt's functional performance at time of assessment include: arm weakness, HTN, Polio, post polio syndrome, Obesity, Cecubitus ulcer of R heel, Hypokalemia, hyponatremia, cervical myelopathy  Personal factors affecting pt at time of IE include:limited home support, difficulty performing ADLS and difficulty performing IADLS   Prior to admission, pt was living home with wife in a raised ranch home, with a stair glide and ramped entrance  He used a cane during functional mobility and wears a LLE brace during ambulation  Wife assisted with ADLs and IADLs as needed  All DME needs have been me previously  Pt sustained a fall over a month ago fracturing R Hip resulting in ORIF and is now NWB  He has been receiving rehab services at [facility] prior to admission to [place] [address]  Upon evaluation: Pt requires min a for bed mobility using bed rails with HOB elevated  Standing and transfers not attempted 2* his brace left home  According to pt, staff was using a sliding board at [address] from bed to chair transfers  Pt requires min/mod A for UB ADL , max/TD for LB ADLs  Pt is dep with toileting   pt scoring and 35/100 on the Barthel Index indicating severe functional decline with  the following deficits impacting occupational performance: weakness, decreased ROM, decreased strength, decreased balance, decreased tolerance, impaired GMC, impaired [address] and decreased safety awareness  Pt to benefit from continued skilled OT tx while in the hospital to address deficits as defined above and maximize level of functional independence w ADL's and functional mobility  Occupational Performance areas to address include: grooming, bathing/shower, toilet hygiene, dressing, medication management, socialization, health maintenance, functional mobility, community mobility, clothing management and job performance/volunteering  From OT standpoint, recommendation at time of d/c would be STR       OT Discharge Recommendation: Short Term Rehab  OT - OK to Discharge: Yes (when medically cleared )

## 2018-03-26 NOTE — OCCUPATIONAL THERAPY NOTE
Occupational Therapy Evaluation         Patient Name: Cristi Aldrich   [id number] Date: 3/26/2018      03/26/18 1108   Note Type   Note type Eval/Treat   Restrictions/Precautions   Weight Bearing Precautions Per Order No   Braces or Orthoses LE Braces   Other Precautions Bed Alarm; Chair Alarm;Telemetry;Multiple lines   Pain Assessment   Pain Assessment No/denies pain   Pain Score No Pain   Home Living   Type of [address] One level;Ramped entrance  (ramped entrance from back, and stair glide from basement )   Bathroom Shower/Tub Walk-in shower   Bathroom Toilet Raised   Bathroom Equipment Grab bars in shower;Grab bars around toilet   [address]   Prior Function   Level of [place] Independent with ADLs and functional mobility   Lives With Spouse   Receives Help From Family   ADL Assistance Needs assistance   IADLs Needs assistance   Falls in the last 6 months 1 to 4   Vocational Retired   Lifestyle   Autonomy Pt was I/toileting, feeding and MI/ functional mobiliity using cane,    Reciprocal Relationships supportive spouse   Psychosocial   Psychosocial (WDL) WDL   ADL   Where Assessed Edge of bed   Eating Assistance 5  Supervision/Setup   Grooming Assistance 5  Supervision/Setup   UB [address] 3  Moderate Assistance   UB Bathing Deficit Supervision/safety; Increased time to complete   LB Bathing Assistance 2  Maximal Assistance   LB Bathing Deficit Increased time to complete;Supervision/safety   UB Dressing Assistance 4  Minimal Assistance   UB Dressing Deficit Increased time to complete;Supervision/safety   LB Dressing Assistance 2  Maximal Assistance   LB Dressing Deficit Supervision/safety; Increased time to complete   Toileting Assistance  1  Total Assistance   Toileting Deficit Use of bedpan/urinal setup   Additional Comments pt w   Bed Mobility   Rolling R 3  Moderate assistance   Additional items Assist x 1;Bedrails;HOB elevated; Increased time required;Verbal cues   Rolling L 4  Minimal assistance   Additional items Assist x 1;Bedrails; Increased time required;LE management;Verbal cues   Sit to Supine 4  Minimal assistance   Additional items Assist x 1;HOB elevated; Bedrails;LE management;Verbal cues   Transfers   Sit to Stand (pt was using SB transfer at facility prior to hosp admitt)   Balance   Static Sitting Good   Dynamic Sitting Fair +   Activity Tolerance   Activity Tolerance Patient limited by fatigue   Nurse Made Aware YVONNE Rader verbalized pt approprite for tx, outcomes discussed    RUE Assessment   RUE Assessment X   RUE Overall AROM   R Shoulder Flexion 40*    RUE Strength   RUE Overall Strength Deficits   R Shoulder Flexion 2+/5   Edema   RUE Edema Non-pitting   Edema   LUE Edema Non-pitting   Hand Function   Gross Motor Coordination Impaired  (RUE)   Fine Motor Coordination Impaired  (RUE)   Sensation   Light Touch No apparent deficits   Vision-Basic Assessment   Current Vision Wears glasses all the time   Cognition   Overall Cognitive Status [facility]   Arousal/Participation Alert; Cooperative;Responsive   Attention Within functional limits   Orientation Level Oriented X4   Memory Within functional limits   Following Commands Follows all commands and directions without difficulty   Assessment   Limitation Decreased ADL status; Decreased UE strength;Decreased cognition;Decreased endurance;Decreased high-level ADLs; Decreased self-care trans   Prognosis Good   Assessment Pt is a 75 y o  male seen for OT evaluation s/p admit to [facility] on 3/24/2018 w/ Paroxysmal atrial fibrillation ([facility] [address] )  Comorbidities affecting pt's functional performance at time of assessment include: arm weakness, HTN, Polio, post polio syndrome, Obesity, Cecubitus ulcer of R heel, Hypokalemia, hyponatremia, cervical myelopathy   Personal factors affecting pt at time of IE include:limited home support, difficulty performing ADLS and difficulty performing IADLS   Prior to admission, pt was living home with wife in a raised ranch home, with a stair glide and ramped entrance  He used a cane during functional mobility and wears a LLE brace during ambulation  Wife assisted with ADLs and IADLs as needed  All DME needs have been me previously  Pt sustained a fall over a month ago fracturing R Hip resulting in ORIF and is now NWB  He has been receiving rehab services at [facility] prior to admission to [place] [address]  Upon evaluation: Pt requires min a for bed mobility using bed rails with HOB elevated  Standing and transfers not attempted 2* his brace left home  According to pt, staff was using a sliding board at [address] from bed to chair transfers  Pt requires min/mod A for UB ADL , max/TD for LB ADLs  Pt is dep with toileting  pt scoring and 35/100 on the Barthel Index indicating severe functional decline with  the following deficits impacting occupational performance: weakness, decreased ROM, decreased strength, decreased balance, decreased tolerance, impaired GMC, impaired [facility] and decreased safety awareness  Pt to benefit from continued skilled OT tx while in the hospital to address deficits as defined above and maximize level of functional independence w ADL's and functional mobility  Occupational Performance areas to address include: grooming, bathing/shower, toilet hygiene, dressing, medication management, socialization, health maintenance, functional mobility, community mobility, clothing management and job performance/volunteering  From OT standpoint, recommendation at time of d/c would be STR  Goals   Patient Goals "to get stronger and return home    Plan   Treatment Interventions ADL retraining;Functional transfer training;UE strengthening/ROM; Endurance training;Patient/family training;Equipment evaluation/education; Energy conservation; Compensatory technique education   Goal Expiration Date 04/06/18   OT Frequency 3-5x/wk   Recommendation   OT Discharge Recommendation Short Term Rehab   OT - OK to Discharge Yes  (when medically cleared )   Barthel Index   Feeding 10   Bathing 0   Grooming Score 0   Dressing Score 5   Bladder Score 5   Bowels Score 10   Toilet Use Score 0   Transfers (Bed/Chair) Score 5   Mobility (Level Surface) Score 0   Stairs Score 0   Barthel Index Score 35   Modified [place] Scale   Modified [place] Scale 4       Occupational Therapy goals: In 7-14 days:     1- Patient will verbalize and demonstrate use of energy conservation/ deep breathing technique and work simplification skills during functional activity with no verbal cues  2-Patient will verbalize and demonstrate good body mechanics and joint protection techniques during  ADLs/ IADLs with no verbal cues  3- Patient will increase OOB/ sitting tolerance to 2-4 hours per day for increased participation in self care and leisure tasks with no s/s of exertion  4-Patient will increase standing tolerance time to 5  minutes with unilateral UE support to complete sink level ADLs@ mod I level   5- Patient will increase sitting tolerance at edge of bed to 20 minutes to complete UB ADLs @ set up assist level  6- Patient will transfer bed to Chair / toilet at Set up assist level with AD as indicated  7- Patient will complete UB ADLs with set up assist  8- Patient will complete LB ADLs with min assist with the use of adaptive equipment  9- Patient will complete toileting hygiene with set up assist/ supervision for thoroughness    10-Patient/ Family  will demonstrate competency with UE Home Exercise Program

## 2018-03-26 NOTE — ASSESSMENT & PLAN NOTE
WBC 16 on admission, did meet SIRS criteria with tachycardia and ^ WBC  · Influenza pending, blood cultures NGTD x24 hours, C diff pending, contact precautions  · UA negative  · CTA and CT abdomen and pelvis without evidence of acute infectious process  · Agree with holding on antibiotics at this time

## 2018-03-27 PROBLEM — E87.6 HYPOKALEMIA: Status: RESOLVED | Noted: 2018-03-24 | Resolved: 2018-03-27

## 2018-03-27 PROBLEM — E87.1 HYPONATREMIA: Status: RESOLVED | Noted: 2018-03-24 | Resolved: 2018-03-27

## 2018-03-27 LAB
ATRIAL RATE: 159 BPM
QRS AXIS: 120 DEGREES
QRSD INTERVAL: 90 MS
QT INTERVAL: 258 MS
QTC INTERVAL: 385 MS
T WAVE AXIS: -43 DEGREES
VENTRICULAR RATE: 134 BPM

## 2018-03-27 PROCEDURE — 99232 SBSQ HOSP IP/OBS MODERATE 35: CPT | Performed by: INTERNAL MEDICINE

## 2018-03-27 PROCEDURE — G8979 MOBILITY GOAL STATUS: HCPCS

## 2018-03-27 PROCEDURE — 93010 ELECTROCARDIOGRAM REPORT: CPT | Performed by: INTERNAL MEDICINE

## 2018-03-27 PROCEDURE — G8978 MOBILITY CURRENT STATUS: HCPCS

## 2018-03-27 PROCEDURE — 97163 PT EVAL HIGH COMPLEX 45 MIN: CPT

## 2018-03-27 PROCEDURE — 99232 SBSQ HOSP IP/OBS MODERATE 35: CPT | Performed by: PHYSICIAN ASSISTANT

## 2018-03-27 PROCEDURE — 97110 THERAPEUTIC EXERCISES: CPT

## 2018-03-27 RX ORDER — METOPROLOL TARTRATE 50 MG/1
50 TABLET, FILM COATED ORAL EVERY 12 HOURS SCHEDULED
Status: DISCONTINUED | OUTPATIENT
Start: 2018-03-27 | End: 2018-03-29

## 2018-03-27 RX ADMIN — APIXABAN 5 MG: 5 TABLET, FILM COATED ORAL at 10:37

## 2018-03-27 RX ADMIN — APIXABAN 5 MG: 5 TABLET, FILM COATED ORAL at 17:06

## 2018-03-27 RX ADMIN — METOPROLOL TARTRATE 5 MG: 5 INJECTION, SOLUTION INTRAVENOUS at 13:52

## 2018-03-27 RX ADMIN — TAMSULOSIN HYDROCHLORIDE 0.4 MG: 0.4 CAPSULE ORAL at 17:06

## 2018-03-27 RX ADMIN — MICONAZOLE NITRATE: 2 CREAM TOPICAL at 18:02

## 2018-03-27 RX ADMIN — DIGOXIN 125 MCG: 0.12 TABLET ORAL at 10:38

## 2018-03-27 RX ADMIN — ALLOPURINOL 300 MG: 300 TABLET ORAL at 10:36

## 2018-03-27 RX ADMIN — FLUTICASONE PROPIONATE 1 SPRAY: 50 SPRAY, METERED NASAL at 10:39

## 2018-03-27 RX ADMIN — LORATADINE 10 MG: 10 TABLET ORAL at 10:39

## 2018-03-27 RX ADMIN — METOPROLOL TARTRATE 50 MG: 50 TABLET ORAL at 17:59

## 2018-03-27 RX ADMIN — METOPROLOL TARTRATE 5 MG: 5 INJECTION, SOLUTION INTRAVENOUS at 08:28

## 2018-03-27 NOTE — PROGRESS NOTES
Progress Note - Cardiology     Boom Porter 75 y o  male MRN: [id number]  Unit/Bed#: -01 Encounter: [id number]      Subjective:   No significant events overnight  Patient has been working with PT and his heart rates have been spiking with activity       Objective:   Vitals:  Vitals:    03/27/18 0736   BP: 128/66   Pulse: 97   Resp: 18   Temp: 97 9 °F (36 6 °C)   SpO2: 96%       Body mass index is 36 22 kg/m²  Systolic (56MPH), SJJ:380 , Min:95 , THS:427     Diastolic (78LWB), SAC:65, Min:60, Max:66      Intake/Output Summary (Last 24 hours) at 03/27/18 1006  Last data filed at 03/27/18 0501   Gross per 24 hour   Intake              860 ml   Output              980 ml   Net             -120 ml     Weight (last 2 days)     None          PHYSICAL EXAM:  General: Patient is not in acute distress  Head: Normocephalic  Atraumatic  HEENT: Both pupils normal sized, round and reactive to light  Nonicteric  Neck: JVP not raised  Trachea central    Respiratory: Bilateral bronchovascular breath sounds with no crackles or rhonchi  Cardiovascular: irregular  S1 and S2 normal  No murmur, rub or gallop  GI: Abdomen soft, nontender  No guarding or rigidity  Neurologic: Patient is awake, alert, responding to command   Moving all extremities  Integumentary:  No skin rash  Extremities: No clubbing, cyanosis or edema    LABORATORY RESULTS:    Results from last 7 days  Lab Units 03/24/18  1453   TROPONIN I ng/mL <0 02     CBC with diff:   Results from last 7 days  Lab Units 03/26/18  0537 03/25/18  0507 03/24/18  1453   WBC Thousand/uL 11 04* 10 81* 16 62*   HEMOGLOBIN g/dL 11 2* 11 4* 13 5   HEMATOCRIT % 36 1* 35 8* 42 0   MCV fL 88 87 85   PLATELETS Thousands/uL 295 314 394*   MCH pg 27 2 27 7 27 4   MCHC g/dL 31 0* 31 8 32 1   RDW % 14 2 14 1 13 9   MPV fL 8 5* 9 0 9 1   NRBC AUTO /100 WBCs 0  --  0       CMP:  Results from last 7 days  Lab Units 03/26/18  0537 03/25/18  1016 03/25/18  0507 03/24/18  1453   SODIUM mmol/L 135* 135* 135* 130*   POTASSIUM mmol/L 3 5 3 3* 3 2* 3 1*   CHLORIDE mmol/L 94* 94* 94* 85*   CO2 mmol/L 36* 37* 36* 37*   ANION GAP mmol/L 5 4 5 8   BUN mg/dL 6 7 7 10   CREATININE mg/dL 0 72 0 69 0 71 1 07   GLUCOSE RANDOM mg/dL 120 122 114 125   CALCIUM mg/dL 8 8 8 4 8 2* 9 2   AST U/L 21  --   --  24   ALT U/L 7*  --   --  15   ALK PHOS U/L 91  --   --  116   TOTAL PROTEIN g/dL 6 7  --   --  7 6   BILIRUBIN TOTAL mg/dL 0 40  --   --  0 50   EGFR ml/min/1 73sq m 96 98 96 71       BMP:  Results from last 7 days  Lab Units 18  0537 18  1016 18  0507   SODIUM mmol/L 135* 135* 135*   POTASSIUM mmol/L 3 5 3 3* 3 2*   CHLORIDE mmol/L 94* 94* 94*   CO2 mmol/L 36* 37* 36*   BUN mg/dL 6 7 7   CREATININE mg/dL 0 72 0 69 0 71   GLUCOSE RANDOM mg/dL 120 122 114   CALCIUM mg/dL 8 8 8 4 8 2*                Results from last 7 days  Lab Units 18  1016   MAGNESIUM mg/dL 1 5*               Results from last 7 days  Lab Units 18  0537 18  1016   TSH 3RD GENERATON uIU/mL  --  4 336*   FREE T4 ng/dL 1 27  --          Results from last 7 days  Lab Units 18  1453   INR  1 73*       Lipid Profile:   Lab Results   Component Value Date    CHOL 193 2017     Lab Results   Component Value Date    HDL 47 2017     No results found for: LDLCALC  No results found for: TRIG    Cardiac testing:   Results for orders placed during the hospital encounter of 18   Echo complete with contrast if indicated    Narrative [address] [place] [address] [phone]    Transthoracic Echocardiogram  2D, M-mode, Doppler, and Color Doppler    Study date:  26-Mar-2018    Patient: Helen Kelly  MR number: [id number]  Account number: [de-identified]  : 1950  Age: 75 years  Gender: Male  Status: Inpatient  Location: Bedside  Height: 66 in  Weight: 224 lb  BP: 124/ 68 mmHg    Indications: Atrial Fibrillation    Diagnoses: I48 0 - Atrial fibrillation    Sonographer:  Gloria Ku  Interpreting Physician:  Dinesh Hill MD  Referring Physician:  Felisa Watson PA-C  Group:  [facility]    SUMMARY    LEFT VENTRICLE:  Systolic function was normal  Ejection fraction was estimated in the range of 55 % to 65 %  There were no regional wall motion abnormalities  MITRAL VALVE:  There was mild regurgitation  TRICUSPID VALVE:  There was mild regurgitation  Estimated peak PA pressure was 40 mmHg  HISTORY: PRIOR HISTORY: Atrial Fibrillation, Hypertension, Mixed Hyperlipidemia, Hypokalemia    PROCEDURE: The procedure was performed at the bedside  This was a routine study  The transthoracic approach was used  The study included complete 2D imaging, M-mode, complete spectral Doppler, and color Doppler  The heart rate was 76 bpm,  at the start of the study  Images were obtained from the parasternal, apical, subcostal, and suprasternal notch acoustic windows  Image quality was adequate  LEFT VENTRICLE: Size was normal  Systolic function was normal  Ejection fraction was estimated in the range of 55 % to 65 %  There were no regional wall motion abnormalities  Wall thickness was normal  DOPPLER: Left ventricular diastolic  function parameters were abnormal     RIGHT VENTRICLE: The size was normal  Systolic function was normal  Wall thickness was normal     LEFT ATRIUM: Size was normal     RIGHT ATRIUM: Size was normal     MITRAL VALVE: Valve structure was normal  There was normal leaflet separation  DOPPLER: The transmitral velocity was within the normal range  There was no evidence for stenosis  There was mild regurgitation  AORTIC VALVE: The valve was trileaflet  Leaflets exhibited normal thickness and normal cuspal separation  DOPPLER: Transaortic velocity was minimally increased  There was no evidence for stenosis  There was no regurgitation      TRICUSPID VALVE: The valve structure was normal  There was normal leaflet separation  DOPPLER: The transtricuspid velocity was within the normal range  There was no evidence for stenosis  There was mild regurgitation  Estimated peak PA  pressure was 40 mmHg  PULMONIC VALVE: Leaflets exhibited normal thickness, no calcification, and normal cuspal separation  DOPPLER: The transpulmonic velocity was within the normal range  There was no regurgitation  PERICARDIUM: There was no pericardial effusion  The pericardium was normal in appearance  AORTA: The root exhibited normal size  SYSTEMIC VEINS: IVC: The inferior vena cava was normal in size and course  Respirophasic changes were normal     SYSTEM MEASUREMENT TABLES    2D  %FS: 25 2 %  Ao Diam: 2 7 cm  EDV(Teich): 83 6 ml  EF(Teich): 50 %  ESV(Teich): 41 8 ml  IVSd: 1 cm  LA Area: 23 9 cm2  LA Diam: 3 7 cm  LVEDV MOD A4C: 54 7 ml  LVEF MOD A4C: 63 9 %  LVESV MOD A4C: 19 8 ml  LVIDd: 4 3 cm  LVIDs: 3 2 cm  LVLd A4C: 6 8 cm  LVLs A4C: 6 cm  LVOT Diam: 1 9 cm  LVPWd: 1 cm  RA Area: 20 6 cm2  RVIDd: 3 4 cm  SV MOD A4C: 35 ml  SV(Teich): 41 8 ml    CW  AV Env  Ti: 213 1 ms  AV VTI: 29 7 cm  AV Vmax: 2 1 m/s  AV Vmean: 1 4 m/s  AV maxP 7 mmHg  AV meanP 1 mmHg  TR Vmax: 3 3 m/s  TR maxP mmHg    MM  TAPSE: 2 1 cm    PW  SALUD (VTI): 2 cm2  SALUD Vmax: 1 8 cm2  LVOT Env  Ti: 262 6 ms  LVOT VTI: 20 4 cm  LVOT Vmax: 1 3 m/s  LVOT Vmean: 0 8 m/s  LVOT maxP 5 mmHg  LVOT meanP 9 mmHg  LVSV Dopp: 59 3 ml    [facility] Accredited [facility]ch[facility]    Prepared and electronically signed by    Chao Rand MD  Signed 26-Mar-2018 18:09:34             Meds/Allergies   all current active meds have been reviewed  Prescriptions Prior to Admission   Medication    allopurinol (ZYLOPRIM) 300 mg tablet    apixaban (ELIQUIS) 5 mg    baclofen 10 mg tablet    colchicine (COLCRYS) 0 6 mg tablet    digoxin (LANOXIN) 0 125 mg tablet    fluticasone (FLONASE) 50 mcg/act nasal spray    furosemide (LASIX) 40 mg tablet    loratadine (CLARITIN) 10 mg tablet    metoprolol tartrate (LOPRESSOR) 50 mg tablet    tamsulosin (FLOMAX) 0 4 mg    indomethacin (INDOCIN) 25 mg capsule    Methylprednisolone 4 MG TBPK              Assessment and Plan:  Paroxysmal atrial fibrillation  -echocardiogram revealed EF 55-65 percent without regional wall motion abnormality, mild MR, mild TR, estimated peak PA pressure 40 mmHg  - currently on digoxin and metoprolol-- however will increase lopressor to 50 mg bid for better control with activity   -on Eliquis for anticoagulation     Hypertension- BP stable, however will monitor with increase of lopressor as patient did have periods were his BP would be less than 585 systolic this admission            ** Please Note: Dragon 360 Dictation voice to text software may have been used in the creation of this document   **

## 2018-03-27 NOTE — PROGRESS NOTES
Progress Note - Donal Bloodgood 1950, 75 y o  male MRN: [id number]    Unit/Bed#: -01 Encounter: [id number]    Primary Care Provider: Leia Cali DO   Date and time admitted to hospital: 3/24/2018  1:28 PM        * Paroxysmal atrial fibrillation [facility])   Assessment & Plan    Continue Eliquis for anticoagulation, rate now controlled  Still some tachycardia, Cardiology increased his metoprolol to 50 mg b i d   · Continue digoxin and Lopressor, telemetry   · Cardiology following, echo without significant abnormalities, preserved EF        Leukocytosis   Assessment & Plan    WBC 16 on admission, did meet SIRS criteria with tachycardia and ^ WBC  · Influenza (-), blood cultures NGTD x24 hours, C diff (-)  · UA negative  · CTA and CT abdomen and pelvis without evidence of acute infectious process  · Agree with holding on antibiotics at this time        Decubitus ulcer of heel   Assessment & Plan    Present on admit, secondary to neuropathy and polio brace  · Continue wound care per podiatry recommendations, follow up with his podiatrist outpatient  · No signs of infection such as drainage, foul odor, or worsening erythema - wound culture showing preliminary Staph  Monitor, await final culture  · Will ask Podiatry to see the patient again prior to discharge  · He does have some erythema on the top part of the right foot, the seems more consistent with tinea infection  I am going to add miconazole topical cream to this area        Polio   Assessment & Plan    Patient with post-polio syndrome  · Does wear leg braces to get around, however has neuropathy as well and has developed a decub ulcer of right medial heel due to leg braces creating abrasions on patient's skin without realization  · Patient recently at [facility] however patient and family do not want to go back  They would like to see if patient can get transferred to [address] [place] [address] after hospitalization    Case management following  · Podiatry consult appreciated        Gout, arthritis   Assessment & Plan    Continue allopurinol  · Does not seem to be in acute exacerbation          VTE Pharmacologic Prophylaxis:   Pharmacologic: Apixaban (Eliquis)  Mechanical VTE Prophylaxis in Place: Yes    Patient Centered Rounds: I have evaluated patient without nursing staff present due to Nurse not available at this time, did discuss by phone earlier regarding heart rate issues    Discussions with Specialists or Other Care Team Provider:  Cardiology    Education and Discussions with Family / Patient:  Patient and his wife at the bedside    Time Spent for Care: 20 minutes  More than 50% of total time spent on counseling and coordination of care as described above  Current Length of Stay: 3 day(s)    Current Patient Status: Inpatient   Certification Statement: The patient will continue to require additional inpatient hospital stay due to Await better controlled heart rate today, final cultures from the wound, case management for placement for rehab    Discharge Plan:  Pending above, anticipate in the next 24-48 hours    Code Status: Level 1 - Full Code      Subjective:   Patient seen examined sitting up in bed  His wife is at the bedside  His main complaint today is a new rash on the top of the right foot  It is erythematous, it is not hot, he has neuropathy does not feel any pain there  He denies any trauma to that area  Does not appear more swollen to him  Denies subjective fevers or chills  Otherwise, he feels well  He does notice that he has been having some intermittent tachycardia with exertion, no significant chest pain otherwise  Objective:     Vitals:   Temp (24hrs), Av 2 °F (36 8 °C), Min:97 9 °F (36 6 °C), Max:98 8 °F (37 1 °C)    HR:  [] 100  Resp:  [18] 18  BP: (107-128)/(60-66) 120/64  SpO2:  [93 %-97 %] 97 %  Body mass index is 36 22 kg/m²  Input and Output Summary (last 24 hours):        Intake/Output Summary (Last 24 hours) at 03/27/18 1657  Last data filed at 03/27/18 1500   Gross per 24 hour   Intake             1720 ml   Output              980 ml   Net              740 ml       Physical Exam:     Physical Exam   Constitutional: Vital signs are normal  He appears well-developed and well-nourished  No distress  Cardiovascular: Normal rate, S1 normal and S2 normal   An irregularly irregular rhythm present  No murmur heard  Pulmonary/Chest: Effort normal and breath sounds normal  No respiratory distress  He has no wheezes  He has no rhonchi  He has no rales  Abdominal: Soft  Bowel sounds are normal  He exhibits no distension  There is no tenderness  Obese, round   Musculoskeletal: He exhibits edema (Trace bilat pedal)  Skin: Rash (Dorsum right foot, proximal to the toes  Not warm  Blanching ) noted  Nursing note and vitals reviewed  Additional Data:     Labs:      Results from last 7 days  Lab Units 03/26/18  0537   WBC Thousand/uL 11 04*   HEMOGLOBIN g/dL 11 2*   HEMATOCRIT % 36 1*   PLATELETS Thousands/uL 295   NEUTROS PCT % 70   LYMPHS PCT % 15   MONOS PCT % 11   EOS PCT % 3       Results from last 7 days  Lab Units 03/26/18  0537   SODIUM mmol/L 135*   POTASSIUM mmol/L 3 5   CHLORIDE mmol/L 94*   CO2 mmol/L 36*   BUN mg/dL 6   CREATININE mg/dL 0 72   CALCIUM mg/dL 8 8   TOTAL PROTEIN g/dL 6 7   BILIRUBIN TOTAL mg/dL 0 40   ALK PHOS U/L 91   ALT U/L 7*   AST U/L 21   GLUCOSE RANDOM mg/dL 120       Results from last 7 days  Lab Units 03/24/18  1453   INR  1 73*       * I Have Reviewed All Lab Data Listed Above  * Additional Pertinent Lab Tests Reviewed: No New Labs Available For Today    Imaging:    Imaging Reports Reviewed Today Include:  Echo  Imaging Personally Reviewed by Myself Includes:  None    Recent Cultures (last 7 days):       Results from last 7 days  Lab Units 03/25/18  1517 03/25/18  1140 03/25/18  1033 03/24/18  1521 03/24/18  1453   BLOOD CULTURE   --   --   --  No Growth at 48 hrs   No Growth at 48 hrs  GRAM STAIN RESULT  No Polys or Bacteria seen  --   --   --   --    WOUND CULTURE  1+ Growth of Staphylococcus coagulase negative*  --   --   --   --    INFLUENZA A PCR   --   --  None Detected  --   --    INFLUENZA B PCR   --   --  None Detected  --   --    RSV PCR   --   --  None Detected  --   --    C DIFF TOXIN B   --  NEGATIVE for C difficle toxin by PCR    --   --   --        Last 24 Hours Medication List:     Current Facility-Administered Medications:  allopurinol 300 mg Oral Daily Stefanie Mata PA-C   apixaban 5 mg Oral BID Stefanie Mata PA-C   baclofen 10 mg Oral TID PRN Enid De Los Santos PA-C   digoxin 125 mcg Oral Daily Stefanie Mata PA-C   fluticasone 1 spray Nasal Daily Stefanie Mata PA-C   loratadine 10 mg Oral Daily Stefanie Mata PA-C   metoprolol 5 mg Intravenous Q6H PRN Enid De Los Santos PA-C   metoprolol tartrate 50 mg Oral Q12H [facility] Caty Manuel PA-C   miconazole  Topical BID Danni Henriquez PA-C   ondansetron 4 mg Intravenous Q6H PRN Enid De Los Santos PA-C   tamsulosin 0 4 mg Oral Daily With Dinner Enid De Los Santos PA-C        Today, Patient Was Seen By: Jennie Gould PA-C    ** Please Note: Dictation voice to text software may have been used in the creation of this document   **

## 2018-03-27 NOTE — SOCIAL WORK
Per [facility] pt needs to be off cardiac meds 24 hours prior to admission  Had IV lopressor this am  [facility] will reassess in the am and give a determination   Cm to follow

## 2018-03-27 NOTE — ASSESSMENT & PLAN NOTE
Patient with post-polio syndrome  · Does wear leg braces to get around, however has neuropathy as well and has developed a decub ulcer of right medial heel due to leg braces creating abrasions on patient's skin without realization  · Patient recently at [facility] stone however patient and family do not want to go back  They would like to see if patient can get transferred to [facility] after hospitalization    Case management following  · Podiatry consult appreciated

## 2018-03-27 NOTE — ASSESSMENT & PLAN NOTE
Present on admit, secondary to neuropathy and polio brace  · Continue wound care per podiatry recommendations, follow up with his podiatrist outpatient  · No signs of infection such as drainage, foul odor, or worsening erythema - wound culture showing preliminary Candida  Monitor, await final culture  · Will ask Podiatry to see the patient again prior to discharge  · He does have some erythema on the top part of the right foot, the seems more consistent with tinea infection    I am going to add miconazole topical cream to this area

## 2018-03-27 NOTE — SOCIAL WORK
Conversation w pt and family  Inquiring about acceptance at [facility]  Cm explained that pt must be medically cleared for [facility] to give a determination  Furthermore, awaiting to see pts progression w PT  Family understanding  If pt denied at [facility] he desires to go home   Cm will assist in obtaining dme if pt goes home

## 2018-03-27 NOTE — PLAN OF CARE
Problem: Potential for Falls  Goal: Patient will remain free of falls  INTERVENTIONS:  - Assess patient frequently for physical needs  -  Identify cognitive and physical deficits and behaviors that affect risk of falls    -  [place] fall precautions as indicated by assessment   - Educate patient/family on patient safety including physical limitations  - Instruct patient to call for assistance with activity based on assessment  - Modify environment to reduce risk of injury  - Consider OT/PT consult to assist with strengthening/mobility   Outcome: Progressing      Problem: Prexisting or High Potential for Compromised Skin Integrity  Goal: Skin integrity is maintained or improved  INTERVENTIONS:  - Identify patients at risk for skin breakdown  - Assess and monitor skin integrity  - Assess and monitor nutrition and hydration status  - Monitor labs (i e  albumin)  - Assess for incontinence   - Turn and reposition patient  - Assist with mobility/ambulation  - Relieve pressure over bony prominences  - Avoid friction and shearing  - Provide appropriate hygiene as needed including keeping skin clean and dry  - Evaluate need for skin moisturizer/barrier cream  - Collaborate with interdisciplinary team (i e  Nutrition, Rehabilitation, etc )   - Patient/family teaching   Outcome: Progressing      Problem: DISCHARGE PLANNING - CARE MANAGEMENT  Goal: Discharge to post-acute care or home with appropriate resources  INTERVENTIONS:  - Conduct assessment to determine patient/family and health care team treatment goals, and need for post-acute services based on payer coverage, community resources, and patient preferences, and barriers to discharge  - Address psychosocial, clinical, and financial barriers to discharge as identified in assessment in conjunction with the patient/family and health care team  - Arrange appropriate level of post-acute services according to patient's   needs and preference and payer coverage in collaboration with the physician and health care team  - Communicate with and update the patient/family, physician, and health care team regarding progress on the discharge plan  - Arrange appropriate transportation to post-acute venues   Outcome: Progressing

## 2018-03-27 NOTE — PROGRESS NOTES
Spoke to Elvis Page  Informed her that pt just received an additional  Iv metoprolol dose and  did not receive his po metoprolol dose scheduled for 9 am due to the fact that he received IV dose less that half hour prior  Instructed not to give the po dose at this time, but to give the 9pm dose at 6 pm this evening

## 2018-03-27 NOTE — ASSESSMENT & PLAN NOTE
Continue Eliquis for anticoagulation, rate now controlled    Still some tachycardia, Cardiology increased his metoprolol to 50 mg b i d   · Continue digoxin and Lopressor, telemetry   · Cardiology following, echo without significant abnormalities, preserved EF

## 2018-03-27 NOTE — PLAN OF CARE
Problem: PHYSICAL THERAPY ADULT  Goal: Performs mobility at highest level of function for planned discharge setting  See evaluation for individualized goals  Treatment/Interventions: Functional transfer training, LE strengthening/ROM, Therapeutic exercise, Endurance training, Patient/family training, Equipment eval/education, Bed mobility, Spoke to nursing, Continued evaluation  Equipment Recommended: Wheelchair (drop arm commode, hayley lift, sliding board, hospital bed)       See flowsheet documentation for full assessment, interventions and recommendations  Prognosis: Good  Problem List: Decreased strength, Decreased range of motion, Decreased endurance, Impaired balance, Decreased mobility, Decreased skin integrity, Pain, Orthopedic restrictions  Assessment: Patient admitted with a fib  Has R femur ORIF about 4-5 weeks ago, NWB  Was in [facility]  Also with diarrhea with c-diff ruled out  Has been in hospital recently at [facility] with afib diagnosis new, constipation, fever  No etiology for fever  DX:  SIRS with unknown etiology and increased WBC  Also decreased Na and K+  He presents with the above deficits creating inability to transfer at this time putting him at risk for falls  He is below his baseline level of function requiring mod assist with mobility and will benefit from skilled PT interventions to increase his level of functional mobility and level of independence while decreasing his fall risk as well as caregiver burden  His history presents with more than 3 elements, a high level of complexity    Topics considered with medical/social history included:  Co morbid conditions impacting function and progress, impaired PLF, context of current functional limitations compared to PLF, lack of physical emotional and/or social support, cultural preferences, employment status, recent hospital admissions/readmissions, response to prior treatment approaches, medication management, personal factors impacting POC  (Number of elements:  0=low, 1-2 =moderate, 3 or more=high)  His examination presents with more than 3 elements, a high level of complexity  Examination of body systems included musculoskeletal, sensory, cardiovascular, pulmonary, neuromuscular, integumentary, and communication  Functional limitations include ROM deficits, strength deficits, irregular vital sign response to interventions, difficulty initiating task/action/activity, edema, pain, self care deficits, community and social reintegration deficits, impaired ability to make needs known, impaired level of consciousness and/or orientation, learning barrier, emotional response behavioral response, tone, balance, sensory, coordination and endurance deficits (Number of elements:  1-2 =low, 3=moderate, >3=high)  Objective testing utilizing the Barthel indicates impaired functional mobility  His clinical presentation is presently unstable  Clinicial decision making reveals a high level of evaluation complexity  Barriers to Discharge: Inaccessible home environment, Decreased caregiver support (needs increased assist and equipement needs)  Barriers to Discharge Comments: limited by medical condition and mobility at this time, has good social support system  Recommendation: Post acute IP rehab, Short-term skilled PT     PT - OK to Discharge: No (not medically cleared, further training needed)    See flowsheet documentation for full assessment

## 2018-03-27 NOTE — ASSESSMENT & PLAN NOTE
WBC 16 on admission, did meet SIRS criteria with tachycardia and ^ WBC  · Influenza (-), blood cultures NGTD x24 hours, C diff (-)  · UA negative  · CTA and CT abdomen and pelvis without evidence of acute infectious process  · Agree with holding on antibiotics at this time

## 2018-03-27 NOTE — PROGRESS NOTES
Pt having physical therapy  Heart rate up to 170's  Currently 153  Metoprolol 5 mg given IV as ordered

## 2018-03-27 NOTE — PHYSICAL THERAPY NOTE
PHYSICAL THERAPY NOTE (GOALS)    Omitted in error from initial evaluation  Patient Name: Blanca Long  [id number]'G Date: 3/27/2018      Supine to and from sit without assist, increase R knee ROM to 70 degrees flexion, independent b/s exer program, improve sitting tolerance to 20 minutes with increase in sitting balance to G dynamic  Continue to assess transfers with increased tolerance        Boom Soriano, PT

## 2018-03-28 DIAGNOSIS — I48.0 PAROXYSMAL A-FIB (HCC): Primary | ICD-10-CM

## 2018-03-28 LAB
ANION GAP SERPL CALCULATED.3IONS-SCNC: 7 MMOL/L (ref 4–13)
ATRIAL RATE: 182 BPM
BACTERIA SPEC ANAEROBE CULT: NORMAL
BACTERIA WND AEROBE CULT: ABNORMAL
BUN SERPL-MCNC: 9 MG/DL (ref 5–25)
CALCIUM SERPL-MCNC: 8.7 MG/DL (ref 8.3–10.1)
CHLORIDE SERPL-SCNC: 97 MMOL/L (ref 100–108)
CO2 SERPL-SCNC: 31 MMOL/L (ref 21–32)
CREAT SERPL-MCNC: 0.63 MG/DL (ref 0.6–1.3)
GFR SERPL CREATININE-BSD FRML MDRD: 101 ML/MIN/1.73SQ M
GLUCOSE SERPL-MCNC: 109 MG/DL (ref 65–140)
GRAM STN SPEC: ABNORMAL
MAGNESIUM SERPL-MCNC: 1.7 MG/DL (ref 1.6–2.6)
POTASSIUM SERPL-SCNC: 3.3 MMOL/L (ref 3.5–5.3)
QRS AXIS: 85 DEGREES
QRSD INTERVAL: 86 MS
QT INTERVAL: 292 MS
QTC INTERVAL: 436 MS
SODIUM SERPL-SCNC: 135 MMOL/L (ref 136–145)
T WAVE AXIS: -77 DEGREES
VENTRICULAR RATE: 134 BPM

## 2018-03-28 PROCEDURE — 97530 THERAPEUTIC ACTIVITIES: CPT

## 2018-03-28 PROCEDURE — 99232 SBSQ HOSP IP/OBS MODERATE 35: CPT | Performed by: PHYSICIAN ASSISTANT

## 2018-03-28 PROCEDURE — 80048 BASIC METABOLIC PNL TOTAL CA: CPT | Performed by: PHYSICIAN ASSISTANT

## 2018-03-28 PROCEDURE — 93010 ELECTROCARDIOGRAM REPORT: CPT | Performed by: INTERNAL MEDICINE

## 2018-03-28 PROCEDURE — 83735 ASSAY OF MAGNESIUM: CPT | Performed by: PHYSICIAN ASSISTANT

## 2018-03-28 PROCEDURE — 99232 SBSQ HOSP IP/OBS MODERATE 35: CPT | Performed by: INTERNAL MEDICINE

## 2018-03-28 PROCEDURE — 93005 ELECTROCARDIOGRAM TRACING: CPT

## 2018-03-28 RX ADMIN — METOPROLOL TARTRATE 50 MG: 50 TABLET ORAL at 07:53

## 2018-03-28 RX ADMIN — METOPROLOL TARTRATE 5 MG: 5 INJECTION, SOLUTION INTRAVENOUS at 07:55

## 2018-03-28 RX ADMIN — METOPROLOL TARTRATE 50 MG: 50 TABLET ORAL at 18:16

## 2018-03-28 RX ADMIN — ALLOPURINOL 300 MG: 300 TABLET ORAL at 07:52

## 2018-03-28 RX ADMIN — APIXABAN 5 MG: 5 TABLET, FILM COATED ORAL at 07:52

## 2018-03-28 RX ADMIN — LORATADINE 10 MG: 10 TABLET ORAL at 07:53

## 2018-03-28 RX ADMIN — METOPROLOL TARTRATE 25 MG: 25 TABLET ORAL at 18:15

## 2018-03-28 RX ADMIN — TAMSULOSIN HYDROCHLORIDE 0.4 MG: 0.4 CAPSULE ORAL at 16:48

## 2018-03-28 RX ADMIN — APIXABAN 5 MG: 5 TABLET, FILM COATED ORAL at 18:15

## 2018-03-28 RX ADMIN — DIGOXIN 125 MCG: 0.12 TABLET ORAL at 07:53

## 2018-03-28 RX ADMIN — METOPROLOL TARTRATE 5 MG: 5 INJECTION, SOLUTION INTRAVENOUS at 00:34

## 2018-03-28 RX ADMIN — MICONAZOLE NITRATE: 2 CREAM TOPICAL at 18:18

## 2018-03-28 NOTE — SOCIAL WORK
Received IV lopressor at midnight   Will not be eligible for transfer to [facility] until 3/29

## 2018-03-28 NOTE — PLAN OF CARE
Problem: PHYSICAL THERAPY ADULT  Goal: Performs mobility at highest level of function for planned discharge setting  See evaluation for individualized goals  Treatment/Interventions: Functional transfer training, LE strengthening/ROM, Therapeutic exercise, Endurance training, Patient/family training, Equipment eval/education, Bed mobility, Spoke to nursing, Continued evaluation  Equipment Recommended: Wheelchair (drop arm commode, hayley lift, sliding board, hospital bed)       See flowsheet documentation for full assessment, interventions and recommendations  Outcome: Progressing  Prognosis: Good  Problem List: Decreased strength, Decreased range of motion, Decreased endurance, Impaired balance, Decreased mobility, Decreased skin integrity, Pain, Orthopedic restrictions  Assessment: Patient with improved mobility this session  No LOB in sitting,  Difficulty with hip and knee ext with standing with RW  Had not been up in over a week  Very motivated with good attention of wife for education with mobility  HR controlled this session  No c/o pain  LLE with chronic knee hyperextension  Barriers to Discharge: Inaccessible home environment, Decreased caregiver support  Barriers to Discharge Comments: limited by medical condition and mobility at this time, has good social support system  Recommendation: Short-term skilled PT, Post acute IP rehab     PT - OK to Discharge: Yes (if medically stable tyo STR)    See flowsheet documentation for full assessment

## 2018-03-28 NOTE — PROGRESS NOTES
Progress Note - Cardiology     Diana Curtis 75 y o  male MRN: [id number]  Unit/Bed#: -01 Encounter: [id number]      Subjective:   No significant events overnight  Patient working with physical therapy this morning  Heart rates were stable during activity  Patient denies chest pain, SOB, palpitations, lightheadedness  Objective:   Vitals:  Vitals:    03/28/18 1100   BP: 123/75   Pulse: 78   Resp: 18   Temp: (!) 97 4 °F (36 3 °C)   SpO2:        Body mass index is 36 22 kg/m²  Systolic (91MYZ), GSN:574 , Min:106 , ABJ:640     Diastolic (32TBA), JFS:20, Min:56, Max:75      Intake/Output Summary (Last 24 hours) at 03/28/18 1159  Last data filed at 03/28/18 1012   Gross per 24 hour   Intake             1520 ml   Output              725 ml   Net              795 ml     Weight (last 2 days)     None          PHYSICAL EXAM:  General: Patient is not in acute distress  Head: Normocephalic  Atraumatic  HEENT: Both pupils normal sized, round and reactive to light  Nonicteric  Neck: JVP not raised  Trachea central   Respiratory: Bilateral bronchovascular breath sounds with no crackles or rhonchi  Cardiovascular:   Irregular S1 and S2 normal  No murmur, rub or gallop  GI: Abdomen soft, nontender  No guarding or rigidity  Neurologic: Patient is awake, alert, responding to command   Moving all extremities  Integumentary:  No skin rash  Extremities:  Trace edema bilaterally    LABORATORY RESULTS:    Results from last 7 days  Lab Units 03/24/18  1453   TROPONIN I ng/mL <0 02     CBC with diff:   Results from last 7 days  Lab Units 03/26/18  0537 03/25/18  0507 03/24/18  1453   WBC Thousand/uL 11 04* 10 81* 16 62*   HEMOGLOBIN g/dL 11 2* 11 4* 13 5   HEMATOCRIT % 36 1* 35 8* 42 0   MCV fL 88 87 85   PLATELETS Thousands/uL 295 314 394*   MCH pg 27 2 27 7 27 4   MCHC g/dL 31 0* 31 8 32 1   RDW % 14 2 14 1 13 9   MPV fL 8 5* 9 0 9 1   NRBC AUTO /100 WBCs 0  --  0       CMP:  Results from last 7 days  Lab Units 18  0537 18  1016 18  0507 18  1453   SODIUM mmol/L 135* 135* 135* 130*   POTASSIUM mmol/L 3 5 3 3* 3 2* 3 1*   CHLORIDE mmol/L 94* 94* 94* 85*   CO2 mmol/L 36* 37* 36* 37*   ANION GAP mmol/L 5 4 5 8   BUN mg/dL 6 7 7 10   CREATININE mg/dL 0 72 0 69 0 71 1 07   GLUCOSE RANDOM mg/dL 120 122 114 125   CALCIUM mg/dL 8 8 8 4 8 2* 9 2   AST U/L 21  --   --  24   ALT U/L 7*  --   --  15   ALK PHOS U/L 91  --   --  116   TOTAL PROTEIN g/dL 6 7  --   --  7 6   BILIRUBIN TOTAL mg/dL 0 40  --   --  0 50   EGFR ml/min/1 73sq m 96 98 96 71       BMP:  Results from last 7 days  Lab Units 18  0537 18  1016 18  0507   SODIUM mmol/L 135* 135* 135*   POTASSIUM mmol/L 3 5 3 3* 3 2*   CHLORIDE mmol/L 94* 94* 94*   CO2 mmol/L 36* 37* 36*   BUN mg/dL 6 7 7   CREATININE mg/dL 0 72 0 69 0 71   GLUCOSE RANDOM mg/dL 120 122 114   CALCIUM mg/dL 8 8 8 4 8 2*                Results from last 7 days  Lab Units 18  1016   MAGNESIUM mg/dL 1 5*               Results from last 7 days  Lab Units 18  0537 18  1016   TSH 3RD GENERATON uIU/mL  --  4 336*   FREE T4 ng/dL 1 27  --          Results from last 7 days  Lab Units 18  1453   INR  1 73*       Lipid Profile:   Lab Results   Component Value Date    CHOL 193 2017     Lab Results   Component Value Date    HDL 47 2017     No results found for: LDLCALC  No results found for: TRIG    Cardiac testing:   Results for orders placed during the hospital encounter of 18   Echo complete with contrast if indicated    Narrative [address] [place] [id number] [phone]    Transthoracic Echocardiogram  2D, M-mode, Doppler, and Color Doppler    Study date:  26-Mar-2018    Patient: Zak Peres  MR number: [id number]  Account number: [de-identified]  : 1950  Age: 75 years  Gender: Male  Status: Inpatient  Location: Bedside  Height: 66 in  Weight: 224 lb  BP: 124/ 68 mmHg    Indications: Atrial Fibrillation    Diagnoses: I48 0 - Atrial fibrillation    Sonographer:  AKILA Michael,RDCS  Interpreting Physician:  Noah Roach MD  Referring Physician:  Darryl Crum PA-C  Group:  [facility]    SUMMARY    LEFT VENTRICLE:  Systolic function was normal  Ejection fraction was estimated in the range of 55 % to 65 %  There were no regional wall motion abnormalities  MITRAL VALVE:  There was mild regurgitation  TRICUSPID VALVE:  There was mild regurgitation  Estimated peak PA pressure was 40 mmHg  HISTORY: PRIOR HISTORY: Atrial Fibrillation, Hypertension, Mixed Hyperlipidemia, Hypokalemia    PROCEDURE: The procedure was performed at the bedside  This was a routine study  The transthoracic approach was used  The study included complete 2D imaging, M-mode, complete spectral Doppler, and color Doppler  The heart rate was 76 bpm,  at the start of the study  Images were obtained from the parasternal, apical, subcostal, and suprasternal notch acoustic windows  Image quality was adequate  LEFT VENTRICLE: Size was normal  Systolic function was normal  Ejection fraction was estimated in the range of 55 % to 65 %  There were no regional wall motion abnormalities  Wall thickness was normal  DOPPLER: Left ventricular diastolic  function parameters were abnormal     RIGHT VENTRICLE: The size was normal  Systolic function was normal  Wall thickness was normal     LEFT ATRIUM: Size was normal     RIGHT ATRIUM: Size was normal     MITRAL VALVE: Valve structure was normal  There was normal leaflet separation  DOPPLER: The transmitral velocity was within the normal range  There was no evidence for stenosis  There was mild regurgitation  AORTIC VALVE: The valve was trileaflet  Leaflets exhibited normal thickness and normal cuspal separation  DOPPLER: Transaortic velocity was minimally increased  There was no evidence for stenosis   There was no regurgitation  TRICUSPID VALVE: The valve structure was normal  There was normal leaflet separation  DOPPLER: The transtricuspid velocity was within the normal range  There was no evidence for stenosis  There was mild regurgitation  Estimated peak PA  pressure was 40 mmHg  PULMONIC VALVE: Leaflets exhibited normal thickness, no calcification, and normal cuspal separation  DOPPLER: The transpulmonic velocity was within the normal range  There was no regurgitation  PERICARDIUM: There was no pericardial effusion  The pericardium was normal in appearance  AORTA: The root exhibited normal size  SYSTEMIC VEINS: IVC: The inferior vena cava was normal in size and course  Respirophasic changes were normal     SYSTEM MEASUREMENT TABLES    2D  %FS: 25 2 %  Ao Diam: 2 7 cm  EDV(Teich): 83 6 ml  EF(Teich): 50 %  ESV(Teich): 41 8 ml  IVSd: 1 cm  LA Area: 23 9 cm2  LA Diam: 3 7 cm  LVEDV MOD A4C: 54 7 ml  LVEF MOD A4C: 63 9 %  LVESV MOD A4C: 19 8 ml  LVIDd: 4 3 cm  LVIDs: 3 2 cm  LVLd A4C: 6 8 cm  LVLs A4C: 6 cm  LVOT Diam: 1 9 cm  LVPWd: 1 cm  RA Area: 20 6 cm2  RVIDd: 3 4 cm  SV MOD A4C: 35 ml  SV(Teich): 41 8 ml    CW  AV Env  Ti: 213 1 ms  AV VTI: 29 7 cm  AV Vmax: 2 1 m/s  AV Vmean: 1 4 m/s  AV maxP 7 mmHg  AV meanP 1 mmHg  TR Vmax: 3 3 m/s  TR maxP mmHg    MM  TAPSE: 2 1 cm    PW  SALUD (VTI): 2 cm2  SALUD Vmax: 1 8 cm2  LVOT Env  Ti: 262 6 ms  LVOT VTI: 20 4 cm  LVOT Vmax: 1 3 m/s  LVOT Vmean: 0 8 m/s  LVOT maxP 5 mmHg  LVOT meanP 9 mmHg  LVSV Dopp: 59 3 ml    [employer] Accredited Echocardi[facility]    Prepared and electronically signed by    Nikolay Clements MD  Signed 26-Mar-2018 18:09:34             Meds/Allergies   all current active meds have been reviewed  Prescriptions Prior to Admission   Medication    allopurinol (ZYLOPRIM) 300 mg tablet    apixaban (ELIQUIS) 5 mg    baclofen 10 mg tablet    colchicine (COLCRYS) 0 6 mg tablet    digoxin (LANOXIN) 0 125 mg tablet    fluticasone (FLONASE) 50 mcg/act nasal spray    furosemide (LASIX) 40 mg tablet    loratadine (CLARITIN) 10 mg tablet    metoprolol tartrate (LOPRESSOR) 50 mg tablet    tamsulosin (FLOMAX) 0 4 mg    indomethacin (INDOCIN) 25 mg capsule    Methylprednisolone 4 MG TBPK              Assessment and Plan:  Paroxysmal atrial fibrillation  -echocardiogram revealed EF 55-65 percent without regional wall motion abnormality, mild MR, mild TR, estimated peak PA pressure 40 mmHg  - currently on digoxin and metoprolol (dose adjusted yesterday) heart rates now better controlled even with activity  -on Eliquis for anticoagulation     Hypertension- BP stable         ** Please Note: Dragon 360 Dictation voice to text software may have been used in the creation of this document   **

## 2018-03-28 NOTE — ASSESSMENT & PLAN NOTE
Present on admit, secondary to neuropathy and polio brace  · Continue wound care per podiatry recommendations, follow up with his podiatrist outpatient  · No signs of infection such as drainage, foul odor, or worsening erythema - wound culture showing Staph epidermis, no antibiotics      · Will see if podiatry has any further recommendations

## 2018-03-28 NOTE — PHYSICAL THERAPY NOTE
PHYSICAL THERAPY NOTE          Patient Name: Leonor Ferrari  LWVEA'J Date: 3/28/2018     03/28/18 0932   Pain Assessment   Pain Assessment No/denies pain   Pain Score No Pain   Restrictions/Precautions   Weight Bearing Precautions Per Order Yes   RLE Weight Bearing Per Order NWB   Braces or Orthoses LE Braces  (LLE worn this session)   Other Precautions Chair Alarm; Bed Alarm;WBS; Telemetry; Fall Risk  (anticoagulation, a fib)   General   Chart Reviewed Yes  (cleared for PT by RN, pre medicated)   Family/Caregiver Present Yes  (wife present)   Cognition   Overall Cognitive Status [facility]   Arousal/Participation Alert; Cooperative;Responsive   Attention Within functional limits   Orientation Level Oriented X4   Memory Within functional limits   Following Commands Follows all commands and directions without difficulty   Comments Agreeable to PT   Subjective   Subjective "I really want to get up and get to the chair "   Bed Mobility   Supine to Sit 4  Minimal assistance   Additional items Assist x 1;HOB elevated; Bedrails; Increased time required;Verbal cues;LE management  (RLE assist)   Additional Comments Cues needed for proper sequencing and UE as well as LE placement  Practiced rolling side to side with min assist to R, mod to L, with bedrail and cues for UE as well as LE placement  Educated and demonstrated proper technique with patient and spouse with proper body mechanics and bed height  Educated in bed placement at home to faciliate supine to sit toward best side for eventual transition to home  Transfers   Sit to Stand 3  Moderate assistance   Additional items Assist x 1;Bedrails; Increased time required;Verbal cues  (CG of another, elevated bed, RW)   Stand to Sit 3  Moderate assistance   Additional items Assist x 1;Bedrails; Increased time required;Verbal cues; Bed elevated  (RW used)   Sliding Board transfer 3  Moderate assistance  (CG of another for safety)   Additional items Assist x 1; Increased time required;Verbal cues  (bed to recliner, max assist to place board)   Additional Comments Increased time spent educating and demonstrating technique to spouse  Also verbal and demonstrated transfers from [facility] to and from bed, etc toward strong side whenever possible  Stood with RW with min to mod assist for 20 sec  NWB RLE  Difficulty obtaining full upright posture with LUE on walker, leaning on forearm to L to assist with NWB LLE  Brace with shoe on LLE   Ambulation/Elevation   Gait pattern (n/a at this time)   Balance   Static Sitting Good   Dynamic Sitting Fair +   Static Standing Poor   Dynamic Standing (n/a)   Ambulatory (n/a)   Endurance Deficit   Endurance Deficit Yes   Endurance Deficit Description Fatigued with session  Activity Tolerance   Activity Tolerance Patient limited by fatigue  (HR controlled this session  Ranged from )   Nurse Made Aware RN aware of session   Exercises   Balance training  Sitting at EOB weight shifting side to side with close supervision to CG assist to manage sliding board  Scooting to L to get closer to bed rail with CG assist and use of LLE as well as UE's  Maintained NWB RLE   Assessment   Prognosis Good   Problem List Decreased strength;Decreased range of motion;Decreased endurance; Impaired balance;Decreased mobility; Decreased skin integrity;Pain;Orthopedic restrictions   Assessment Patient with improved mobility this session  No LOB in sitting,  Difficulty with hip and knee ext with standing with RW  Had not been up in over a week  Very motivated with good attention of wife for education with mobility  HR controlled this session  No c/o pain  LLE with chronic knee hyperextension  Barriers to Discharge Inaccessible home environment;Decreased caregiver support   Goals   Patient Goals Get better, move better, get to rehab and go home     STG Expiration Date 04/03/18   Short Term Goal #2 Additional goals:  Patient will tolerate standing with RW for 1min with min assist of one, NWB RLE and transfer with sliding board to and from bed to chair with min assist of one  Treatment Day 2   Plan   Treatment/Interventions Functional transfer training;LE strengthening/ROM; Therapeutic exercise; Endurance training;Patient/family training;Equipment eval/education; Bed mobility;Spoke to nursing  (b/s exer program next session)   Progress Progressing toward goals   PT Frequency 5x/wk; Weekend   Recommendation   Recommendation Short-term skilled PT;Post acute IP rehab   Equipment Recommended Wheelchair  (full electric hospital bed with 1/2 rails, droop arm commode)   PT - OK to Discharge Yes  (if medically stable tyo STR)   Additional Comments WC cushion     Ancil Fears, PT

## 2018-03-28 NOTE — PHYSICAL THERAPY NOTE
PHYSICAL THERAPY NOTE          Patient Name: Abdiaziz Phelan  TWPVE'S Date: 3/28/2018     03/28/18 1147   Pain Assessment   Pain Assessment No/denies pain   Pain Score No Pain   Restrictions/Precautions   Weight Bearing Precautions Per Order Yes   RLE Weight Bearing Per Order NWB   Braces or Orthoses LE Braces  (LLE brace this session)   Other Precautions Chair Alarm; Bed Alarm; Fall Risk;WBS  (insensate feet, anticoagulation)   General   Chart Reviewed Yes  (cleared for PT by RN)   Family/Caregiver Present Yes  (wife)   Cognition   Overall Cognitive Status [facility]   Arousal/Participation Alert; Cooperative;Responsive   Attention Within functional limits   Orientation Level Oriented X4   Memory Within functional limits   Following Commands Follows all commands and directions without difficulty   Comments Agreeable to PT   Subjective   Subjective I am tired  Would like to get to bed  Bed Mobility   Rolling R 3  Moderate assistance   Additional items Assist x 1;Bedrails; Increased time required;LE management;Verbal cues   Rolling L 4  Minimal assistance   Additional items Assist x 1;Bedrails; Increased time required;LE management;Verbal cues   Sit to Supine (mod to max assist)   Additional items Assist x 1; Increased time required;Verbal cues;LE management  (total assist bilat LE, fatigued)   Additional Comments verbal cues for proper UE as well as LE placement with rolling  Moved up in bed with total assist of 2  Fatigued  Wife assist with education for preparation of d/c to home eventually  Transfers   Sliding Board transfer 3  Moderate assistance   Additional items Assist x 1; Increased time required;Verbal cues  (mod assist to place board)   Additional Comments Patient able to assist with transfer with sliding board with proper cues for proper UE as well as LE placement and use (LLE with brace)      (maintained NWB RLE) Ambulation/Elevation   Gait pattern (n/a at this time)   Balance   Static Sitting Good   Dynamic Sitting Fair +   Static Standing (n/a)   Dynamic Standing (n/a)   Ambulatory (n/a)   Endurance Deficit   Endurance Deficit Yes   Endurance Deficit Description Fatigued after sitting in chair  "But felt Good"   Activity Tolerance   Activity Tolerance Patient limited by fatigue   Nurse Made Aware RN aware of patient  Patient with chills end of session  RN aware   Assessment   Prognosis Good   Problem List Decreased strength;Decreased range of motion;Decreased endurance; Impaired balance;Decreased mobility; Decreased skin integrity;Pain;Orthopedic restrictions   Assessment Patient with increased assist this session with sliding board transfer  Able to use UE and rotate trunk to assist with transfer although fatigued  Very motivated  Spouse present during session with questions and desire to learn and assist    Barriers to Discharge Inaccessible home environment;Decreased caregiver support   Goals   Patient Goals get to rehab, move better, get stronger   STG Expiration Date 04/03/18   Treatment Day 2  (second session today)   Plan   Treatment/Interventions Functional transfer training;LE strengthening/ROM; Therapeutic exercise; Endurance training;Patient/family training;Equipment eval/education; Bed mobility;Spoke to nursing   PT Frequency 5x/wk; Weekend   Recommendation   Recommendation Short-term skilled PT;Post acute IP rehab   Equipment Recommended (See AM note)   PT - OK to Discharge Yes  (if to STR)   Additional Comments Also CHERIE Poe, PT

## 2018-03-28 NOTE — PROGRESS NOTES
Called for rapid AFib, patient with heart rate elevated to 170, asymptomatic  He had been up moving around  Patient will get a BMP with magnesium, 12 lead EKG will be reviewed  Will give him an extra 25 of oral metoprolol now with his 50 mg dose this evening and monitor  Blood pressures are reasonable 591-490 systolic  Nursing aware of plan, will continue to monitor closely  Continue telemetry monitor at this time

## 2018-03-28 NOTE — ASSESSMENT & PLAN NOTE
Continue Eliquis for anticoagulation, rate controlled    Cardiology increased his metoprolol to 50 mg b i d  - however the patient did receive 1 additional dose of IV metoprolol last evening, for this reason he will need to remain in the hospital 24 hours with no further IV medications to be accepted to [facility]  · Continue digoxin and Lopressor, telemetry   · Cardiology following, echo without significant abnormalities, preserved EF

## 2018-03-28 NOTE — ASSESSMENT & PLAN NOTE
Patient with post-polio syndrome  · Does wear leg braces to get around, however has neuropathy as well and has developed a decub ulcer of right medial heel due to leg braces creating abrasions on patient's skin without realization  · Patient recently at [facility] stone however patient and family do not want to go back    Plan for [facility] tomorrow, discontinue IV metoprolol  · Podiatry consult appreciated - wound culture growing 1+ staph epidermis, do not suspect that this is acutely infected, continue with local wound care only no antibiotics

## 2018-03-28 NOTE — PLAN OF CARE
Problem: PHYSICAL THERAPY ADULT  Goal: Performs mobility at highest level of function for planned discharge setting  See evaluation for individualized goals  Treatment/Interventions: Functional transfer training, LE strengthening/ROM, Therapeutic exercise, Endurance training, Patient/family training, Equipment eval/education, Bed mobility, Spoke to nursing, Continued evaluation  Equipment Recommended: Wheelchair (drop arm commode, hayley lift, sliding board, hospital bed)       See flowsheet documentation for full assessment, interventions and recommendations  Outcome: Progressing  Prognosis: Good  Problem List: Decreased strength, Decreased range of motion, Decreased endurance, Impaired balance, Decreased mobility, Decreased skin integrity, Pain, Orthopedic restrictions  Assessment: Patient with increased assist this session with sliding board transfer  Able to use UE and rotate trunk to assist with transfer although fatigued  Very motivated  Spouse present during session with questions and desire to learn and assist   Barriers to Discharge: Inaccessible home environment, Decreased caregiver support  Barriers to Discharge Comments: limited by medical condition and mobility at this time, has good social support system  Recommendation: Short-term skilled PT, Post acute IP rehab     PT - OK to Discharge: Yes (if to STR)    See flowsheet documentation for full assessment

## 2018-03-28 NOTE — ASSESSMENT & PLAN NOTE
WBC 16 on admission, did meet SIRS criteria with tachycardia and ^ WBC  · Influenza (-), blood cultures NGTD x24 hours, C diff (-)  · UA negative  · CTA and CT abdomen and pelvis without evidence of acute infectious process  · Agree with holding on antibiotics at this time - leukocytosis resolved without any intervention

## 2018-03-28 NOTE — CASE MANAGEMENT
Continued Stay Review    Date: 3/28    Vital Signs: /75 (BP Location: Left arm)   Pulse 78   Temp (!) 97 4 °F (36 3 °C) (Oral)   Resp 18   Ht 5' 6" (1 676 m)   Wt 102 kg (224 lb 6 9 oz)   SpO2 94%   BMI 36 22 kg/m²     Medications:   Scheduled Meds:   Current Facility-Administered Medications:  allopurinol 300 mg Oral Daily Stefanie Mata PA-C   apixaban 5 mg Oral BID Stefanie Mata PA-C   baclofen 10 mg Oral TID PRN Enrike Obrien PA-C   digoxin 125 mcg Oral Daily Stefanie Mata PA-C   fluticasone 1 spray Nasal Daily Stefanie Mata PA-C   loratadine 10 mg Oral Daily Stefanie Mata PA-C   metoprolol 5 mg Intravenous Q6H PRN Enrike Obrien PA-C   metoprolol tartrate 50 mg Oral Q12H [address] Caty Manuel PA-C   miconazole  Topical BID Danni Henriquez PA-C   ondansetron 4 mg Intravenous Q6H PRN Enrike Obrien PA-C   tamsulosin 0 4 mg Oral Daily With Dinner Stefanie Mata PA-C     Continuous Infusions:    PRN Meds: baclofen    metoprolol    ondansetron    Abnormal Labs/Diagnostic Results:     Age/Sex: 75 y o  male     Assessment/Plan:   Note from 3/27    Paroxysmal atrial fibrillation (HCC)   Assessment & Plan     Continue Eliquis for anticoagulation, rate now controlled  Still some tachycardia, Cardiology increased his metoprolol to 50 mg b i d   ? Continue digoxin and Lopressor, telemetry   ? Cardiology following, echo without significant abnormalities, preserved EF          Leukocytosis   Assessment & Plan     WBC 16 on admission, did meet SIRS criteria with tachycardia and ^ WBC  ? Influenza (-), blood cultures NGTD x24 hours, C diff (-)  ? UA negative  ? CTA and CT abdomen and pelvis without evidence of acute infectious process  ? Agree with holding on antibiotics at this time          Decubitus ulcer of heel   Assessment & Plan     Present on admit, secondary to neuropathy and polio brace  ?  Continue wound care per podiatry recommendations, follow up with his podiatrist outpatient  ? No signs of infection such as drainage, foul odor, or worsening erythema - wound culture showing preliminary Staph  Monitor, await final culture  ? Will ask Podiatry to see the patient again prior to discharge  ? He does have some erythema on the top part of the right foot, the seems more consistent with tinea infection  I am going to add miconazole topical cream to this area          Polio   Assessment & Plan     Patient with post-polio syndrome  ? Does wear leg braces to get around, however has neuropathy as well and has developed a decub ulcer of right medial heel due to leg braces creating abrasions on patient's skin without realization  ? Patient recently at [facility] however patient and family do not want to go back  They would like to see if patient can get transferred to [facility] after hospitalization  Case management following  ? Podiatry consult appreciated          Gout, arthritis   Assessment & Plan     Continue allopurinol  ? Does not seem to be in acute exacerbation            VTE Pharmacologic Prophylaxis:   Pharmacologic: Apixaban (Eliquis)  Mechanical VTE Prophylaxis in Place: Yes     Patient Centered Rounds: I have evaluated patient without nursing staff present due to Nurse not available at this time, did discuss by phone earlier regarding heart rate issues     Discussions with Specialists or Other Care Team Provider:  Cardiology     Education and Discussions with Family / Patient:  Patient and his wife at the bedside     Time Spent for Care: 20 minutes    More than 50% of total time spent on counseling and coordination of care as described above      Current Length of Stay: 3 day(s)     Current Patient Status: Inpatient   Certification Statement: The patient will continue to require additional inpatient hospital stay due to Await better controlled heart rate today, final cultures from the wound, case management for placement for rehab     Discharge Plan:  Pending above, anticipate in the next 24-48 hours      PT eval   Recommendation   Recommendation Short-term skilled PT;Post acute IP rehab   Equipment Recommended Wheelchair  (full electric hospital bed with 1/2 rails, droop arm commode)   PT - OK to Discharge Yes  (if medically stable tyo STR)       Cardiology note  3/27  Assessment and Plan:  Paroxysmal atrial fibrillation  -echocardiogram revealed EF 55-65 percent without regional wall motion abnormality, mild MR, mild TR, estimated peak PA pressure 40 mmHg  - currently on digoxin and metoprolol-- however will increase lopressor to 50 mg bid for better control with activity   -on Eliquis for anticoagulation     Hypertension- BP stable, however will monitor with increase of lopressor as patient did have periods were his BP would be less than 801 systolic this admission

## 2018-03-28 NOTE — PROGRESS NOTES
Progress Note - Donal Bloodgood 1950, 75 y o  male MRN: [id number]    Unit/Bed#: -01 Encounter: [id number]    Primary Care Provider: Leia Cali DO   Date and time admitted to hospital: 3/24/2018  1:28 PM        * Paroxysmal atrial fibrillation [facility])   Assessment & Plan    Continue Eliquis for anticoagulation, rate controlled  Cardiology increased his metoprolol to 50 mg b i d  - however the patient did receive 1 additional dose of IV metoprolol last evening, for this reason he will need to remain in the hospital 24 hours with no further IV medications [facility]  · Continue digoxin and Lopressor, telemetry   · Cardiology following, echo without significant abnormalities, preserved EF        Leukocytosis   Assessment & Plan    WBC 16 on admission, did meet SIRS criteria with tachycardia and ^ WBC  · Influenza (-), blood cultures NGTD x24 hours, C diff (-)  · UA negative  · CTA and CT abdomen and pelvis without evidence of acute infectious process  · Agree with holding on antibiotics at this time - leukocytosis resolved without any intervention        Decubitus ulcer of heel   Assessment & Plan    Present on admit, secondary to neuropathy and polio brace  · Continue wound care per podiatry recommendations, follow up with his podiatrist outpatient  · No signs of infection such as drainage, foul odor, or worsening erythema - wound culture showing Staph epidermis, no antibiotics  · Will see if podiatry has any further recommendations        Polio   Assessment & Plan    Patient with post-polio syndrome  · Does wear leg braces to get around, however has neuropathy as well and has developed a decub ulcer of right medial heel due to leg braces creating abrasions on patient's skin without realization  · Patient recently at [facility] stone however patient and family do not want to go back    Plan for [facility] tomorrow, discontinue IV metoprolol  · Podiatry consult appreciated - wound culture growing 1+ staph epidermis, do not suspect that this is acutely infected, continue with local wound care only no antibiotics        Gout, arthritis   Assessment & Plan    Continue allopurinol  · Does not seem to be in acute exacerbation          VTE Pharmacologic Prophylaxis:   Pharmacologic: Apixaban (Eliquis)  Mechanical VTE Prophylaxis in Place: Yes    Patient Centered Rounds: I have performed bedside rounds with nursing staff today  Discussions with Specialists or Other Care Team Provider: daughter at bedside     Education and Discussions with Family / Patient:  Discussed with patient and his family regarding need for discontinuing the IV metoprolol and maintaining heart rate control with oral medications only, anticipate discharge tomorrow    Time Spent for Care: 15 minutes  More than 50% of total time spent on counseling and coordination of care as described above  Current Length of Stay: 4 day(s)    Current Patient Status: Inpatient   Certification Statement: The patient will continue to require additional inpatient hospital stay due to Per [facility], needs to be off IV metoprolol times 24 hours; Anticipate discharge to [facility] tomorrow, discontinue IV metoprolol, he is medically stable    Discharge Plan:  Stable for discharge    Code Status: Level 1 - Full Code      Subjective:   Patient seen lying in bed, he has no specific complaints  No bowel movement since yesterday, so no further diarrhea  Denies any chest pain, shortness of breath or palpitations  No headache or dizziness  Objective:     Vitals:   Temp (24hrs), Av 1 °F (36 7 °C), Min:97 4 °F (36 3 °C), Max:99 2 °F (37 3 °C)    HR:  [] 78  Resp:  [18] 18  BP: (106-129)/(56-75) 123/75  SpO2:  [93 %-95 %] 94 %  Body mass index is 36 22 kg/m²  Input and Output Summary (last 24 hours):        Intake/Output Summary (Last 24 hours) at 18 1508  Last data filed at 18 1012   Gross per 24 hour   Intake              580 ml   Output              450 ml   Net              130 ml       Physical Exam:     Physical Exam   Constitutional: Vital signs are normal  He appears well-developed and well-nourished  No distress  Cardiovascular: Normal rate, S1 normal, S2 normal and normal heart sounds  An irregular rhythm present  No murmur heard  Pulmonary/Chest: Effort normal and breath sounds normal  No respiratory distress  He has no wheezes  He has no rhonchi  He has no rales  Abdominal: Soft  Bowel sounds are normal  He exhibits no distension  There is no tenderness  Round, soft   Musculoskeletal: He exhibits no edema  Skin:   Erythema dorsum right foot now most consistent with superficial broken blood vessels, improved   Nursing note and vitals reviewed  Additional Data:     Labs:      Results from last 7 days  Lab Units 03/26/18  0537   WBC Thousand/uL 11 04*   HEMOGLOBIN g/dL 11 2*   HEMATOCRIT % 36 1*   PLATELETS Thousands/uL 295   NEUTROS PCT % 70   LYMPHS PCT % 15   MONOS PCT % 11   EOS PCT % 3       Results from last 7 days  Lab Units 03/26/18  0537   SODIUM mmol/L 135*   POTASSIUM mmol/L 3 5   CHLORIDE mmol/L 94*   CO2 mmol/L 36*   BUN mg/dL 6   CREATININE mg/dL 0 72   CALCIUM mg/dL 8 8   TOTAL PROTEIN g/dL 6 7   BILIRUBIN TOTAL mg/dL 0 40   ALK PHOS U/L 91   ALT U/L 7*   AST U/L 21   GLUCOSE RANDOM mg/dL 120       Results from last 7 days  Lab Units 03/24/18  1453   INR  1 73*       * I Have Reviewed All Lab Data Listed Above  * Additional Pertinent Lab Tests Reviewed: No New Labs Available For Today    Imaging:    Imaging Reports Reviewed Today Include:  None  Imaging Personally Reviewed by Myself Includes:  None    Recent Cultures (last 7 days):       Results from last 7 days  Lab Units 03/25/18  1517 03/25/18  1140 03/25/18  1033 03/24/18  1521 03/24/18  1453   BLOOD CULTURE   --   --   --  No Growth at 72 hrs  No Growth at 72 hrs     GRAM STAIN RESULT  No Polys or Bacteria seen --   --   --   --    WOUND CULTURE  1+ Growth of Staphylococcus epidermidis*  --   --   --   --    INFLUENZA A PCR   --   --  None Detected  --   --    INFLUENZA B PCR   --   --  None Detected  --   --    RSV PCR   --   --  None Detected  --   --    C DIFF TOXIN B   --  NEGATIVE for C difficle toxin by PCR    --   --   --        Last 24 Hours Medication List:     Current Facility-Administered Medications:  allopurinol 300 mg Oral Daily Stefanie Mata PA-C   apixaban 5 mg Oral BID Stefanie Mata PA-C   baclofen 10 mg Oral TID PRN Bernice Amado PA-C   digoxin 125 mcg Oral Daily Stefanie Mata PA-C   fluticasone 1 spray Nasal Daily Stefanie Mata PA-C   loratadine 10 mg Oral Daily Stefanie Mata PA-C   metoprolol tartrate 50 mg Oral Q12H [facility] Caty Manuel PA-C   miconazole  Topical BID Danni Henriquez PA-C   ondansetron 4 mg Intravenous Q6H PRN Bernice Amado PA-C   tamsulosin 0 4 mg Oral Daily With Dinner Bernice Amado PA-C        Today, Patient Was Seen By: Stephanie Figueroa PA-C    ** Please Note: Dictation voice to text software may have been used in the creation of this document   **

## 2018-03-29 LAB
ANION GAP SERPL CALCULATED.3IONS-SCNC: 7 MMOL/L (ref 4–13)
BACTERIA BLD CULT: NORMAL
BACTERIA BLD CULT: NORMAL
BUN SERPL-MCNC: 8 MG/DL (ref 5–25)
CALCIUM SERPL-MCNC: 8.6 MG/DL (ref 8.3–10.1)
CHLORIDE SERPL-SCNC: 98 MMOL/L (ref 100–108)
CO2 SERPL-SCNC: 32 MMOL/L (ref 21–32)
CREAT SERPL-MCNC: 0.66 MG/DL (ref 0.6–1.3)
GFR SERPL CREATININE-BSD FRML MDRD: 99 ML/MIN/1.73SQ M
GLUCOSE SERPL-MCNC: 103 MG/DL (ref 65–140)
POTASSIUM SERPL-SCNC: 3.1 MMOL/L (ref 3.5–5.3)
SODIUM SERPL-SCNC: 137 MMOL/L (ref 136–145)

## 2018-03-29 PROCEDURE — 97110 THERAPEUTIC EXERCISES: CPT

## 2018-03-29 PROCEDURE — 80048 BASIC METABOLIC PNL TOTAL CA: CPT | Performed by: PHYSICIAN ASSISTANT

## 2018-03-29 PROCEDURE — 99232 SBSQ HOSP IP/OBS MODERATE 35: CPT | Performed by: INTERNAL MEDICINE

## 2018-03-29 PROCEDURE — 99233 SBSQ HOSP IP/OBS HIGH 50: CPT | Performed by: NURSE PRACTITIONER

## 2018-03-29 RX ORDER — POTASSIUM CHLORIDE 20 MEQ/1
40 TABLET, EXTENDED RELEASE ORAL ONCE
Status: COMPLETED | OUTPATIENT
Start: 2018-03-29 | End: 2018-03-29

## 2018-03-29 RX ORDER — MAGNESIUM SULFATE HEPTAHYDRATE 40 MG/ML
2 INJECTION, SOLUTION INTRAVENOUS ONCE
Status: COMPLETED | OUTPATIENT
Start: 2018-03-29 | End: 2018-03-30

## 2018-03-29 RX ORDER — METOPROLOL TARTRATE 5 MG/5ML
5 INJECTION INTRAVENOUS EVERY 6 HOURS PRN
Status: DISCONTINUED | OUTPATIENT
Start: 2018-03-29 | End: 2018-03-30 | Stop reason: HOSPADM

## 2018-03-29 RX ORDER — METOPROLOL TARTRATE 50 MG/1
50 TABLET, FILM COATED ORAL EVERY 8 HOURS
Status: DISCONTINUED | OUTPATIENT
Start: 2018-03-29 | End: 2018-03-30 | Stop reason: HOSPADM

## 2018-03-29 RX ADMIN — METOPROLOL TARTRATE 50 MG: 50 TABLET ORAL at 18:49

## 2018-03-29 RX ADMIN — DIGOXIN 125 MCG: 0.12 TABLET ORAL at 09:09

## 2018-03-29 RX ADMIN — MICONAZOLE NITRATE 1 APPLICATION: 2 CREAM TOPICAL at 18:52

## 2018-03-29 RX ADMIN — MICONAZOLE NITRATE: 2 CREAM TOPICAL at 09:10

## 2018-03-29 RX ADMIN — APIXABAN 5 MG: 5 TABLET, FILM COATED ORAL at 18:49

## 2018-03-29 RX ADMIN — MAGNESIUM SULFATE HEPTAHYDRATE 2 G: 40 INJECTION, SOLUTION INTRAVENOUS at 15:34

## 2018-03-29 RX ADMIN — METOPROLOL TARTRATE 50 MG: 50 TABLET ORAL at 09:09

## 2018-03-29 RX ADMIN — POTASSIUM CHLORIDE 40 MEQ: 1500 TABLET, EXTENDED RELEASE ORAL at 15:35

## 2018-03-29 RX ADMIN — FLUTICASONE PROPIONATE 1 SPRAY: 50 SPRAY, METERED NASAL at 09:10

## 2018-03-29 RX ADMIN — TAMSULOSIN HYDROCHLORIDE 0.4 MG: 0.4 CAPSULE ORAL at 15:35

## 2018-03-29 RX ADMIN — LORATADINE 10 MG: 10 TABLET ORAL at 09:10

## 2018-03-29 RX ADMIN — ALLOPURINOL 300 MG: 300 TABLET ORAL at 09:09

## 2018-03-29 RX ADMIN — APIXABAN 5 MG: 5 TABLET, FILM COATED ORAL at 09:09

## 2018-03-29 NOTE — PROGRESS NOTES
Progress Note - Gissle Palacios 1950, 75 y o  male MRN: [id number]    Unit/Bed#: -01 Encounter: [id number]    Primary Care Provider: Maryann Huynh DO   Date and time admitted to hospital: 3/24/2018  1:28 PM        Leukocytosis   Assessment & Plan    WBC 16 on admission, did meet SIRS criteria with tachycardia and ^ WBC  · Influenza (-), blood cultures NGTD 4 days, C diff (-)  · UA negative  · CTA and CT abdomen and pelvis without evidence of acute infectious process  · Agree with holding on antibiotics at this time - leukocytosis resolved without any intervention        Decubitus ulcer of heel   Assessment & Plan    Present on admit, secondary to neuropathy and polio brace  · Continue wound care per podiatry recommendations, follow up with his podiatrist outpatient  · No signs of infection such as drainage, foul odor, or worsening erythema - wound culture showing Staph epidermis, no antibiotics  · Will see if podiatry has any further recommendations        Polio   Assessment & Plan    Patient with post-polio syndrome  · Does wear leg braces to get around, however has neuropathy as well and has developed a decub ulcer of right medial heel due to leg braces creating abrasions on patient's skin without realization  · Patient recently at [facility] stone however patient and family do not want to go back  Plan for [facility] today however plan modified given patient is having increased heart rate status post physical therapy  · Podiatry consult appreciated - wound culture growing 1+ staph epidermis, do not suspect that this is acutely infected, continue with local wound care only no antibiotics        Gout, arthritis   Assessment & Plan    Continue allopurinol  · Does not seem to be in acute exacerbation        * Paroxysmal atrial fibrillation (HCC)   Assessment & Plan    · Continue Eliquis for anticoagulation, rate controlled      · Continue digoxin and Lopressor, telemetry   · Cardiology following, echo without significant abnormalities, preserved EF  · Discussion with Cardiology regarding options will look adjusting medication agree temp early on adding the IV metoprolol back on if sustained rate greater than 120  · Cardiology would prefer medication adjustment and further outpatient evaluation for other options for patient's atrial fibrillation  · Agree given patient's debility he would benefit from rehab cardiology will evaluate the patient and make further recommendations  · Patient's magnesium was 1 7 will replete recommend keeping greater than 2 0            VTE Pharmacologic Prophylaxis:   Pharmacologic: Apixaban (Eliquis)  Mechanical VTE Prophylaxis in Place: Yes    Patient Centered Rounds: I have performed bedside rounds with nursing staff today  Discussions with Specialists or Other Care Team Provider:  Cardiology    Education and Discussions with Family / Patient:  Patient and wife    Time Spent for Care: 35 minutes  More than 50% of total time spent on counseling and coordination of care as described above  Current Length of Stay: 5 day(s)    Current Patient Status: Inpatient   Certification Statement: The patient will continue to require additional inpatient hospital stay due to Ongoing treatment with atrial fibrillation with RVR    Discharge Plan:  Pending cardiac evaluation and better rate control    Code Status: Level 1 - Full Code      Subjective:   Patient lying in bed family at bedside patient feels very to started given that his heart rate continues to climb patient states he feels well however does feel fatigued at times and felt fine working with physical therapy this morning  Patient and family voicing frustrations given that it does not appear the patient's rate is remaining control on the current medication regimen    Discussion with patient and family on the plan of care stating will speak with Cardiology discussion options as to what to do to get his rate controlled debility plan of care to get him to rehab as soon as possible  Objective:     Vitals:   Temp (24hrs), Av 2 °F (36 8 °C), Min:97 7 °F (36 5 °C), Max:99 2 °F (37 3 °C)    HR:  [] 88  Resp:  [17-18] 17  BP: (115-144)/(56-76) 115/73  SpO2:  [93 %-98 %] 95 %  Body mass index is 36 22 kg/m²  Input and Output Summary (last 24 hours): Intake/Output Summary (Last 24 hours) at 18 1140  Last data filed at 18 0701   Gross per 24 hour   Intake              480 ml   Output              700 ml   Net             -220 ml       Physical Exam:     Physical Exam   Constitutional: He is oriented to person, place, and time  He appears well-developed and well-nourished  HENT:   Head: Normocephalic and atraumatic  Eyes: Conjunctivae and EOM are normal    Neck: Normal range of motion  Cardiovascular: Normal heart sounds  An irregularly irregular rhythm present  Tachycardia present  Pulmonary/Chest: Effort normal and breath sounds normal    Abdominal: Soft  Bowel sounds are normal    Neurological: He is alert and oriented to person, place, and time  Skin: Skin is warm and dry  Psychiatric: He has a normal mood and affect   His behavior is normal        Additional Data:     Labs:      Results from last 7 days  Lab Units 18  0537   WBC Thousand/uL 11 04*   HEMOGLOBIN g/dL 11 2*   HEMATOCRIT % 36 1*   PLATELETS Thousands/uL 295   NEUTROS PCT % 70   LYMPHS PCT % 15   MONOS PCT % 11   EOS PCT % 3       Results from last 7 days  Lab Units 18  0502  18  0537   SODIUM mmol/L 137  < > 135*   POTASSIUM mmol/L 3 1*  < > 3 5   CHLORIDE mmol/L 98*  < > 94*   CO2 mmol/L 32  < > 36*   BUN mg/dL 8  < > 6   CREATININE mg/dL 0 66  < > 0 72   CALCIUM mg/dL 8 6  < > 8 8   TOTAL PROTEIN g/dL  --   --  6 7   BILIRUBIN TOTAL mg/dL  --   --  0 40   ALK PHOS U/L  --   --  91   ALT U/L  --   --  7*   AST U/L  --   --  21   GLUCOSE RANDOM mg/dL 103  < > 120   < > = values in this interval not displayed  Results from last 7 days  Lab Units 03/24/18  1453   INR  1 73*       * I Have Reviewed All Lab Data Listed Above  * Additional Pertinent Lab Tests Reviewed: All Labs Within Last 24 Hours Reviewed        Recent Cultures (last 7 days):       Results from last 7 days  Lab Units 03/25/18  1517 03/25/18  1140 03/25/18  1033 03/24/18  1521 03/24/18  1453   BLOOD CULTURE   --   --   --  No Growth After 4 Days  No Growth After 4 Days  GRAM STAIN RESULT  No Polys or Bacteria seen  --   --   --   --    WOUND CULTURE  1+ Growth of Staphylococcus epidermidis*  --   --   --   --    INFLUENZA A PCR   --   --  None Detected  --   --    INFLUENZA B PCR   --   --  None Detected  --   --    RSV PCR   --   --  None Detected  --   --    C DIFF TOXIN B   --  NEGATIVE for C difficle toxin by PCR    --   --   --        Last 24 Hours Medication List:     Current Facility-Administered Medications:  allopurinol 300 mg Oral Daily Stefanie Mata PA-C   apixaban 5 mg Oral BID Stefanie Mata PA-C   baclofen 10 mg Oral TID PRN Calista Ventura PA-C   digoxin 125 mcg Oral Daily Stefanie Mata PA-C   fluticasone 1 spray Nasal Daily Stefanie Mata PA-C   loratadine 10 mg Oral Daily Stefanie Mata PA-C   magnesium sulfate 2 g Intravenous Once HOME Abdul   metoprolol 5 mg Intravenous Q6H PRN HOME Abdul   metoprolol tartrate 50 mg Oral Q12H [address] Caty Manuel PA-C   miconazole  Topical BID Danni Henriquez PA-C   ondansetron 4 mg Intravenous Q6H PRN Stefanie Mata PA-C   potassium chloride 40 mEq Oral Once HOME Abdul   tamsulosin 0 4 mg Oral Daily With Dinner Calista Ventura PA-C        Today, Patient Was Seen By: HOME Abdul    ** Please Note: Dictation voice to text software may have been used in the creation of this document   **

## 2018-03-29 NOTE — ASSESSMENT & PLAN NOTE
· Continue Eliquis for anticoagulation, rate controlled  · Continue digoxin and Lopressor, telemetry   · Cardiology following, echo without significant abnormalities, preserved EF  · Discussion with Cardiology regarding options will look adjusting medication agree temp early on adding the IV metoprolol back on if sustained rate greater than 120  · Cardiology would prefer medication adjustment and further outpatient evaluation for other options for patient's atrial fibrillation  · Agree given patient's debility he would benefit from rehab cardiology will evaluate the patient and make further recommendations    · Patient's magnesium was 1 7 will replete recommend keeping greater than 2 0

## 2018-03-29 NOTE — PROGRESS NOTES
Patient HR uncontrolled in 150's-170's  [facility] notified and added a 25 mg dose of metoprolol to administer along with the night time dose of 50 mg early  The metoprolol was administered and we will continue to monitor HR and BP throughout the night

## 2018-03-29 NOTE — ASSESSMENT & PLAN NOTE
WBC 16 on admission, did meet SIRS criteria with tachycardia and ^ WBC  · Influenza (-), blood cultures NGTD 4 days, C diff (-)  · UA negative  · CTA and CT abdomen and pelvis without evidence of acute infectious process  · Agree with holding on antibiotics at this time - leukocytosis resolved without any intervention

## 2018-03-29 NOTE — PHYSICAL THERAPY NOTE
PHYSICAL THERAPY NOTE          Patient Name: Homa Franco  [id number] Date: 3/29/2018     03/29/18 9067   Pain Assessment   Pain Assessment No/denies pain   Pain Score No Pain  (mild discomfort R knee with sustained/PROM knee flex)   Restrictions/Precautions   Weight Bearing Precautions Per Order Yes   RLE Weight Bearing Per Order NWB   Braces or Orthoses LE Braces  (n/a this session)   Other Precautions Bed Alarm;WBS;Multiple lines;Telemetry; Fall Risk;Pain  (insensate feet, anticoagulation)   General   Chart Reviewed Yes  (cleared for bed exer only by RN)   Additional Pertinent History Patient with episodes last evening of 170's HR, afib  Medicated  Family/Caregiver Present Yes  (wife present)   Cognition   Overall Cognitive Status WFL   Arousal/Participation Alert; Cooperative;Responsive   Attention Within functional limits   Orientation Level Oriented X4   Memory Within functional limits   Following Commands Follows all commands and directions without difficulty   Comments Agreeable to PT   Subjective   Subjective One step forward and another back  I hope this gets under control (afib/HR)  Bed Mobility   Additional Comments none performed this session   Transfers   Sit to Stand (n/a this session)   Ambulation/Elevation   Gait pattern (n/a at this time)   Balance   Static Sitting (n/a this session)   Endurance Deficit   Endurance Deficit No  (none related to exer this session)   Activity Tolerance   Activity Tolerance Patient tolerated treatment well   Nurse Made Aware RN aware of session   Exercises   Hamstring Stretch Supine;5 reps;Bilateral;PROM  (20 sec hold)   Heelslides Supine;10 reps;Bilateral;AAROM;AROM  (increased A R, x 2 sets)   Glute Sets Supine;10 reps;AROM; Bilateral   Hip Flexion Supine;10 reps;AROM;AAROM; Bilateral  (SLR)   Hip Abduction Supine;AAROM;AROM; Bilateral;15 reps  (x 2 sets)   Hip Extension Supine;10 reps;Bilateral  (manual resisted hip/knee ext in closed chain L, 2 sets)   Hip Adduction Supine;15 reps;Bilateral;AAROM;AROM   Knee AROM Short Arc Quad Supine;10 reps;Bilateral;AAROM  (x 2 sets)   Heel Cord Stretch Supine;5 reps;Bilateral;PROM  (20 sec hold)   Equipment Use   Comments Also hip AD stretch bilat,3 reps, 20 sec hold as well as hip ER stretch on R, 20 sec hold for 3 reps  R knee flexion increased to 50 degrees with assist   PROM R knee with end range hold, gentle massage to quad and grade 2 mobilization to R knee for increased knee flexion  Assessment   Prognosis Good   Problem List Decreased strength;Decreased range of motion;Decreased endurance; Impaired balance;Decreased mobility; Decreased skin integrity;Pain;Orthopedic restrictions   Assessment Patient with good tolerance to exer  Increased HR end of session to 150  Session completed  "That felt good  Moving the leg better "     Barriers to Discharge Inaccessible home environment;Decreased caregiver support   Goals   Patient Goals get to STR, get HR under control   STG Expiration Date 04/03/18   Treatment Day 3   Plan   Treatment/Interventions Functional transfer training;LE strengthening/ROM; Therapeutic exercise; Endurance training;Patient/family training;Equipment eval/education; Bed mobility;Spoke to nursing   Progress Slow progress, medical status limitations   PT Frequency 5x/wk; Weekend   Recommendation   Recommendation Short-term skilled PT;Post acute IP rehab   Equipment Recommended Other (Comment)  (see below)   PT - OK to Discharge Yes  (when medically stable to STR)   Additional Comments Will needs full electric hospital bed with 1/2 rails, drop arm BSC, sliding board, possible hayley lift, WC, WC cushion     Roxana Kramer, PT

## 2018-03-29 NOTE — ASSESSMENT & PLAN NOTE
Patient with post-polio syndrome  · Does wear leg braces to get around, however has neuropathy as well and has developed a decub ulcer of right medial heel due to leg braces creating abrasions on patient's skin without realization  · Patient recently at [facility] however patient and family do not want to go back    Plan for [facility] today however plan modified given patient is having increased heart rate status post physical therapy  · Podiatry consult appreciated - wound culture growing 1+ staph epidermis, do not suspect that this is acutely infected, continue with local wound care only no antibiotics

## 2018-03-29 NOTE — PROGRESS NOTES
Progress Note - Cardiology     Inell Srikanth 75 y o  male MRN: [id number]  Unit/Bed#: -01 Encounter: [id number]      Subjective:   Patient had elevated heart rates with activity during PT today  He admits to fatigue and weakness  He states he is frustrated that it seems things are not getting better  Objective:   Vitals:  Vitals:    03/29/18 1100   BP: 115/73   Pulse: 88   Resp: 17   Temp: 97 8 °F (36 6 °C)   SpO2: 95%       Body mass index is 36 22 kg/m²  Systolic (90GQX), TXO:210 , Min:115 , QLO:346     Diastolic (23YEI), KRO:81, Min:56, Max:76      Intake/Output Summary (Last 24 hours) at 03/29/18 1405  Last data filed at 03/29/18 0701   Gross per 24 hour   Intake              480 ml   Output              700 ml   Net             -220 ml     Weight (last 2 days)     None          PHYSICAL EXAM:  General: Patient is not in acute distress  Laying comfortably in the bed  Head: Normocephalic  Atraumatic  HEENT: Both pupils normal sized, round and reactive to light  Nonicteric  Neck: JVP not raised  Trachea central    Respiratory: Bilateral bronchovascular breath sounds with no crackles or rhonchi  Cardiovascular: irregular  S1 and S2 normal  No murmur, rub or gallop  GI: Abdomen soft, nontender  No guarding or rigidity  Neurologic: Patient is awake, alert, responding to command   Moving all extremities  Integumentary:  No skin rash  Extremities: No clubbing, cyanosis or edema    LABORATORY RESULTS:    Results from last 7 days  Lab Units 03/24/18  1453   TROPONIN I ng/mL <0 02     CBC with diff:   Results from last 7 days  Lab Units 03/26/18  0537 03/25/18  0507 03/24/18  1453   WBC Thousand/uL 11 04* 10 81* 16 62*   HEMOGLOBIN g/dL 11 2* 11 4* 13 5   HEMATOCRIT % 36 1* 35 8* 42 0   MCV fL 88 87 85   PLATELETS Thousands/uL 295 314 394*   MCH pg 27 2 27 7 27 4   MCHC g/dL 31 0* 31 8 32 1   RDW % 14 2 14 1 13 9   MPV fL 8 5* 9 0 9 1   NRBC AUTO /100 WBCs 0  --  0       CMP:  Results from last 7 days  Lab Units 03/29/18  0502 03/28/18 1952 03/26/18  0537  03/24/18  1453   SODIUM mmol/L 137 135* 135*  < > 130*   POTASSIUM mmol/L 3 1* 3 3* 3 5  < > 3 1*   CHLORIDE mmol/L 98* 97* 94*  < > 85*   CO2 mmol/L 32 31 36*  < > 37*   ANION GAP mmol/L 7 7 5  < > 8   BUN mg/dL 8 9 6  < > 10   CREATININE mg/dL 0 66 0 63 0 72  < > 1 07   GLUCOSE RANDOM mg/dL 103 109 120  < > 125   CALCIUM mg/dL 8 6 8 7 8 8  < > 9 2   AST U/L  --   --  21  --  24   ALT U/L  --   --  7*  --  15   ALK PHOS U/L  --   --  91  --  116   TOTAL PROTEIN g/dL  --   --  6 7  --  7 6   BILIRUBIN TOTAL mg/dL  --   --  0 40  --  0 50   EGFR ml/min/1 73sq m 99 101 96  < > 71   < > = values in this interval not displayed      BMP:  Results from last 7 days  Lab Units 03/29/18  0502 03/28/18 1952 03/26/18  0537   SODIUM mmol/L 137 135* 135*   POTASSIUM mmol/L 3 1* 3 3* 3 5   CHLORIDE mmol/L 98* 97* 94*   CO2 mmol/L 32 31 36*   BUN mg/dL 8 9 6   CREATININE mg/dL 0 66 0 63 0 72   GLUCOSE RANDOM mg/dL 103 109 120   CALCIUM mg/dL 8 6 8 7 8 8                Results from last 7 days  Lab Units 03/28/18 1952 03/25/18  1016   MAGNESIUM mg/dL 1 7 1 5*               Results from last 7 days  Lab Units 03/26/18  0537 03/25/18  1016   TSH 3RD GENERATON uIU/mL  --  4 336*   FREE T4 ng/dL 1 27  --          Results from last 7 days  Lab Units 03/24/18  1453   INR  1 73*       Lipid Profile:   Lab Results   Component Value Date    CHOL 193 11/13/2017     Lab Results   Component Value Date    HDL 47 11/13/2017     No results found for: LDLCALC  No results found for: TRIG    Cardiac testing:   Results for orders placed during the hospital encounter of 03/24/18   Echo complete with contrast if indicated    Narrative [address] [address]  [phone]    Transthoracic Echocardiogram  2D, M-mode, Doppler, and Color Doppler    Study date:  26-Mar-2018    Patient: Peggye Bridge  MR number: [id number]  Account number: [id number]  : 1950  Age: 75 years  Gender: Male  Status: Inpatient  Location: Bedside  Height: 66 in  Weight: 224 lb  BP: 124/ 68 mmHg    Indications: Atrial Fibrillation    Diagnoses: I48 0 - Atrial fibrillation    Sonographer:  AKILA Becerra,RDCS  Interpreting Physician:  Morgan Neumann MD  Referring Physician:  CROW Dumont-C  Group:  [facility]    SUMMARY    LEFT VENTRICLE:  Systolic function was normal  Ejection fraction was estimated in the range of 55 % to 65 %  There were no regional wall motion abnormalities  MITRAL VALVE:  There was mild regurgitation  TRICUSPID VALVE:  There was mild regurgitation  Estimated peak PA pressure was 40 mmHg  HISTORY: PRIOR HISTORY: Atrial Fibrillation, Hypertension, Mixed Hyperlipidemia, Hypokalemia    PROCEDURE: The procedure was performed at the bedside  This was a routine study  The transthoracic approach was used  The study included complete 2D imaging, M-mode, complete spectral Doppler, and color Doppler  The heart rate was 76 bpm,  at the start of the study  Images were obtained from the parasternal, apical, subcostal, and suprasternal notch acoustic windows  Image quality was adequate  LEFT VENTRICLE: Size was normal  Systolic function was normal  Ejection fraction was estimated in the range of 55 % to 65 %  There were no regional wall motion abnormalities  Wall thickness was normal  DOPPLER: Left ventricular diastolic  function parameters were abnormal     RIGHT VENTRICLE: The size was normal  Systolic function was normal  Wall thickness was normal     LEFT ATRIUM: Size was normal     RIGHT ATRIUM: Size was normal     MITRAL VALVE: Valve structure was normal  There was normal leaflet separation  DOPPLER: The transmitral velocity was within the normal range  There was no evidence for stenosis  There was mild regurgitation  AORTIC VALVE: The valve was trileaflet   Leaflets exhibited normal thickness and normal cuspal separation  DOPPLER: Transaortic velocity was minimally increased  There was no evidence for stenosis  There was no regurgitation  TRICUSPID VALVE: The valve structure was normal  There was normal leaflet separation  DOPPLER: The transtricuspid velocity was within the normal range  There was no evidence for stenosis  There was mild regurgitation  Estimated peak PA  pressure was 40 mmHg  PULMONIC VALVE: Leaflets exhibited normal thickness, no calcification, and normal cuspal separation  DOPPLER: The transpulmonic velocity was within the normal range  There was no regurgitation  PERICARDIUM: There was no pericardial effusion  The pericardium was normal in appearance  AORTA: The root exhibited normal size  SYSTEMIC VEINS: IVC: The inferior vena cava was normal in size and course  Respirophasic changes were normal     SYSTEM MEASUREMENT TABLES    2D  %FS: 25 2 %  Ao Diam: 2 7 cm  EDV(Teich): 83 6 ml  EF(Teich): 50 %  ESV(Teich): 41 8 ml  IVSd: 1 cm  LA Area: 23 9 cm2  LA Diam: 3 7 cm  LVEDV MOD A4C: 54 7 ml  LVEF MOD A4C: 63 9 %  LVESV MOD A4C: 19 8 ml  LVIDd: 4 3 cm  LVIDs: 3 2 cm  LVLd A4C: 6 8 cm  LVLs A4C: 6 cm  LVOT Diam: 1 9 cm  LVPWd: 1 cm  RA Area: 20 6 cm2  RVIDd: 3 4 cm  SV MOD A4C: 35 ml  SV(Teich): 41 8 ml    CW  AV Env  Ti: 213 1 ms  AV VTI: 29 7 cm  AV Vmax: 2 1 m/s  AV Vmean: 1 4 m/s  AV maxP 7 mmHg  AV meanP 1 mmHg  TR Vmax: 3 3 m/s  TR maxP mmHg    MM  TAPSE: 2 1 cm    PW  SALUD (VTI): 2 cm2  SALUD Vmax: 1 8 cm2  LVOT Env  Ti: 262 6 ms  LVOT VTI: 20 4 cm  LVOT Vmax: 1 3 m/s  LVOT Vmean: 0 8 m/s  LVOT maxP 5 mmHg  LVOT meanP 9 mmHg  LVSV Dopp: 59 3 ml    [employer] Accredited Echocardiography [facility]    Prepared and electronically signed by    Ivy Wheeler MD  Signed 26-Mar-2018 18:09:34       Meds/Allergies   all current active meds have been reviewed  Prescriptions Prior to Admission   Medication    allopurinol (ZYLOPRIM) 300 mg tablet    apixaban (ELIQUIS) 5 mg    baclofen 10 mg tablet    colchicine (COLCRYS) 0 6 mg tablet    digoxin (LANOXIN) 0 125 mg tablet    fluticasone (FLONASE) 50 mcg/act nasal spray    furosemide (LASIX) 40 mg tablet    loratadine (CLARITIN) 10 mg tablet    metoprolol tartrate (LOPRESSOR) 50 mg tablet    tamsulosin (FLOMAX) 0 4 mg    indomethacin (INDOCIN) 25 mg capsule    Methylprednisolone 4 MG TBPK              Assessment and Plan:  Paroxysmal atrial fibrillation  -echocardiogram revealed EF 55-65 percent without regional wall motion abnormality, mild MR, mild TR, estimated peak PA pressure 40 mmHg  - currently on digoxin 125 mcg and metoprolol tartrate 50 mg BID-- heart rates elevated with activity-- will increase lopressor to TID dosing for better coverage  -patient reporting weakness and fatigue-- difficult to say if this is due to AF with RVR or deconditioning-- suspect combination of both   -on Eliquis for anticoagulation     Hypertension- BP stable, had transient hypotension this admission, monitor with increase in lopressor     ** Please Note: Dragon 360 Dictation voice to text software may have been used in the creation of this document   **

## 2018-03-29 NOTE — PLAN OF CARE
Problem: PHYSICAL THERAPY ADULT  Goal: Performs mobility at highest level of function for planned discharge setting  See evaluation for individualized goals  Treatment/Interventions: Functional transfer training, LE strengthening/ROM, Therapeutic exercise, Endurance training, Patient/family training, Equipment eval/education, Bed mobility, Spoke to nursing, Continued evaluation  Equipment Recommended: Wheelchair (drop arm commode, hayley lift, sliding board, hospital bed)       See flowsheet documentation for full assessment, interventions and recommendations  Prognosis: Good  Problem List: Decreased strength, Decreased range of motion, Decreased endurance, Impaired balance, Decreased mobility, Decreased skin integrity, Pain, Orthopedic restrictions  Assessment: Patient with good tolerance to exer  Increased HR end of session to 150  Session completed  "That felt good  Moving the leg better "    Barriers to Discharge: Inaccessible home environment, Decreased caregiver support  Barriers to Discharge Comments: limited by medical condition and mobility at this time, has good social support system  Recommendation: Short-term skilled PT, Post acute IP rehab     PT - OK to Discharge: Yes (when medically stable to STR)    See flowsheet documentation for full assessment

## 2018-03-30 ENCOUNTER — HOSPITAL ENCOUNTER (OUTPATIENT)
Facility: HOSPITAL | Age: 68
End: 2018-03-31
Attending: PHYSICAL MEDICINE & REHABILITATION | Admitting: PHYSICAL MEDICINE & REHABILITATION

## 2018-03-30 ENCOUNTER — TELEPHONE (OUTPATIENT)
Dept: CARDIOLOGY CLINIC | Facility: CLINIC | Age: 68
End: 2018-03-30

## 2018-03-30 VITALS
HEIGHT: 66 IN | OXYGEN SATURATION: 92 % | WEIGHT: 224.43 LBS | RESPIRATION RATE: 18 BRPM | DIASTOLIC BLOOD PRESSURE: 70 MMHG | SYSTOLIC BLOOD PRESSURE: 117 MMHG | TEMPERATURE: 98.3 F | HEART RATE: 112 BPM | BODY MASS INDEX: 36.07 KG/M2

## 2018-03-30 LAB
ANION GAP SERPL CALCULATED.3IONS-SCNC: 6 MMOL/L (ref 4–13)
BUN SERPL-MCNC: 7 MG/DL (ref 5–25)
CALCIUM SERPL-MCNC: 8.7 MG/DL (ref 8.3–10.1)
CHLORIDE SERPL-SCNC: 101 MMOL/L (ref 100–108)
CO2 SERPL-SCNC: 29 MMOL/L (ref 21–32)
CREAT SERPL-MCNC: 0.61 MG/DL (ref 0.6–1.3)
ERYTHROCYTE [DISTWIDTH] IN BLOOD BY AUTOMATED COUNT: 14.6 % (ref 11.6–15.1)
GFR SERPL CREATININE-BSD FRML MDRD: 103 ML/MIN/1.73SQ M
GLUCOSE SERPL-MCNC: 110 MG/DL (ref 65–140)
HCT VFR BLD AUTO: 37.6 % (ref 36.5–49.3)
HGB BLD-MCNC: 12 G/DL (ref 12–17)
MAGNESIUM SERPL-MCNC: 2.2 MG/DL (ref 1.6–2.6)
MCH RBC QN AUTO: 27.5 PG (ref 26.8–34.3)
MCHC RBC AUTO-ENTMCNC: 31.9 G/DL (ref 31.4–37.4)
MCV RBC AUTO: 86 FL (ref 82–98)
PLATELET # BLD AUTO: 376 THOUSANDS/UL (ref 149–390)
PMV BLD AUTO: 8.6 FL (ref 8.9–12.7)
POTASSIUM SERPL-SCNC: 3.9 MMOL/L (ref 3.5–5.3)
RBC # BLD AUTO: 4.36 MILLION/UL (ref 3.88–5.62)
SODIUM SERPL-SCNC: 136 MMOL/L (ref 136–145)
WBC # BLD AUTO: 10.75 THOUSAND/UL (ref 4.31–10.16)

## 2018-03-30 PROCEDURE — 99232 SBSQ HOSP IP/OBS MODERATE 35: CPT | Performed by: NURSE PRACTITIONER

## 2018-03-30 PROCEDURE — 80048 BASIC METABOLIC PNL TOTAL CA: CPT | Performed by: NURSE PRACTITIONER

## 2018-03-30 PROCEDURE — 87633 RESP VIRUS 12-25 TARGETS: CPT | Performed by: INTERNAL MEDICINE

## 2018-03-30 PROCEDURE — 83735 ASSAY OF MAGNESIUM: CPT | Performed by: NURSE PRACTITIONER

## 2018-03-30 PROCEDURE — 85027 COMPLETE CBC AUTOMATED: CPT | Performed by: NURSE PRACTITIONER

## 2018-03-30 PROCEDURE — 99232 SBSQ HOSP IP/OBS MODERATE 35: CPT | Performed by: INTERNAL MEDICINE

## 2018-03-30 PROCEDURE — 99239 HOSP IP/OBS DSCHRG MGMT >30: CPT | Performed by: NURSE PRACTITIONER

## 2018-03-30 RX ORDER — LOPERAMIDE HYDROCHLORIDE 2 MG/1
2 CAPSULE ORAL 4 TIMES DAILY PRN
Qty: 30 CAPSULE | Refills: 0
Start: 2018-03-30 | End: 2018-04-09 | Stop reason: HOSPADM

## 2018-03-30 RX ORDER — LOPERAMIDE HYDROCHLORIDE 2 MG/1
2 CAPSULE ORAL 4 TIMES DAILY PRN
Status: DISCONTINUED | OUTPATIENT
Start: 2018-03-30 | End: 2018-03-30 | Stop reason: HOSPADM

## 2018-03-30 RX ADMIN — FLUTICASONE PROPIONATE 1 SPRAY: 50 SPRAY, METERED NASAL at 08:42

## 2018-03-30 RX ADMIN — ALLOPURINOL 300 MG: 300 TABLET ORAL at 08:39

## 2018-03-30 RX ADMIN — METOPROLOL TARTRATE 50 MG: 50 TABLET ORAL at 16:20

## 2018-03-30 RX ADMIN — METOPROLOL TARTRATE 50 MG: 50 TABLET ORAL at 01:17

## 2018-03-30 RX ADMIN — LOPERAMIDE HYDROCHLORIDE 2 MG: 2 CAPSULE ORAL at 13:54

## 2018-03-30 RX ADMIN — LORATADINE 10 MG: 10 TABLET ORAL at 08:39

## 2018-03-30 RX ADMIN — MICONAZOLE NITRATE: 2 CREAM TOPICAL at 08:42

## 2018-03-30 RX ADMIN — DIGOXIN 125 MCG: 0.12 TABLET ORAL at 08:40

## 2018-03-30 RX ADMIN — APIXABAN 5 MG: 5 TABLET, FILM COATED ORAL at 08:39

## 2018-03-30 RX ADMIN — SODIUM CHLORIDE 1000 ML: 0.9 INJECTION, SOLUTION INTRAVENOUS at 13:55

## 2018-03-30 RX ADMIN — METOPROLOL TARTRATE 50 MG: 50 TABLET ORAL at 08:39

## 2018-03-30 RX ADMIN — TAMSULOSIN HYDROCHLORIDE 0.4 MG: 0.4 CAPSULE ORAL at 16:20

## 2018-03-30 NOTE — ASSESSMENT & PLAN NOTE
WBC 16 on admission, did meet SIRS criteria with tachycardia and ^ WBC  · Influenza (-), blood cultures NGTD 5 days, C diff (-)  · UA negative  · CTA and CT abdomen and pelvis without evidence of acute infectious process  · Agree with holding on antibiotics at this time - leukocytosis resolving without any intervention

## 2018-03-30 NOTE — PLAN OF CARE
CARDIOVASCULAR - ADULT     Maintains optimal cardiac output and hemodynamic stability Progressing     Absence of cardiac dysrhythmias or at baseline rhythm Progressing        DISCHARGE PLANNING - CARE MANAGEMENT     Discharge to post-acute care or home with appropriate resources Progressing        MUSCULOSKELETAL - ADULT     Maintain or return mobility to safest level of function Progressing     Maintain proper alignment of affected body part Progressing        Potential for Falls     Patient will remain free of falls Progressing        Prexisting or High Potential for Compromised Skin Integrity     Skin integrity is maintained or improved Progressing

## 2018-03-30 NOTE — DISCHARGE SUMMARY
Discharge- Osmar Covington 1950, 75 y o  male MRN: [id number]    Unit/Bed#: -01 Encounter: [id number]    Primary Care Provider: Christiano Garibay DO   Date and time admitted to hospital: 3/24/2018  1:28 PM        Leukocytosis   Assessment & Plan    WBC 16 on admission, did meet SIRS criteria with tachycardia and ^ WBC  · Influenza (-), blood cultures NGTD 5 days, C diff (-)  · UA negative  · CTA and CT abdomen and pelvis without evidence of acute infectious process  · Agree with holding on antibiotics at this time - leukocytosis resolving without any intervention        Decubitus ulcer of heel   Assessment & Plan    Present on admit, secondary to neuropathy and polio brace  · Continue wound care per podiatry recommendations, follow up with his podiatrist outpatient  · No signs of infection such as drainage, foul odor, or worsening erythema - wound culture showing Staph epidermis, no antibiotics  · Will see if podiatry has any further recommendations        Polio   Assessment & Plan    Patient with post-polio syndrome  · Does wear leg braces to get around, however has neuropathy as well and has developed a decub ulcer of right medial heel due to leg braces creating abrasions on patient's skin without realization  · Patient recently at [facility] however patient and family do not want to go back    Plan for [facility] today however plan modified given patient is having increased heart rate status post physical therapy  · Podiatry consult appreciated recommending keeping wound clean and dry and follow up with Podiatry as an outpatient - wound culture growing 1+ staph epidermis, do not suspect that this is acutely infected, continue with local wound care only no antibiotics        Gout, arthritis   Assessment & Plan    Continue allopurinol  · Does not seem to be in acute exacerbation        * Paroxysmal atrial fibrillation (HCC)   Assessment & Plan    · Continue Eliquis for anticoagulation, rate controlled  · Continue digoxin and Lopressor, telemetry   · Cardiology following, echo without significant abnormalities, preserved EF  · Cardiology would prefer medication adjustment and further outpatient evaluation for other options for patient's atrial fibrillation  · Continue increased dose of metoprolol 50 mg t i d    · Agree given patient's deconditioning could be contributing to patient's atrial fibrillation RVR  · Patient's magnesium was low yesterday at 1 7 repleted currently at 2 2  · Improved rates in the 90s with the use of his home CPAP machine  Resolved Problems  Date Reviewed: 3/29/2018          Resolved    Hypokalemia 3/27/2018     Resolved by  Rakesh Perera PA-C    Hyponatremia 3/27/2018     Resolved by  Rakesh Perera PA-C          Consultations During Hospital Stay:  · Cardiology    Procedures Performed:     · Chest x-ray no acute cardiopulmonary disease  There are bilateral calcified pleural plaques  · CTA chest abdomen pelvis 3/24:  No evidence of pulmonary illness  Scattered calcified and noncalcified pleural plaques  Right greater than left basilar dependent atelectasis  Gallbladder distention without evidence of acute cholecystitis  Bilateral renal calculi without obstructive uropathy  Colonic diverticulosis without evidence of acute diverticulitis  Stable prominent right hilar lymph node measuring up to 1 5 cm   · Right femur x-ray 3/25:  Status post ORIF of the comminuted distal right femur fracture the expected postoperative alignment  · Ultrasound right upper quadrant 3/26:  There are multiple gallstones and sludge within the gallbladder there is no evidence of acute cholecystitis    Significant Findings / Test Results:     · Atrial fibrillation with RVR  · Polio   · Diarrhea C diff negative    Incidental Findings:   · None     Test Results Pending at Discharge (will require follow up):    · None     Outpatient Tests Requested:  · Per Cardiology    Complications:  Atrial fibrillation with RVR    Reason for Admission:  Atrial fibrillation with RVR    Hospital Course:     Jorge Bautista is a 75 y o  male patient who originally presented to the hospital on 3/24/2018 due to known history of atrial fibrillation, hypertension, DORA, gout, polio, and peripheral neuropathy presenting with fever and chills x1 day patient reports he sustained a fall 1 month ago at home was admitted to [facility] patient underwent an ORIF to the right femur  Patient was at [facility] for rehab where he had fevers tachycardia and chills and requesting admits here for further evaluation  Patient did have a leukocytosis no clear source of infection upon admission patient was found to be atrial fibrillation with RVR  Patient was having loose stools workup was negative for C diff patient started on Imodium to help his diarrhea symptoms  Potassium was repleted as it was low on admission  Patient had difficulty getting his RVR and noted to have increased rates while awake Cardiology continue to work with patient started him on digoxin and increase his rate to metoprolol to the point of 50 mg q 8 hours  Patient overall stabilized was accepted at acute [facility] acute rehab for strengthening  Please see above list of diagnoses and related plan for additional information  Condition at Discharge: stable     Discharge Day Visit / Exam:     * Please refer to separate progress note for these details *    Discussion with Family:  Wife at bedside    Discharge instructions/Information to patient and family:   See after visit summary for information provided to patient and family  Provisions for Follow-Up Care:  See after visit summary for information related to follow-up care and any pertinent home health orders        Disposition:     Acute Rehab at [facility]    For Discharges to [facility] SNF:   · Not Applicable to this Patient - Not Applicable to this Patient    Planned Readmission:  None     Discharge Statement:  I spent 40 minutes discharging the patient  This time was spent on the day of discharge  I had direct contact with the patient on the day of discharge  Greater than 50% of the total time was spent examining patient, answering all patient questions, arranging and discussing plan of care with patient as well as directly providing post-discharge instructions  Additional time then spent on discharge activities  Discharge Medications:  See after visit summary for reconciled discharge medications provided to patient and family        ** Please Note: This note has been constructed using a voice recognition system **

## 2018-03-30 NOTE — ASSESSMENT & PLAN NOTE
· Continue Eliquis for anticoagulation, rate controlled  · Continue digoxin and Lopressor, telemetry   · Cardiology following, echo without significant abnormalities, preserved EF  · Cardiology would prefer medication adjustment and further outpatient evaluation for other options for patient's atrial fibrillation  · Continue increased dose of metoprolol 50 mg t i d    · Agree given patient's deconditioning could be contributing to patient's atrial fibrillation RVR  · Patient's magnesium was low yesterday at 1 7 repleted currently at 2 2  · Improved rates in the 90s with the use of his home CPAP machine

## 2018-03-30 NOTE — SOCIAL WORK
Patient remains accepted to [facility] rehab [facility]  Cardiology to clear  Cm met with patient and wife regarding acceptance to [facility] Acute rehab  Couple report remaining agreeable to [facility]  Cm reviewed imm letter with patient, no reported needs at this time

## 2018-03-30 NOTE — ASSESSMENT & PLAN NOTE
Patient with post-polio syndrome  · Does wear leg braces to get around, however has neuropathy as well and has developed a decub ulcer of right medial heel due to leg braces creating abrasions on patient's skin without realization  · Patient recently at [facility] stone however patient and family do not want to go back    Plan for [facility] today however plan modified given patient is having increased heart rate status post physical therapy  · Podiatry consult appreciated recommending keeping wound clean and dry and follow up with Podiatry as an outpatient - wound culture growing 1+ staph epidermis, do not suspect that this is acutely infected, continue with local wound care only no antibiotics

## 2018-03-30 NOTE — PLAN OF CARE
Problem: DISCHARGE PLANNING - CARE MANAGEMENT  Goal: Discharge to post-acute care or home with appropriate resources  INTERVENTIONS:  - Conduct assessment to determine patient/family and health care team treatment goals, and need for post-acute services based on payer coverage, community resources, and patient preferences, and barriers to discharge  - Address psychosocial, clinical, and financial barriers to discharge as identified in assessment in conjunction with the patient/family and health care team  - Arrange appropriate level of post-acute services according to patient's   needs and preference and payer coverage in collaboration with the physician and health care team  - Communicate with and update the patient/family, physician, and health care team regarding progress on the discharge plan  - Arrange appropriate transportation to post-acute venues   Outcome: Progressing  To dc to [facility], when clear

## 2018-03-30 NOTE — PROGRESS NOTES
Progress Note - Divina Pickens 1950, 75 y o  male MRN: [id number]    Unit/Bed#: -01 Encounter: [id number]    Primary Care Provider: Sirena Sanchez DO   Date and time admitted to hospital: 3/24/2018  1:28 PM        Leukocytosis   Assessment & Plan    WBC 16 on admission, did meet SIRS criteria with tachycardia and ^ WBC  · Influenza (-), blood cultures NGTD 5 days, C diff (-)  · UA negative  · CTA and CT abdomen and pelvis without evidence of acute infectious process  · Agree with holding on antibiotics at this time - leukocytosis resolving without any intervention        Decubitus ulcer of heel   Assessment & Plan    Present on admit, secondary to neuropathy and polio brace  · Continue wound care per podiatry recommendations, follow up with his podiatrist outpatient  · No signs of infection such as drainage, foul odor, or worsening erythema - wound culture showing Staph epidermis, no antibiotics  · Will see if podiatry has any further recommendations        Polio   Assessment & Plan    Patient with post-polio syndrome  · Does wear leg braces to get around, however has neuropathy as well and has developed a decub ulcer of right medial heel due to leg braces creating abrasions on patient's skin without realization  · Patient recently at [facility] however patient and family do not want to go back    Plan for [facility] today however plan modified given patient is having increased heart rate status post physical therapy  · Podiatry consult appreciated recommending keeping wound clean and dry and follow up with Podiatry as an outpatient - wound culture growing 1+ staph epidermis, do not suspect that this is acutely infected, continue with local wound care only no antibiotics        Gout, arthritis   Assessment & Plan    Continue allopurinol  · Does not seem to be in acute exacerbation        * Paroxysmal atrial fibrillation (HCC)   Assessment & Plan    · Continue Eliquis for anticoagulation, rate controlled  · Continue digoxin and Lopressor, telemetry   · Cardiology following, echo without significant abnormalities, preserved EF  · Cardiology would prefer medication adjustment and further outpatient evaluation for other options for patient's atrial fibrillation  · Continue increased dose of metoprolol 50 mg t i d    · Agree given patient's deconditioning could be contributing to patient's atrial fibrillation RVR  · Patient's magnesium was low yesterday at 1 7 repleted currently at 2 2  · Improved rates in the 90s with the use of his home CPAP machine  VTE Pharmacologic Prophylaxis:   Pharmacologic: Apixaban (Eliquis)  Mechanical VTE Prophylaxis in Place: Yes    Patient Centered Rounds: I have performed bedside rounds with nursing staff today  Discussions with Specialists or Other Care Team Provider:  Cardiology note reviewed will discuss case further on advancing patient to rehab    Education and Discussions with Family / Patient:  Patient and wife at bedside    Time Spent for Care: 25 minutes  More than 50% of total time spent on counseling and coordination of care as described above  Current Length of Stay: 6 day(s)    Current Patient Status: Inpatient   Certification Statement: The patient will continue to require additional inpatient hospital stay due to Ongoing treatment for atrial fibrillation with RVR need for further cardiac recommendations    Discharge Plan:  Possible discharge to rehab next 24 hours    Code Status: Level 1 - Full Code      Subjective:   Patient in bed 1 CPAP checking his yes or no to conversation with the wife at the bedside  Patient states he just feels tired however is agreeable that if Cardiology feels patient should go to rehab for strengthening and this would be a better option on getting him to recover patient is on board  Patient has no other general complaints at this time      Objective:     Vitals:   Temp (24hrs), Av 1 °F (36 7 °C), Min:97 5 °F (36 4 °C), Max:98 8 °F (37 1 °C)    HR:  [] 96  Resp:  [17-18] 18  BP: (106-136)/(58-76) 106/70  SpO2:  [92 %-96 %] 92 %  Body mass index is 36 22 kg/m²  Input and Output Summary (last 24 hours): Intake/Output Summary (Last 24 hours) at 03/30/18 0952  Last data filed at 03/30/18 0848   Gross per 24 hour   Intake             1050 ml   Output              650 ml   Net              400 ml       Physical Exam:     Physical Exam   Constitutional: He is oriented to person, place, and time  HENT:   Head: Normocephalic and atraumatic  Eyes: Conjunctivae and EOM are normal    Neck: Normal range of motion  Cardiovascular: Normal rate and normal heart sounds  An irregular rhythm present  Pulmonary/Chest: Effort normal and breath sounds normal    Abdominal: Soft  Bowel sounds are normal    Musculoskeletal: Normal range of motion  Neurological: He is alert and oriented to person, place, and time  Weakness 3/5 bilateral lower extremities   Skin: Skin is warm and dry  Psychiatric: He has a normal mood and affect  His behavior is normal          Additional Data:     Labs:      Results from last 7 days  Lab Units 03/30/18 0517 03/26/18  0537   WBC Thousand/uL 10 75* 11 04*   HEMOGLOBIN g/dL 12 0 11 2*   HEMATOCRIT % 37 6 36 1*   PLATELETS Thousands/uL 376 295   NEUTROS PCT %  --  70   LYMPHS PCT %  --  15   MONOS PCT %  --  11   EOS PCT %  --  3       Results from last 7 days  Lab Units 03/30/18 0517  03/26/18  0537   SODIUM mmol/L 136  < > 135*   POTASSIUM mmol/L 3 9  < > 3 5   CHLORIDE mmol/L 101  < > 94*   CO2 mmol/L 29  < > 36*   BUN mg/dL 7  < > 6   CREATININE mg/dL 0 61  < > 0 72   CALCIUM mg/dL 8 7  < > 8 8   TOTAL PROTEIN g/dL  --   --  6 7   BILIRUBIN TOTAL mg/dL  --   --  0 40   ALK PHOS U/L  --   --  91   ALT U/L  --   --  7*   AST U/L  --   --  21   GLUCOSE RANDOM mg/dL 110  < > 120   < > = values in this interval not displayed      Results from last 7 days  Lab Units 03/24/18  1453   INR  1 73*       * I Have Reviewed All Lab Data Listed Above  * Additional Pertinent Lab Tests Reviewed: All Labs Within Last 24 Hours Reviewed        Recent Cultures (last 7 days):       Results from last 7 days  Lab Units 03/25/18  1517 03/25/18  1140 03/25/18  1033 03/24/18  1521 03/24/18  1453   BLOOD CULTURE   --   --   --  No Growth After 5 Days  No Growth After 5 Days  GRAM STAIN RESULT  No Polys or Bacteria seen  --   --   --   --    WOUND CULTURE  1+ Growth of Staphylococcus epidermidis*  --   --   --   --    INFLUENZA A PCR   --   --  None Detected  --   --    INFLUENZA B PCR   --   --  None Detected  --   --    RSV PCR   --   --  None Detected  --   --    C DIFF TOXIN B   --  NEGATIVE for C difficle toxin by PCR    --   --   --        Last 24 Hours Medication List:     Current Facility-Administered Medications:  allopurinol 300 mg Oral Daily Stefanie Mata PA-C   apixaban 5 mg Oral BID Stefanie Mata PA-C   baclofen 10 mg Oral TID PRN Lauren Sanders PA-C   digoxin 125 mcg Oral Daily Stefanie Mata PA-C   fluticasone 1 spray Nasal Daily Stefanie Mata PA-C   loratadine 10 mg Oral Daily Stefanie Mata PA-C   metoprolol 5 mg Intravenous Q6H PRN [facility]   metoprolol tartrate 50 mg Oral Q8H Caty Manuel PA-C   miconazole  Topical BID Danni Henriquez PA-C   ondansetron 4 mg Intravenous Q6H PRN Lauren Sanders PA-C   tamsulosin 0 4 mg Oral Daily With Dinner Lauren Sanders PA-C        Today, Patient Was Seen By: [facility]    ** Please Note: Dictation voice to text software may have been used in the creation of this document   **

## 2018-03-30 NOTE — ASSESSMENT & PLAN NOTE
Patient with post-polio syndrome  · Does wear leg braces to get around, however has neuropathy as well and has developed a decub ulcer of right medial heel due to leg braces creating abrasions on patient's skin without realization  · Patient recently at [facility] stone however patient and family do not want to go back    Plan for [facility] today however plan modified given patient is having increased heart rate status post physical therapy  · Podiatry consult appreciated recommending keeping wound clean and dry and follow up with Podiatry as an outpatient - wound culture growing 1+ staph epidermis, do not suspect that this is acutely infected, continue with local wound care only no antibiotics

## 2018-03-30 NOTE — PROGRESS NOTES
Progress Note - Cardiology     Leonor Ferrari 75 y o  male MRN: [id number]  Unit/Bed#: -01 Encounter: [id number]      Subjective:   Patient does admit that he has been having diarrhea--frequent bowel movements nearly every 2 hours for the last few days  Objective:   Vitals:  Vitals:    03/30/18 1620   BP: 117/70   Pulse: (!) 112   Resp:    Temp:    SpO2:        Body mass index is 36 22 kg/m²  Systolic (95QAE), ERQ:764 , Min:106 , TQV:814     Diastolic (83WDU), LKB:28, Min:58, Max:76      Intake/Output Summary (Last 24 hours) at 03/30/18 1708  Last data filed at 03/30/18 1600   Gross per 24 hour   Intake             2190 ml   Output              650 ml   Net             1540 ml     Weight (last 2 days)     None          PHYSICAL EXAM:  General: Patient is not in acute distress  Laying comfortably in the bed  Head: Normocephalic  Atraumatic  HEENT: Both pupils normal sized, round and reactive to light  Nonicteric  Neck: JVP not raised  Trachea central    Respiratory: Bilateral bronchovascular breath sounds with no crackles or rhonchi  Cardiovascular: RRR  S1 and S2 normal  No murmur, rub or gallop  GI: Abdomen soft, nontender  No guarding or rigidity  Neurologic: Patient is awake, alert, responding to command   Moving all extremities  Integumentary:  No skin rash  Extremities: No clubbing, cyanosis or edema    LABORATORY RESULTS:    Results from last 7 days  Lab Units 03/24/18  1453   TROPONIN I ng/mL <0 02     CBC with diff:   Results from last 7 days  Lab Units 03/30/18  0517 03/26/18  0537 03/25/18  0507 03/24/18  1453   WBC Thousand/uL 10 75* 11 04* 10 81* 16 62*   HEMOGLOBIN g/dL 12 0 11 2* 11 4* 13 5   HEMATOCRIT % 37 6 36 1* 35 8* 42 0   MCV fL 86 88 87 85   PLATELETS Thousands/uL 376 295 314 394*   MCH pg 27 5 27 2 27 7 27 4   MCHC g/dL 31 9 31 0* 31 8 32 1   RDW % 14 6 14 2 14 1 13 9   MPV fL 8 6* 8 5* 9 0 9 1   NRBC AUTO /100 WBCs  --  0  --  0       CMP:  Results from last 7 days  Lab Units 03/30/18  0517 03/29/18  0502 03/28/18  1952 03/26/18  0537  03/24/18  1453   SODIUM mmol/L 136 137 135* 135*  < > 130*   POTASSIUM mmol/L 3 9 3 1* 3 3* 3 5  < > 3 1*   CHLORIDE mmol/L 101 98* 97* 94*  < > 85*   CO2 mmol/L 29 32 31 36*  < > 37*   ANION GAP mmol/L 6 7 7 5  < > 8   BUN mg/dL 7 8 9 6  < > 10   CREATININE mg/dL 0 61 0 66 0 63 0 72  < > 1 07   GLUCOSE RANDOM mg/dL 110 103 109 120  < > 125   CALCIUM mg/dL 8 7 8 6 8 7 8 8  < > 9 2   AST U/L  --   --   --  21  --  24   ALT U/L  --   --   --  7*  --  15   ALK PHOS U/L  --   --   --  91  --  116   TOTAL PROTEIN g/dL  --   --   --  6 7  --  7 6   BILIRUBIN TOTAL mg/dL  --   --   --  0 40  --  0 50   EGFR ml/min/1 73sq m 103 99 101 96  < > 71   < > = values in this interval not displayed      BMP:  Results from last 7 days  Lab Units 03/30/18  0517 03/29/18  0502 03/28/18 1952   SODIUM mmol/L 136 137 135*   POTASSIUM mmol/L 3 9 3 1* 3 3*   CHLORIDE mmol/L 101 98* 97*   CO2 mmol/L 29 32 31   BUN mg/dL 7 8 9   CREATININE mg/dL 0 61 0 66 0 63   GLUCOSE RANDOM mg/dL 110 103 109   CALCIUM mg/dL 8 7 8 6 8 7                Results from last 7 days  Lab Units 03/30/18  0517 03/28/18  1952 03/25/18  1016   MAGNESIUM mg/dL 2 2 1 7 1 5*               Results from last 7 days  Lab Units 03/26/18  0537 03/25/18  1016   TSH 3RD GENERATON uIU/mL  --  4 336*   FREE T4 ng/dL 1 27  --          Results from last 7 days  Lab Units 03/24/18  1453   INR  1 73*       Lipid Profile:   Lab Results   Component Value Date    CHOL 193 11/13/2017     Lab Results   Component Value Date    HDL 47 11/13/2017     No results found for: LDLCALC  No results found for: TRIG    Cardiac testing:   Results for orders placed during the hospital encounter of 03/24/18   Echo complete with contrast if indicated    Narrative [address] [place], [place] [address] [phone]    Transthoracic Echocardiogram  2D, M-mode, Doppler, and Color Doppler    Study date: 26-Mar-2018    Patient: Keisha Montano  MR number: [id number]  Account number: [de-identified]  : 1950  Age: 75 years  Gender: Male  Status: Inpatient  Location: Bedside  Height: 66 in  Weight: 224 lb  BP: 124/ 68 mmHg    Indications: Atrial Fibrillation    Diagnoses: I48 0 - Atrial fibrillation    Sonographer:  AKILA Wilcox,RDCS  Interpreting Physician:  Blu Stokes MD  Referring Physician:  Ada Avelar PA-C  Group:  [facility]    SUMMARY    LEFT VENTRICLE:  Systolic function was normal  Ejection fraction was estimated in the range of 55 % to 65 %  There were no regional wall motion abnormalities  MITRAL VALVE:  There was mild regurgitation  TRICUSPID VALVE:  There was mild regurgitation  Estimated peak PA pressure was 40 mmHg  HISTORY: PRIOR HISTORY: Atrial Fibrillation, Hypertension, Mixed Hyperlipidemia, Hypokalemia    PROCEDURE: The procedure was performed at the bedside  This was a routine study  The transthoracic approach was used  The study included complete 2D imaging, M-mode, complete spectral Doppler, and color Doppler  The heart rate was 76 bpm,  at the start of the study  Images were obtained from the parasternal, apical, subcostal, and suprasternal notch acoustic windows  Image quality was adequate  LEFT VENTRICLE: Size was normal  Systolic function was normal  Ejection fraction was estimated in the range of 55 % to 65 %  There were no regional wall motion abnormalities  Wall thickness was normal  DOPPLER: Left ventricular diastolic  function parameters were abnormal     RIGHT VENTRICLE: The size was normal  Systolic function was normal  Wall thickness was normal     LEFT ATRIUM: Size was normal     RIGHT ATRIUM: Size was normal     MITRAL VALVE: Valve structure was normal  There was normal leaflet separation  DOPPLER: The transmitral velocity was within the normal range  There was no evidence for stenosis  There was mild regurgitation      AORTIC VALVE: The valve was trileaflet  Leaflets exhibited normal thickness and normal cuspal separation  DOPPLER: Transaortic velocity was minimally increased  There was no evidence for stenosis  There was no regurgitation  TRICUSPID VALVE: The valve structure was normal  There was normal leaflet separation  DOPPLER: The transtricuspid velocity was within the normal range  There was no evidence for stenosis  There was mild regurgitation  Estimated peak PA  pressure was 40 mmHg  PULMONIC VALVE: Leaflets exhibited normal thickness, no calcification, and normal cuspal separation  DOPPLER: The transpulmonic velocity was within the normal range  There was no regurgitation  PERICARDIUM: There was no pericardial effusion  The pericardium was normal in appearance  AORTA: The root exhibited normal size  SYSTEMIC VEINS: IVC: The inferior vena cava was normal in size and course  Respirophasic changes were normal     SYSTEM MEASUREMENT TABLES    2D  %FS: 25 2 %  Ao Diam: 2 7 cm  EDV(Teich): 83 6 ml  EF(Teich): 50 %  ESV(Teich): 41 8 ml  IVSd: 1 cm  LA Area: 23 9 cm2  LA Diam: 3 7 cm  LVEDV MOD A4C: 54 7 ml  LVEF MOD A4C: 63 9 %  LVESV MOD A4C: 19 8 ml  LVIDd: 4 3 cm  LVIDs: 3 2 cm  LVLd A4C: 6 8 cm  LVLs A4C: 6 cm  LVOT Diam: 1 9 cm  LVPWd: 1 cm  RA Area: 20 6 cm2  RVIDd: 3 4 cm  SV MOD A4C: 35 ml  SV(Teich): 41 8 ml    CW  AV Env  Ti: 213 1 ms  AV VTI: 29 7 cm  AV Vmax: 2 1 m/s  AV Vmean: 1 4 m/s  AV maxP 7 mmHg  AV meanP 1 mmHg  TR Vmax: 3 3 m/s  TR maxP mmHg    MM  TAPSE: 2 1 cm    PW  SALUD (VTI): 2 cm2  SALUD Vmax: 1 8 cm2  LVOT Env  Ti: 262 6 ms  LVOT VTI: 20 4 cm  LVOT Vmax: 1 3 m/s  LVOT Vmean: 0 8 m/s  LVOT maxP 5 mmHg  LVOT meanP 9 mmHg  LVSV Dopp: 59 3 ml    [employer] Accredited Echocardiography [facility]    Prepared and electronically signed by    Damián Hahn MD  Signed 26-Mar-2018 18:09:34           Meds/Allergies   all current active meds have been reviewed  Prescriptions Prior to Admission   Medication    allopurinol (ZYLOPRIM) 300 mg tablet    apixaban (ELIQUIS) 5 mg    baclofen 10 mg tablet    colchicine (COLCRYS) 0 6 mg tablet    digoxin (LANOXIN) 0 125 mg tablet    fluticasone (FLONASE) 50 mcg/act nasal spray    furosemide (LASIX) 40 mg tablet    loratadine (CLARITIN) 10 mg tablet    metoprolol tartrate (LOPRESSOR) 50 mg tablet    tamsulosin (FLOMAX) 0 4 mg    indomethacin (INDOCIN) 25 mg capsule    Methylprednisolone 4 MG TBPK              Assessment and Plan:  Paroxysmal atrial fibrillation  -echocardiogram revealed EF 55-65 percent without regional wall motion abnormality, mild MR, mild TR, estimated peak PA pressure 40 mmHg  - currently on digoxin 125 mcg and metoprolol tartrate 50 mg TID   -heart rates overall stable, however he does have minimal elevations with activity  Patient reporting diarrhea, propensity for tachycardia may be due to dehydration  -on Eliquis for anticoagulation     Hypertension- BP stable    Patient may be discharged from cardiac standpoint, however diarrhea may need to be addressed  He would likely benefit from IVF  ** Please Note: Dragon 360 Dictation voice to text software may have been used in the creation of this document   **

## 2018-03-31 ENCOUNTER — APPOINTMENT (EMERGENCY)
Dept: CT IMAGING | Facility: HOSPITAL | Age: 68
DRG: 308 | End: 2018-03-31
Payer: MEDICARE

## 2018-03-31 ENCOUNTER — HOSPITAL ENCOUNTER (INPATIENT)
Facility: HOSPITAL | Age: 68
LOS: 9 days | DRG: 308 | End: 2018-04-09
Attending: EMERGENCY MEDICINE | Admitting: INTERNAL MEDICINE
Payer: MEDICARE

## 2018-03-31 ENCOUNTER — APPOINTMENT (EMERGENCY)
Dept: RADIOLOGY | Facility: HOSPITAL | Age: 68
DRG: 308 | End: 2018-03-31
Payer: MEDICARE

## 2018-03-31 DIAGNOSIS — G93.40 ACUTE ENCEPHALOPATHY: ICD-10-CM

## 2018-03-31 DIAGNOSIS — L89.609: ICD-10-CM

## 2018-03-31 DIAGNOSIS — R41.82 ALTERED MENTAL STATUS: Primary | ICD-10-CM

## 2018-03-31 DIAGNOSIS — R00.0 TACHYCARDIA: ICD-10-CM

## 2018-03-31 DIAGNOSIS — I48.91 ATRIAL FIBRILLATION WITH RVR (HCC): ICD-10-CM

## 2018-03-31 DIAGNOSIS — K59.00 CONSTIPATION: ICD-10-CM

## 2018-03-31 DIAGNOSIS — Z98.890 S/P RIGHT KNEE SURGERY: ICD-10-CM

## 2018-03-31 PROBLEM — Z99.89 OSA ON CPAP: Chronic | Status: ACTIVE | Noted: 2017-09-15

## 2018-03-31 PROBLEM — G47.33 OSA ON CPAP: Chronic | Status: ACTIVE | Noted: 2017-09-15

## 2018-03-31 LAB
ALBUMIN SERPL BCP-MCNC: 2.4 G/DL (ref 3.5–5)
ALP SERPL-CCNC: 78 U/L (ref 46–116)
ALT SERPL W P-5'-P-CCNC: 14 U/L (ref 12–78)
AMMONIA PLAS-SCNC: <10 UMOL/L (ref 11–35)
AMPHETAMINES SERPL QL SCN: NEGATIVE
ANION GAP SERPL CALCULATED.3IONS-SCNC: 11 MMOL/L (ref 4–13)
AST SERPL W P-5'-P-CCNC: 23 U/L (ref 5–45)
BARBITURATES UR QL: NEGATIVE
BASOPHILS # BLD AUTO: 0.08 THOUSANDS/ΜL (ref 0–0.1)
BASOPHILS NFR BLD AUTO: 1 % (ref 0–1)
BENZODIAZ UR QL: NEGATIVE
BILIRUB SERPL-MCNC: 0.4 MG/DL (ref 0.2–1)
BILIRUB UR QL STRIP: NEGATIVE
BUN SERPL-MCNC: 9 MG/DL (ref 5–25)
CALCIUM SERPL-MCNC: 8.9 MG/DL (ref 8.3–10.1)
CHLORIDE SERPL-SCNC: 101 MMOL/L (ref 100–108)
CLARITY UR: CLEAR
CO2 SERPL-SCNC: 27 MMOL/L (ref 21–32)
COCAINE UR QL: NEGATIVE
COLOR UR: YELLOW
CREAT SERPL-MCNC: 0.67 MG/DL (ref 0.6–1.3)
DIGOXIN SERPL-MCNC: 0.6 NG/ML (ref 0.8–2)
EOSINOPHIL # BLD AUTO: 0.56 THOUSAND/ΜL (ref 0–0.61)
EOSINOPHIL NFR BLD AUTO: 5 % (ref 0–6)
ERYTHROCYTE [DISTWIDTH] IN BLOOD BY AUTOMATED COUNT: 14.6 % (ref 11.6–15.1)
ETHANOL SERPL-MCNC: <3 MG/DL (ref 0–3)
GFR SERPL CREATININE-BSD FRML MDRD: 99 ML/MIN/1.73SQ M
GLUCOSE SERPL-MCNC: 100 MG/DL (ref 65–140)
GLUCOSE UR STRIP-MCNC: NEGATIVE MG/DL
HCT VFR BLD AUTO: 37 % (ref 36.5–49.3)
HGB BLD-MCNC: 11.7 G/DL (ref 12–17)
HGB UR QL STRIP.AUTO: NEGATIVE
KETONES UR STRIP-MCNC: NEGATIVE MG/DL
LACTATE SERPL-SCNC: 1.7 MMOL/L (ref 0.5–2)
LEUKOCYTE ESTERASE UR QL STRIP: NEGATIVE
LYMPHOCYTES # BLD AUTO: 1.39 THOUSANDS/ΜL (ref 0.6–4.47)
LYMPHOCYTES NFR BLD AUTO: 13 % (ref 14–44)
MAGNESIUM SERPL-MCNC: 1.9 MG/DL (ref 1.6–2.6)
MCH RBC QN AUTO: 27.7 PG (ref 26.8–34.3)
MCHC RBC AUTO-ENTMCNC: 31.6 G/DL (ref 31.4–37.4)
MCV RBC AUTO: 88 FL (ref 82–98)
METHADONE UR QL: NEGATIVE
MONOCYTES # BLD AUTO: 1.19 THOUSAND/ΜL (ref 0.17–1.22)
MONOCYTES NFR BLD AUTO: 11 % (ref 4–12)
NEUTROPHILS # BLD AUTO: 7.35 THOUSANDS/ΜL (ref 1.85–7.62)
NEUTS SEG NFR BLD AUTO: 69 % (ref 43–75)
NITRITE UR QL STRIP: NEGATIVE
NRBC BLD AUTO-RTO: 0 /100 WBCS
NT-PROBNP SERPL-MCNC: 1192 PG/ML
OPIATES UR QL SCN: NEGATIVE
PCP UR QL: NEGATIVE
PH UR STRIP.AUTO: 6 [PH] (ref 4.5–8)
PHOSPHATE SERPL-MCNC: 3.1 MG/DL (ref 2.3–4.1)
PLATELET # BLD AUTO: 437 THOUSANDS/UL (ref 149–390)
PMV BLD AUTO: 8.6 FL (ref 8.9–12.7)
POTASSIUM SERPL-SCNC: 4 MMOL/L (ref 3.5–5.3)
PROT SERPL-MCNC: 6.6 G/DL (ref 6.4–8.2)
PROT UR STRIP-MCNC: NEGATIVE MG/DL
RBC # BLD AUTO: 4.23 MILLION/UL (ref 3.88–5.62)
SODIUM SERPL-SCNC: 139 MMOL/L (ref 136–145)
SP GR UR STRIP.AUTO: 1.01 (ref 1–1.03)
THC UR QL: NEGATIVE
TROPONIN I SERPL-MCNC: <0.02 NG/ML
TSH SERPL DL<=0.05 MIU/L-ACNC: 5.67 UIU/ML (ref 0.36–3.74)
UROBILINOGEN UR QL STRIP.AUTO: 0.2 E.U./DL
WBC # BLD AUTO: 10.66 THOUSAND/UL (ref 4.31–10.16)

## 2018-03-31 PROCEDURE — 84100 ASSAY OF PHOSPHORUS: CPT | Performed by: EMERGENCY MEDICINE

## 2018-03-31 PROCEDURE — 36415 COLL VENOUS BLD VENIPUNCTURE: CPT | Performed by: EMERGENCY MEDICINE

## 2018-03-31 PROCEDURE — 80320 DRUG SCREEN QUANTALCOHOLS: CPT | Performed by: EMERGENCY MEDICINE

## 2018-03-31 PROCEDURE — 82140 ASSAY OF AMMONIA: CPT | Performed by: EMERGENCY MEDICINE

## 2018-03-31 PROCEDURE — 80162 ASSAY OF DIGOXIN TOTAL: CPT | Performed by: EMERGENCY MEDICINE

## 2018-03-31 PROCEDURE — 83735 ASSAY OF MAGNESIUM: CPT | Performed by: EMERGENCY MEDICINE

## 2018-03-31 PROCEDURE — 80307 DRUG TEST PRSMV CHEM ANLYZR: CPT | Performed by: EMERGENCY MEDICINE

## 2018-03-31 PROCEDURE — 81003 URINALYSIS AUTO W/O SCOPE: CPT | Performed by: EMERGENCY MEDICINE

## 2018-03-31 PROCEDURE — 93005 ELECTROCARDIOGRAM TRACING: CPT | Performed by: EMERGENCY MEDICINE

## 2018-03-31 PROCEDURE — 80053 COMPREHEN METABOLIC PANEL: CPT | Performed by: EMERGENCY MEDICINE

## 2018-03-31 PROCEDURE — 83880 ASSAY OF NATRIURETIC PEPTIDE: CPT | Performed by: EMERGENCY MEDICINE

## 2018-03-31 PROCEDURE — 71045 X-RAY EXAM CHEST 1 VIEW: CPT

## 2018-03-31 PROCEDURE — 83605 ASSAY OF LACTIC ACID: CPT | Performed by: EMERGENCY MEDICINE

## 2018-03-31 PROCEDURE — 96366 THER/PROPH/DIAG IV INF ADDON: CPT

## 2018-03-31 PROCEDURE — 96375 TX/PRO/DX INJ NEW DRUG ADDON: CPT

## 2018-03-31 PROCEDURE — 84484 ASSAY OF TROPONIN QUANT: CPT | Performed by: EMERGENCY MEDICINE

## 2018-03-31 PROCEDURE — 96365 THER/PROPH/DIAG IV INF INIT: CPT

## 2018-03-31 PROCEDURE — 85025 COMPLETE CBC W/AUTO DIFF WBC: CPT | Performed by: EMERGENCY MEDICINE

## 2018-03-31 PROCEDURE — 71275 CT ANGIOGRAPHY CHEST: CPT

## 2018-03-31 PROCEDURE — 70450 CT HEAD/BRAIN W/O DYE: CPT

## 2018-03-31 PROCEDURE — 87040 BLOOD CULTURE FOR BACTERIA: CPT | Performed by: EMERGENCY MEDICINE

## 2018-03-31 PROCEDURE — 84443 ASSAY THYROID STIM HORMONE: CPT | Performed by: EMERGENCY MEDICINE

## 2018-03-31 RX ORDER — DILTIAZEM HYDROCHLORIDE 5 MG/ML
25 INJECTION INTRAVENOUS ONCE
Status: COMPLETED | OUTPATIENT
Start: 2018-03-31 | End: 2018-03-31

## 2018-03-31 RX ORDER — METOPROLOL TARTRATE 50 MG/1
50 TABLET, FILM COATED ORAL ONCE
Status: COMPLETED | OUTPATIENT
Start: 2018-03-31 | End: 2018-03-31

## 2018-03-31 RX ORDER — DIGOXIN 0.25 MG/ML
500 INJECTION INTRAMUSCULAR; INTRAVENOUS ONCE
Status: COMPLETED | OUTPATIENT
Start: 2018-03-31 | End: 2018-03-31

## 2018-03-31 RX ORDER — MECLIZINE HCL 12.5 MG/1
TABLET ORAL
COMMUNITY
Start: 2018-02-17 | End: 2018-04-09 | Stop reason: HOSPADM

## 2018-03-31 RX ORDER — OMEPRAZOLE 20 MG/1
CAPSULE, DELAYED RELEASE ORAL
COMMUNITY
Start: 2018-02-17 | End: 2020-01-01 | Stop reason: HOSPADM

## 2018-03-31 RX ADMIN — METOPROLOL TARTRATE 50 MG: 50 TABLET ORAL at 21:53

## 2018-03-31 RX ADMIN — SODIUM CHLORIDE 250 ML: 9 INJECTION, SOLUTION INTRAVENOUS at 18:07

## 2018-03-31 RX ADMIN — DIGOXIN 500 MCG: 0.25 INJECTION INTRAMUSCULAR; INTRAVENOUS at 22:47

## 2018-03-31 RX ADMIN — DILTIAZEM HYDROCHLORIDE 25 MG: 5 INJECTION INTRAVENOUS at 18:04

## 2018-03-31 RX ADMIN — IOHEXOL 85 ML: 350 INJECTION, SOLUTION INTRAVENOUS at 19:26

## 2018-03-31 RX ADMIN — DILTIAZEM HYDROCHLORIDE 5 MG/HR: 5 INJECTION INTRAVENOUS at 18:02

## 2018-03-31 NOTE — ED PROVIDER NOTES
History  Chief Complaint   Patient presents with    Altered Mental Status     patient is brought to ED from [facility]  patient had ORIF of his right hip on 3/26  per patient's family patient has not been acting like himself today  [facility] nurse also reports that patient became tachycardic during rehab today     Patient is a 27-year-old male  He has a history of polio in the past   He recently broke his femur needed to be ruled added  Today he was in therapy when he was noted to be tachycardic  There may have been fever  He also appeared confused  He does have a history of atrial fibrillation and is on digoxin  Otherwise history is limited due to confusion  Symptoms are moderately severe  No aggravating or relieving factors  Prior to Admission Medications   Prescriptions Last Dose Informant Patient Reported? Taking?    Methylprednisolone 4 MG TBPK   No No   Sig: Use as directed on package   allopurinol (ZYLOPRIM) 300 mg tablet   No No   Sig: TAKE 1 TABLET BY MOUTH EVERY DAY   apixaban (ELIQUIS) 5 mg   Yes No   Sig: Take 5 mg by mouth 2 (two) times a day   baclofen 10 mg tablet   Yes No   Sig: Take 10 mg by mouth 3 (three) times a day   colchicine (COLCRYS) 0 6 mg tablet   Yes No   Sig: Take 0 6 mg by mouth daily   digoxin (LANOXIN) 0 125 mg tablet   Yes No   Sig: Take 125 mcg by mouth daily   fluticasone (FLONASE) 50 mcg/act nasal spray   Yes No   Si spray into each nostril daily   loperamide (IMODIUM) 2 mg capsule   No No   Sig: Take 1 capsule (2 mg total) by mouth 4 (four) times a day as needed for diarrhea   loratadine (CLARITIN) 10 mg tablet   Yes No   Sig: Take 10 mg by mouth daily   metoprolol tartrate (LOPRESSOR) 50 mg tablet   Yes No   Sig: Take 25 mg by mouth every 12 (twelve) hours   miconazole 2 % cream   No No   Sig: Apply topically 2 (two) times a day   tamsulosin (FLOMAX) 0 4 mg   Yes No   Sig: Take 0 4 mg by mouth daily with dinner      Facility-Administered Medications: None       Past Medical History:   Diagnosis Date    A-fib ([address] )     Gout     HTN (hypertension)     Neuropathy     Polio     Sleep apnea        Past Surgical History:   Procedure Laterality Date    COLON SURGERY      FEMUR FRACTURE SURGERY Right     HERNIA REPAIR         History reviewed  No pertinent family history  I have reviewed and agree with the history as documented  Social History   Substance Use Topics    Smoking status: Never Smoker    Smokeless tobacco: Never Used    Alcohol use No        Review of Systems   Constitutional: Positive for fever  Negative for chills  HENT: Negative for rhinorrhea and sore throat  Eyes: Negative for pain, redness and visual disturbance  Respiratory: Negative for cough and shortness of breath  Cardiovascular: Positive for palpitations  Negative for chest pain and leg swelling  Gastrointestinal: Negative for abdominal pain, diarrhea and vomiting  Endocrine: Negative for polydipsia and polyuria  Genitourinary: Negative for dysuria, frequency and hematuria  Musculoskeletal: Negative for back pain and neck pain  Skin: Negative for rash and wound  Allergic/Immunologic: Negative for immunocompromised state  Neurological: Positive for weakness  Negative for numbness and headaches  Psychiatric/Behavioral: Positive for confusion  Negative for hallucinations and suicidal ideas  All other systems reviewed and are negative  Physical Exam  ED Triage Vitals [03/31/18 1700]   Temperature Pulse Respirations Blood Pressure SpO2   100 1 °F (37 8 °C) (!) 130 18 130/81 98 %      Temp Source Heart Rate Source Patient Position - Orthostatic VS BP Location FiO2 (%)   Oral Monitor -- -- --      Pain Score       No Pain           Orthostatic Vital Signs  Vitals:    03/31/18 1700   BP: 130/81   Pulse: (!) 130       Physical Exam   Constitutional: He is oriented to person, place, and time  He appears well-developed and well-nourished   No distress  HENT:   Head: Normocephalic and atraumatic  Oropharynx is dry  Eyes: Conjunctivae and EOM are normal  Pupils are equal, round, and reactive to light  Right eye exhibits no discharge  Left eye exhibits no discharge  No scleral icterus  Neck: Normal range of motion  Neck supple  No JVD present  Cardiovascular: Regular rhythm, normal heart sounds and intact distal pulses  Exam reveals no gallop and no friction rub  No murmur heard  Tachycardic irregular irregular rate and rhythm  Pulmonary/Chest: Effort normal and breath sounds normal  No respiratory distress  He has no wheezes  He has no rales  Abdominal: Soft  Bowel sounds are normal  He exhibits no distension  There is no tenderness  There is no rebound and no guarding  Musculoskeletal: Normal range of motion  He exhibits no edema, tenderness or deformity  Neurological: He is alert and oriented to person, place, and time  He has normal strength  No sensory deficit  GCS eye subscore is 4  GCS verbal subscore is 5  GCS motor subscore is 6  No lateralizing deficits  Skin: Skin is warm and dry  No rash noted  He is not diaphoretic  Psychiatric: He has a normal mood and affect  His behavior is normal    Vitals reviewed        ED Medications  Medications   sodium chloride 0 9 % bolus 250 mL (250 mL Intravenous New Bag 3/31/18 1807)   diltiazem (CARDIZEM) 125 mg in sodium chloride 0 9 % 125 mL infusion (5 mg/hr Intravenous New Bag 3/31/18 1802)   diltiazem (CARDIZEM) injection 25 mg (25 mg Intravenous Given 3/31/18 1804)       Diagnostic Studies  Results Reviewed     Procedure Component Value Units Date/Time    Ammonia [[id number]]  (Abnormal) Collected:  03/31/18 1801    Lab Status:  Final result Specimen:  Blood from Arm, Right Updated:  03/31/18 1844     Ammonia <10 (L) umol/L     TSH [[id number]]  (Abnormal) Collected:  03/31/18 1801    Lab Status:  Final result Specimen:  Blood from Arm, Right Updated:  03/31/18 1839     TSH 3RD MISHEL 5 674 (H) uIU/mL     Narrative:         Patients undergoing fluorescein dye angiography may retain small amounts of fluorescein in the body for 48-72 hours post procedure  Samples containing fluorescein can produce falsely depressed TSH values  If the patient had this procedure,a specimen should be resubmitted post fluorescein clearance  B-type natriuretic peptide [[id number]]  (Abnormal) Collected:  03/31/18 1801    Lab Status:  Final result Specimen:  Blood from Arm, Right Updated:  03/31/18 1839     NT-proBNP 1,192 (H) pg/mL     Phosphorus [[id number]]  (Normal) Collected:  03/31/18 1801    Lab Status:  Final result Specimen:  Blood from Arm, Right Updated:  03/31/18 1839     Phosphorus 3 1 mg/dL     Magnesium [[id number]]  (Normal) Collected:  03/31/18 1801    Lab Status:  Final result Specimen:  Blood from Arm, Right Updated:  03/31/18 1839     Magnesium 1 9 mg/dL     Digoxin level [[id number]]  (Abnormal) Collected:  03/31/18 1801    Lab Status:  Final result Specimen:  Blood from Arm, Right Updated:  03/31/18 1839     Digoxin Lvl 0 6 (L) ng/mL     Lactic acid, plasma [[id number]]  (Normal) Collected:  03/31/18 1801    Lab Status:  Final result Specimen:  Blood from Arm, Right Updated:  03/31/18 1833     LACTIC ACID 1 7 mmol/L     Narrative:         Result may be elevated if tourniquet was used during collection      Comprehensive metabolic panel [[id number]]  (Abnormal) Collected:  03/31/18 1801    Lab Status:  Final result Specimen:  Blood from Arm, Right Updated:  03/31/18 1831     Sodium 139 mmol/L      Potassium 4 0 mmol/L      Chloride 101 mmol/L      CO2 27 mmol/L      Anion Gap 11 mmol/L      BUN 9 mg/dL      Creatinine 0 67 mg/dL      Glucose 100 mg/dL      Calcium 8 9 mg/dL      AST 23 U/L      ALT 14 U/L      Alkaline Phosphatase 78 U/L      Total Protein 6 6 g/dL      Albumin 2 4 (L) g/dL      Total Bilirubin 0 40 mg/dL      eGFR 99 ml/min/1 73sq m     Narrative:         National Kidney Disease Education Program recommendations are as follows:  GFR calculation is accurate only with a steady state creatinine  Chronic Kidney disease less than 60 ml/min/1 73 sq  meters  Kidney failure less than 15 ml/min/1 73 sq  meters  Blood culture #1 [[id number]] Collected:  03/31/18 1826    Lab Status: In process Specimen:  Blood from Arm, Left Updated:  03/31/18 1831    Ethanol [[id number]]  (Normal) Collected:  03/31/18 1801    Lab Status:  Final result Specimen:  Blood from Arm, Right Updated:  03/31/18 1826     Ethanol Lvl <3 mg/dL     Troponin I [[id number]] Collected:  03/31/18 1801    Lab Status: In process Specimen:  Blood from Arm, Right Updated:  03/31/18 1824    CBC and differential [[id number]]  (Abnormal) Collected:  03/31/18 1801    Lab Status:  Final result Specimen:  Blood from Arm, Right Updated:  03/31/18 1810     WBC 10 66 (H) Thousand/uL      RBC 4 23 Million/uL      Hemoglobin 11 7 (L) g/dL      Hematocrit 37 0 %      MCV 88 fL      MCH 27 7 pg      MCHC 31 6 g/dL      RDW 14 6 %      MPV 8 6 (L) fL      Platelets 836 (H) Thousands/uL      nRBC 0 /100 WBCs      Neutrophils Relative 69 %      Lymphocytes Relative 13 (L) %      Monocytes Relative 11 %      Eosinophils Relative 5 %      Basophils Relative 1 %      Neutrophils Absolute 7 35 Thousands/µL      Lymphocytes Absolute 1 39 Thousands/µL      Monocytes Absolute 1 19 Thousand/µL      Eosinophils Absolute 0 56 Thousand/µL      Basophils Absolute 0 08 Thousands/µL     Blood culture #2 [[id number]] Collected:  03/31/18 1801    Lab Status: In process Specimen:  Blood from Arm, Right Updated:  03/31/18 1806    UA w Reflex to Microscopic w Reflex to Culture [[id number]]     Lab Status:  No result Specimen:  Urine     Rapid drug screen, urine [[id number]]     Lab Status:  No result Specimen:  Urine                  CT head without contrast   Final Result by Dilcia Antonio MD (03/31 1739)      No acute intracranial abnormality                    Workstation performed: [id number]         XR chest 1 view portable    (Results Pending)   CTA ED chest PE study    (Results Pending)              Procedures  ECG 12 Lead Documentation  Date/Time: 3/31/2018 6:27 PM  Performed by: Amarilys Khanna by: Andria Gallardo     ECG reviewed by me, the ED Provider: yes    Patient location:  ED  Comments:      Atrial fibrillation rapid ventricular response  Low voltage EKG  Nonspecific T-wave abnormality  No ST elevations  Phone Contacts  ED Phone Contact    ED Course  ED Course                                [facility]  CritCare Time    Disposition  Final diagnoses:   None     ED Disposition     None      Follow-up Information    None       Patient's Medications   Discharge Prescriptions    No medications on file     No discharge procedures on file      ED Provider  Electronically Signed by           Rogelio Henderson MD  03/31/18 0634

## 2018-03-31 NOTE — ED NOTES
During triage patient is alert to person and time   Disoriented to place     Blanca Campos RN  03/31/18 1806

## 2018-04-01 ENCOUNTER — APPOINTMENT (INPATIENT)
Dept: MRI IMAGING | Facility: HOSPITAL | Age: 68
DRG: 308 | End: 2018-04-01
Payer: MEDICARE

## 2018-04-01 LAB
ANION GAP SERPL CALCULATED.3IONS-SCNC: 7 MMOL/L (ref 4–13)
BUN SERPL-MCNC: 10 MG/DL (ref 5–25)
CALCIUM SERPL-MCNC: 9 MG/DL (ref 8.3–10.1)
CHLORIDE SERPL-SCNC: 104 MMOL/L (ref 100–108)
CO2 SERPL-SCNC: 28 MMOL/L (ref 21–32)
CREAT SERPL-MCNC: 0.59 MG/DL (ref 0.6–1.3)
DIGOXIN SERPL-MCNC: 1.6 NG/ML (ref 0.8–2)
ERYTHROCYTE [DISTWIDTH] IN BLOOD BY AUTOMATED COUNT: 14.8 % (ref 11.6–15.1)
GFR SERPL CREATININE-BSD FRML MDRD: 104 ML/MIN/1.73SQ M
GLUCOSE SERPL-MCNC: 112 MG/DL (ref 65–140)
HCT VFR BLD AUTO: 37.6 % (ref 36.5–49.3)
HGB BLD-MCNC: 11.2 G/DL (ref 12–17)
MCH RBC QN AUTO: 27.1 PG (ref 26.8–34.3)
MCHC RBC AUTO-ENTMCNC: 29.8 G/DL (ref 31.4–37.4)
MCV RBC AUTO: 91 FL (ref 82–98)
PLATELET # BLD AUTO: 426 THOUSANDS/UL (ref 149–390)
PMV BLD AUTO: 8.9 FL (ref 8.9–12.7)
POTASSIUM SERPL-SCNC: 4.3 MMOL/L (ref 3.5–5.3)
RBC # BLD AUTO: 4.14 MILLION/UL (ref 3.88–5.62)
SODIUM SERPL-SCNC: 139 MMOL/L (ref 136–145)
WBC # BLD AUTO: 9.18 THOUSAND/UL (ref 4.31–10.16)

## 2018-04-01 PROCEDURE — 99285 EMERGENCY DEPT VISIT HI MDM: CPT

## 2018-04-01 PROCEDURE — 80162 ASSAY OF DIGOXIN TOTAL: CPT | Performed by: PHYSICIAN ASSISTANT

## 2018-04-01 PROCEDURE — 80048 BASIC METABOLIC PNL TOTAL CA: CPT | Performed by: PHYSICIAN ASSISTANT

## 2018-04-01 PROCEDURE — 85027 COMPLETE CBC AUTOMATED: CPT | Performed by: PHYSICIAN ASSISTANT

## 2018-04-01 PROCEDURE — 99223 1ST HOSP IP/OBS HIGH 75: CPT | Performed by: PHYSICIAN ASSISTANT

## 2018-04-01 PROCEDURE — 94760 N-INVAS EAR/PLS OXIMETRY 1: CPT

## 2018-04-01 PROCEDURE — 99222 1ST HOSP IP/OBS MODERATE 55: CPT | Performed by: INTERNAL MEDICINE

## 2018-04-01 PROCEDURE — 70551 MRI BRAIN STEM W/O DYE: CPT

## 2018-04-01 PROCEDURE — 99222 1ST HOSP IP/OBS MODERATE 55: CPT | Performed by: PSYCHIATRY & NEUROLOGY

## 2018-04-01 RX ORDER — LOPERAMIDE HYDROCHLORIDE 2 MG/1
2 CAPSULE ORAL 4 TIMES DAILY PRN
Status: DISCONTINUED | OUTPATIENT
Start: 2018-04-01 | End: 2018-04-09 | Stop reason: HOSPADM

## 2018-04-01 RX ORDER — FLUTICASONE PROPIONATE 50 MCG
1 SPRAY, SUSPENSION (ML) NASAL DAILY
Status: DISCONTINUED | OUTPATIENT
Start: 2018-04-01 | End: 2018-04-09 | Stop reason: HOSPADM

## 2018-04-01 RX ORDER — TAMSULOSIN HYDROCHLORIDE 0.4 MG/1
0.4 CAPSULE ORAL
Status: DISCONTINUED | OUTPATIENT
Start: 2018-04-01 | End: 2018-04-09 | Stop reason: HOSPADM

## 2018-04-01 RX ORDER — BACLOFEN 10 MG/1
10 TABLET ORAL 3 TIMES DAILY
Status: DISCONTINUED | OUTPATIENT
Start: 2018-04-01 | End: 2018-04-09 | Stop reason: HOSPADM

## 2018-04-01 RX ORDER — ALLOPURINOL 300 MG/1
300 TABLET ORAL DAILY
Status: DISCONTINUED | OUTPATIENT
Start: 2018-04-01 | End: 2018-04-09 | Stop reason: HOSPADM

## 2018-04-01 RX ORDER — LORATADINE 10 MG/1
10 TABLET ORAL DAILY
Status: DISCONTINUED | OUTPATIENT
Start: 2018-04-01 | End: 2018-04-09 | Stop reason: HOSPADM

## 2018-04-01 RX ORDER — ONDANSETRON 2 MG/ML
4 INJECTION INTRAMUSCULAR; INTRAVENOUS EVERY 6 HOURS PRN
Status: DISCONTINUED | OUTPATIENT
Start: 2018-04-01 | End: 2018-04-09 | Stop reason: HOSPADM

## 2018-04-01 RX ORDER — DIGOXIN 125 MCG
125 TABLET ORAL DAILY
Status: DISCONTINUED | OUTPATIENT
Start: 2018-04-01 | End: 2018-04-03

## 2018-04-01 RX ORDER — ACETAMINOPHEN 325 MG/1
650 TABLET ORAL EVERY 6 HOURS PRN
Status: DISCONTINUED | OUTPATIENT
Start: 2018-04-01 | End: 2018-04-09 | Stop reason: HOSPADM

## 2018-04-01 RX ADMIN — ALLOPURINOL 300 MG: 300 TABLET ORAL at 10:22

## 2018-04-01 RX ADMIN — LORATADINE 10 MG: 10 TABLET ORAL at 10:22

## 2018-04-01 RX ADMIN — APIXABAN 5 MG: 5 TABLET, FILM COATED ORAL at 16:20

## 2018-04-01 RX ADMIN — BACLOFEN 10 MG: 10 TABLET ORAL at 22:15

## 2018-04-01 RX ADMIN — BACLOFEN 10 MG: 10 TABLET ORAL at 16:20

## 2018-04-01 RX ADMIN — METOPROLOL TARTRATE 25 MG: 25 TABLET ORAL at 16:19

## 2018-04-01 RX ADMIN — TAMSULOSIN HYDROCHLORIDE 0.4 MG: 0.4 CAPSULE ORAL at 16:20

## 2018-04-01 RX ADMIN — FLUTICASONE PROPIONATE 1 SPRAY: 50 SPRAY, METERED NASAL at 16:20

## 2018-04-01 RX ADMIN — METOPROLOL TARTRATE 25 MG: 25 TABLET ORAL at 22:14

## 2018-04-01 RX ADMIN — DIGOXIN 125 MCG: 0.12 TABLET ORAL at 10:22

## 2018-04-01 RX ADMIN — BACLOFEN 10 MG: 10 TABLET ORAL at 10:22

## 2018-04-01 RX ADMIN — METOPROLOL TARTRATE 25 MG: 25 TABLET ORAL at 06:22

## 2018-04-01 RX ADMIN — APIXABAN 5 MG: 5 TABLET, FILM COATED ORAL at 10:22

## 2018-04-01 NOTE — CONSULTS
Consultation - Neurology   Francoise Maynard 75 y o  male MRN: [id number]  Unit/Bed#: -01 Encounter: [id number]      Physician Requesting Consult: Yoko Gonzalez MD  Reason for Consult:  Change in mental status    HPI: Gaurav Mclaughlin is a right handed  75 y o  male who  I am asked to see for the above complaints  He was recently discharged from the hospital 2 days ago with admission due to atrial fibrillation with an uncontrolled rate  This was stabilized and he was discharged to [facility] for further rehabilitation given his recent hip fracture  His wife states that he was in his usual state of health yesterday morning going to occupational and physical therapy  He states that he does not remember too much after that  She states that when she came back to visit him following his therapy he was in bed  He seemed confused talking about  who were there and of the things that did not make sense  He seemed to have been hallucinating  She states that he had a similar episode about a month ago when he was started on Cymbalta but his mental status cleared once the medication was discontinued  The only recent change in his medication with his metoprolol was increased to 50 mg 3 times daily  He was readmitted to the hospital and had a CT scan in the emergency room which demonstrated no acute changes  He recently had an MRI study performed this afternoon  The report remains pending but no acute ischemia is noted upon my review  His wife reports that when he woke up this morning he seemed to be back to his baseline state  He denies any history of similar issues other than what was felt to be related to Cymbalta  No history of depression or other psychiatric illness  No history of cognitive difficulties  He sees my associate Dr Chris Marin for peripheral neuropathy and degenerative spine disease    Review of Systems:  See history of present illness for pertinent neurological symptoms   All other systems are negative  Historical Information   Past Medical History:   Diagnosis Date    A-fib ([address] )     Gout     HTN (hypertension)     Neuropathy     Polio     Sleep apnea      Past Surgical History:   Procedure Laterality Date    COLON SURGERY      FEMUR FRACTURE SURGERY Right     HERNIA REPAIR       Social History   History   Smoking Status    Never Smoker   Smokeless Tobacco    Never Used     History   Alcohol Use No     History   Drug Use No       Family History:   History reviewed  No pertinent family history  Allergies   Allergen Reactions    Cymbalta [Duloxetine Hcl]      hallucinations       Meds:  All current active meds have been reviewed    Scheduled Meds:  Current Facility-Administered Medications:  acetaminophen 650 mg Oral Q6H PRN Danna Snider, SHANT   allopurinol 300 mg Oral Daily Danna Snider, PA-C   apixaban 5 mg Oral BID [facility] Agatha, PA-C   baclofen 10 [facility] TID [facility]   digoxin 125 mcg [facility]   fluticasone 1 spray Nasal Daily [facility]   loperamide 2 mg Oral 4x Daily PRN [facility]   loratadine 10 mg Oral Daily [facility] Agatha, PA-C   metoprolol tartrate 25 mg Oral [facility]   ondansetron 4 mg Intravenous Q6H PRN [facility]   tamsulosin 0 4 mg Oral Daily With [employer] , PA-C     PRN Meds:   acetaminophen    loperamide    ondansetron    Physical Exam:   Objective   Vitals:   Vitals:    04/01/18 1022   BP:    Pulse: 76   Resp:    Temp:    SpO2:    ,Body mass index is 34 55 kg/m²  Patient was examined in bed  GENERAL:  Cooperative in no acute distress  Well-developed and well-nourished    HEAD and NECK   Head is atraumatic normocephalic with no lesions or masses  Neck is supple with full range of motion    CARDIOVASCULAR  Carotid Arteries-no carotid bruits      NEUROLOGIC:  Mental Status-the patient is awake alert and oriented without aphasia or apraxia  Was able to recall 3 of 3 objects immediately and 2 of 3 objects after a few minutes  Cranial Nerves: Visual fields are full to confrontation  Discs are difficult to visualize secondary to myosis  Extraocular movements are full without nystagmus  Pupils are 2 mm and reactive  Face is symmetrical to light touch  Movements of facial expression move symmetrically  Hearing is normal to finger rub bilaterally  Soft palate lifts symmetrically  Shoulder shrug is symmetrical  Tongue is midline without atrophy  Motor:  Drift could not be tested due to proximal right upper extremity weakness related to polio  Strength is full in the upper and lower extremities bilaterally with exception of 0/5 strength in deltoid and triceps on the right with normal bulk and tone 5-/5 strength is noted in the right lower extremity where he has had his recent hip fracture  Sensory:  Absent temperature and vibratory sensation in the distal lower extremities bilaterally  Cortical function is intact  Coordination: Finger to nose testing is performed accurately on the left  Romberg and gait were not tested    Reflexes:  Hypoactive throughout Toes are downgoing          Lab Results:Lab Results:   CBC:   Results from last 7 days  Lab Units 04/01/18 0527 03/31/18  1801 03/30/18  0517   WBC Thousand/uL 9 18 10 66* 10 75*   RBC Million/uL 4 14 4 23 4 36   HEMOGLOBIN g/dL 11 2* 11 7* 12 0   HEMATOCRIT % 37 6 37 0 37 6   MCV fL 91 88 86   PLATELETS Thousands/uL 426* 437* 376   , BMP/CMP:   Results from last 7 days  Lab Units 04/01/18 0527 03/31/18  1801 03/30/18  0517  03/26/18  0537   SODIUM mmol/L 139 139 136  < > 135*   POTASSIUM mmol/L 4 3 4 0 3 9  < > 3 5   CHLORIDE mmol/L 104 101 101  < > 94*   CO2 mmol/L 28 27 29  < > 36*   ANION GAP mmol/L 7 11 6  < > 5   BUN mg/dL 10 9 7  < > 6   CREATININE mg/dL 0 59* 0 67 0 61  < > 0 72   GLUCOSE RANDOM mg/dL 112 100 110  < > 120   CALCIUM mg/dL 9 0 8 9 8 7  < > 8 8   AST U/L  --  23  --   -- 21   ALT U/L  --  14  --   --  7*   ALK PHOS U/L  --  78  --   --  91   TOTAL PROTEIN g/dL  --  6 6  --   --  6 7   BILIRUBIN TOTAL mg/dL  --  0 40  --   --  0 40   EGFR ml/min/1 73sq m 104 99 103  < > 96   < > = values in this interval not displayed  , TSH:   Results from last 7 days  Lab Units 03/31/18  1801   TSH 3RD GENERATON uIU/mL 5 674*   , Ammonia:   Results from last 7 days  Lab Units 03/31/18  1801   AMMONIA umol/L <10*     I have personally reviewed pertinent reports  EEG, Echo, Pathology, and Other Studies: I have personally reviewed pertinent films in PACS    Family, was present at the bedside for history and examination  Assessment:  1  Delirium - etiology unclear, not on any medications currently that would account for this  No significant metabolic abnormalities noted in his blood work  Imaging demonstrates no abnormalities  The only previous history was associated with initiation of Cymbalta and his symptoms resolved upon discontinuing the medication  Cannot exclude partial complex seizure although symptoms are very atypical  2  Idiopathic peripheral neuropathy  3  History of polio  4   Atrial fibrillation    Plan:  EEG    Paolo Carcamo MD  4/1/2018,1:16 PM    "This note has been constructed using a voice recognition system "

## 2018-04-01 NOTE — CASE MANAGEMENT
Initial Clinical Review    Admission: Date/Time/Statement: 3/31/18 @ 2316     Orders Placed This Encounter   Procedures    Inpatient Admission     Standing Status:   Standing     Number of Occurrences:   1     Order Specific Question:   Admitting Physician     Answer:   Boone Parker [[id number]]     Order Specific Question:   Level of Care     Answer:   Med Surg [16]     Order Specific Question:   Estimated length of stay     Answer:   More than 2 Midnights     Order Specific Question:   Certification     Answer:   I certify that inpatient services are medically necessary for this patient for a duration of greater than two midnights  See H&P and MD Progress Notes for additional information about the patient's course of treatment  ED: Date/Time/Mode of Arrival:   ED Arrival Information     Expected Arrival Acuity Means of Arrival Escorted By Service Admission Type    - 3/31/2018 16:38 Emergent [address]  Complaint    DISORIENTATION          Chief Complaint:   Chief Complaint   Patient presents with    Altered Mental Status     patient is brought to ED from [facility]  patient had ORIF of his right hip on 3/26  per patient's family patient has not been acting like himself today  [facility] nurse also reports that patient became tachycardic during rehab today       History of Illness: 75 y o  male who presents with acute change in mental status  Patient was recently discharged from this hospital and admitted at [facility] for rehabilitation status post orif right distal femur  The patient was in the hospital during previous admission, he had atrial fibrillation with RVR that was uncontrolled  Control was then established and patient was discharged to [facility]  This morning patient had an acute change in mental status  Patient's family states that he was very confused, speaking relevant information, it and unsure of his surroundings  The only change to his medications recently was increasing his metoprolol frequency to t i d, no new medications have been started  Patient has had similar symptoms in the past after being started on Cymbalta about 4 weeks ago        ED Vital Signs:   ED Triage Vitals   Temperature Pulse Respirations Blood Pressure SpO2   03/31/18 1700 03/31/18 1700 03/31/18 1700 03/31/18 1700 03/31/18 1700   100 1 °F (37 8 °C) (!) 130 18 130/81 98 %      Temp Source Heart Rate Source Patient Position - Orthostatic VS BP Location FiO2 (%)   03/31/18 1700 03/31/18 1700 04/01/18 0029 04/01/18 0029 --   Oral Monitor Lying Left arm       Pain Score       03/31/18 1700       No Pain        Wt Readings from Last 1 Encounters:   04/01/18 97 1 kg (214 lb 1 1 oz)       Vital Signs (abnormal):  03/31/18 2230  --   106   29  115/67  --  96 %  --  --   03/31/18 2200  --   124  18  109/66  --  96 %  --  --   03/31/18 2153  --   138  --  125/61  --  --  --  --   03/31/18 2030  --   126  18               Abnormal Labs:    03/31/18 1801    NT-proBNP <125 pg/mL 1,192        03/31/18 1801    WBC 4 31 - 10 16 Thousand/uL 10 66     RBC 3 88 - 5 62 Million/uL 4 23    Hemoglobin 12 0 - 17 0 g/dL 11 7     Hematocrit 36 5 - 49 3 % 37 0       03/31/18 1801    Digoxin Lvl 0 8 - 2 0 ng/mL 0 6         Diagnostic Test Results: PCXR - Bilateral calcified pleural plaques    Bibasilar atelectasis        ED Treatment:   Medication Administration from 03/31/2018 1638 to 04/01/2018 0024       Date/Time Order Dose Route Action     03/31/2018 1807 sodium chloride 0 9 % bolus 250 mL 250 mL Intravenous New Bag     03/31/2018 1804 diltiazem (CARDIZEM) injection 25 mg 25 mg Intravenous Given     04/01/2018 0002 diltiazem (CARDIZEM) 125 mg in sodium chloride 0 9 % 125 mL infusion 10 mg/hr Intravenous Rate/Dose Change     03/31/2018 2102 diltiazem (CARDIZEM) 125 mg in sodium chloride 0 9 % 125 mL infusion 10 mg/hr Intravenous Rate/Dose Change     03/31/2018 2011 diltiazem (CARDIZEM) 125 mg in sodium chloride 0 9 % 125 mL infusion 7 5 mg/hr Intravenous Rate/Dose Change     03/31/2018 1802 diltiazem (CARDIZEM) 125 mg in sodium chloride 0 9 % 125 mL infusion 5 mg/hr Intravenous New Bag     03/31/2018 1926 iohexol (OMNIPAQUE) 350 MG/ML injection (MULTI-DOSE) 85 mL 85 mL Intravenous Given     03/31/2018 2153 metoprolol tartrate (LOPRESSOR) tablet 50 mg 50 mg Oral Given     03/31/2018 2247 digoxin (LANOXIN) injection 500 mcg 500 mcg Intravenous Given          Past Medical/Surgical History:    Active Ambulatory Problems     Diagnosis Date Noted    Paroxysmal atrial fibrillation (HCC) 11/21/2014    Bilateral arm weakness 03/03/2017    Bilateral edema of lower extremity 01/20/2015    Benign prostatic hyperplasia 11/21/2014    Cervical myelopathy ([facility] [address] ) 09/05/2017    Essential hypertension 11/21/2014    Gout, arthritis 11/14/2016    Mixed hyperlipidemia 11/21/2014    DORA on CPAP 09/15/2017    Osteoporosis 11/21/2014    Peptic reflux disease 11/21/2014    Post-polio syndrome 01/20/2015    Obesity (BMI 30-39 9) 02/21/2018    Polio 03/24/2018    Decubitus ulcer of heel 03/24/2018    Leukocytosis 03/24/2018     Resolved Ambulatory Problems     Diagnosis Date Noted    Hypokalemia 03/24/2018    Hyponatremia 03/24/2018     Past Medical History:   Diagnosis Date    [facility]     Gout     HTN (hypertension)     Neuropathy     Polio     Sleep apnea        Admitting Diagnosis: Altered mental status [R41 82]  Tachycardia [R00 0]  Decubitus ulcer of heel [L89 609]  Atrial fibrillation with RVR (HCC) [I48 91]  Acute encephalopathy [G93 40]    Age/Sex: 75 y o  male    Assessment/Plan:   Atrial fibrillation with RVR ([facility] [address] )   Assessment & Plan     Telemetry  Cardiology consult  Continue Cardizem drip, metoprolol 50 milligram t i d , digoxin          * Acute encephalopathy   Assessment & Plan     Admit  Neurology consult  Neuro checks  MRI brain pending          Decubitus ulcer of heel Assessment & Plan     Wound care consult          DORA on CPAP   Assessment & Plan     CPAP q h s           Essential hypertension   Assessment & Plan     Continue metoprolol             VTE Prophylaxis: Apixaban (Eliquis)  / sequential compression device   Code Status:  Full  POLST: There is no POLST form on file for this patient (pre-hospital)  Discussion with family:  Family was present for the entire evaluation     Anticipated Length of Stay:  Patient will be admitted on an Inpatient basis with an anticipated length of stay of  more than 2 midnights     Justification for Hospital Stay:  Neurology evaluation, cardiology evaluation         Admission Orders:  Tele monitoring  Continuous pulse oximetry  Bld culture x2  Neurological checks q4h  Neurology cons  Cardiology cons  Wound Care    Scheduled Meds:   Current Facility-Administered Medications:  allopurinol 300 mg Oral Daily   apixaban 5 mg Oral BID   baclofen 10 mg Oral TID   digoxin 125 mcg Oral Daily   fluticasone 1 spray Nasal Daily   loratadine 10 mg Oral Daily   metoprolol tartrate 25 mg Oral Q8H [address]   tamsulosin 0 4 mg Oral Daily With Dinner     Continuous Infusions:     diltiazem (CARDIZEM) 125 mg in sodium chloride 0 9 % 125 mL infusion  Rate: 1-15 mL/hr Dose: 1-15 mg/hr  Freq: Titrated Route: IV  Last Dose: Stopped (04/01/18 0155)  Start: 03/31/18 1730 End: 04/01/18 0155      PRN Meds:   acetaminophen    loperamide    ondansetron

## 2018-04-01 NOTE — CONSULTS
Consultation - Cardiology    Zoe Claros 75 y o  male MRN: [id number]  Unit/Bed#: -01 Encounter: [id number]    Physician Requesting Consult: Tavo Ye MD    Reason for Consult / Principal Problem:  Atrial fibrillation with RVR    HPI: Zoe Claros is a 75y o  year old male with a past medical history significant for atrial fibrillation, hypertension, DORA  Patient presented from [facility] rehab with altered mental status  Wife at bedside reports the patient was doing well yesterday morning with occupational therapy until sudden he became confused later that afternoon  Patient was recently admitted for fever and chills  At that time he did have issues with atrial fibrillation and RVR  However ultimately heart rates were controlled upon discharge  Patient does admit to some issues with urinary retention  He otherwise denies chest pain, palpitations, shortness of breath  Wife states he has not had any loose bowel movements  When patient arrived to the emergency room he was found to be in RVR, however heart rates are ultimately controlled after re-loading digoxin and Cardizem drip  Historical Information   Past Medical History:   Diagnosis Date    [facility] [address] )     Gout     HTN (hypertension)     Neuropathy     Polio     Sleep apnea      Past Surgical History:   Procedure Laterality Date    COLON SURGERY      FEMUR FRACTURE SURGERY Right     HERNIA REPAIR       History   Alcohol Use No     History   Drug Use No     History   Smoking Status    Never Smoker   Smokeless Tobacco    Never Used       FAMILY HISTORY:  History reviewed  No pertinent family history      MEDS & ALLERGIES:  all current active meds have been reviewed and current meds:   Current Facility-Administered Medications   Medication Dose Route Frequency    acetaminophen (TYLENOL) tablet 650 mg  650 mg Oral Q6H PRN    allopurinol (ZYLOPRIM) tablet 300 mg  300 mg Oral Daily    apixaban (ELIQUIS) tablet 5 mg  5 mg Oral BID  baclofen tablet 10 mg  10 mg Oral TID    digoxin (LANOXIN) tablet 125 mcg  125 mcg Oral Daily    fluticasone (FLONASE) 50 mcg/act nasal spray 1 spray  1 spray Nasal Daily    loperamide (IMODIUM) capsule 2 mg  2 mg Oral 4x Daily PRN    loratadine (CLARITIN) tablet 10 mg  10 mg Oral Daily    metoprolol tartrate (LOPRESSOR) tablet 25 mg  25 mg Oral Q8H [address]    ondansetron (ZOFRAN) injection 4 mg  4 mg Intravenous Q6H PRN    tamsulosin (FLOMAX) capsule 0 4 mg  0 4 mg Oral Daily With Dinner        Allergies   Allergen Reactions    Cymbalta [Duloxetine Hcl]      hallucinations         REVIEW OF SYSTEMS:  Limited/unreliable due to altered mental status  Constitutional: Denies fever or chills  Respiratory: Denies cough, expectoration or shortness of breath   Cardiovascular: Denies chest pain, palpitations or lower extremity swelling   GI: Denies abdominal pain, nausea, vomiting, bloody stools or diarrhea   : +decresed urination Denies dysuria, frequency, hematuria  Musculoskeletal: Denies back pain or joint pain   Neurologic: +confusion Denies lightheadedness, syncope, headache, focal weakness or sensory changes       PHYSICAL EXAM:  Vitals:   Vitals:    04/01/18 0700   BP: 93/55   Pulse: 68   Resp: 18   Temp: 98 1 °F (36 7 °C)   SpO2: 99%     Body mass index is 34 55 kg/m²  Systolic (19LND), DEQ:613 , Min:93 , BAX:384     Diastolic (69KGM), QKM:05, Min:53, Max:81      Intake/Output Summary (Last 24 hours) at 04/01/18 0835  Last data filed at 03/31/18 1907   Gross per 24 hour   Intake              250 ml   Output                0 ml   Net              250 ml     Weight (last 2 days)     Date/Time   Weight    04/01/18 0051  97 1 (214 07)    03/31/18 1700  101 (221 78)            Invasive Devices     Peripheral Intravenous Line            Peripheral IV 03/31/18 Right Antecubital less than 1 day                General: Patient is not in acute distress  Laying comfortably in the bed  Head: Normocephalic  Atraumatic  HEENT: Both pupils normal sized, round and reactive to light  Nonicteric  Neck: JVP not elevated  Trachea central    Respiratory: Bilateral bronchovascular breath sounds with no crackles or rhonchi  Cardiovascular:   Irregular   No murmur, rub or gallop  GI: Abdomen soft, nontender  No guarding or rigidity  Neurologic: Patient is intermittently awake and does respond to commands, however does seem somewhat confused  Speech mildly slurred  Extremities:  +1 upper extremity edema, left greater than right  Integument: No skin rashes or ulceration      LABORATORY RESULTS:    Results from last 7 days  Lab Units 03/31/18 1801   TROPONIN I ng/mL <0 02       CBC with diff:   Results from last 7 days  Lab Units 04/01/18 0527 03/31/18 1801 03/30/18  0517 03/26/18  0537   WBC Thousand/uL 9 18 10 66* 10 75* 11 04*   HEMOGLOBIN g/dL 11 2* 11 7* 12 0 11 2*   HEMATOCRIT % 37 6 37 0 37 6 36 1*   MCV fL 91 88 86 88   PLATELETS Thousands/uL 426* 437* 376 295   MCH pg 27 1 27 7 27 5 27 2   MCHC g/dL 29 8* 31 6 31 9 31 0*   RDW % 14 8 14 6 14 6 14 2   MPV fL 8 9 8 6* 8 6* 8 5*   NRBC AUTO /100 WBCs  --  0  --  0       CMP:  Results from last 7 days  Lab Units 04/01/18 0527 03/31/18 1801 03/30/18  0517  03/26/18  0537   SODIUM mmol/L 139 139 136  < > 135*   POTASSIUM mmol/L 4 3 4 0 3 9  < > 3 5   CHLORIDE mmol/L 104 101 101  < > 94*   CO2 mmol/L 28 27 29  < > 36*   ANION GAP mmol/L 7 11 6  < > 5   BUN mg/dL 10 9 7  < > 6   CREATININE mg/dL 0 59* 0 67 0 61  < > 0 72   GLUCOSE RANDOM mg/dL 112 100 110  < > 120   CALCIUM mg/dL 9 0 8 9 8 7  < > 8 8   AST U/L  --  23  --   --  21   ALT U/L  --  14  --   --  7*   ALK PHOS U/L  --  78  --   --  91   TOTAL PROTEIN g/dL  --  6 6  --   --  6 7   BILIRUBIN TOTAL mg/dL  --  0 40  --   --  0 40   EGFR ml/min/1 73sq m 104 99 103  < > 96   < > = values in this interval not displayed      BMP:  Results from last 7 days  Lab Units 04/01/18  0527 03/31/18  1801 03/30/18  0517   SODIUM mmol/L 139 139 136   POTASSIUM mmol/L 4 3 4 0 3 9   CHLORIDE mmol/L 104 101 101   CO2 mmol/L 28 27 29   BUN mg/dL 10 9 7   CREATININE mg/dL 0 59* 0 67 0 61   GLUCOSE RANDOM mg/dL 112 100 110   CALCIUM mg/dL 9 0 8 9 8 7         Results from last 7 days  Lab Units 18  1801   NT-PRO BNP pg/mL 1,192*        Results from last 7 days  Lab Units 18  1801 18  0517 18  19518  1016   MAGNESIUM mg/dL 1 9 2 2 1 7 1 5*           Results from last 7 days  Lab Units 18  1801 18  0537   TSH 3RD GENERATON uIU/mL 5 674*  --    FREE T4 ng/dL  --  1 27           Lipid Profile:   Lab Results   Component Value Date    CHOL 193 2017     Lab Results   Component Value Date    HDL 47 2017       Cardiac testing:   Results for orders placed during the hospital encounter of 18   Echo complete with contrast if indicated    [place] [id number]  [phone]    Transthoracic Echocardiogram  2D, M-mode, Doppler, and Color Doppler    Study date:  26-Mar-2018    Patient: Melissa Barrios  MR number: [id number]  Account number: [de-identified]  : 1950  Age: 75 years  Gender: Male  Status: Inpatient  Location: Bedside  Height: 66 in  Weight: 224 lb  BP: 124/ 68 mmHg    Indications: Atrial Fibrillation    Diagnoses: I48 0 - Atrial fibrillation    Sonographer:  AKILA Choe,RDCS  Interpreting Physician:  Elizabeth Caldwell MD  Referring Physician:  Sadie Wilson PA-C  Group:  [facility]    SUMMARY    LEFT VENTRICLE:  Systolic function was normal  Ejection fraction was estimated in the range of 55 % to 65 %  There were no regional wall motion abnormalities  MITRAL VALVE:  There was mild regurgitation  TRICUSPID VALVE:  There was mild regurgitation  Estimated peak PA pressure was 40 mmHg      HISTORY: PRIOR HISTORY: Atrial Fibrillation, Hypertension, Mixed Hyperlipidemia, Hypokalemia    PROCEDURE: The procedure was performed at the bedside  This was a routine study  The transthoracic approach was used  The study included complete 2D imaging, M-mode, complete spectral Doppler, and color Doppler  The heart rate was 76 bpm,  at the start of the study  Images were obtained from the parasternal, apical, subcostal, and suprasternal notch acoustic windows  Image quality was adequate  LEFT VENTRICLE: Size was normal  Systolic function was normal  Ejection fraction was estimated in the range of 55 % to 65 %  There were no regional wall motion abnormalities  Wall thickness was normal  DOPPLER: Left ventricular diastolic  function parameters were abnormal     RIGHT VENTRICLE: The size was normal  Systolic function was normal  Wall thickness was normal     LEFT ATRIUM: Size was normal     RIGHT ATRIUM: Size was normal     MITRAL VALVE: Valve structure was normal  There was normal leaflet separation  DOPPLER: The transmitral velocity was within the normal range  There was no evidence for stenosis  There was mild regurgitation  AORTIC VALVE: The valve was trileaflet  Leaflets exhibited normal thickness and normal cuspal separation  DOPPLER: Transaortic velocity was minimally increased  There was no evidence for stenosis  There was no regurgitation  TRICUSPID VALVE: The valve structure was normal  There was normal leaflet separation  DOPPLER: The transtricuspid velocity was within the normal range  There was no evidence for stenosis  There was mild regurgitation  Estimated peak PA  pressure was 40 mmHg  PULMONIC VALVE: Leaflets exhibited normal thickness, no calcification, and normal cuspal separation  DOPPLER: The transpulmonic velocity was within the normal range  There was no regurgitation  PERICARDIUM: There was no pericardial effusion  The pericardium was normal in appearance  AORTA: The root exhibited normal size      SYSTEMIC VEINS: IVC: The inferior vena cava was normal in size and course  Respirophasic changes were normal     SYSTEM MEASUREMENT TABLES    2D  %FS: 25 2 %  Ao Diam: 2 7 cm  EDV(Teich): 83 6 ml  EF(Teich): 50 %  ESV(Teich): 41 8 ml  IVSd: 1 cm  LA Area: 23 9 cm2  LA Diam: 3 7 cm  LVEDV MOD A4C: 54 7 ml  LVEF MOD A4C: 63 9 %  LVESV MOD A4C: 19 8 ml  LVIDd: 4 3 cm  LVIDs: 3 2 cm  LVLd A4C: 6 8 cm  LVLs A4C: 6 cm  LVOT Diam: 1 9 cm  LVPWd: 1 cm  RA Area: 20 6 cm2  RVIDd: 3 4 cm  SV MOD A4C: 35 ml  SV(Teich): 41 8 ml    CW  AV Env  Ti: 213 1 ms  AV VTI: 29 7 cm  AV Vmax: 2 1 m/s  AV Vmean: 1 4 m/s  AV maxP 7 mmHg  AV meanP 1 mmHg  TR Vmax: 3 3 m/s  TR maxP mmHg    MM  TAPSE: 2 1 cm    PW  SALUD (VTI): 2 cm2  SALUD Vmax: 1 8 cm2  LVOT Env  Ti: 262 6 ms  LVOT VTI: 20 4 cm  LVOT Vmax: 1 3 m/s  LVOT Vmean: 0 8 m/s  LVOT maxP 5 mmHg  LVOT meanP 9 mmHg  LVSV Dopp: 59 3 ml    [facility] Accredited Echocardiography [facility]    Prepared and electronically signed by    Damián Hahn MD  Signed 26-Mar-2018 18:09:34       Imaging: I have personally reviewed pertinent reports  EKG reviewed personally:  Order  Telemetry reviewed personally:  Atrial fibrillation, rates now controlled    Assessment and Plan:  Paroxysmal Atrial fibrillation with RVR  -previous echocardiogram revealed EF 55-65 percent without regional wall motion abnormality, mild MR, mild TR, estimated peak PA pressure 40 mmHg  - currently on digoxin 125 mcg at home, patient was given 500 mcg IV on arrival, and Lopressor 25 mg every 8 hours   -heart rates elevated on arrival, however now are controlled  Cardizem drip has since been discontinued  -on Eliquis for anticoagulation     Hypertension- BP stable    DORA-on CPAP    AMS-CT head negative for acute abnormality, neurology consult pending    Code Status: Level 1 - Full Code      Thank you for allowing us to participate in this patient's care  Counseling / Coordination of Care  Total floor / unit time spent today 35 minutes  Greater than 50% of total time was spent with the patient and / or family counseling and / or coordination of care  A description of the counseling / coordination of care: Review of history, current assessment, development of a plan         Matt Naik PA-C  4/1/2018,8:35 AM

## 2018-04-01 NOTE — H&P
H&P- Divina Pickens 1950, 75 y o  male MRN: [id number]    Unit/Bed#: NATALIA Encounter: [id number]    Primary Care Provider: Sirena Sanchez DO   Date and time admitted to hospital: 3/31/2018  4:40 PM        Atrial fibrillation with RVR [facility])   Assessment & Plan    Telemetry  Cardiology consult  Continue Cardizem drip, metoprolol 50 milligram t i d , digoxin        * Acute encephalopathy   Assessment & Plan    Admit  Neurology consult  Neuro checks  MRI brain pending        Decubitus ulcer of heel   Assessment & Plan    Wound care consult        DORA on CPAP   Assessment & Plan    CPAP q h s  Essential hypertension   Assessment & Plan    Continue metoprolol            VTE Prophylaxis: Apixaban (Eliquis)  / sequential compression device   Code Status:  Full  POLST: There is no POLST form on file for this patient (pre-hospital)  Discussion with family:  Family was present for the entire evaluation    Anticipated Length of Stay:  Patient will be admitted on an Inpatient basis with an anticipated length of stay of  more than 2 midnights  Justification for Hospital Stay:  Neurology evaluation, cardiology evaluation    Total Time for Visit, including Counseling / Coordination of Care: 30 minutes  Greater than 50% of this total time spent on direct patient counseling and coordination of care  Chief Complaint:   Mental status change    History of Present Illness:    Divina Pickens is a 75 y o  male who presents with acute change in mental status  Patient was recently discharged from this hospital and admitted at [facility] for rehabilitation status post orif right distal femur  The patient was in the hospital during previous admission, he had atrial fibrillation with RVR that was uncontrolled  Control was then established and patient was discharged to [facility]  This morning patient had an acute change in mental status    Patient's family states that he was very confused, speaking relevant information, it and unsure of his surroundings  The only change to his medications recently was increasing his metoprolol frequency to t i d, no new medications have been started  Patient has had similar symptoms in the past after being started on Cymbalta about 4 weeks ago  Review of Systems:    Review of Systems   Psychiatric/Behavioral: Positive for confusion  All other systems reviewed and are negative  Past Medical and Surgical History:     Past Medical History:   Diagnosis Date    A-fib ([facility] [address] )     Gout     HTN (hypertension)     Neuropathy     Polio     Sleep apnea        Past Surgical History:   Procedure Laterality Date    COLON SURGERY      FEMUR FRACTURE SURGERY Right     HERNIA REPAIR         Meds/Allergies:    Prior to Admission medications    Medication Sig Start Date End Date Taking?  Authorizing Provider   apixaban (ELIQUIS) 5 mg 1 TAB(S) 2 TIMES A DAY ORALLY 30 1/20/18  Yes Historical Provider, MD   allopurinol (ZYLOPRIM) 300 mg tablet TAKE 1 TABLET BY MOUTH EVERY DAY 2/22/18   Brenda Durham DO   baclofen 10 mg tablet Take 10 mg by mouth 3 (three) times a day    Historical Provider, MD   colchicine (COLCRYS) 0 6 mg tablet Take 0 6 mg by mouth daily    Historical Provider, MD   digoxin (LANOXIN) 0 125 mg tablet Take 125 mcg by mouth daily    Historical Provider, MD   fluticasone (FLONASE) 50 mcg/act nasal spray 1 spray into each nostril daily    Historical Provider, MD   loperamide (IMODIUM) 2 mg capsule Take 1 capsule (2 mg total) by mouth 4 (four) times a day as needed for diarrhea 3/30/18   [facility] [place], HOME   loratadine (CLARITIN) 10 mg tablet Take 10 mg by mouth daily    Historical Provider, MD   meclizine (ANTIVERT) 12 5 MG tablet  2/17/18   Historical Provider, MD   Methylprednisolone 4 MG TBPK Use as directed on package 2/16/18   Brenda Durham DO   metoprolol tartrate (LOPRESSOR) 50 mg tablet Take 25 mg by mouth every 12 (twelve) hours    Historical Provider, MD miconazole 2 % cream Apply topically 2 (two) times a day 3/30/18   [facility] [place]   omeprazole (PriLOSEC) 20 mg delayed release capsule  2/17/18   Historical Provider, MD   tamsulosin (FLOMAX) 0 4 mg Take 0 4 mg by mouth daily with dinner    Historical Provider, MD     I have reviewed home medications with patient personally  Allergies: Allergies   Allergen Reactions    Cymbalta [Duloxetine Hcl]      hallucinations       Social History:     Marital Status: /Civil Union   Occupation:  Retired  Patient Pre-hospital Living Situation:  Lives at home, [facility]  Patient Pre-hospital Level of Mobility:  Ambulatory  Patient Pre-hospital Diet Restrictions:  None  Substance Use History:   History   Alcohol Use No     History   Smoking Status    Never Smoker   Smokeless Tobacco    Never Used     History   Drug Use No       Family History:    History reviewed  No pertinent family history  Physical Exam:     Vitals:   Blood Pressure: 151/66 (04/01/18 0000)  Pulse: 75 (04/01/18 0000)  Temperature: 100 1 °F (37 8 °C) (03/31/18 1700)  Temp Source: Oral (03/31/18 1700)  Respirations: 20 (04/01/18 0000)  Weight - Scale: 101 kg (221 lb 12 5 oz) (03/31/18 1700)  SpO2: 96 % (04/01/18 0000)    Physical Exam   Constitutional: He appears well-developed and well-nourished  He appears lethargic  HENT:   Head: Normocephalic and atraumatic  Right Ear: External ear normal    Left Ear: External ear normal    Nose: Nose normal    Mouth/Throat: Oropharynx is clear and moist    Eyes: Conjunctivae and EOM are normal  Pupils are equal, round, and reactive to light  Neck: Normal range of motion  Cardiovascular: Normal rate, regular rhythm and normal heart sounds  Pulmonary/Chest: Effort normal and breath sounds normal    Abdominal: Soft  Bowel sounds are normal    Neurological: He appears lethargic  He is disoriented     Extremity weakness is chronic secondary to polio   Skin: Skin is warm and dry  Vitals reviewed  Additional Data:     Lab Results: I have personally reviewed pertinent reports  Results from last 7 days  Lab Units 03/31/18  1801   WBC Thousand/uL 10 66*   HEMOGLOBIN g/dL 11 7*   HEMATOCRIT % 37 0   PLATELETS Thousands/uL 437*   NEUTROS PCT % 69   LYMPHS PCT % 13*   MONOS PCT % 11   EOS PCT % 5       Results from last 7 days  Lab Units 03/31/18  1801   SODIUM mmol/L 139   POTASSIUM mmol/L 4 0   CHLORIDE mmol/L 101   CO2 mmol/L 27   BUN mg/dL 9   CREATININE mg/dL 0 67   CALCIUM mg/dL 8 9   TOTAL PROTEIN g/dL 6 6   BILIRUBIN TOTAL mg/dL 0 40   ALK PHOS U/L 78   ALT U/L 14   AST U/L 23   GLUCOSE RANDOM mg/dL 100           Imaging: I have personally reviewed pertinent reports  CTA ED chest PE study   Final Result by Dawson Mosqueda MD (03/31 1947)   1  No pulmonary emboli demonstrated  2   Stable bibasilar atelectasis, right greater than left  3   Nonobstructing renal calculi  Workstation performed: [id number]         CT head without contrast   Final Result by Veronika Francois MD (03/31 1739)      No acute intracranial abnormality  Workstation performed: [id number]         XR chest 1 view portable    (Results Pending)       EKG, Pathology, and Other Studies Reviewed on Admission:   · EKG:  Atrial fib with RVR    Allscripts / Epic Records Reviewed: Yes     ** Please Note: This note has been constructed using a voice recognition system   **

## 2018-04-01 NOTE — PHYSICIAN ADVISOR
This patient is a 75 y o  y/o male who is admitted to the hospital for Altered Mental Status (patient is brought to ED from [facility]  patient had ORIF of his right hip on 3/26  per patient's family patient has not been acting like himself today  [facility] nurse also reports that patient became tachycardic during rehab today)   The patient presented to the ED on 3/31/18 at 1638 and was admitted to the hospital on 3/31/2018 1640  History of Present Illness includes: the patient had a prior admission from 3/24-3/30 due to leukocytosis, PAF and s/pORIF of right femur    The patient was treated and discharged in stable condition  The patient was at [facility] but was sent to the hospital due to a change in mental status  The patient was confused and unsure of his surroundings  Vital signs in the ER are as follows   ED Triage Vitals   Temperature Pulse Respirations Blood Pressure SpO2   03/31/18 1700 03/31/18 1700 03/31/18 1700 03/31/18 1700 03/31/18 1700   100 1 °F (37 8 °C) (!) 130 18 130/81 98 %      Temp Source Heart Rate Source Patient Position - Orthostatic VS BP Location FiO2 (%)   03/31/18 1700 03/31/18 1700 04/01/18 0029 04/01/18 0029 --   Oral Monitor Lying Left arm       Pain Score       03/31/18 1700       No Pain           On exam the patient is lethargic  The patient is disoriented The patient has extremity weakness due to polio  WBC is 10 6 and Hgb is 11 7, Cr is 0 67  The patient is being admitted with rapid atrial fibrillation and acute encephalopathy  The plan of care includes telemetry monitoring, cardizem drip, neuro checks, neurology consultation, MRI of the brain  This patient is appropriate for INPATIENT admission as his length of stay is expected to be at least 2 midnights  This admission is UNRELATED to his prior admission as he was treated and discharged in stable condition

## 2018-04-01 NOTE — PLAN OF CARE
CARDIOVASCULAR - ADULT     Maintains optimal cardiac output and hemodynamic stability Progressing     Absence of cardiac dysrhythmias or at baseline rhythm Progressing        DISCHARGE PLANNING     Discharge to home or other facility with appropriate resources Progressing        INFECTION - ADULT     Absence or prevention of progression during hospitalization Progressing        Knowledge Deficit     Patient/family/caregiver demonstrates understanding of disease process, treatment plan, medications, and discharge instructions Progressing        MUSCULOSKELETAL - ADULT     Maintain or return mobility to safest level of function Progressing     Maintain proper alignment of affected body part Progressing        NEUROSENSORY - ADULT     Achieves stable or improved neurological status Progressing     Achieves maximal functionality and self care Progressing        PAIN - ADULT     Verbalizes/displays adequate comfort level or baseline comfort level Progressing        Potential for Falls     Patient will remain free of falls Progressing        Prexisting or High Potential for Compromised Skin Integrity     Skin integrity is maintained or improved Progressing        RESPIRATORY - ADULT     Achieves optimal ventilation and oxygenation Progressing        SAFETY ADULT     Maintain or return to baseline ADL function Progressing     Maintain or return mobility status to optimal level Progressing     Patient will remain free of falls Progressing        SKIN/TISSUE INTEGRITY - ADULT     Skin integrity remains intact Progressing     Incision(s), wounds(s) or drain site(s) healing without S/S of infection Progressing     Oral mucous membranes remain intact Progressing

## 2018-04-01 NOTE — PROGRESS NOTES
Patient admitted after midnight for altered mental status  Patient now back to baseline  Neurology consulted  MRI ordered  Patient also was in afib and placed on cardizem drip  Rate now controlled  Cardiology following

## 2018-04-02 ENCOUNTER — TRANSITIONAL CARE MANAGEMENT (OUTPATIENT)
Dept: FAMILY MEDICINE CLINIC | Facility: CLINIC | Age: 68
End: 2018-04-02

## 2018-04-02 ENCOUNTER — APPOINTMENT (INPATIENT)
Dept: NEUROLOGY | Facility: HOSPITAL | Age: 68
DRG: 308 | End: 2018-04-02
Attending: PSYCHIATRY & NEUROLOGY
Payer: MEDICARE

## 2018-04-02 LAB
ADENOVIRUS: NOT DETECTED
C PNEUM DNA SPEC QL NAA+PROBE: NOT DETECTED
FLUAV H1 RNA SPEC QL NAA+PROBE: NOT DETECTED
FLUAV H3 RNA SPEC QL NAA+PROBE: NOT DETECTED
FLUAV RNA SPEC QL NAA+PROBE: NOT DETECTED
FLUBV RNA SPEC QL NAA+PROBE: NOT DETECTED
HBOV DNA SPEC QL NAA+PROBE: NOT DETECTED
HCOV 229E RNA SPEC QL NAA+PROBE: NOT DETECTED
HCOV HKU1 RNA SPEC QL NAA+PROBE: NOT DETECTED
HCOV NL63 RNA SPEC QL NAA+PROBE: NOT DETECTED
HCOV OC43 RNA SPEC QL NAA+PROBE: NOT DETECTED
HPIV1 RNA SPEC QL NAA+PROBE: NOT DETECTED
HPIV2 RNA SPEC QL NAA+PROBE: NOT DETECTED
HPIV3 RNA SPEC QL NAA+PROBE: NOT DETECTED
HPIV4 RNA SPEC QL NAA+PROBE: NOT DETECTED
M PNEUMO DNA SPEC QL NAA+PROBE: NOT DETECTED
METAPNEUMOVIRUS: NOT DETECTED
RHINOVIRUS RNA SPEC QL NAA+PROBE: NOT DETECTED
RSV A RNA SPEC QL NAA+PROBE: NOT DETECTED
RSV B RNA SPEC QL NAA+PROBE: NOT DETECTED

## 2018-04-02 PROCEDURE — 99232 SBSQ HOSP IP/OBS MODERATE 35: CPT | Performed by: INTERNAL MEDICINE

## 2018-04-02 PROCEDURE — 95816 EEG AWAKE AND DROWSY: CPT

## 2018-04-02 PROCEDURE — 95816 EEG AWAKE AND DROWSY: CPT | Performed by: PSYCHIATRY & NEUROLOGY

## 2018-04-02 RX ORDER — METOPROLOL TARTRATE 50 MG/1
50 TABLET, FILM COATED ORAL EVERY 8 HOURS SCHEDULED
Status: DISCONTINUED | OUTPATIENT
Start: 2018-04-02 | End: 2018-04-03

## 2018-04-02 RX ADMIN — FLUTICASONE PROPIONATE 1 SPRAY: 50 SPRAY, METERED NASAL at 09:06

## 2018-04-02 RX ADMIN — APIXABAN 5 MG: 5 TABLET, FILM COATED ORAL at 09:06

## 2018-04-02 RX ADMIN — METOPROLOL TARTRATE 50 MG: 50 TABLET ORAL at 15:21

## 2018-04-02 RX ADMIN — LORATADINE 10 MG: 10 TABLET ORAL at 09:05

## 2018-04-02 RX ADMIN — APIXABAN 5 MG: 5 TABLET, FILM COATED ORAL at 18:53

## 2018-04-02 RX ADMIN — DIGOXIN 125 MCG: 0.12 TABLET ORAL at 09:06

## 2018-04-02 RX ADMIN — METOPROLOL TARTRATE 50 MG: 50 TABLET ORAL at 21:13

## 2018-04-02 RX ADMIN — BACLOFEN 10 MG: 10 TABLET ORAL at 19:16

## 2018-04-02 RX ADMIN — METOPROLOL TARTRATE 25 MG: 25 TABLET ORAL at 05:00

## 2018-04-02 RX ADMIN — BACLOFEN 10 MG: 10 TABLET ORAL at 21:13

## 2018-04-02 RX ADMIN — ALLOPURINOL 300 MG: 300 TABLET ORAL at 09:05

## 2018-04-02 RX ADMIN — ACETAMINOPHEN 650 MG: 325 TABLET, FILM COATED ORAL at 15:19

## 2018-04-02 RX ADMIN — TAMSULOSIN HYDROCHLORIDE 0.4 MG: 0.4 CAPSULE ORAL at 19:16

## 2018-04-02 RX ADMIN — BACLOFEN 10 MG: 10 TABLET ORAL at 09:06

## 2018-04-02 NOTE — PROGRESS NOTES
[address] Internal Medicine Progress Note  Patient: Jorge Bautista 75 y o  male   MRN: [id number]  PCP: Almas Brewer DO  Unit/Bed#: -Paige Encounter: [id number]  Date Of Visit: 04/02/18    Assessment:    Principal Problem:    Acute encephalopathy  Active Problems:    Essential hypertension    DORA on CPAP    Decubitus ulcer of heel    Atrial fibrillation with RVR ([facility] [address] )      Plan:    · 1  Acute toxic metabolic encephalopathy- patient now back to baseline  MRI brain negative for any acute pathology  Neurology following  Patient for EEG to r/o seizures  · 2  Afib with RVR- cardiology following  Patient off cardizem drip  HR now better controlled  On digoxin and metoprolol  On eliquis  · 3  BPH- on flomax  · 4  H/o- right hip fracture  Patient to go back to [facility] once stable  · 5  Unstageable pressure ulcer of R heel POA in setting of polio brace, neuropathy as evidenced by wound care nurse charting treated with hydrogel with adaptic , off load, freq reposition  · 6  Systemic inflammatory response syndrome (SIRS) of non-infectious origin without acute organ dysfunction in setting of encephalopathy and recent ORIF as evidenced by tachycardia, tachypnea treated with blood cultures, UA and  supportive care  ·          VTE Pharmacologic Prophylaxis:   Pharmacologic: Apixaban (Eliquis)  Mechanical VTE Prophylaxis in Place: Yes    Patient Centered Rounds: I have performed bedside rounds with nursing staff today  Discussions with Specialists or Other Care Team Provider:     Education and Discussions with Family / Patient:     Time Spent for Care: 20 minutes  More than 50% of total time spent on counseling and coordination of care as described above      Current Length of Stay: 2 day(s)    Current Patient Status: Inpatient   Certification Statement: The patient will continue to require additional inpatient hospital stay due to Acute toxic metabolic encephalopahty, Afib with RVR    Discharge Plan / Estimated Discharge Date: Once acute encephalopathy and afib resolves    Code Status: Level 1 - Full Code      Subjective:   Patient seen and examined at bedside  Patient has no new complaints  Objective:     Vitals:   Temp (24hrs), Av 2 °F (36 8 °C), Min:98 °F (36 7 °C), Max:98 5 °F (36 9 °C)    HR:  [] 104  Resp:  [18-20] 19  BP: (101-122)/(57-68) 104/60  SpO2:  [95 %-100 %] 98 %  Body mass index is 34 55 kg/m²  Input and Output Summary (last 24 hours): Intake/Output Summary (Last 24 hours) at 18 1344  Last data filed at 18 0900   Gross per 24 hour   Intake              240 ml   Output              650 ml   Net             -410 ml       Physical Exam:     Physical Exam   Constitutional: He is oriented to person, place, and time  He appears well-developed and well-nourished  HENT:   Head: Normocephalic and atraumatic  Eyes: Conjunctivae and EOM are normal  Pupils are equal, round, and reactive to light  Neck: Normal range of motion  Neck supple  No JVD present  No tracheal deviation present  No thyromegaly present  Cardiovascular: Normal rate, regular rhythm and normal heart sounds  Exam reveals no gallop and no friction rub  No murmur heard  Pulmonary/Chest: Effort normal and breath sounds normal  No respiratory distress  He has no wheezes  He has no rales  Abdominal: Soft  Bowel sounds are normal  He exhibits no distension  There is no tenderness  There is no rebound  Musculoskeletal: Normal range of motion  He exhibits no edema  Neurological: He is alert and oriented to person, place, and time  Vitals reviewed            Additional Data:     Labs:      Results from last 7 days  Lab Units 18  0527 18  1801   WBC Thousand/uL 9 18 10 66*   HEMOGLOBIN g/dL 11 2* 11 7*   HEMATOCRIT % 37 6 37 0   PLATELETS Thousands/uL 426* 437*   NEUTROS PCT %  --  69   LYMPHS PCT %  --  13*   MONOS PCT %  --  11   EOS PCT %  --  5       Results from last 7 days  Lab Units 04/01/18  0527 03/31/18  1801   SODIUM mmol/L 139 139   POTASSIUM mmol/L 4 3 4 0   CHLORIDE mmol/L 104 101   CO2 mmol/L 28 27   BUN mg/dL 10 9   CREATININE mg/dL 0 59* 0 67   CALCIUM mg/dL 9 0 8 9   TOTAL PROTEIN g/dL  --  6 6   BILIRUBIN TOTAL mg/dL  --  0 40   ALK PHOS U/L  --  78   ALT U/L  --  14   AST U/L  --  23   GLUCOSE RANDOM mg/dL 112 100           * I Have Reviewed All Lab Data Listed Above  * Additional Pertinent Lab Tests Reviewed: [address] Admission Reviewed    Imaging:    Imaging Reports Reviewed Today Include:   Imaging Personally Reviewed by Myself Includes:      Recent Cultures (last 7 days):       Results from last 7 days  Lab Units 03/31/18  1826 03/31/18  1801   BLOOD CULTURE  No Growth at 24 hrs  No Growth at 24 hrs  Last 24 Hours Medication List:     Current Facility-Administered Medications:  acetaminophen 650 mg Oral Q6H PRN Fidel Herman PA-C   allopurinol 300 mg Oral Daily Fidel Herman PA-C   apixaban 5 mg Oral BID Fidel Herman PA-C   baclofen 10 mg Oral TID Fidel Herman PA-C   digoxin 125 mcg Oral Daily Fidel Herman PA-C   fluticasone 1 spray Nasal Daily Fidel Herman PA-C   loperamide 2 mg Oral 4x Daily PRN Fidel Herman PA-C   loratadine 10 mg Oral Daily Fidel Herman PA-C   metoprolol tartrate 50 mg Oral [facility] Caty Manuel PA-C   ondansetron 4 mg Intravenous Q6H PRN Fidel Herman PA-C   tamsulosin 0 4 mg Oral Daily With Dinner Fidel Herman PA-C        Today, Patient Was Seen By: Burgess Marco MD    ** Please Note: This note has been constructed using a voice recognition system   **

## 2018-04-02 NOTE — SOCIAL WORK
CM met with pt at bedside  Pt lives with his wife Bethany Robertson in a ranch style home with a ramp present and a stair glide to go from the basement to the main floor  Pt uses a cane to ambulate and a Cpap at hs  Dr Lazarus Fanny is his pulmonologist and Dr Woodson Krabbe is his PCP  He takes care of most ADL's, but wife assists with bathing  He was admitted here  from [facility] and is hoping to return there  He uses [facility] and has no problem with his copays  Pt quit drinking completely in 1994 and denies mental health issues  He has never smoked  He does not work, but still drives  His wife Bethany Robertson will transport home when medically cleared  Pt would like to return to [facility] on discharge  CM contacted Julia from [address] [place] [address] and she will accept pt back as long he has been off the Cardizem drip for 24 hrs  CM spoke to wife Jesenia via phone ([phone])QNL she is in agreement with return to [address] [place] [address] when he is medically stable  CM department will continue to follow

## 2018-04-02 NOTE — PLAN OF CARE
Problem: DISCHARGE PLANNING - CARE MANAGEMENT  Goal: Discharge to post-acute care or home with appropriate resources  INTERVENTIONS:  - Conduct assessment to determine patient/family and health care team treatment goals, and need for post-acute services based on payer coverage, community resources, and patient preferences, and barriers to discharge  - Address psychosocial, clinical, and financial barriers to discharge as identified in assessment in conjunction with the patient/family and health care team  - Arrange appropriate level of post-acute services according to patients   needs and preference and payer coverage in collaboration with the physician and health care team  - Communicate with and update the patient/family, physician, and health care team regarding progress on the discharge plan  - Arrange appropriate transportation to post-acute venues   Outcome: Progressing  CM spoke to pt and wife Jesenia at bedside to discuss d/c plan  Questions answered  Both are in agreement with [facility]  CM asked PT to provide an assessment since pt will be considered a new pt when he is returns to [facility]

## 2018-04-02 NOTE — SOCIAL WORK
CM spoke to pt and wife Jesenia at bedside to discuss d/c plan  Questions answered  Both are in agreement with [facility]  CM asked PT to provide an assessment since pt will be considered a new pt when he is returns to [facility]

## 2018-04-02 NOTE — SOCIAL WORK
Julia from [address] notified that pt has been off the Cardizem drip since yesterday  EEG to be done today  Pt is a possible return to [address] tomorrow  Prior auth to be done

## 2018-04-02 NOTE — CONSULTS
Progress Note - Wound   Diana Curtis 75 y o  male MRN: [id number]  Unit/Bed#: -01 Encounter: [id number]      Assessment:  Patient is 75year old male admitted with change in mental status from [place] [address]  Wound care consulted for pressure injury noted on admission  Patient seen in bed, agreeable with assessment  Continent of bowel and bladder, assist with care  Nutrition consult in place  Patient declined assessment of his buttocks and sacrum, per nursing the skin is intact to these areas - will recommend preventative skin care plan  Patient will b/l heel boots patient states he got from [address], and he likes to wear them  Nursing can alternate using the heel boots and pillows to offload pressure to the heels as per patient tolerance and comfort  L heel is intact with no redness or wounds noted  Recommend offloading of pressure and hydraguard cream     R posterior heel / achilles area with pressure injury  This wound was noted on admission, and on previous admission  Per patient he has a had this wound for a while and it was from a brace rubbing him  Patient with a history of polio  At time of assessment wound appears scabbed over / unstageable with 100% well adhered dry brown colored eschar  Edge is dry attached with no un-nayely noted  No drainage noted  Florence-wound is intact with no redness, induration or fluctuance noted  Denies pain to wound  Recommend to apply hydrogel to wound bed, cover with adaptic and apply dry dressing  New dressing applied  Educated patient on wound care products, rationale and importance of offloading of pressure (turning/repositioning) and plan of care, verbalized understanding  Wound care will follow along with patient  See flow-sheets for more detailed assessment findings  Plan:   1  Apply hydraguard to sacrum, buttock, and heels BID and PRN for prevention and protection  2  Apply skin nourishing cream to the entire skin daily for moisture   3  Turn and reposition patient every 2 hours   4  Soft care cushion to chair when OOB   5  Elevate heels off of bed with pillows to offload pressure   6  Cleanse R heel pressure injury with NSS, pat dry, apply hydrogel to wound bed, cover with adaptic and secure with dry dressing daily and as needed  7  Wound care will follow along with patient weekly, please call with questions and concerns [phone]    Objective:    Vitals: Blood pressure 115/57, pulse 90, temperature 98 °F (36 7 °C), temperature source Axillary, resp  rate 19, height 5' 6" (1 676 m), weight 97 1 kg (214 lb 1 1 oz), SpO2 95 %  ,Body mass index is 34 55 kg/m²  Wound 03/24/18 Abrasion(s) Heel Posterior;Mid;Right Open area, yellow, epithelization (Active)   Wound Description Dry; Intact; Maty Flurry 4/2/2018  9:00 AM   Florence-wound Assessment Clean;Dry; Intact;Pink;Fragile 4/2/2018  9:00 AM   Shape oval 4/2/2018  9:00 AM   Wound Length (cm) 2 2 cm 4/2/2018  9:00 AM   Wound Width (cm) 0 6 cm 4/2/2018  9:00 AM   Wound Depth (cm) 0 4/2/2018  9:00 AM   Calculated Wound Volume (cm^3) 0 cm^3 4/2/2018  9:00 AM   Change in Wound Size % 100 4/2/2018  9:00 AM   Tunneling 0 cm 4/2/2018  9:00 AM   Undermining 0 4/2/2018  9:00 AM   Closure None 4/2/2018  9:00 AM   Drainage Amount None 4/2/2018  9:00 AM   Drainage Description Serous 3/26/2018  8:00 AM   Non-staged Wound Description Not applicable 8/6/2557  1:82 AM   Treatments Cleansed;Site care 4/2/2018  9:00 AM   Dressing Gauze 4/2/2018  9:00 AM   Wound packed? No 4/2/2018  9:00 AM   Packing- # removed 0 4/2/2018  9:00 AM   Packing- # inserted 0 4/2/2018  9:00 AM   Dressing Changed New 4/2/2018  9:00 AM   Patient Tolerance Tolerated well 4/2/2018  9:00 AM   Dressing Status Clean;Dry; Intact 4/2/2018  9:00 AM       Recommendations written as orders    Steph Archer RN BSN

## 2018-04-02 NOTE — PLAN OF CARE
Problem: Potential for Falls  Goal: Patient will remain free of falls  INTERVENTIONS:  - Assess patient frequently for physical needs  -  Identify cognitive and physical deficits and behaviors that affect risk of falls    -  [place] fall precautions as indicated by assessment   - Educate patient/family on patient safety including physical limitations  - Instruct patient to call for assistance with activity based on assessment  - Modify environment to reduce risk of injury  - Consider OT/PT consult to assist with strengthening/mobility    Outcome: Progressing      Problem: Prexisting or High Potential for Compromised Skin Integrity  Goal: Skin integrity is maintained or improved  INTERVENTIONS:  - Identify patients at risk for skin breakdown  - Assess and monitor skin integrity  - Assess and monitor nutrition and hydration status  - Monitor labs (i e  albumin)  - Assess for incontinence   - Turn and reposition patient  - Assist with mobility/ambulation  - Relieve pressure over bony prominences  - Avoid friction and shearing  - Provide appropriate hygiene as needed including keeping skin clean and dry  - Evaluate need for skin moisturizer/barrier cream  - Collaborate with interdisciplinary team (i e  Nutrition, Rehabilitation, etc )   - Patient/family teaching   Outcome: Progressing      Problem: PAIN - ADULT  Goal: Verbalizes/displays adequate comfort level or baseline comfort level  Interventions:  - Encourage patient to monitor pain and request assistance  - Assess pain using appropriate pain scale  - Administer analgesics based on type and severity of pain and evaluate response  - Implement non-pharmacological measures as appropriate and evaluate response  - Consider cultural and social influences on pain and pain management  - Notify physician/advanced practitioner if interventions unsuccessful or patient reports new pain   Outcome: Progressing      Problem: INFECTION - ADULT  Goal: Absence or prevention of progression during hospitalization  INTERVENTIONS:  - Assess and monitor for signs and symptoms of infection  - Monitor lab/diagnostic results  - Monitor all insertion sites, i e  indwelling lines, tubes, and drains  - Monitor endotracheal (as able) and nasal secretions for changes in amount and color  - [place] appropriate cooling/warming therapies per order  - Administer medications as ordered  - Instruct and encourage patient and family to use good hand hygiene technique  - Identify and instruct in appropriate isolation precautions for identified infection/condition   Outcome: Progressing      Problem: SAFETY ADULT  Goal: Maintain or return to baseline ADL function  INTERVENTIONS:  -  Assess patient's ability to carry out ADLs; assess patient's baseline for ADL function and identify physical deficits which impact ability to perform ADLs (bathing, care of mouth/teeth, toileting, grooming, dressing, etc )  - Assess/evaluate cause of self-care deficits   - Assess range of motion  - Assess patient's mobility; develop plan if impaired  - Assess patient's need for assistive devices and provide as appropriate  - Encourage maximum independence but intervene and supervise when necessary  ¯ Involve family in performance of ADLs  ¯ Assess for home care needs following discharge   ¯ Request OT consult to assist with ADL evaluation and planning for discharge  ¯ Provide patient education as appropriate   Outcome: Progressing    Goal: Maintain or return mobility status to optimal level  INTERVENTIONS:  - Assess patient's baseline mobility status (ambulation, transfers, stairs, etc )    - Identify cognitive and physical deficits and behaviors that affect mobility  - Identify mobility aids required to assist with transfers and/or ambulation (gait belt, sit-to-stand, lift, walker, cane, etc )  - [place] fall precautions as indicated by assessment  - Record patient progress and toleration of activity level on Mobility SBAR; progress patient to next Phase/Stage  - Instruct patient to call for assistance with activity based on assessment  - Request Rehabilitation consult to assist with strengthening/weightbearing, etc    Outcome: Progressing    Goal: Patient will remain free of falls  INTERVENTIONS:  - Assess patient frequently for physical needs  -  Identify cognitive and physical deficits and behaviors that affect risk of falls  -  [place] fall precautions as indicated by assessment   - Educate patient/family on patient safety including physical limitations  - Instruct patient to call for assistance with activity based on assessment  - Modify environment to reduce risk of injury  - Consider OT/PT consult to assist with strengthening/mobility    Outcome: Progressing      Problem: DISCHARGE PLANNING  Goal: Discharge to home or other facility with appropriate resources  INTERVENTIONS:  - Identify barriers to discharge w/patient and caregiver  - Arrange for needed discharge resources and transportation as appropriate  - Identify discharge learning needs (meds, wound care, etc )  - Arrange for interpretive services to assist at discharge as needed  - Refer to Case Management Department for coordinating discharge planning if the patient needs post-hospital services based on physician/advanced practitioner order or complex needs related to functional status, cognitive ability, or social support system   Outcome: Progressing      Problem: Knowledge Deficit  Goal: Patient/family/caregiver demonstrates understanding of disease process, treatment plan, medications, and discharge instructions  Complete learning assessment and assess knowledge base    Interventions:  - Provide teaching at level of understanding  - Provide teaching via preferred learning methods   Outcome: Progressing      Problem: NEUROSENSORY - ADULT  Goal: Achieves stable or improved neurological status  INTERVENTIONS  - Monitor and report changes in neurological status  - Initiate measures to prevent increased intracranial pressure  - Maintain blood pressure and fluid volume within ordered parameters to optimize cerebral perfusion  - Monitor temperature, glucose, and sodium or any other associated labs  Initiate appropriate interventions as ordered  - Monitor for seizure activity   - Administer anti-seizure medications as ordered   Outcome: Progressing    Goal: Achieves maximal functionality and self care  INTERVENTIONS  - Monitor swallowing and airway patency with patient fatigue and changes in neurological status  - Encourage and assist patient to increase activity and self care with guidance from rehab services  - Encourage visually impaired, hearing impaired and aphasic patients to use assistive/communication devices   Outcome: Progressing      Problem: CARDIOVASCULAR - ADULT  Goal: Maintains optimal cardiac output and hemodynamic stability  INTERVENTIONS:  - Monitor I/O, vital signs and rhythm  - Monitor for S/S and trends of decreased cardiac output i e  bleeding, hypotension  - Administer and titrate ordered vasoactive medications to optimize hemodynamic stability  - Assess quality of pulses, skin color and temperature  - Assess for signs of decreased coronary artery perfusion - ex   Angina  - Instruct patient to report change in severity of symptoms   Outcome: Progressing    Goal: Absence of cardiac dysrhythmias or at baseline rhythm  INTERVENTIONS:  - Continuous cardiac monitoring, monitor vital signs, obtain 12 lead EKG if indicated  - Administer antiarrhythmic and heart rate control medications as ordered  - Monitor electrolytes and administer replacement therapy as ordered   Outcome: Progressing      Problem: RESPIRATORY - ADULT  Goal: Achieves optimal ventilation and oxygenation  INTERVENTIONS:  - Assess for changes in respiratory status  - Assess for changes in mentation and behavior  - Position to facilitate oxygenation and minimize respiratory effort  - Oxygen administration by appropriate delivery method based on oxygen saturation (per order) or ABGs  - Initiate smoking cessation education as indicated  - Encourage broncho-pulmonary hygiene including cough, deep breathe, Incentive Spirometry  - Assess the need for suctioning and aspirate as needed  - Assess and instruct to report SOB or any respiratory difficulty  - Respiratory Therapy support as indicated   Outcome: Progressing      Problem: SKIN/TISSUE INTEGRITY - ADULT  Goal: Skin integrity remains intact  INTERVENTIONS  - Identify patients at risk for skin breakdown  - Assess and monitor skin integrity  - Assess and monitor nutrition and hydration status  - Monitor labs (i e  albumin)  - Assess for incontinence   - Turn and reposition patient  - Assist with mobility/ambulation  - Relieve pressure over bony prominences  - Avoid friction and shearing  - Provide appropriate hygiene as needed including keeping skin clean and dry  - Evaluate need for skin moisturizer/barrier cream  - Collaborate with interdisciplinary team (i e  Nutrition, Rehabilitation, etc )   - Patient/family teaching   Outcome: Progressing    Goal: Incision(s), wounds(s) or drain site(s) healing without S/S of infection  INTERVENTIONS  - Assess and document risk factors for skin impairment   - Assess and document dressing, incision, wound bed, drain sites and surrounding tissue  - Initiate Nutrition services consult and/or wound management as needed   Outcome: Progressing    Goal: Oral mucous membranes remain intact  INTERVENTIONS  - Assess oral mucosa and hygiene practices  - Implement preventative oral hygiene regimen  - Implement oral medicated treatments as ordered  - Initiate Nutrition services referral as needed   Outcome: Progressing      Problem: MUSCULOSKELETAL - ADULT  Goal: Maintain or return mobility to safest level of function  INTERVENTIONS:  - Assess patient's ability to carry out ADLs; assess patient's baseline for ADL function and identify physical deficits which impact ability to perform ADLs (bathing, care of mouth/teeth, toileting, grooming, dressing, etc )  - Assess/evaluate cause of self-care deficits   - Assess range of motion  - Assess patient's mobility; develop plan if impaired  - Assess patient's need for assistive devices and provide as appropriate  - Encourage maximum independence but intervene and supervise when necessary  - Involve family in performance of ADLs  - Assess for home care needs following discharge   - Request OT consult to assist with ADL evaluation and planning for discharge  - Provide patient education as appropriate   Outcome: Progressing    Goal: Maintain proper alignment of affected body part  INTERVENTIONS:  - Support, maintain and protect limb and body alignment  - Provide pt/fam with appropriate education   Outcome: Progressing      Problem: DISCHARGE PLANNING - CARE MANAGEMENT  Goal: Discharge to post-acute care or home with appropriate resources  INTERVENTIONS:  - Conduct assessment to determine patient/family and health care team treatment goals, and need for post-acute services based on payer coverage, community resources, and patient preferences, and barriers to discharge  - Address psychosocial, clinical, and financial barriers to discharge as identified in assessment in conjunction with the patient/family and health care team  - Arrange appropriate level of post-acute services according to patients   needs and preference and payer coverage in collaboration with the physician and health care team  - Communicate with and update the patient/family, physician, and health care team regarding progress on the discharge plan  - Arrange appropriate transportation to post-acute venues   Outcome: Progressing

## 2018-04-02 NOTE — PLAN OF CARE
Problem: DISCHARGE PLANNING - CARE MANAGEMENT  Goal: Discharge to post-acute care or home with appropriate resources  INTERVENTIONS:  - Conduct assessment to determine patient/family and health care team treatment goals, and need for post-acute services based on payer coverage, community resources, and patient preferences, and barriers to discharge  - Address psychosocial, clinical, and financial barriers to discharge as identified in assessment in conjunction with the patient/family and health care team  - Arrange appropriate level of post-acute services according to patients   needs and preference and payer coverage in collaboration with the physician and health care team  - Communicate with and update the patient/family, physician, and health care team regarding progress on the discharge plan  - Arrange appropriate transportation to post-acute venues  Outcome: Progressing  CM met with pt at bedside  Pt lives with his wife Janelle Cross in a ranch style home with a ramp present and a stair glide to go from the basement to the main floor  Pt uses a cane to ambulate and a Cpap at hs  Dr Vimal Jenkins is his pulmonologist and Dr Roscoe Murguia is his PCP  He takes care of most ADL's, but wife assists with bathing  He was admitted here  from [facility] and is hoping to return there  He uses [facility] and has no problem with his copays  Pt quit drinking completely in 1994 and denies mental health issues  He has never smoked  He does not work, but still drives  His wife Janelle Cross will transport home when medically cleared  Pt would like to return to [facility] on discharge  CM contacted Julia from [address] [place] [address] and she will accept pt back as long he has been off the Cardizem drip for 24 hrs  CM spoke to wife Jesenia via phone ([phone])[facility]CK she is in agreement with return to [address] [place] [address] when he is medically stable  CM department will continue to follow

## 2018-04-02 NOTE — PROGRESS NOTES
Progress Note - Cardiology     Dusty Deal 75 y o  male MRN: [id number]  Unit/Bed#: -01 Encounter: [id number]      Subjective:   Patient now back to baseline in regards to his mental status  However, heart rates more elevated this morning  He otherwise has no new complaints  He denies chest pain, shortness of breath, palpitations  Objective:   Vitals:  Vitals:    04/02/18 0700   BP: 115/57   Pulse: 90   Resp: 19   Temp: 98 °F (36 7 °C)   SpO2: 95%       Body mass index is 34 55 kg/m²  Systolic (36XAK), HIMA:878 , Min:112 , QGC:090     Diastolic (54VKN), IHF:27, Min:57, Max:68      Intake/Output Summary (Last 24 hours) at 04/02/18 0854  Last data filed at 04/02/18 0246   Gross per 24 hour   Intake              600 ml   Output             1350 ml   Net             -750 ml     Weight (last 2 days)     Date/Time   Weight    04/01/18 0051  97 1 (214 07)    03/31/18 1700  101 (221 78)              PHYSICAL EXAM:  General: Patient is not in acute distress  Laying comfortably in the bed  Awake, alert, responding to commands  Head: Normocephalic  Atraumatic  HEENT: Both pupils normal sized, round and reactive to light  Nonicteric  Neck: JVP not raised  Trachea central    Respiratory: Bilateral bronchovascular breath sounds with no crackles or rhonchi  Cardiovascular:  Irregularly irregular  Tachycardic  No murmur, rub or gallop  GI: Abdomen soft, nontender  No guarding or rigidity  Neurologic: Patient is awake, alert, responding to command   Moving all extremities  Integumentary:  No skin rash  Extremities: No clubbing, cyanosis or edema    LABORATORY RESULTS:    Results from last 7 days  Lab Units 03/31/18  1801   TROPONIN I ng/mL <0 02     CBC with diff:   Results from last 7 days  Lab Units 04/01/18  0527 03/31/18  1801 03/30/18  0517   WBC Thousand/uL 9 18 10 66* 10 75*   HEMOGLOBIN g/dL 11 2* 11 7* 12 0   HEMATOCRIT % 37 6 37 0 37 6   MCV fL 91 88 86   PLATELETS Thousands/uL 426* 437* 376   MCH pg 27 1 27 7 27 5   MCHC g/dL 29 8* 31 6 31 9   RDW % 14 8 14 6 14 6   MPV fL 8 9 8 6* 8 6*   NRBC AUTO /100 WBCs  --  0  --        CMP:  Results from last 7 days  Lab Units 18  0518  18018  0517   SODIUM mmol/L 139 139 136   POTASSIUM mmol/L 4 3 4 0 3 9   CHLORIDE mmol/L 104 101 101   CO2 mmol/L 28 27 29   ANION GAP mmol/L 7 11 6   BUN mg/dL 10 9 7   CREATININE mg/dL 0 59* 0 67 0 61   GLUCOSE RANDOM mg/dL 112 100 110   CALCIUM mg/dL 9 0 8 9 8 7   AST U/L  --  23  --    ALT U/L  --  14  --    ALK PHOS U/L  --  78  --    TOTAL PROTEIN g/dL  --  6 6  --    BILIRUBIN TOTAL mg/dL  --  0 40  --    EGFR ml/min/1 73sq m 104 99 103       BMP:  Results from last 7 days  Lab Units 18  0518  18018  0517   SODIUM mmol/L 139 139 136   POTASSIUM mmol/L 4 3 4 0 3 9   CHLORIDE mmol/L 104 101 101   CO2 mmol/L 28 27 29   BUN mg/dL 10 9 7   CREATININE mg/dL 0 59* 0 67 0 61   GLUCOSE RANDOM mg/dL 112 100 110   CALCIUM mg/dL 9 0 8 9 8 7         Results from last 7 days  Lab Units 18  1801   NT-PRO BNP pg/mL 1,192*          Results from last 7 days  Lab Units 18  18018  0518  1952   MAGNESIUM mg/dL 1 9 2 2 1 7               Results from last 7 days  Lab Units 18  1801   TSH 3RD GENERATON uIU/mL 5 674*             Lipid Profile:   Lab Results   Component Value Date    CHOL 193 2017     Lab Results   Component Value Date    HDL 47 2017     No results found for: LDLCALC  No results found for: TRIG    Cardiac testing:   Results for orders placed during the hospital encounter of 18   Echo complete with contrast if indicated    Narrative [address] [place] [address] [place], [place] [address] [phone]    Transthoracic Echocardiogram  2D, M-mode, Doppler, and Color Doppler    Study date:  26-Mar-2018    Patient: Nuvia Capps  MR number: [id number]  Account number: [de-identified]  : 1950  Age: 75 years  Gender: Male  Status: Inpatient  Location: Bedside  Height: 66 in  Weight: 224 lb  BP: 124/ 68 mmHg    Indications: Atrial Fibrillation    Diagnoses: I48 0 - Atrial fibrillation    Sonographer:  Fred Scheuermann, BS,RDCS  Interpreting Physician:  Shakeel Rivera MD  Referring Physician:  Libby Landon PA-C  Group:  [facility]    SUMMARY    LEFT VENTRICLE:  Systolic function was normal  Ejection fraction was estimated in the range of 55 % to 65 %  There were no regional wall motion abnormalities  MITRAL VALVE:  There was mild regurgitation  TRICUSPID VALVE:  There was mild regurgitation  Estimated peak PA pressure was 40 mmHg  HISTORY: PRIOR HISTORY: Atrial Fibrillation, Hypertension, Mixed Hyperlipidemia, Hypokalemia    PROCEDURE: The procedure was performed at the bedside  This was a routine study  The transthoracic approach was used  The study included complete 2D imaging, M-mode, complete spectral Doppler, and color Doppler  The heart rate was 76 bpm,  at the start of the study  Images were obtained from the parasternal, apical, subcostal, and suprasternal notch acoustic windows  Image quality was adequate  LEFT VENTRICLE: Size was normal  Systolic function was normal  Ejection fraction was estimated in the range of 55 % to 65 %  There were no regional wall motion abnormalities  Wall thickness was normal  DOPPLER: Left ventricular diastolic  function parameters were abnormal     RIGHT VENTRICLE: The size was normal  Systolic function was normal  Wall thickness was normal     LEFT ATRIUM: Size was normal     RIGHT ATRIUM: Size was normal     MITRAL VALVE: Valve structure was normal  There was normal leaflet separation  DOPPLER: The transmitral velocity was within the normal range  There was no evidence for stenosis  There was mild regurgitation  AORTIC VALVE: The valve was trileaflet  Leaflets exhibited normal thickness and normal cuspal separation   DOPPLER: Transaortic velocity was minimally increased  There was no evidence for stenosis  There was no regurgitation  TRICUSPID VALVE: The valve structure was normal  There was normal leaflet separation  DOPPLER: The transtricuspid velocity was within the normal range  There was no evidence for stenosis  There was mild regurgitation  Estimated peak PA  pressure was 40 mmHg  PULMONIC VALVE: Leaflets exhibited normal thickness, no calcification, and normal cuspal separation  DOPPLER: The transpulmonic velocity was within the normal range  There was no regurgitation  PERICARDIUM: There was no pericardial effusion  The pericardium was normal in appearance  AORTA: The root exhibited normal size  SYSTEMIC VEINS: IVC: The inferior vena cava was normal in size and course  Respirophasic changes were normal     SYSTEM MEASUREMENT TABLES    2D  %FS: 25 2 %  Ao Diam: 2 7 cm  EDV(Teich): 83 6 ml  EF(Teich): 50 %  ESV(Teich): 41 8 ml  IVSd: 1 cm  LA Area: 23 9 cm2  LA Diam: 3 7 cm  LVEDV MOD A4C: 54 7 ml  LVEF MOD A4C: 63 9 %  LVESV MOD A4C: 19 8 ml  LVIDd: 4 3 cm  LVIDs: 3 2 cm  LVLd A4C: 6 8 cm  LVLs A4C: 6 cm  LVOT Diam: 1 9 cm  LVPWd: 1 cm  RA Area: 20 6 cm2  RVIDd: 3 4 cm  SV MOD A4C: 35 ml  SV(Teich): 41 8 ml    CW  AV Env  Ti: 213 1 ms  AV VTI: 29 7 cm  AV Vmax: 2 1 m/s  AV Vmean: 1 4 m/s  AV maxP 7 mmHg  AV meanP 1 mmHg  TR Vmax: 3 3 m/s  TR maxP mmHg    MM  TAPSE: 2 1 cm    PW  SALUD (VTI): 2 cm2  SALDU Vmax: 1 8 cm2  LVOT Env  Ti: 262 6 ms  LVOT VTI: 20 4 cm  LVOT Vmax: 1 3 m/s  LVOT Vmean: 0 8 m/s  LVOT maxP 5 mmHg  LVOT meanP 9 mmHg  LVSV Dopp: 59 3 ml    [employer] Accredited Echocardiography [facility]    Prepared and electronically signed by    Jamey Nieves MD  Signed 26-Mar-2018 18:09:34           Meds/Allergies   current meds:   Current Facility-Administered Medications   Medication Dose Route Frequency    acetaminophen (TYLENOL) tablet 650 mg  650 mg Oral Q6H PRN    allopurinol (ZYLOPRIM) tablet 300 mg  300 mg Oral Daily    apixaban (ELIQUIS) tablet 5 mg  5 mg Oral BID    baclofen tablet 10 mg  10 mg Oral TID    digoxin (LANOXIN) tablet 125 mcg  125 mcg Oral Daily    fluticasone (FLONASE) 50 mcg/act nasal spray 1 spray  1 spray Nasal Daily    loperamide (IMODIUM) capsule 2 mg  2 mg Oral 4x Daily PRN    loratadine (CLARITIN) tablet 10 mg  10 mg Oral Daily    metoprolol tartrate (LOPRESSOR) tablet 50 mg  50 mg Oral Q8H [address]    ondansetron (ZOFRAN) injection 4 mg  4 mg Intravenous Q6H PRN    tamsulosin (FLOMAX) capsule 0 4 mg  0 4 mg Oral Daily With Dinner     Prescriptions Prior to Admission   Medication    allopurinol (ZYLOPRIM) 300 mg tablet    apixaban (ELIQUIS) 5 mg    digoxin (LANOXIN) 0 125 mg tablet    metoprolol tartrate (LOPRESSOR) 50 mg tablet    baclofen 10 mg tablet    colchicine (COLCRYS) 0 6 mg tablet    fluticasone (FLONASE) 50 mcg/act nasal spray    loperamide (IMODIUM) 2 mg capsule    loratadine (CLARITIN) 10 mg tablet    meclizine (ANTIVERT) 12 5 MG tablet    Methylprednisolone 4 MG TBPK    miconazole 2 % cream    omeprazole (PriLOSEC) 20 mg delayed release capsule    tamsulosin (FLOMAX) 0 4 mg              Assessment and Plan:  Paroxysmal Atrial fibrillation with RVR  -previous echocardiogram revealed EF 55-65 percent without regional wall motion abnormality, mild MR, mild TR, estimated peak PA pressure 40 mmHg  - currently on digoxin 125 mcg and Lopressor 25 mg t i d , will increase to 50 mg t i d  May need to consider adding Cardizem or increasing digoxin if heart rates are not controlled after this change  -on Eliquis for anticoagulation     Hypertension- BP stable     DORA-on CPAP as op          ** Please Note: Dragon 360 Dictation voice to text software may have been used in the creation of this document   **

## 2018-04-03 ENCOUNTER — TRANSITIONAL CARE MANAGEMENT (OUTPATIENT)
Dept: FAMILY MEDICINE CLINIC | Facility: CLINIC | Age: 68
End: 2018-04-03

## 2018-04-03 LAB
ANION GAP SERPL CALCULATED.3IONS-SCNC: 7 MMOL/L (ref 4–13)
ATRIAL RATE: 394 BPM
BASOPHILS # BLD AUTO: 0.06 THOUSANDS/ΜL (ref 0–0.1)
BASOPHILS NFR BLD AUTO: 1 % (ref 0–1)
BUN SERPL-MCNC: 9 MG/DL (ref 5–25)
CALCIUM SERPL-MCNC: 9 MG/DL (ref 8.3–10.1)
CHLORIDE SERPL-SCNC: 101 MMOL/L (ref 100–108)
CO2 SERPL-SCNC: 30 MMOL/L (ref 21–32)
CREAT SERPL-MCNC: 0.57 MG/DL (ref 0.6–1.3)
EOSINOPHIL # BLD AUTO: 0.69 THOUSAND/ΜL (ref 0–0.61)
EOSINOPHIL NFR BLD AUTO: 10 % (ref 0–6)
ERYTHROCYTE [DISTWIDTH] IN BLOOD BY AUTOMATED COUNT: 14.3 % (ref 11.6–15.1)
GFR SERPL CREATININE-BSD FRML MDRD: 106 ML/MIN/1.73SQ M
GLUCOSE SERPL-MCNC: 104 MG/DL (ref 65–140)
HCT VFR BLD AUTO: 38.4 % (ref 36.5–49.3)
HGB BLD-MCNC: 11.9 G/DL (ref 12–17)
LYMPHOCYTES # BLD AUTO: 1.47 THOUSANDS/ΜL (ref 0.6–4.47)
LYMPHOCYTES NFR BLD AUTO: 21 % (ref 14–44)
MCH RBC QN AUTO: 27.4 PG (ref 26.8–34.3)
MCHC RBC AUTO-ENTMCNC: 31 G/DL (ref 31.4–37.4)
MCV RBC AUTO: 88 FL (ref 82–98)
MONOCYTES # BLD AUTO: 0.58 THOUSAND/ΜL (ref 0.17–1.22)
MONOCYTES NFR BLD AUTO: 8 % (ref 4–12)
NEUTROPHILS # BLD AUTO: 4.09 THOUSANDS/ΜL (ref 1.85–7.62)
NEUTS SEG NFR BLD AUTO: 59 % (ref 43–75)
NRBC BLD AUTO-RTO: 0 /100 WBCS
PLATELET # BLD AUTO: 463 THOUSANDS/UL (ref 149–390)
PMV BLD AUTO: 9 FL (ref 8.9–12.7)
POTASSIUM SERPL-SCNC: 4.1 MMOL/L (ref 3.5–5.3)
QRS AXIS: 67 DEGREES
QRSD INTERVAL: 86 MS
QT INTERVAL: 310 MS
QTC INTERVAL: 432 MS
RBC # BLD AUTO: 4.35 MILLION/UL (ref 3.88–5.62)
SODIUM SERPL-SCNC: 138 MMOL/L (ref 136–145)
T WAVE AXIS: -11 DEGREES
VENTRICULAR RATE: 117 BPM
WBC # BLD AUTO: 6.95 THOUSAND/UL (ref 4.31–10.16)

## 2018-04-03 PROCEDURE — 99232 SBSQ HOSP IP/OBS MODERATE 35: CPT | Performed by: INTERNAL MEDICINE

## 2018-04-03 PROCEDURE — 97530 THERAPEUTIC ACTIVITIES: CPT

## 2018-04-03 PROCEDURE — G8979 MOBILITY GOAL STATUS: HCPCS

## 2018-04-03 PROCEDURE — G8987 SELF CARE CURRENT STATUS: HCPCS

## 2018-04-03 PROCEDURE — G8988 SELF CARE GOAL STATUS: HCPCS

## 2018-04-03 PROCEDURE — G8978 MOBILITY CURRENT STATUS: HCPCS

## 2018-04-03 PROCEDURE — 97167 OT EVAL HIGH COMPLEX 60 MIN: CPT

## 2018-04-03 PROCEDURE — 85025 COMPLETE CBC W/AUTO DIFF WBC: CPT | Performed by: INTERNAL MEDICINE

## 2018-04-03 PROCEDURE — 94760 N-INVAS EAR/PLS OXIMETRY 1: CPT

## 2018-04-03 PROCEDURE — 99222 1ST HOSP IP/OBS MODERATE 55: CPT | Performed by: PHYSICIAN ASSISTANT

## 2018-04-03 PROCEDURE — 97163 PT EVAL HIGH COMPLEX 45 MIN: CPT

## 2018-04-03 PROCEDURE — 93010 ELECTROCARDIOGRAM REPORT: CPT | Performed by: INTERNAL MEDICINE

## 2018-04-03 PROCEDURE — 80048 BASIC METABOLIC PNL TOTAL CA: CPT | Performed by: INTERNAL MEDICINE

## 2018-04-03 RX ORDER — AMIODARONE HYDROCHLORIDE 200 MG/1
400 TABLET ORAL 2 TIMES DAILY WITH MEALS
Status: DISCONTINUED | OUTPATIENT
Start: 2018-04-03 | End: 2018-04-09 | Stop reason: HOSPADM

## 2018-04-03 RX ORDER — METOPROLOL TARTRATE 5 MG/5ML
5 INJECTION INTRAVENOUS EVERY 6 HOURS PRN
Status: DISCONTINUED | OUTPATIENT
Start: 2018-04-03 | End: 2018-04-09 | Stop reason: HOSPADM

## 2018-04-03 RX ORDER — METOPROLOL TARTRATE 50 MG/1
50 TABLET, FILM COATED ORAL EVERY 12 HOURS SCHEDULED
Status: DISCONTINUED | OUTPATIENT
Start: 2018-04-03 | End: 2018-04-04

## 2018-04-03 RX ORDER — METOPROLOL TARTRATE 5 MG/5ML
5 INJECTION INTRAVENOUS EVERY 6 HOURS PRN
Status: DISCONTINUED | OUTPATIENT
Start: 2018-04-03 | End: 2018-04-03

## 2018-04-03 RX ORDER — METOPROLOL TARTRATE 5 MG/5ML
10 INJECTION INTRAVENOUS ONCE
Status: COMPLETED | OUTPATIENT
Start: 2018-04-03 | End: 2018-04-03

## 2018-04-03 RX ADMIN — METOPROLOL TARTRATE 10 MG: 5 INJECTION, SOLUTION INTRAVENOUS at 17:25

## 2018-04-03 RX ADMIN — METOPROLOL TARTRATE 50 MG: 50 TABLET ORAL at 06:44

## 2018-04-03 RX ADMIN — BACLOFEN 10 MG: 10 TABLET ORAL at 21:11

## 2018-04-03 RX ADMIN — TAMSULOSIN HYDROCHLORIDE 0.4 MG: 0.4 CAPSULE ORAL at 16:21

## 2018-04-03 RX ADMIN — LORATADINE 10 MG: 10 TABLET ORAL at 08:27

## 2018-04-03 RX ADMIN — METOPROLOL TARTRATE 50 MG: 50 TABLET ORAL at 21:11

## 2018-04-03 RX ADMIN — AMIODARONE HYDROCHLORIDE 400 MG: 200 TABLET ORAL at 16:21

## 2018-04-03 RX ADMIN — APIXABAN 5 MG: 5 TABLET, FILM COATED ORAL at 17:25

## 2018-04-03 RX ADMIN — APIXABAN 5 MG: 5 TABLET, FILM COATED ORAL at 08:26

## 2018-04-03 RX ADMIN — ONDANSETRON 4 MG: 2 INJECTION INTRAMUSCULAR; INTRAVENOUS at 14:32

## 2018-04-03 RX ADMIN — FLUTICASONE PROPIONATE 1 SPRAY: 50 SPRAY, METERED NASAL at 08:27

## 2018-04-03 RX ADMIN — DIGOXIN 125 MCG: 0.12 TABLET ORAL at 08:26

## 2018-04-03 RX ADMIN — ALLOPURINOL 300 MG: 300 TABLET ORAL at 08:27

## 2018-04-03 RX ADMIN — BACLOFEN 10 MG: 10 TABLET ORAL at 16:21

## 2018-04-03 RX ADMIN — BACLOFEN 10 MG: 10 TABLET ORAL at 08:27

## 2018-04-03 NOTE — OCCUPATIONAL THERAPY NOTE
Occupational Therapy Evaluation        Patient Name: Moshe Mays  GPBWU'H Date: 4/3/2018       04/03/18 0521   Note Type   Note type Eval/Treat   Restrictions/Precautions   Weight Bearing Precautions Per Order Yes   RLE Weight Bearing Per Order NWB   Braces or Orthoses LE Braces  (LE Braces (short leg brace 2* polio))   Pain Assessment   Pain Assessment No/denies pain   Pain Score No Pain   Home Living   Type of [address] One level;Ramped entrance  (stair glide to enter from basemant)   Bathroom Shower/Tub Walk-in shower   Bathroom Toilet Raised   Bathroom Equipment Grab bars in shower;Grab bars around toilet   [address]   Prior Function   Level of [address] with ADLs and functional mobility  (ambulates c SPC)   Lives With Spouse   Receives Help From Family   ADL Assistance Needs assistance   IADLs Needs assistance   Falls in the last 6 months 1 to 4  (1 recent fall resulting in R femur fx)   Vocational Retired   Lifestyle   Autonomy Patient independent with eating, grooming, toileting and functional mobility at [address] level  Lives with spouse in a one story house/ ramped entrance and stair glide to/ from basemant  Reciprocal Relationships Supportive family   Psychosocial   Psychosocial (WDL) WDL   ADL   Eating Assistance 5  Supervision/Setup   Grooming Assistance 4  Minimal Assistance   UB Bathing Assistance 3  Moderate Assistance   LB Bathing Assistance 2  Maximal Assistance   UB Dressing Assistance 4  Minimal Assistance   LB Dressing Assistance 2  Maximal [address]  1  Total [address] 2  Maximal Assistance   Bed Mobility   Additional Comments patient received seated at edge of bed with PT, in preparation for transfers OOB to [facility]     Transfers   Sit to Stand Unable to assess  (deferred at this time/ prefers parallel bars vs walker)   Sliding Board transfer 3  Moderate assistance   Additional items Assist x 2; Increased time required;Verbal cues   Functional Mobility   Functional Mobility 3  Moderate assistance   Additional items (W/C)   Balance   Static Sitting Good   Dynamic Sitting Fair +   Activity Tolerance   Activity Tolerance Patient tolerated treatment well   Nurse Made Aware YVONNE Ayala verbalized patient appropriate for therapy, made aware of therapy recommendations  RUE Assessment   RUE Assessment X  (non functional shoulder mobility)   RUE Overall AROM   R Shoulder Flexion ~ 25 degrees   R Elbow Flexion 90   R Mass Grasp incomplete grasp by 1/3, wears hand glove to control pain/ discomfort associated to peripheral neuropathy   RUE Strength   RUE Overall Strength Deficits  (proximal - N/T, distal 3/5)   Edema   RUE Edema None   LUE Assessment   LUE Assessment X  (crepitus/ limited overhead mobility)   LUE Strength   LUE Overall Strength Deficits  (grossly 3+/5)   Edema   LUE Edema None   Hand Function   Gross Motor Coordination Impaired   Fine Motor Coordination Impaired   Sensation   Light Touch No apparent deficits  (BUEs)   Vision-Basic Assessment   Current Vision Wears glasses all the time   Patient Visual Report Other (Comment)  (No significant changes reported)   Cognition   Overall Cognitive Status WFL   Arousal/Participation Alert; Cooperative;Responsive   Attention Within functional limits   Orientation Level Oriented X4   Memory Within functional limits   Following Commands Follows all commands and directions without difficulty   Assessment   Limitation Decreased ADL status; Decreased UE ROM; Decreased UE strength;Decreased Safe judgement during ADL;Decreased endurance;Decreased fine motor control;Decreased self-care trans;Decreased high-level ADLs   Prognosis Good   Assessment Patient is a 75 y o  male seen for OT evaluation s/p admit to [id number] [facility] on 3/31/2018 w/Acute encephalopathy  Patient presents with acute change in mental status   Patient was recently discharged from this hospital and admitted at [facility] for rehabilitation status post orif right distal femur  The patient was in the hospital during previous admission, he had atrial fibrillation with RVR that was uncontrolled  Control was then established and [facility] was [facility]  Commorbidities affecting patient's functional performance at time of assessment include: A-fib, Gout, HTN, Polio, Sleep Apnea  Prior to admission, Patient independent with eating, grooming, toileting and functional mobility at [facility] level  Lives with spouse in a one story house/ ramped entrance and stair glide to/ from basemant  Personal factors affecting patient at time of initial evaluation include: decreased initiation and engagement, difficulty performing ADLs and difficulty performing IADLs  Upon evaluation, patient requires moderate assist for UB ADLs, maximal and total assist for LB ADLs, sliding board transfers with mod assist x2  Occupational performance is affected by the following deficits: decreased functional use of BUEs, in hand manipulation deficit with impaired reach, grasp and coordination, limited active ROM, decreased muscle strength, degenerative arthritic joint changes, impaired gross motor coordination, impaired fine motor coordination, dynamic sit/ stand balance deficit with poor standing tolerance time for self care and functional mobility, decreased activity tolerance and postural control and postural alignment deficit, requiring external assistance to complete transitional movements  Therapist completed  extensive additional review of medical records and additional review of physical, cognitive or psychosocial history, clinical examination identifying 5 or more performance deficits, clinical decision making of a high complexity , consistent with a high complexity level evaluation   Patient to benefit from continued Occupational Therapy treatment while in the hospital to address deficits as defined above and maximize level of functional independence with ADLs and functional mobillity  Plan   Treatment Interventions ADL retraining;Functional transfer training;UE strengthening/ROM; Endurance training;Cognitive reorientation;Patient/family training;Equipment evaluation/education; Fine motor coordination activities; Compensatory technique education;Continued evaluation; Energy conservation; Activityengagement   Goal Expiration Date 04/17/18   Treatment Day 1   OT Frequency 3-5x/wk   Additional Treatment Session   Start Time 1413   End Time 7447   Recommendation   Recommendation Physiatry Consult   OT Discharge Recommendation Short Term Rehab   OT - OK to Discharge Yes  (return to [facility] when medically cleared)   Barthel Index   Feeding 5   Bathing 0   Grooming Score 0   Dressing Score 5   Bladder Score 10   Bowels Score 10   Toilet Use Score 5   Transfers (Bed/Chair) Score 10   Mobility (Level Surface) Score 0   Stairs Score 0   Barthel Index Score 45   Modified [place] Scale   Modified Lorenzo Scale 4     Occupational Therapy treatment while in the hospital to address deficits as defined above and maximize level of functional independence with ADLs and functional mobility  Occupational Performance areas to address include: grooming , bathing/ shower, dressing, toilet hygiene, transfer to all surfaces, functional ambulation, functional mobility, community mobility, emergency response, health maintenance, IADLs: safety procedures, Leisure Participation and Social participation  From OT standpoint, recommendation at time of d/c would be Short Term Rehab  Occupational Therapy goals: In 7-14 days:     1- Patient will verbalize and demonstrate use of energy conservation/ deep breathing technique and work simplification skills during functional activity with no verbal cues    2-Patient will verbalize and demonstrate good body mechanics and joint protection techniques during  ADLs/ IADLs with no verbal cues  3- Patient will increase OOB/ sitting tolerance to 2-4 hours per day for increased participation in self care and leisure tasks with no s/s of exertion  4-Patient will increase standing tolerance time to 5  minutes with unilateral UE support to complete sink level ADLs@ mod I level   5- Patient will increase sitting tolerance at edge of bed to 20 minutes to complete UB ADLs @ set up assist level  6- Patient will transfer bed to Chair / toilet at Set up assist level with AD as indicated  7- Patient will complete UB ADLs with set up assist  8- Patient will complete LB ADLs with min assist with the use of adaptive equipment  9- Patient will complete toileting hygiene with set up assist/ supervision for thoroughness  10-Patient/ Family  will demonstrate competency with UE Home Exercise Program       Treatment Assessment Note:      Patient participated in Skilled OT session this date with interventions consisting of ADL re training with the use of correct body mechnaics,  therapeutic activities to: increase activity tolerance, increase postural control, increase trunk control and increase OOB/ sitting tolerance   Patient was educated on weight shifting techniques while OOB to W/C, w/c safety and OOB/ mobility goal   Patient requiring verbal cues for correct technique  Patient continues to be functioning below baseline level, occupational performance remains limited secondary to factors listed above and increased risk for falls and injury  From OT standpoint, recommendation at time of d/c would be Short Term Rehab  Patient to benefit from continued Occupational Therapy treatment while in the hospital to address deficits as defined above and maximize level of functional independence with ADLs and functional mobility

## 2018-04-03 NOTE — PROGRESS NOTES
[facility] Internal [facility] Progress Note  Patient: Abdiaziz Phelan 75 y o  male   MRN: [id number]  PCP: Kaleb Singh DO  Unit/Bed#: -Pa[facility] Encounter: [id number]  Date Of Visit: 04/03/18    Assessment:    Principal Problem:    Acute encephalopathy  Active Problems:    Essential hypertension    DORA on CPAP    Decubitus ulcer of heel    Atrial fibrillation with RVR ([facility] [address] )    Altered mental status      Plan:    · Acute toxic metabolic encephalopathy-resolved  Patient back to baseline  MRI brain negative for acute pathology  Neurology following  EEG performed, results pending  · AFib with RVR-cardiology following  Discussed with Cardiology, amiodarone will be for better heart rate control  Continue metoprolol, Eliquis  · Status post right femur fracture-5 weeks ago  As per patient's family orthopedics will be consulted to discuss about weight-bearing recommendations for right lower extremity  Patient originally supposed to have appointment with his orthopedic surgeon at [facility] today  · Unstable pressure ulcer of right heel present on admission-in the setting of polio, neuropathy-continue wound care  · BPH-on Flomax        VTE Pharmacologic Prophylaxis:   Pharmacologic: Apixaban (Eliquis)  Mechanical VTE Prophylaxis in Place: Yes    Patient Centered Rounds: I have performed bedside rounds with nursing staff today  Discussions with Specialists or Other Care Team Provider:  Discussed with Cardiology    Education and Discussions with Family / Patient:  Discussed with patient's family at the bedside    Time Spent for Care: 20 minutes  More than 50% of total time spent on counseling and coordination of care as described above      Current Length of Stay: 3 day(s)    Current Patient Status: Inpatient   Certification Statement: The patient will continue to require additional inpatient hospital stay due to Management of atrial fibrillation    Discharge Plan:  Plan to discharge to rehab next 24-48 hours    Code Status: Level 1 - Full Code      Subjective:   Patient seen and examined  Currently he is alert oriented x3, denies any acute complaints  No acute events per nursing staff  Telemetry reviewed, patient had few spikes up to 120-130, but otherwise had stable rate control  Objective:     Vitals:   Temp (24hrs), Av 8 °F (36 6 °C), Min:97 5 °F (36 4 °C), Max:98 °F (36 7 °C)    HR:  [] 96  Resp:  [18-19] 18  BP: (113-130)/(62-80) 121/62  SpO2:  [94 %-98 %] 97 %  Body mass index is 34 55 kg/m²  Input and Output Summary (last 24 hours): Intake/Output Summary (Last 24 hours) at 18 1429  Last data filed at 18 0835   Gross per 24 hour   Intake              240 ml   Output              900 ml   Net             -660 ml       Physical Exam:     Awake, alert, oriented  Mucous membranes moist  Lungs are clear no Creps  Heart-irregular, no murmurs  Abdomen-soft nontender  Extremities right knee incision appears stable      Additional Data:     Labs:      Results from last 7 days  Lab Units 18  0505   WBC Thousand/uL 6 95   HEMOGLOBIN g/dL 11 9*   HEMATOCRIT % 38 4   PLATELETS Thousands/uL 463*   NEUTROS PCT % 59   LYMPHS PCT % 21   MONOS PCT % 8   EOS PCT % 10*       Results from last 7 days  Lab Units 18  0505  18  1801   SODIUM mmol/L 138  < > 139   POTASSIUM mmol/L 4 1  < > 4 0   CHLORIDE mmol/L 101  < > 101   CO2 mmol/L 30  < > 27   BUN mg/dL 9  < > 9   CREATININE mg/dL 0 57*  < > 0 67   CALCIUM mg/dL 9 0  < > 8 9   TOTAL PROTEIN g/dL  --   --  6 6   BILIRUBIN TOTAL mg/dL  --   --  0 40   ALK PHOS U/L  --   --  78   ALT U/L  --   --  14   AST U/L  --   --  23   GLUCOSE RANDOM mg/dL 104  < > 100   < > = values in this interval not displayed  * I Have Reviewed All Lab Data Listed Above  * Additional Pertinent Lab Tests Reviewed:  All Labs For Current Hospital Admission Reviewed    Imaging:    Imaging Reports Reviewed Today Include:  CT of the head and MRI of the brain  Imaging Personally Reviewed by Myself Includes:      Recent Cultures (last 7 days):       Results from last 7 days  Lab Units 03/31/18  1826 03/31/18  1801   BLOOD CULTURE  No Growth at 48 hrs  No Growth at 48 hrs  Last 24 Hours Medication List:     Current Facility-Administered Medications:  acetaminophen 650 mg Oral Q6H PRN Christie Poplin, PA-C   allopurinol 300 mg Oral Daily Christie Poplin, PA-C   amiodarone 400 mg Oral BID With Meals Caty Manuel PA-C   apixaban 5 mg Oral BID Christie Poplin, PA-YI   baclofen 10 mg Oral TID Christie Poplin, PA-C   fluticasone 1 spray Nasal Daily Christie Poplin, PA-C   loperamide 2 mg Oral 4x Daily PRN Christie Poplin, PA-YI   loratadine 10 mg Oral Daily Christie Poplin, PA-C   metoprolol tartrate 50 mg Oral Q12H [address] Caty Manuel PA-C   ondansetron 4 mg Intravenous Q6H PRN Christie Poplin, PA-C   tamsulosin 0 4 mg Oral Daily With Dinner Christie Smith, PAJOSEY        Today, Patient Was Seen By: Tia Mariee MD    ** Please Note: Dragon 360 Dictation voice to text software may have been used in the creation of this document   **

## 2018-04-03 NOTE — PLAN OF CARE
Problem: OCCUPATIONAL THERAPY ADULT  Goal: Performs self-care activities at highest level of function for planned discharge setting  See evaluation for individualized goals  Treatment Interventions: ADL retraining, Functional transfer training, UE strengthening/ROM, Endurance training, Cognitive reorientation, Patient/family training, Equipment evaluation/education, Fine motor coordination activities, Compensatory technique education, Continued evaluation, Energy conservation, Activityengagement          See flowsheet documentation for full assessment, interventions and recommendations  Limitation: Decreased ADL status, Decreased UE ROM, Decreased UE strength, Decreased Safe judgement during ADL, Decreased endurance, Decreased fine motor control, Decreased self-care trans, Decreased high-level ADLs  Prognosis: Good  Assessment: Patient is a 75 y o  male seen for OT evaluation s/p admit to [address] [place] [address] on 3/31/2018 w/Acute encephalopathy  Patient presents with acute change in mental status  Patient was recently discharged from this hospital and admitted at [facility] for rehabilitation status post orif right distal femur  The patient was in the hospital during previous admission, he had atrial fibrillation with RVR that was uncontrolled  Control was then established and patient was discharged to [facility]  Commorbidities affecting patient's functional performance at time of assessment include: A-fib, Gout, HTN, Polio, Sleep Apnea  Prior to admission, Patient independent with eating, grooming, toileting and functional mobility at [facility] level  Lives with spouse in a one story house/ ramped entrance and stair glide to/ from basemant  Personal factors affecting patient at time of initial evaluation include: decreased initiation and engagement, difficulty performing ADLs and difficulty performing IADLs   Upon evaluation, patient requires moderate assist for UB ADLs, maximal and total assist for LB ADLs, sliding board transfers with mod assist x2  Occupational performance is affected by the following deficits: decreased functional use of BUEs, in hand manipulation deficit with impaired reach, grasp and coordination, limited active ROM, decreased muscle strength, degenerative arthritic joint changes, impaired gross motor coordination, impaired fine motor coordination, dynamic sit/ stand balance deficit with poor standing tolerance time for self care and functional mobility, decreased activity tolerance and postural control and postural alignment deficit, requiring external assistance to complete transitional movements  Therapist completed  extensive additional review of medical records and additional review of physical, cognitive or psychosocial history, clinical examination identifying 5 or more performance deficits, clinical decision making of a high complexity , consistent with a high complexity level evaluation  Patient to benefit from continued Occupational Therapy treatment while in the hospital to address deficits as defined above and maximize level of functional independence with ADLs and functional mobillity     Recommendation: Physiatry Consult  OT Discharge Recommendation: Short Term Rehab  OT - OK to Discharge: Yes (return to [facility] when medically cleared)

## 2018-04-03 NOTE — PROGRESS NOTES
Progress Note - Cardiology     Boom Porter 75 y o  male MRN: [id number]  Unit/Bed#: -01 Encounter: [id number]      Subjective:   Patient sleepy this morning  He was working with PT this morning and his heart rate elevated >130's     Objective:   Vitals:  Vitals:    18 1100   BP: 121/62   Pulse: 96   Resp: 18   Temp: 97 5 °F (36 4 °C)   SpO2: 97%       Body mass index is 34 55 kg/m²  Systolic (76ASP), HPI:471 , Min:104 , SH     Diastolic (17HFS), YIW:19, Min:60, Max:80      Intake/Output Summary (Last 24 hours) at 18 1226  Last data filed at 18 0644   Gross per 24 hour   Intake              120 ml   Output              900 ml   Net             -780 ml     Weight (last 2 days)     Date/Time   Weight    18 0051  97 1 (214 07)              PHYSICAL EXAM:  General: Patient is not in acute distress  Laying comfortably in the bed  Head: Normocephalic  Atraumatic  HEENT: Both pupils normal sized, round and reactive to light  Nonicteric  Neck: JVP not raised  Trachea central  Respiratory: Bilateral bronchovascular breath sounds with no crackles or rhonchi  Cardiovascular: irregular  S1 and S2 normal  No murmur, rub or gallop  GI: Abdomen soft, nontender  No guarding or rigidity  Neurologic: Patient is awake, alert, responding to command   Moving all extremities  Integumentary:  No skin rash  Extremities: No clubbing, cyanosis or edema    LABORATORY RESULTS:    Results from last 7 days  Lab Units 18  1801   TROPONIN I ng/mL <0 02     CBC with diff:   Results from last 7 days  Lab Units 18  0505 18  0527 18  1801   WBC Thousand/uL 6 95 9 18 10 66*   HEMOGLOBIN g/dL 11 9* 11 2* 11 7*   HEMATOCRIT % 38 4 37 6 37 0   MCV fL 88 91 88   PLATELETS Thousands/uL 463* 426* 437*   MCH pg 27 4 27 1 27 7   MCHC g/dL 31 0* 29 8* 31 6   RDW % 14 3 14 8 14 6   MPV fL 9 0 8 9 8 6*   NRBC AUTO /100 WBCs 0  --  0       CMP:  Results from last 7 days  Lab Units 18  8374 18  0527 18  1801   SODIUM mmol/L 138 139 139   POTASSIUM mmol/L 4 1 4 3 4 0   CHLORIDE mmol/L 101 104 101   CO2 mmol/L 30 28 27   ANION GAP mmol/L 7 7 11   BUN mg/dL 9 10 9   CREATININE mg/dL 0 57* 0 59* 0 67   GLUCOSE RANDOM mg/dL 104 112 100   CALCIUM mg/dL 9 0 9 0 8 9   AST U/L  --   --  23   ALT U/L  --   --  14   ALK PHOS U/L  --   --  78   TOTAL PROTEIN g/dL  --   --  6 6   BILIRUBIN TOTAL mg/dL  --   --  0 40   EGFR ml/min/1 73sq m 106 104 99       BMP:  Results from last 7 days  Lab Units 18  0505 18  0527 18  1801   SODIUM mmol/L 138 139 139   POTASSIUM mmol/L 4 1 4 3 4 0   CHLORIDE mmol/L 101 104 101   CO2 mmol/L 30 28 27   BUN mg/dL 9 10 9   CREATININE mg/dL 0 57* 0 59* 0 67   GLUCOSE RANDOM mg/dL 104 112 100   CALCIUM mg/dL 9 0 9 0 8 9         Results from last 7 days  Lab Units 18  1801   NT-PRO BNP pg/mL 1,192*          Results from last 7 days  Lab Units 18  1801 18  0517 18  1952   MAGNESIUM mg/dL 1 9 2 2 1 7               Results from last 7 days  Lab Units 18  1801   TSH 3RD GENERATON uIU/mL 5 674*             Lipid Profile:   Lab Results   Component Value Date    CHOL 193 2017     Lab Results   Component Value Date    HDL 47 2017     No results found for: LDLCALC  No results found for: TRIG    Cardiac testing:   Results for orders placed during the hospital encounter of 18   Echo complete with contrast if indicated    Narrative [address] [place] [address] [place], [place] [address] [phone]    Transthoracic Echocardiogram  2D, M-mode, Doppler, and Color Doppler    Study date:  26-Mar-2018    Patient: Dinorah Alejo  MR number: [id number]  Account number: [de-identified]  : 1950  Age: 75 years  Gender: Male  Status: Inpatient  Location: Bedside  Height: 66 in  Weight: 224 lb  BP: 124/ 68 mmHg    Indications: Atrial Fibrillation    Diagnoses: I48 0 - Atrial fibrillation    Sonographer: AKILA Choe,RDCS  Interpreting Physician:  Andrea Pro MD  Referring Physician:  Sadie Wilson PA-C  Group:  [facility]    SUMMARY    LEFT VENTRICLE:  Systolic function was normal  Ejection fraction was estimated in the range of 55 % to 65 %  There were no regional wall motion abnormalities  MITRAL VALVE:  There was mild regurgitation  TRICUSPID VALVE:  There was mild regurgitation  Estimated peak PA pressure was 40 mmHg  HISTORY: PRIOR HISTORY: Atrial Fibrillation, Hypertension, Mixed Hyperlipidemia, Hypokalemia    PROCEDURE: The procedure was performed at the bedside  This was a routine study  The transthoracic approach was used  The study included complete 2D imaging, M-mode, complete spectral Doppler, and color Doppler  The heart rate was 76 bpm,  at the start of the study  Images were obtained from the parasternal, apical, subcostal, and suprasternal notch acoustic windows  Image quality was adequate  LEFT VENTRICLE: Size was normal  Systolic function was normal  Ejection fraction was estimated in the range of 55 % to 65 %  There were no regional wall motion abnormalities  Wall thickness was normal  DOPPLER: Left ventricular diastolic  function parameters were abnormal     RIGHT VENTRICLE: The size was normal  Systolic function was normal  Wall thickness was normal     LEFT ATRIUM: Size was normal     RIGHT ATRIUM: Size was normal     MITRAL VALVE: Valve structure was normal  There was normal leaflet separation  DOPPLER: The transmitral velocity was within the normal range  There was no evidence for stenosis  There was mild regurgitation  AORTIC VALVE: The valve was trileaflet  Leaflets exhibited normal thickness and normal cuspal separation  DOPPLER: Transaortic velocity was minimally increased  There was no evidence for stenosis  There was no regurgitation  TRICUSPID VALVE: The valve structure was normal  There was normal leaflet separation   DOPPLER: The transtricuspid velocity was within the normal range  There was no evidence for stenosis  There was mild regurgitation  Estimated peak PA  pressure was 40 mmHg  PULMONIC VALVE: Leaflets exhibited normal thickness, no calcification, and normal cuspal separation  DOPPLER: The transpulmonic velocity was within the normal range  There was no regurgitation  PERICARDIUM: There was no pericardial effusion  The pericardium was normal in appearance  AORTA: The root exhibited normal size  SYSTEMIC VEINS: IVC: The inferior vena cava was normal in size and course  Respirophasic changes were normal     SYSTEM MEASUREMENT TABLES    2D  %FS: 25 2 %  Ao Diam: 2 7 cm  EDV(Teich): 83 6 ml  EF(Teich): 50 %  ESV(Teich): 41 8 ml  IVSd: 1 cm  LA Area: 23 9 cm2  LA Diam: 3 7 cm  LVEDV MOD A4C: 54 7 ml  LVEF MOD A4C: 63 9 %  LVESV MOD A4C: 19 8 ml  LVIDd: 4 3 cm  LVIDs: 3 2 cm  LVLd A4C: 6 8 cm  LVLs A4C: 6 cm  LVOT Diam: 1 9 cm  LVPWd: 1 cm  RA Area: 20 6 cm2  RVIDd: 3 4 cm  SV MOD A4C: 35 ml  SV(Teich): 41 8 ml    CW  AV Env  Ti: 213 1 ms  AV VTI: 29 7 cm  AV Vmax: 2 1 m/s  AV Vmean: 1 4 m/s  AV maxP 7 mmHg  AV meanP 1 mmHg  TR Vmax: 3 3 m/s  TR maxP mmHg    MM  TAPSE: 2 1 cm    PW  SALUD (VTI): 2 cm2  SALUD Vmax: 1 8 cm2  LVOT Env  Ti: 262 6 ms  LVOT VTI: 20 4 cm  LVOT Vmax: 1 3 m/s  LVOT Vmean: 0 8 m/s  LVOT maxP 5 mmHg  LVOT meanP 9 mmHg  LVSV Dopp: 59 3 ml    [employer] Accredited Echocardiography Laboratory    Prepared and electronically signed by    Fozia Bucio MD  Signed 26-Mar-2018 18:09:34         Meds/Allergies   all current active meds have been reviewed  Prescriptions Prior to Admission   Medication    allopurinol (ZYLOPRIM) 300 mg tablet    apixaban (ELIQUIS) 5 mg    digoxin (LANOXIN) 0 125 mg tablet    metoprolol tartrate (LOPRESSOR) 50 mg tablet    baclofen 10 mg tablet    colchicine (COLCRYS) 0 6 mg tablet    fluticasone (FLONASE) 50 mcg/act nasal spray    loperamide (IMODIUM) 2 mg capsule    loratadine (CLARITIN) 10 mg tablet    meclizine (ANTIVERT) 12 5 MG tablet    Methylprednisolone 4 MG TBPK    miconazole 2 % cream    omeprazole (PriLOSEC) 20 mg delayed release capsule    tamsulosin (FLOMAX) 0 4 mg              Assessment and Plan:  Paroxysmal Atrial fibrillation with RVR  -previous echocardiogram revealed EF 55-65 percent without regional wall motion abnormality, mild MR, mild TR, estimated peak PA pressure 40 mmHg  - currently on digoxin 125 mcg daily and Lopressor 50 mg three times daily   -though heart rates are some controlled this morning, patient continues to have elevations with activity  Likewise, patient has been poorly controlled on this regimen as outpatient which resulted in his admission   -Will discontinue digoxin and decrease metoprolol to 50 twice daily and add amiodarone at 400 mg twice daily to load   -TSH slightly elevated but T4 WNL, liver enzymes WNL   -on Eliquis for anticoagulation     Hypertension- BP stable     DORA-on CPAP as outpatient        ** Please Note: Dragon 360 Dictation voice to text software may have been used in the creation of this document   **

## 2018-04-03 NOTE — PHYSICAL THERAPY NOTE
PT Progress Note (18min)  (8:05-8:23)       04/03/18 0823   Pain Assessment   Pain Assessment No/denies pain   Restrictions/Precautions   Weight Bearing Precautions Per Order Yes   RLE Weight Bearing Per Order NWB   Braces or Orthoses LE Braces  (short L leg brace 2* post polio)   Other Precautions Bed Alarm;Telemetry; Fall Risk   General   Chart Reviewed Yes   Response to Previous Treatment Patient with no complaints from previous session  Family/Caregiver Present Yes  (spouse)   Cognition   Orientation Level Oriented X4   Subjective   Subjective pt agreeable to PT session  "I'll give the board a try  that's what I was doing upstairs"  Transfers   Sliding Board transfer 3  Moderate assistance   Additional items Assist x 2; Increased time required;Verbal cues  (towards L > w/c)   Ambulation/Elevation   Gait pattern Not appropriate   Balance   Static Sitting Good   Dynamic Sitting Fair +   Activity Tolerance   Activity Tolerance Patient limited by fatigue   Nurse Made Aware lucius   Assessment   Prognosis Good   Problem List Decreased strength;Decreased range of motion;Decreased endurance; Impaired balance;Decreased mobility; Impaired sensation; Impaired tone;Decreased skin integrity;Orthopedic restrictions;Pain   Assessment pt able to complete lateral transfer via slide board mod (A)x2 towards L side > w/c  min verbal cues required for transfer technique + hand placement  pt able to tolerate sitting in w/c at end of session c R LE elevated on leg rest  instructed on propelling technique c good verbal understanding  will cont skilled PT to further maximize functional mobility + improve quality of life  upon d/c, recommend STR  Barriers to Discharge Decreased caregiver support   Goals   Patient Goals "to get stronger"  STG Expiration Date 04/13/18   Treatment Day 1   Plan   Treatment/Interventions Functional transfer training;LE strengthening/ROM; Therapeutic exercise; Endurance training;Patient/family training;Equipment eval/education; Bed mobility;Spoke to nursing;OT;Family   Progress Progressing toward goals   PT Frequency 5x/wk   Recommendation   Recommendation Short-term skilled PT   PT - OK to Discharge Yes  (to STR )     Barry Mathis, PT

## 2018-04-03 NOTE — PLAN OF CARE
Problem: PHYSICAL THERAPY ADULT  Goal: Performs mobility at highest level of function for planned discharge setting  See evaluation for individualized goals  Treatment/Interventions: Functional transfer training, LE strengthening/ROM, Therapeutic exercise, Endurance training, Patient/family training, Equipment eval/education, Bed mobility, Compensatory technique education, Spoke to nursing, OT, Family          See flowsheet documentation for full assessment, interventions and recommendations  Prognosis: Good  Problem List: Decreased strength, Decreased range of motion, Decreased endurance, Impaired balance, Decreased mobility, Impaired sensation, Impaired tone, Decreased skin integrity, Orthopedic restrictions  Assessment: pt is a 68y/o m who presents to [facility] c change in mental status  PMH significant for a-fib, post polio syndrome, DORA, obesity, + leukocytosis  pt residing at [facility] for STR prior to admission 2* R hip fx s/p fall c NWB R LE  at baseline, pt mod (I) c functional mobility c SPC  resides c spouse in one story home c stair glide to enter  currently requires min (A)x1 for functional mobility tasks 2* deficits in strength, balance, ROM, sensation, skin integrity, + activity tolerance noted in PT exam above  Barthel Index 45/100  pt also required (A) for donning L LE short leg brace in sitting  able to laterally scoot at EOB c (S) while maintaining NWB R % of the time  deferred transitions 2* NWB R LE + impaired  R UE  will trial slide board to w/c next session as tolerated  would benefit from skilled PT to maximize functional mobility + improve quality of life  upon d/c, recommend STR  PT eval of high complexity 2* unstable med status c pt requiring ongoing medical management 2* acute change in mental status  pt c multiple co-morbidities including post polio syndrome  remains NWB R LE s/p femur fx 4-5wks ago  presents c mobility deficits above requiring min (A)x1     Barriers to Discharge: Decreased caregiver support     Recommendation: Short-term skilled PT     PT - OK to Discharge: Yes (to STR )    See flowsheet documentation for full assessment

## 2018-04-03 NOTE — PHYSICAL THERAPY NOTE
PT Evaluation (15min)  (7:50-8:05)    Past Medical History:   Diagnosis Date    A-fib ([address] )     Gout     HTN (hypertension)     Neuropathy     Polio     Sleep apnea       04/03/18 0805   Note Type   Note type Eval/Treat   Pain Assessment   Pain Assessment No/denies pain   Home Living   Type of [address] One level; Other (Comment)  (stair glide to main level)   Bathroom Shower/Tub Walk-in shower   Bathroom Toilet Raised   Bathroom Equipment Grab bars in shower;Grab bars around toilet   [address] Cane;Stair glide; Other (Comment)  (lift chair)   Prior Function   Level of [place] Independent with ADLs and functional mobility  (ambulates c SPC)   Lives With Spouse   Receives Help From Family   ADL Assistance Needs assistance   IADLs Needs assistance   Falls in the last 6 months 1 to 4  (1 recent fall resulting in R femur fx)   Vocational Retired   Restrictions/Precautions   [place] Bearing Precautions Per Order Yes   RLE [place] Bearing Per Order NWB  (s/p recent R femur fx)   Braces or Orthoses LE Braces  (short leg brace 2* polio)   Other Precautions Bed Alarm;Telemetry; Fall Risk;WBS  (NWB R LE)   General   Additional Pertinent History pt [facility] 2* acute change in mental status  was residing at [facility] s/p R hip fx c NWB to R LE  PT consulted for mobility + d/c planning      Family/Caregiver Present Yes  (spouse)   Cognition   Orientation Level Oriented X4   RUE Assessment   RUE Assessment X   RUE Overall AROM   R Shoulder Flexion (grossly 40 degrees)   RUE Strength   RUE Overall Strength Deficits   LUE Assessment   LUE Assessment WFL   RLE Assessment   RLE Assessment WFL   Strength RLE   R Hip Flexion 3/5   R Hip ABduction 3/5   R Knee Flexion 3/5   R Knee Extension 3/5   R Ankle Dorsiflexion 3/5   R Ankle Plantar Flexion 3/5   LLE Assessment   LLE Assessment WFL   Strength LLE   L Hip Flexion 3-/5   L Hip ABduction 2-/5 L Knee Flexion 2-/5   L Knee Extension 2-/5   L Ankle Dorsiflexion 2-/5   L Ankle Plantar Flexion 2-/5   Coordination   Sensation X   Light Touch   RLE Light Touch Impaired   LLE Light Touch Impaired   Bed Mobility   Supine to Sit 4  Minimal assistance   Additional items Assist x 1;HOB elevated; Increased time required;Verbal cues   Transfers   Sit to Stand Unable to assess  (pt deferred 2* NWB R LE)   Additional Comments pt performed lateral scoots at EOB c (S) + cues for technique  able to maintain NWB R % of the time   Ambulation/Elevation   Gait pattern Not appropriate   Balance   Static Sitting Fair +   Dynamic Sitting Fair   Activity Tolerance   Activity Tolerance Patient limited by fatigue   Nurse Made Aware Mayr Brunette   Assessment   Prognosis Good   Problem List Decreased strength;Decreased range of motion;Decreased endurance; Impaired balance;Decreased mobility; Impaired sensation; Impaired tone;Decreased skin integrity;Orthopedic restrictions   Assessment pt is a 68y/o m who presents to [facility] c change in mental status  PMH significant for a-fib, post polio syndrome, DORA, obesity, + leukocytosis  pt residing at [facility] for STR prior to admission 2* R hip fx s/p fall c NWB R LE  at baseline, pt mod (I) c functional mobility c SPC  resides c spouse in one story home c stair glide to enter  currently requires min (A)x1 for functional mobility tasks 2* deficits in strength, balance, ROM, sensation, skin integrity, + activity tolerance noted in PT exam above  Barthel Index 45/100  pt also required (A) for donning L LE short leg brace in sitting  able to laterally scoot at EOB c (S) while maintaining NWB R % of the time  deferred transitions 2* NWB R LE + impaired  R UE  will trial slide board to w/c next session as tolerated  would benefit from skilled PT to maximize functional mobility + improve quality of life  upon d/c, recommend STR   PT eval of high complexity 2* unstable med status c pt requiring ongoing medical management 2* acute change in mental status  pt c multiple co-morbidities including post polio syndrome  remains NWB R LE s/p femur fx 4-5wks ago  presents c mobility deficits above requiring min (A)x1  Barriers to Discharge Decreased caregiver support   Goals   Patient Goals "to get stronger"  STG Expiration Date 04/13/18   Short Term Goal #1 1  increase strength 1/2 grade to improve overall functional mobility, 2  perform bed mobility c (S) to sit up eat a meal, 2  perform transfers c (S) c most appropriate method while maintaining NWB R % of the time to decrease caregiver burden, 4  propel w/c 150' mod (I) on level surface to safely navigate environment   Plan   Treatment/Interventions Functional transfer training;LE strengthening/ROM; Therapeutic exercise; Endurance training;Patient/family training;Equipment eval/education; Bed mobility; Compensatory technique education;Spoke to nursing;OT;Family   PT Frequency 5x/wk   Recommendation   Recommendation Short-term skilled PT   PT - OK to Discharge Yes  (to STR )   Barthel Index   Feeding 10   Bathing 0   Grooming Score 5   Dressing Score 5   Bladder Score 10   Bowels Score 10   Toilet Use Score 5   Transfers (Bed/Chair) Score 0   Mobility (Level Surface) Score 0   Stairs Score 0   Barthel Index Score 45     Juan Manuel Rodriguez PT

## 2018-04-03 NOTE — PLAN OF CARE
Problem: PHYSICAL THERAPY ADULT  Goal: Performs mobility at highest level of function for planned discharge setting  See evaluation for individualized goals  Treatment/Interventions: Functional transfer training, LE strengthening/ROM, Therapeutic exercise, Endurance training, Patient/family training, Equipment eval/education, Bed mobility, Compensatory technique education, Spoke to nursing, OT, Family          See flowsheet documentation for full assessment, interventions and recommendations  Outcome: Progressing  Prognosis: Good  Problem List: Decreased strength, Decreased range of motion, Decreased endurance, Impaired balance, Decreased mobility, Impaired sensation, Impaired tone, Decreased skin integrity, Orthopedic restrictions, Pain  Assessment: pt able to complete lateral transfer via slide board mod (A)x2 towards L side > w/c  min verbal cues required for transfer technique + hand placement  pt able to tolerate sitting in w/c at end of session c R LE elevated on leg rest  instructed on propelling technique c good verbal understanding  will cont skilled PT to further maximize functional mobility + improve quality of life  upon d/c, recommend STR  Barriers to Discharge: Decreased caregiver support     Recommendation: Short-term skilled PT     PT - OK to Discharge: Yes (to STR )    See flowsheet documentation for full assessment

## 2018-04-03 NOTE — CONSULTS
Orthopedics   Cristi Aldrich 75 y o  male MRN: [id number]  Unit/Bed#: -Paige      Chief Complaint:   Right leg pain    HPI:  75 y o male complaining of right leg pain  Patient was admitted to the hospital with cardiac issues  Patient had a slip and fall and had open reduction internal fixation of the right distal femur with the trauma unit at [facility]) on February 26, 2018  Orthopedics was consulted today for weight-bearing status  Patient has been nonweightbearing  Denies any numbness or tingling lower extremity  Patient did have what postop visit with the operating surgeon who removed all the staples  Patient tells us once stable was left behind  Review Of Systems:   · Skin: See HPI  · Neuro: See HPI  · Musculoskeletal: See HPI  · 14 point review of systems negative except as stated above     Past Medical History:   Past Medical History:   Diagnosis Date    A-fib ([address] )     Gout     HTN (hypertension)     Neuropathy     Polio     Sleep apnea        Past Surgical History:   Past Surgical History:   Procedure Laterality Date    COLON SURGERY      FEMUR FRACTURE SURGERY Right     HERNIA REPAIR         Family History:  Family history reviewed and non-contributory  History reviewed  No pertinent family history  Social History:  Social History     Social History    Marital status: /Civil Union     Spouse name: N/A    Number of children: N/A    Years of education: N/A     Social History Main Topics    Smoking status: Never Smoker    Smokeless tobacco: Never Used    Alcohol use No    Drug use: No    Sexual activity: Yes     Partners: Female     Birth control/ protection: None     Other Topics Concern    None     Social History Narrative    None       Allergies:    Allergies   Allergen Reactions    Cymbalta [Duloxetine Hcl]      hallucinations           Labs:    0  Lab Value Date/Time   HCT 38 4 04/03/2018 0505   HCT 37 6 04/01/2018 0527   HCT 37 0 03/31/2018 1801   HGB 11 9 (L) 04/03/2018 0505   HGB 11 2 (L) 04/01/2018 0527   HGB 11 7 (L) 03/31/2018 1801   INR 1 73 (H) 03/24/2018 1453   WBC 6 95 04/03/2018 0505   WBC 9 18 04/01/2018 0527   WBC 10 66 (H) 03/31/2018 1801   ESR 23 (H) 12/29/2017 1528       Meds:    Current Facility-Administered Medications:     acetaminophen (TYLENOL) tablet 650 mg, 650 mg, Oral, Q6H PRN, Loredgarde Margaux PA-C, 650 mg at 04/02/18 1519    allopurinol (ZYLOPRIM) tablet 300 mg, 300 mg, Oral, Daily, Lorelee Margaux, PA-C, 300 mg at 04/03/18 1576    amiodarone tablet 400 mg, 400 mg, Oral, BID With Meals, Joann Puckett PA-C    apixaban Evaline Abdi) tablet 5 mg, 5 mg, Oral, BID, Lorelee Margaux PA-C, 5 mg at 04/03/18 2840    baclofen tablet 10 mg, 10 mg, Oral, TID, Loredgarde Margaux, PA-C, 10 mg at 04/03/18 0827    fluticasone (FLONASE) 50 mcg/act nasal spray 1 spray, 1 spray, Nasal, Daily, Lordiane Damico PA-C, 1 spray at 04/03/18 0827    loperamide (IMODIUM) capsule 2 mg, 2 mg, Oral, 4x Daily PRN, Angelic Damico PA-C    loratadine (CLARITIN) tablet 10 mg, 10 mg, Oral, Daily, Lorelee Margaux, PA-C, 10 mg at 04/03/18 0827    metoprolol tartrate (LOPRESSOR) tablet 50 mg, 50 mg, Oral, Q12H [address], Caty Manuel PA-C    ondansetron [facility]) injection 4 mg, 4 mg, Intravenous, Q6H PRN, Angelic Damico PA-C    tamsulosin (FLOMAX) capsule 0 4 mg, 0 4 mg, Oral, Daily With Advance SHANT García, 0 4 mg at 04/02/18 1916    Blood Culture:   Lab Results   Component Value Date    BLOODCX No Growth at 48 hrs  03/31/2018       Wound Culture:   Lab Results   Component Value Date    WOUNDCULT 1+ Growth of Staphylococcus epidermidis (A) 03/25/2018       Ins and Outs:  I/O last 24 hours:   In: 360 [P O :360]  Out: 900 [Urine:900]          Physical Exam:   /62 (BP Location: Left arm)   Pulse 96   Temp 97 5 °F (36 4 °C) (Oral)   Resp 18   Ht 5' 6" (1 676 m)   Wt 97 1 kg (214 lb 1 1 oz)   SpO2 97%   BMI 34 55 kg/m²   Gen: Alert and oriented to person, place, time  HEENT: EOMI, eyes clear, moist mucus membranes, hearing intact  Respiratory: Bilateral chest rise  No audible wheezing found  Cardiovascular: normal rate  Abdomen: soft nontender/nondistended    Musculoskeletal: right lower extremity  ·  Lateral femur incision which is well-healed  There was one stable intact at the distal aspect of the incision  No erythema noted along incision  No signs of infection  · sensation intact light touch L1-S1 distributions  · Motor intact with Hip flexion/extension, knee flexion/extension, ankle dorsi/plantar flexion, EHL/FHL    Tertiary: no tenderness over all other joints/long bones as except already stated  Radiology:   I personally reviewed the films  x-rays of the right femur shows orthopedic plate and screws in acceptable position    Assessment:  67 y o male  Is status post five weeks ORIF right distal femur  Patient will follow up with orthopedic surgeon who performed procedure and near future  We will keep patient nonweightbearing until that follow-up  We will remove the single staple that was left behind right distal femur    Plan:   · PT/OT  Evaluate and treat  · NWB right lower extremity  · Pain control per primary team  · DVT ppx per primary team  · removed single staple distal aspect of wound right femur  Dispo:  Patient is orthopedic stable    Will follow up with orthopedic surgeon for all postoperative care after discharge    [facility]

## 2018-04-03 NOTE — PLAN OF CARE
CARDIOVASCULAR - ADULT     Maintains optimal cardiac output and hemodynamic stability Progressing     Absence of cardiac dysrhythmias or at baseline rhythm Progressing        DISCHARGE PLANNING     Discharge to home or other facility with appropriate resources [address] Discharge to post-acute care or home with appropriate resources Progressing        INFECTION - ADULT     Absence or prevention of progression during hospitalization Progressing        Knowledge Deficit     Patient/family/caregiver demonstrates understanding of disease process, treatment plan, medications, and discharge instructions Progressing        MUSCULOSKELETAL - ADULT     Maintain or return mobility to safest level of function Progressing     Maintain proper alignment of affected body part Progressing        NEUROSENSORY - ADULT     Achieves stable or improved neurological status Progressing     Achieves maximal functionality and self care Progressing        PAIN - ADULT     Verbalizes/displays adequate comfort level or baseline comfort level Progressing        Potential for Falls     Patient will remain free of falls Progressing        Prexisting or High Potential for Compromised Skin Integrity     Skin integrity is maintained or improved Progressing        RESPIRATORY - ADULT     Achieves optimal ventilation and oxygenation Progressing        SAFETY ADULT     Maintain or return to baseline ADL function Progressing     Maintain or return mobility status to optimal level Progressing     Patient will remain free of falls Progressing        SKIN/TISSUE INTEGRITY - ADULT     Skin integrity remains intact Progressing     Incision(s), wounds(s) or drain site(s) healing without S/S of infection Progressing     Oral mucous membranes remain intact Progressing

## 2018-04-04 LAB
ANION GAP SERPL CALCULATED.3IONS-SCNC: 6 MMOL/L (ref 4–13)
BASOPHILS # BLD AUTO: 0.07 THOUSANDS/ΜL (ref 0–0.1)
BASOPHILS NFR BLD AUTO: 1 % (ref 0–1)
BUN SERPL-MCNC: 10 MG/DL (ref 5–25)
CALCIUM SERPL-MCNC: 9.1 MG/DL (ref 8.3–10.1)
CHLORIDE SERPL-SCNC: 103 MMOL/L (ref 100–108)
CO2 SERPL-SCNC: 31 MMOL/L (ref 21–32)
CREAT SERPL-MCNC: 0.7 MG/DL (ref 0.6–1.3)
EOSINOPHIL # BLD AUTO: 0.67 THOUSAND/ΜL (ref 0–0.61)
EOSINOPHIL NFR BLD AUTO: 9 % (ref 0–6)
ERYTHROCYTE [DISTWIDTH] IN BLOOD BY AUTOMATED COUNT: 14.4 % (ref 11.6–15.1)
GFR SERPL CREATININE-BSD FRML MDRD: 97 ML/MIN/1.73SQ M
GLUCOSE SERPL-MCNC: 102 MG/DL (ref 65–140)
HCT VFR BLD AUTO: 35.3 % (ref 36.5–49.3)
HGB BLD-MCNC: 10.8 G/DL (ref 12–17)
LYMPHOCYTES # BLD AUTO: 1.54 THOUSANDS/ΜL (ref 0.6–4.47)
LYMPHOCYTES NFR BLD AUTO: 21 % (ref 14–44)
MCH RBC QN AUTO: 27.1 PG (ref 26.8–34.3)
MCHC RBC AUTO-ENTMCNC: 30.6 G/DL (ref 31.4–37.4)
MCV RBC AUTO: 89 FL (ref 82–98)
MONOCYTES # BLD AUTO: 0.68 THOUSAND/ΜL (ref 0.17–1.22)
MONOCYTES NFR BLD AUTO: 9 % (ref 4–12)
NEUTROPHILS # BLD AUTO: 4.41 THOUSANDS/ΜL (ref 1.85–7.62)
NEUTS SEG NFR BLD AUTO: 59 % (ref 43–75)
NRBC BLD AUTO-RTO: 0 /100 WBCS
PLATELET # BLD AUTO: 478 THOUSANDS/UL (ref 149–390)
PMV BLD AUTO: 8.8 FL (ref 8.9–12.7)
POTASSIUM SERPL-SCNC: 4 MMOL/L (ref 3.5–5.3)
RBC # BLD AUTO: 3.99 MILLION/UL (ref 3.88–5.62)
SODIUM SERPL-SCNC: 140 MMOL/L (ref 136–145)
WBC # BLD AUTO: 7.42 THOUSAND/UL (ref 4.31–10.16)

## 2018-04-04 PROCEDURE — 99232 SBSQ HOSP IP/OBS MODERATE 35: CPT | Performed by: INTERNAL MEDICINE

## 2018-04-04 PROCEDURE — 97530 THERAPEUTIC ACTIVITIES: CPT

## 2018-04-04 PROCEDURE — 85025 COMPLETE CBC W/AUTO DIFF WBC: CPT | Performed by: INTERNAL MEDICINE

## 2018-04-04 PROCEDURE — 80048 BASIC METABOLIC PNL TOTAL CA: CPT | Performed by: INTERNAL MEDICINE

## 2018-04-04 PROCEDURE — 97110 THERAPEUTIC EXERCISES: CPT

## 2018-04-04 RX ORDER — METOPROLOL TARTRATE 50 MG/1
50 TABLET, FILM COATED ORAL EVERY 6 HOURS
Status: DISCONTINUED | OUTPATIENT
Start: 2018-04-04 | End: 2018-04-05

## 2018-04-04 RX ADMIN — FLUTICASONE PROPIONATE 1 SPRAY: 50 SPRAY, METERED NASAL at 09:04

## 2018-04-04 RX ADMIN — TAMSULOSIN HYDROCHLORIDE 0.4 MG: 0.4 CAPSULE ORAL at 15:58

## 2018-04-04 RX ADMIN — ALLOPURINOL 300 MG: 300 TABLET ORAL at 09:03

## 2018-04-04 RX ADMIN — BACLOFEN 10 MG: 10 TABLET ORAL at 21:17

## 2018-04-04 RX ADMIN — LORATADINE 10 MG: 10 TABLET ORAL at 09:04

## 2018-04-04 RX ADMIN — AMIODARONE HYDROCHLORIDE 400 MG: 200 TABLET ORAL at 15:57

## 2018-04-04 RX ADMIN — BACLOFEN 10 MG: 10 TABLET ORAL at 09:04

## 2018-04-04 RX ADMIN — METOPROLOL TARTRATE 50 MG: 50 TABLET ORAL at 21:17

## 2018-04-04 RX ADMIN — AMIODARONE HYDROCHLORIDE 400 MG: 200 TABLET ORAL at 09:03

## 2018-04-04 RX ADMIN — METOPROLOL TARTRATE 50 MG: 50 TABLET ORAL at 15:57

## 2018-04-04 RX ADMIN — BACLOFEN 10 MG: 10 TABLET ORAL at 15:57

## 2018-04-04 RX ADMIN — APIXABAN 5 MG: 5 TABLET, FILM COATED ORAL at 09:04

## 2018-04-04 RX ADMIN — APIXABAN 5 MG: 5 TABLET, FILM COATED ORAL at 17:08

## 2018-04-04 NOTE — PROGRESS NOTES
Progress Note - Cardiology     Jennifer Jacob 75 y o  male MRN: [id number]  Unit/Bed#: -01 Encounter: [id number]      Subjective:   Heart rates were initially controlled last night, though spike to this morning  He denies symptoms--no complaints of chest pain, palpitations, shortness of breath, dizziness  Objective:   Vitals:  Vitals:    04/04/18 1100   BP: 113/61   Pulse: (!) 130   Resp: 18   Temp:    SpO2: 97%       Body mass index is 34 55 kg/m²  Systolic (43FPJ), BUA:609 , Min:100 , DCD:456     Diastolic (34OPI), RGU:12, Min:59, Max:69      Intake/Output Summary (Last 24 hours) at 04/04/18 1151  Last data filed at 04/04/18 0830   Gross per 24 hour   Intake              240 ml   Output              725 ml   Net             -485 ml     Weight (last 2 days)     None          PHYSICAL EXAM:  General: Patient is not in acute distress  Laying comfortably in the bed  Head: Normocephalic  Atraumatic  HEENT: Both pupils normal sized, round and reactive to light  Nonicteric  Neck: JVP not raised  Trachea central    Respiratory: Bilateral bronchovascular breath sounds with no crackles or rhonchi  Cardiovascular:   Irregular  No murmur, rub or gallop  GI: Abdomen soft, nontender  No guarding or rigidity  Neurologic: Patient is awake, alert, responding to command   Moving all extremities  Integumentary:  No skin rash  Extremities: No clubbing, cyanosis or edema    LABORATORY RESULTS:    Results from last 7 days  Lab Units 03/31/18  1801   TROPONIN I ng/mL <0 02     CBC with diff:   Results from last 7 days  Lab Units 04/04/18  0429 04/03/18  0505 04/01/18  0527 03/31/18  1801   WBC Thousand/uL 7 42 6 95 9 18 10 66*   HEMOGLOBIN g/dL 10 8* 11 9* 11 2* 11 7*   HEMATOCRIT % 35 3* 38 4 37 6 37 0   MCV fL 89 88 91 88   PLATELETS Thousands/uL 478* 463* 426* 437*   MCH pg 27 1 27 4 27 1 27 7   MCHC g/dL 30 6* 31 0* 29 8* 31 6   RDW % 14 4 14 3 14 8 14 6   MPV fL 8 8* 9 0 8 9 8 6*   NRBC AUTO /100 WBCs 0 0  --  0 CMP:  Results from last 7 days  Lab Units 18  0429 18  0505 18  0527 18  1801   SODIUM mmol/L 140 138 139 139   POTASSIUM mmol/L 4 0 4 1 4 3 4 0   CHLORIDE mmol/L 103 101 104 101   CO2 mmol/L 31 30 28 27   ANION GAP mmol/L 6 7 7 11   BUN mg/dL 10 9 10 9   CREATININE mg/dL 0 70 0 57* 0 59* 0 67   GLUCOSE RANDOM mg/dL 102 104 112 100   CALCIUM mg/dL 9 1 9 0 9 0 8 9   AST U/L  --   --   --  23   ALT U/L  --   --   --  14   ALK PHOS U/L  --   --   --  78   TOTAL PROTEIN g/dL  --   --   --  6 6   BILIRUBIN TOTAL mg/dL  --   --   --  0 40   EGFR ml/min/1 73sq m 97 106 104 99       BMP:  Results from last 7 days  Lab Units 18  0429 18  0505 18  0527   SODIUM mmol/L 140 138 139   POTASSIUM mmol/L 4 0 4 1 4 3   CHLORIDE mmol/L 103 101 104   CO2 mmol/L 31 30 28   BUN mg/dL 10 9 10   CREATININE mg/dL 0 70 0 57* 0 59*   GLUCOSE RANDOM mg/dL 102 104 112   CALCIUM mg/dL 9 1 9 0 9 0         Results from last 7 days  Lab Units 18  1801   NT-PRO BNP pg/mL 1,192*          Results from last 7 days  Lab Units 18  1801 18  0517 18  1952   MAGNESIUM mg/dL 1 9 2 2 1 7               Results from last 7 days  Lab Units 18  1801   TSH 3RD GENERATON uIU/mL 5 674*             Lipid Profile:   Lab Results   Component Value Date    CHOL 193 2017     Lab Results   Component Value Date    HDL 47 2017     No results found for: LDLCALC  No results found for: TRIG    Cardiac testing:   Results for orders placed during the hospital encounter of 18   Echo complete with contrast if indicated    Narrative [address] [place] [address] A [facility], [address] [place], [place] [id number] [phone]    Transthoracic Echocardiogram  2D, M-mode, Doppler, and Color Doppler    Study date:  26-Mar-2018    Patient: Payal Mendieta  MR number: [id number]  Account number: [de-identified]  : 1950  Age: 75 years  Gender: Male  Status: Inpatient  Location: Bedside  Height: 66 in  Weight: 224 lb  BP: 124/ 68 mmHg    Indications: Atrial Fibrillation    Diagnoses: I48 0 - Atrial fibrillation    Sonographer:  AKILA Palencia,RDCS  Interpreting Physician:  Rosa Stark MD  Referring Physician:  Nikolas Shelton PA-C  Group:  [facility]    SUMMARY    LEFT VENTRICLE:  Systolic function was normal  Ejection fraction was estimated in the range of 55 % to 65 %  There were no regional wall motion abnormalities  MITRAL VALVE:  There was mild regurgitation  TRICUSPID VALVE:  There was mild regurgitation  Estimated peak PA pressure was 40 mmHg  HISTORY: PRIOR HISTORY: Atrial Fibrillation, Hypertension, Mixed Hyperlipidemia, Hypokalemia    PROCEDURE: The procedure was performed at the bedside  This was a routine study  The transthoracic approach was used  The study included complete 2D imaging, M-mode, complete spectral Doppler, and color Doppler  The heart rate was 76 bpm,  at the start of the study  Images were obtained from the parasternal, apical, subcostal, and suprasternal notch acoustic windows  Image quality was adequate  LEFT VENTRICLE: Size was normal  Systolic function was normal  Ejection fraction was estimated in the range of 55 % to 65 %  There were no regional wall motion abnormalities  Wall thickness was normal  DOPPLER: Left ventricular diastolic  function parameters were abnormal     RIGHT VENTRICLE: The size was normal  Systolic function was normal  Wall thickness was normal     LEFT ATRIUM: Size was normal     RIGHT ATRIUM: Size was normal     MITRAL VALVE: Valve structure was normal  There was normal leaflet separation  DOPPLER: The transmitral velocity was within the normal range  There was no evidence for stenosis  There was mild regurgitation  AORTIC VALVE: The valve was trileaflet  Leaflets exhibited normal thickness and normal cuspal separation  DOPPLER: Transaortic velocity was minimally increased   There was no evidence for stenosis  There was no regurgitation  TRICUSPID VALVE: The valve structure was normal  There was normal leaflet separation  DOPPLER: The transtricuspid velocity was within the normal range  There was no evidence for stenosis  There was mild regurgitation  Estimated peak PA  pressure was 40 mmHg  PULMONIC VALVE: Leaflets exhibited normal thickness, no calcification, and normal cuspal separation  DOPPLER: The transpulmonic velocity was within the normal range  There was no regurgitation  PERICARDIUM: There was no pericardial effusion  The pericardium was normal in appearance  AORTA: The root exhibited normal size  SYSTEMIC VEINS: IVC: The inferior vena cava was normal in size and course  Respirophasic changes were normal     SYSTEM MEASUREMENT TABLES    2D  %FS: 25 2 %  Ao Diam: 2 7 cm  EDV(Teich): 83 6 ml  EF(Teich): 50 %  ESV(Teich): 41 8 ml  IVSd: 1 cm  LA Area: 23 9 cm2  LA Diam: 3 7 cm  LVEDV MOD A4C: 54 7 ml  LVEF MOD A4C: 63 9 %  LVESV MOD A4C: 19 8 ml  LVIDd: 4 3 cm  LVIDs: 3 2 cm  LVLd A4C: 6 8 cm  LVLs A4C: 6 cm  LVOT Diam: 1 9 cm  LVPWd: 1 cm  RA Area: 20 6 cm2  RVIDd: 3 4 cm  SV MOD A4C: 35 ml  SV(Teich): 41 8 ml    CW  AV Env  Ti: 213 1 ms  AV VTI: 29 7 cm  AV Vmax: 2 1 m/s  AV Vmean: 1 4 m/s  AV maxP 7 mmHg  AV meanP 1 mmHg  TR Vmax: 3 3 m/s  TR maxP mmHg    MM  TAPSE: 2 1 cm    PW  SALUD (VTI): 2 cm2  SALUD Vmax: 1 8 cm2  LVOT Env  Ti: 262 6 ms  LVOT VTI: 20 4 cm  LVOT Vmax: 1 3 m/s  LVOT Vmean: 0 8 m/s  LVOT maxP 5 mmHg  LVOT meanP 9 mmHg  LVSV Dopp: 59 3 ml    [employer] Accredited Echocardiography Laboratory    Prepared and electronically signed by    Noah Roach MD  Signed 26-Mar-2018 18:09:34           Meds/Allergies   all current active meds have been reviewed  Prescriptions Prior to Admission   Medication    allopurinol (ZYLOPRIM) 300 mg tablet    apixaban (ELIQUIS) 5 mg    digoxin (LANOXIN) 0 125 mg tablet    metoprolol tartrate (LOPRESSOR) 50 mg tablet    baclofen 10 mg tablet    colchicine (COLCRYS) 0 6 mg tablet    fluticasone (FLONASE) 50 mcg/act nasal spray    loperamide (IMODIUM) 2 mg capsule    loratadine (CLARITIN) 10 mg tablet    meclizine (ANTIVERT) 12 5 MG tablet    Methylprednisolone 4 MG TBPK    miconazole 2 % cream    omeprazole (PriLOSEC) 20 mg delayed release capsule    tamsulosin (FLOMAX) 0 4 mg              Assessment and Plan:  Paroxysmal Atrial fibrillation with RVR  -previous echocardiogram 03/26/2018 revealed EF 55-65 percent without regional wall motion abnormality, mild MR, mild TR, estimated peak PA pressure 40 mmHg  -He was switched from digoxin to amiodarone yesterday, currently being loaded with 400 mg BID, heart rates were stable yesterday though increased overnight  -heart would likely improved as amiodarone reaches therapeutic level, however in the meantime will increased frequency of Lopressor to 50 mg every 6 hours  -recent echocardiogram was without remodeling of left atrium, may consider cardioversion tomorrow vs Friday  Will keep the patient NPO after midnight  -TSH slightly elevated but T4 WNL, liver enzymes WNL   -on Eliquis for anticoagulation     Hypertension- BP stable, continue to monitor with adjustment of Lopresor      DORA-on CPAP for which he is compliant        ** Please Note: Dragon 360 Dictation voice to text software may have been used in the creation of this document   **

## 2018-04-04 NOTE — SOCIAL WORK
CM spoke to Dr Krueger Huge  Pt was started on a new med (amiodorone) due to tachycardia  Pt is a possible d/c for tomorrow pending Hr

## 2018-04-04 NOTE — CASE MANAGEMENT
Continued Stay Review    Date: 4/4    Vital Signs: /61 (BP Location: Left arm)   Pulse (!) 130   Temp 98 6 °F (37 °C) (Axillary)   Resp 18   Ht 5' 6" (1 676 m)   Wt 97 1 kg (214 lb 1 1 oz)   SpO2 97%   BMI 34 55 kg/m²     Medications:   Scheduled Meds:   Current Facility-Administered Medications:  acetaminophen 650 mg Oral Q6H PRN Christie Smith, SHANT   allopurinol 300 mg Oral Daily Christie Sarah, SHANT   amiodarone 400 mg Oral BID With Meals Caty Manuel PA-C   apixaban 5 mg Oral BID Christie Poplin, SHANT   baclofen 10 mg Oral TID Christie Poplin, SHANT   fluticasone 1 spray Nasal Daily Christie Poplin, SHANT   loperamide 2 mg Oral 4x Daily PRN Christie Poplin, SHANT   loratadine 10 mg Oral Daily Christie Poplin, SHANT   metoprolol 5 mg Intravenous Q6H PRN Candice Franklin MD   metoprolol tartrate 50 mg Oral Q6H Caty Manuel PA-C   ondansetron 4 mg Intravenous Q6H PRN Christie Poplin, SHANT   tamsulosin 0 4 mg Oral Daily With Jamar Hutchins PA-C     Continuous Infusions:    PRN Meds:   acetaminophen    loperamide    metoprolol    ondansetron    Abnormal Labs/Diagnostic Results: CBC with diff:   Results from last 7 days  Lab Units 04/04/18 0429 04/03/18  0505 04/01/18 0527 03/31/18  1801   WBC Thousand/uL 7 42 6 95 9 18 10 66*   HEMOGLOBIN g/dL 10 8* 11 9* 11 2* 11 7*   HEMATOCRIT % 35 3* 38 4 37 6 37 0   MCV fL 89 88 91 88   PLATELETS Thousands/uL 478* 463* 426* 437*   MCH pg 27 1 27 4 27 1 27 7   MCHC g/dL 30 6* 31 0* 29 8* 31 6   RDW % 14 4 14 3 14 8 14 6   MPV fL 8 8* 9 0 8 9 8 6*   NRBC AUTO /100 WBCs 0 0  --  0        CMP:  Results from last 7 days  Lab Units 04/04/18  0429 04/03/18  0505 04/01/18  0527 03/31/18  1801   SODIUM mmol/L 140 138 139 139   POTASSIUM mmol/L 4 0 4 1 4 3 4 0   CHLORIDE mmol/L 103 101 104 101   CO2 mmol/L 31 30 28 27   ANION GAP mmol/L 6 7 7 11   BUN mg/dL 10 9 10 9   CREATININE mg/dL 0 70 0 57* 0 59* 0 67   GLUCOSE RANDOM mg/dL 102 104 112 100   CALCIUM mg/dL 9 1 9 0 9 0 8 9   AST U/L  --   --   --  23   ALT U/L  --   --   --  14   ALK PHOS U/L  --   --   --  78   TOTAL PROTEIN g/dL  --   --   --  6 6   BILIRUBIN TOTAL mg/dL  --   --   --  0 40   EGFR ml/min/1 73sq m 97 106 104 99         BMP:  Results from last 7 days  Lab Units 04/04/18  0429 04/03/18  0505 04/01/18  0527   SODIUM mmol/L 140 138 139   POTASSIUM mmol/L 4 0 4 1 4 3   CHLORIDE mmol/L 103 101 104   CO2 mmol/L 31 30 28   BUN mg/dL 10 9 10   CREATININE mg/dL 0 70 0 57* 0 59*   GLUCOSE RANDOM mg/dL 102 104 112   CALCIUM mg/dL 9 1 9 0 9 0             Age/Sex: 75 y o  male     Assessment/Plan: Assessment:  Note from 4/3     Principal Problem:    Acute encephalopathy  Active Problems:    Essential hypertension    DORA on CPAP    Decubitus ulcer of heel    Atrial fibrillation with RVR ([facility])    Altered mental status        Plan:     · Acute toxic metabolic encephalopathy-resolved  Patient back to baseline  MRI brain negative for acute pathology  Neurology following  EEG performed, results pending  · AFib with RVR-cardiology following  Discussed with Cardiology, amiodarone will be for better heart rate control  Continue metoprolol, Eliquis  · Status post right femur fracture-5 weeks ago  As per patient's family orthopedics will be consulted to discuss about weight-bearing recommendations for right lower extremity  Patient originally supposed to have appointment with his orthopedic surgeon at [facility] today  · Unstable pressure ulcer of right heel present on admission-in the setting of polio, neuropathy-continue wound care  · BPH-on Flomax           VTE Pharmacologic Prophylaxis:   Pharmacologic: Apixaban (Eliquis)  Mechanical VTE Prophylaxis in Place:  Yes     Patient Centered Rounds: I have performed bedside rounds with nursing staff today      Discussions with Specialists or Other Care Team Provider:  Discussed with Cardiology     Education and Discussions with Family / Patient:  Discussed with patient's family at the bedside     Time Spent for Care: 20 minutes  More than 50% of total time spent on counseling and coordination of care as described above      Current Length of Stay: 3 day(s)     Current Patient Status: Inpatient   Certification Statement: The patient will continue to require additional inpatient hospital stay due to Management of atrial fibrillation     Discharge Plan:  Plan to discharge to rehab next 24-48 hours             Cardiology note  4/4     Assessment and Plan:  Paroxysmal Atrial fibrillation with RVR  -previous echocardiogram 03/26/2018 revealed EF 55-65 percent without regional wall motion abnormality, mild MR, mild TR, estimated peak PA pressure 40 mmHg  -He was switched from digoxin to amiodarone yesterday, currently being loaded with 400 mg BID, heart rates were stable yesterday though increased overnight  -heart would likely improved as amiodarone reaches therapeutic level, however in the meantime will increased frequency of Lopressor to 50 mg every 6 hours  -recent echocardiogram was without remodeling of left atrium, may consider cardioversion tomorrow vs Friday    Will keep the patient NPO after midnight  -TSH slightly elevated but T4 WNL, liver enzymes WNL   -on Eliquis for anticoagulation     Hypertension- BP stable, continue to monitor with adjustment of Lopresor      DORA-on CPAP for which he is compliant

## 2018-04-04 NOTE — PLAN OF CARE
Problem: PHYSICAL THERAPY ADULT  Goal: Performs mobility at highest level of function for planned discharge setting  See evaluation for individualized goals  Treatment/Interventions: Functional transfer training, LE strengthening/ROM, Therapeutic exercise, Endurance training, Patient/family training, Equipment eval/education, Bed mobility, Compensatory technique education, Spoke to nursing, OT, Family          See flowsheet documentation for full assessment, interventions and recommendations  Outcome: Progressing  Prognosis: Good  Problem List: Decreased strength, Decreased range of motion, Decreased endurance, Impaired balance, Decreased mobility, Impaired sensation, Decreased skin integrity, Orthopedic restrictions  Assessment: pt demonatrating progress c PT  less (A) required c functional mobility tasks  completed slide board transfer via beasy board towards L side bed>w/c min (A)x1  (A) required for placement + removal of board from under buttocks  pt maintains NWB R % of the time  initiated w/c mobility on level surface; propelled w/c 50'x2 c rest 2* tachycardia c (S) propelling c L LE bkwds  performed seated A/AAROM ther ex B/L LE x10 reps  will cont skilled PT to further maximize functional mobility + improve quality of life  cont to demonstrate significant deficits in strength, ROM, balance, + activity tolerance  upon d/c, recommend STR  's c functional mobility c mild SOB noted, however pt in no apparent distress  nsg aware  Barriers to Discharge: Inaccessible home environment, Decreased caregiver support     Recommendation: Short-term skilled PT     PT - OK to Discharge: Yes (to STR )    See flowsheet documentation for full assessment

## 2018-04-04 NOTE — PLAN OF CARE
Problem: DISCHARGE PLANNING - CARE MANAGEMENT  Goal: Discharge to post-acute care or home with appropriate resources  INTERVENTIONS:  - Conduct assessment to determine patient/family and health care team treatment goals, and need for post-acute services based on payer coverage, community resources, and patient preferences, and barriers to discharge  - Address psychosocial, clinical, and financial barriers to discharge as identified in assessment in conjunction with the patient/family and health care team  - Arrange appropriate level of post-acute services according to patients   needs and preference and payer coverage in collaboration with the physician and health care team  - Communicate with and update the patient/family, physician, and health care team regarding progress on the discharge plan  - Arrange appropriate transportation to post-acute venues   Outcome: Progressing  CM spoke to Dr Clarence Rodríguez  Pt was started on a new med (amiodorone) due to tachycardia  Pt is a possible d/c for tomorrow pending Hr

## 2018-04-04 NOTE — PHYSICAL THERAPY NOTE
PT Progress Note (38min)  (13:15-13:53)       04/04/18 1353   Pain Assessment   Pain Assessment No/denies pain   Restrictions/Precautions   Weight Bearing Precautions Per Order Yes   RLE Weight Bearing Per Order NWB   Braces or Orthoses LE Braces  (L LE short leg brace)   Other Precautions Fall Risk;Telemetry   General   Chart Reviewed Yes   Response to Previous Treatment Patient with no complaints from previous session  Family/Caregiver Present Yes  (spouse)   Cognition   Orientation Level Oriented X4   Subjective   Subjective pt motivated for PT session  "I'd like to sit up for a bit"  Bed Mobility   Supine to Sit 4  Minimal assistance   Additional items Assist x 1;HOB elevated; Increased time required;Verbal cues;LE management   Transfers   Sliding Board transfer 4  Minimal assistance   Additional items Assist x 1;Verbal cues; Increased time required  (bed>w/c towards L side (beasy board))   Ambulation/Elevation   Gait pattern Not appropriate   Wheelchair Activities   Wheelchair Parts Management Yes  (dependent for brake + leg rest management)   Propulsion Yes   Propulsion Type 1 Manual   Level 1 Level tile   Method 1 Left lower extremity   Level of Assistance 1 Distant supervision   Description/ Details 1 propelled w/c bkwds 50'x2 c rests 2* tachycardia   Balance   Static Sitting Good   Dynamic Sitting Good   Endurance Deficit   Endurance Deficit Yes   Endurance Deficit Description tachycardia c 's   Activity Tolerance   Activity Tolerance Patient limited by fatigue   Nurse Made Aware [facility]  LIBERTAS   Exercises   Quad Sets Sitting;10 reps;AROM; Bilateral   Heelslides Sitting;10 reps;AROM; Bilateral   Knee AROM Long Arc Quad Sitting;10 reps;AAROM; Bilateral   Marching Sitting;10 reps;AROM; Left   Assessment   Prognosis Good   Problem List Decreased strength;Decreased range of motion;Decreased endurance; Impaired balance;Decreased mobility; Impaired sensation;Decreased skin integrity;Orthopedic restrictions Assessment pt demonatrating progress c PT  less (A) required c functional mobility tasks  completed slide board transfer via beasy board towards L side bed>w/c min (A)x1  (A) required for placement + removal of board from under buttocks  pt maintains NWB R % of the time  initiated w/c mobility on level surface; propelled w/c 50'x2 c rest 2* tachycardia c (S) propelling c L LE bkwds  performed seated A/AAROM ther ex B/L LE x10 reps  will cont skilled PT to further maximize functional mobility + improve quality of life  cont to demonstrate significant deficits in strength, ROM, balance, + activity tolerance  upon d/c, recommend STR  's c functional mobility c mild SOB noted, however pt in no apparent distress  nsg aware  Barriers to Discharge Inaccessible home environment;Decreased caregiver support   Goals   Patient Goals "to get stronger"  STG Expiration Date 04/13/18   Treatment Day 2   Plan   Treatment/Interventions Functional transfer training;LE strengthening/ROM; Therapeutic exercise; Endurance training;Patient/family training;Bed mobility; Equipment eval/education;Spoke to nursing   Progress Progressing toward goals   PT Frequency 5x/wk   Recommendation   Recommendation Short-term skilled PT   PT - OK to Discharge Yes  (to STR )     Jayshree Amezcua, PT

## 2018-04-05 LAB
ANION GAP SERPL CALCULATED.3IONS-SCNC: 7 MMOL/L (ref 4–13)
BACTERIA BLD CULT: NORMAL
BACTERIA BLD CULT: NORMAL
BASOPHILS # BLD AUTO: 0.07 THOUSANDS/ΜL (ref 0–0.1)
BASOPHILS NFR BLD AUTO: 1 % (ref 0–1)
BUN SERPL-MCNC: 10 MG/DL (ref 5–25)
CALCIUM SERPL-MCNC: 9.1 MG/DL (ref 8.3–10.1)
CHLORIDE SERPL-SCNC: 104 MMOL/L (ref 100–108)
CO2 SERPL-SCNC: 27 MMOL/L (ref 21–32)
CREAT SERPL-MCNC: 0.66 MG/DL (ref 0.6–1.3)
EOSINOPHIL # BLD AUTO: 0.66 THOUSAND/ΜL (ref 0–0.61)
EOSINOPHIL NFR BLD AUTO: 8 % (ref 0–6)
ERYTHROCYTE [DISTWIDTH] IN BLOOD BY AUTOMATED COUNT: 14.6 % (ref 11.6–15.1)
GFR SERPL CREATININE-BSD FRML MDRD: 99 ML/MIN/1.73SQ M
GLUCOSE SERPL-MCNC: 106 MG/DL (ref 65–140)
HCT VFR BLD AUTO: 34.7 % (ref 36.5–49.3)
HGB BLD-MCNC: 10.7 G/DL (ref 12–17)
LYMPHOCYTES # BLD AUTO: 1.71 THOUSANDS/ΜL (ref 0.6–4.47)
LYMPHOCYTES NFR BLD AUTO: 20 % (ref 14–44)
MCH RBC QN AUTO: 27.4 PG (ref 26.8–34.3)
MCHC RBC AUTO-ENTMCNC: 30.8 G/DL (ref 31.4–37.4)
MCV RBC AUTO: 89 FL (ref 82–98)
MONOCYTES # BLD AUTO: 0.97 THOUSAND/ΜL (ref 0.17–1.22)
MONOCYTES NFR BLD AUTO: 11 % (ref 4–12)
NEUTROPHILS # BLD AUTO: 5.16 THOUSANDS/ΜL (ref 1.85–7.62)
NEUTS SEG NFR BLD AUTO: 60 % (ref 43–75)
NRBC BLD AUTO-RTO: 0 /100 WBCS
PLATELET # BLD AUTO: 432 THOUSANDS/UL (ref 149–390)
PMV BLD AUTO: 8.8 FL (ref 8.9–12.7)
POTASSIUM SERPL-SCNC: 4.2 MMOL/L (ref 3.5–5.3)
RBC # BLD AUTO: 3.91 MILLION/UL (ref 3.88–5.62)
SODIUM SERPL-SCNC: 138 MMOL/L (ref 136–145)
WBC # BLD AUTO: 8.63 THOUSAND/UL (ref 4.31–10.16)

## 2018-04-05 PROCEDURE — 85025 COMPLETE CBC W/AUTO DIFF WBC: CPT | Performed by: INTERNAL MEDICINE

## 2018-04-05 PROCEDURE — 97110 THERAPEUTIC EXERCISES: CPT

## 2018-04-05 PROCEDURE — 97530 THERAPEUTIC ACTIVITIES: CPT

## 2018-04-05 PROCEDURE — 80048 BASIC METABOLIC PNL TOTAL CA: CPT | Performed by: INTERNAL MEDICINE

## 2018-04-05 PROCEDURE — 99232 SBSQ HOSP IP/OBS MODERATE 35: CPT | Performed by: INTERNAL MEDICINE

## 2018-04-05 RX ORDER — METOPROLOL TARTRATE 50 MG/1
50 TABLET, FILM COATED ORAL EVERY 8 HOURS
Status: DISCONTINUED | OUTPATIENT
Start: 2018-04-05 | End: 2018-04-09 | Stop reason: HOSPADM

## 2018-04-05 RX ORDER — METOPROLOL TARTRATE 50 MG/1
50 TABLET, FILM COATED ORAL EVERY 8 HOURS
Status: DISCONTINUED | OUTPATIENT
Start: 2018-04-05 | End: 2018-04-05

## 2018-04-05 RX ADMIN — LORATADINE 10 MG: 10 TABLET ORAL at 08:32

## 2018-04-05 RX ADMIN — BACLOFEN 10 MG: 10 TABLET ORAL at 08:32

## 2018-04-05 RX ADMIN — FLUTICASONE PROPIONATE 1 SPRAY: 50 SPRAY, METERED NASAL at 08:33

## 2018-04-05 RX ADMIN — APIXABAN 5 MG: 5 TABLET, FILM COATED ORAL at 08:32

## 2018-04-05 RX ADMIN — AMIODARONE HYDROCHLORIDE 400 MG: 200 TABLET ORAL at 17:22

## 2018-04-05 RX ADMIN — BACLOFEN 10 MG: 10 TABLET ORAL at 21:06

## 2018-04-05 RX ADMIN — TAMSULOSIN HYDROCHLORIDE 0.4 MG: 0.4 CAPSULE ORAL at 17:22

## 2018-04-05 RX ADMIN — APIXABAN 5 MG: 5 TABLET, FILM COATED ORAL at 17:23

## 2018-04-05 RX ADMIN — METOPROLOL TARTRATE 50 MG: 50 TABLET, FILM COATED ORAL at 17:23

## 2018-04-05 RX ADMIN — ALLOPURINOL 300 MG: 300 TABLET ORAL at 08:32

## 2018-04-05 RX ADMIN — AMIODARONE HYDROCHLORIDE 400 MG: 200 TABLET ORAL at 08:32

## 2018-04-05 RX ADMIN — BACLOFEN 10 MG: 10 TABLET ORAL at 17:23

## 2018-04-05 NOTE — PROGRESS NOTES
[address] Internal Medicine Progress Note  Patient: Aleksandr Brenner 75 y o  male   MRN: [id number]  PCP: Joice Goldmann, DO  Unit/Bed#: -Paige Encounter: [id number]  Date Of Visit: 04/05/18    Assessment:    Principal Problem:    Atrial fibrillation with RVR ([address] )  Active Problems:    Essential hypertension    DORA on CPAP    Decubitus ulcer of heel    Acute encephalopathy    Altered mental status      Plan:  AFib with RVR-heart rate well controlled with the current regimen  But BP is running low  Discussed with Cardiology will decrease the dose of metoprolol to 50 mg q 8 hours  Continue amiodarone and Eliquis  Acute toxic metabolic encephalopathy resolved likely secondary to dehydration  Encourage fluid intake    BPH-on Flomax    Status post right femur fracture 5 weeks ago-nonweightbearing to right lower extremity until patient has been evaluated by his surgeon after discharge        VTE Pharmacologic Prophylaxis:   Pharmacologic: Apixaban (Eliquis)  Mechanical VTE Prophylaxis in Place: Yes    Patient Centered Rounds: I have performed bedside rounds with nursing staff today  Discussions with Specialists or Other Care Team Provider:  Discussed with Cardiology    Education and Discussions with Family / Patient:  Discussed with patient's family at the bedside    Time Spent for Care: 20 minutes  More than 50% of total time spent on counseling and coordination of care as described above  Current Length of Stay: 5 day(s)    Current Patient Status: Inpatient   Certification Statement: The patient will continue to require additional inpatient hospital stay due to Management of AFib/control of of heart rate    Discharge Plan:  Plan to discharge in next 24 hours if heart rate continues to be stable    Code Status: Level 1 - Full Code      Subjective:   Patient seen and examined  Patient currently denies any acute complaints today  Telemetry reviewed heart rate stable between 70s- 80s      Objective:     Vitals: Temp (24hrs), Av 2 °F (36 8 °C), Min:97 9 °F (36 6 °C), Max:98 5 °F (36 9 °C)    HR:  [] 104  Resp:  [18-20] 20  BP: ()/(58-80) 124/69  SpO2:  [93 %-97 %] 97 %  Body mass index is 34 55 kg/m²  Input and Output Summary (last 24 hours): Intake/Output Summary (Last 24 hours) at 18 1344  Last data filed at 18 1353   Gross per 24 hour   Intake              360 ml   Output                0 ml   Net              360 ml       Physical Exam:     -General-awake, alert, oriented  Mucous membranes moist  Heart sounds irregular  Lungs are clear to auscultation no Creps  Abdomen is soft nontender  Trace pedal edema    Additional Data:     Labs:      Results from last 7 days  Lab Units 18  0541   WBC Thousand/uL 8 63   HEMOGLOBIN g/dL 10 7*   HEMATOCRIT % 34 7*   PLATELETS Thousands/uL 432*   NEUTROS PCT % 60   LYMPHS PCT % 20   MONOS PCT % 11   EOS PCT % 8*       Results from last 7 days  Lab Units 18  0541  18  1801   SODIUM mmol/L 138  < > 139   POTASSIUM mmol/L 4 2  < > 4 0   CHLORIDE mmol/L 104  < > 101   CO2 mmol/L 27  < > 27   BUN mg/dL 10  < > 9   CREATININE mg/dL 0 66  < > 0 67   CALCIUM mg/dL 9 1  < > 8 9   TOTAL PROTEIN g/dL  --   --  6 6   BILIRUBIN TOTAL mg/dL  --   --  0 40   ALK PHOS U/L  --   --  78   ALT U/L  --   --  14   AST U/L  --   --  23   GLUCOSE RANDOM mg/dL 106  < > 100   < > = values in this interval not displayed  * I Have Reviewed All Lab Data Listed Above  * Additional Pertinent Lab Tests Reviewed: [address] Admission Reviewed    Imaging:    Recent Cultures (last 7 days):       Results from last 7 days  Lab Units 18  1826 18  1801   BLOOD CULTURE  No Growth After 4 Days  No Growth After 4 Days         Last 24 Hours Medication List:     Current Facility-Administered Medications:  acetaminophen 650 mg Oral Q6H PRN Alka Archer PA-C   allopurinol 300 mg Oral Daily Alka Archer PA-C   amiodarone 400 mg Oral BID With Meals [place], [place]   apixaban 5 mg Oral BID Aniyah Osman PA-C   baclofen 10 mg Oral TID Aniyah Osman PA-C   fluticasone 1 spray Nasal Daily Aniyah Osman PA-C   loperamide 2 mg Oral 4x Daily PRN Aniyah Osman PA-C   loratadine 10 mg Oral Daily Aniyah Osman PA-C   metoprolol 5 mg Intravenous Q6H PRN Nanette Dove MD   metoprolol tartrate 50 mg Oral Q8H Bhavesh Duran MD   ondansetron 4 mg Intravenous Q6H PRN Aniyah Osman PA-C   tamsulosin 0 4 mg Oral Daily With Dinner Aniyah Osman PA-C        Today, Patient Was Seen By: Chaz Castro MD    ** Please Note: Dragon 360 Dictation voice to text software may have been used in the creation of this document   **

## 2018-04-05 NOTE — PHYSICAL THERAPY NOTE
PHYSICAL THERAPY NOTE          Patient Name: Casey Turk  INB[facility]'I Date: 4/5/2018 04/05/18 1514   Pain Assessment   Pain Assessment No/denies pain   Pain Score No Pain   Restrictions/Precautions   Weight Bearing Precautions Per Order Yes   RLE Weight Bearing Per Order NWB  (maintained throughout session)   Braces or Orthoses LE Braces  (LLE LL brace)   Other Precautions Fall Risk;Telemetry; Bed Alarm   General   Chart Reviewed Yes  (cleared for PT by RN)   Additional Pertinent History Was to have cardioversion today, but cancelled due to low BP   Response to Previous Treatment Patient with no complaints from previous session  Family/Caregiver Present Yes  (wife)   Cognition   Overall Cognitive Status WFL   Arousal/Participation Alert; Cooperative;Responsive   Attention Within functional limits   Orientation Level Oriented X4   Memory Within functional limits   Following Commands Follows all commands and directions without difficulty   Comments Agreeable and motivated to pariticipate in therapy session   Subjective   Subjective I want to get moving  I'll do what I can  Bed Mobility   Supine to Sit 4  Minimal assistance   Additional items Assist x 1;Bedrails; Increased time required;Verbal cues;LE management;HOB elevated   Additional Comments Good sequencing, assist mostly with RLE   Transfers   Sit to Stand 2  Maximal assistance  (unable to obtain full upright position)   Additional items Assist x 1; Increased time required;Verbal cues  (RW used, elevated bed)   Stand to Sit 2  Maximal assistance   Additional items Assist x 1; Increased time required;Verbal cues  (RW used)   Additional Comments anxious with attempt to stand  Performed anterior sequencing over base of support for 3 reps with elevatee bed for increased WB on LLE and attempt to stand  Unable with assist of one to obtain upright      Ambulation/Elevation   Gait pattern (unable at this time)   Balance   Static Sitting Normal   Dynamic Sitting Good   Static Standing (unable to obtain upright)   Endurance Deficit   Endurance Deficit Yes   Endurance Deficit Description fatigue with standing attempts   Activity Tolerance   Activity Tolerance Patient limited by fatigue   Nurse Made Aware RN aware of session   Exercises   Hamstring Stretch Supine;5 reps;PROM; Bilateral  (20 sec hold)   Heelslides Supine;AAROM;AROM; Bilateral;15 reps  (x 2 sets)   Hip Abduction Supine;10 reps;AAROM;AROM; Bilateral  (x 2 sets)   Hip Extension Supine;15 reps;Bilateral  (manual resistance, x 2 sets)   Hip Adduction Supine;10 reps;AAROM; Bilateral  (x 2 sets)   Knee AROM Short Arc Quad Supine;10 reps;AAROM; Bilateral  (x 2 sets)   Knee Flexion Stretch Supine;Sitting;10 reps;PROM; Right  (5 sec hold end range)   Knee AROM Long Arc Quad Sitting;10 reps;AAROM; Bilateral  (x 2 sets)   Ankle Pumps Supine;Bilateral;AROM;15 reps   Heel Cord Stretch Supine;5 reps;PROM; Bilateral  (20 sec hold)   Balance training  Sitting at EOB scooting side to side for 2 sets  Close supervision  Equipment Use   Comments Also hip AB/AD, AAROM bilat, hip ER/IR bilat AAROM,    Assessment   Prognosis Good   Problem List Decreased strength;Decreased range of motion;Decreased endurance; Impaired balance;Decreased mobility; Impaired sensation;Decreased skin integrity;Orthopedic restrictions   Assessment Patient very motivated  Good tolerance to session  'This feels good  I am ready to do more  I feel stronger "  Patient HR ranged from 90's to 120's during session  No pain or c/o  Mild fatigue with exer  Barriers to Discharge Inaccessible home environment;Decreased caregiver support   Goals   Patient Goals get stronger, get heart under control, go to STR, go home   STG Expiration Date 04/13/18   Treatment Day 3   Plan   Treatment/Interventions Functional transfer training;LE strengthening/ROM; Therapeutic exercise; Endurance training;Patient/family training;Equipment eval/education; Bed mobility;Spoke to nursing   Progress Progressing toward goals   PT Frequency 5x/wk   Recommendation   Recommendation Short-term skilled PT;Post acute IP rehab   Equipment Recommended (TBD at STR)   PT - OK to Discharge Yes  (to STR when medically stable)     Roxana Kramer, PT

## 2018-04-05 NOTE — PHYSICAL THERAPY NOTE
Physical Therapy Cancellation Note      Patient scheduled for cardioversion later today  Will follow      Kobi Rashid, PT

## 2018-04-05 NOTE — PLAN OF CARE
Problem: PHYSICAL THERAPY ADULT  Goal: Performs mobility at highest level of function for planned discharge setting  See evaluation for individualized goals  Treatment/Interventions: Functional transfer training, LE strengthening/ROM, Therapeutic exercise, Endurance training, Patient/family training, Equipment eval/education, Bed mobility, Compensatory technique education, Spoke to nursing, OT, Family          See flowsheet documentation for full assessment, interventions and recommendations  Outcome: Progressing  Prognosis: Good  Problem List: Decreased strength, Decreased range of motion, Decreased endurance, Impaired balance, Decreased mobility, Impaired sensation, Decreased skin integrity, Orthopedic restrictions  Assessment: Patient very motivated  Good tolerance to session  'This feels good  I am ready to do more  I feel stronger "  Patient HR ranged from 90's to 120's during session  No pain or c/o  Mild fatigue with exer  Barriers to Discharge: Inaccessible home environment, Decreased caregiver support     Recommendation: Short-term skilled PT, Post acute IP rehab     PT - OK to Discharge: Yes (to STR when medically stable)    See flowsheet documentation for full assessment

## 2018-04-05 NOTE — PROGRESS NOTES
[facility] Internal Medicine Progress Note  Patient: Donal Mtz 75 y o  male   MRN: [id number]  PCP: Leia Cali DO  Unit/Bed#: -Paige Encounter: [id number]  Date Of Visit: 18    Assessment:    Principal Problem:    Acute encephalopathy  Active Problems:    Essential hypertension    DORA on CPAP    Decubitus ulcer of heel    Atrial fibrillation with RVR ([address] )    Altered mental status      Plan:    · Acute toxic metabolic encephalopathy resolved  Likely secondary to dehydration/metabolic  · AFib with RVR heart rate stable  Continue amiodarone, metoprolol, Eliquis cardiology following  · Status post right femur fracture 5 weeks ago-ortho recommendations appreciated nonweightbearing to right lower extremity until he has been evaluated by his surgeon after discharge  · BPH on Flomax      VTE Pharmacologic Prophylaxis:   Pharmacologic: Warfarin (Coumadin)  Mechanical VTE Prophylaxis in Place: Yes    Patient Centered Rounds: I have performed bedside rounds with nursing staff today  Discussions with Specialists or Other Care Team Provider:  Cardiology    Education and Discussions with Family / Patient:  Family and patient    Time Spent for Care: 20 minutes  More than 50% of total time spent on counseling and coordination of care as described above  Current Length of Stay: 4 day(s)    Current Patient Status: Inpatient   Certification Statement: The patient will continue to require additional inpatient hospital stay due to Control of heart rate    Discharge Plan:  Likely to short-term rehab in 24 hours    Code Status: Level 1 - Full Code      Subjective:   Patient seen and examined    Denies any acute complaints today No acute events on telemetry except for mild increase in heart rate early this morning    Objective:     Vitals:   Temp (24hrs), Av 4 °F (36 9 °C), Min:98 °F (36 7 °C), Max:98 6 °F (37 °C)    HR:  [] 94  Resp:  [16-18] 18  BP: ()/(59-80) 92/66  SpO2:  [94 %-97 %] 94 %  Body mass index is 34 55 kg/m²  Input and Output Summary (last 24 hours): Intake/Output Summary (Last 24 hours) at 04/04/18 2013  Last data filed at 04/04/18 1353   Gross per 24 hour   Intake              600 ml   Output              400 ml   Net              200 ml       Physical Exam:   IV, alert, oriented  Mucous membranes moist  Lungs are clear no Creps  Heart sounds irregular  Abdomen soft nontender  Extremities are right knee incision appears stable next      Additional Data:     Labs:      Results from last 7 days  Lab Units 04/04/18  0429   WBC Thousand/uL 7 42   HEMOGLOBIN g/dL 10 8*   HEMATOCRIT % 35 3*   PLATELETS Thousands/uL 478*   NEUTROS PCT % 59   LYMPHS PCT % 21   MONOS PCT % 9   EOS PCT % 9*       Results from last 7 days  Lab Units 04/04/18  0429  03/31/18  1801   SODIUM mmol/L 140  < > 139   POTASSIUM mmol/L 4 0  < > 4 0   CHLORIDE mmol/L 103  < > 101   CO2 mmol/L 31  < > 27   BUN mg/dL 10  < > 9   CREATININE mg/dL 0 70  < > 0 67   CALCIUM mg/dL 9 1  < > 8 9   TOTAL PROTEIN g/dL  --   --  6 6   BILIRUBIN TOTAL mg/dL  --   --  0 40   ALK PHOS U/L  --   --  78   ALT U/L  --   --  14   AST U/L  --   --  23   GLUCOSE RANDOM mg/dL 102  < > 100   < > = values in this interval not displayed  * I Have Reviewed All Lab Data Listed Above  * Additional Pertinent Lab Tests Reviewed: [facility] Reviewed    Imaging:        Recent Cultures (last 7 days):       Results from last 7 days  Lab Units 03/31/18  1826 03/31/18  1801   BLOOD CULTURE  No Growth at 72 hrs  No Growth at 72 hrs         Last 24 Hours Medication List:     Current Facility-Administered Medications:  acetaminophen 650 mg Oral Q6H PRN Aicha Rice PA-C   allopurinol 300 mg Oral Daily Aicha Rice PA-C   amiodarone 400 mg Oral BID With Meals Caty Manuel PA-C   apixaban 5 mg Oral BID Aicha Rice PA-C   baclofen 10 mg Oral TID Aicha Rice PA-C   fluticasone 1 spray Nasal Daily Bucky Barry PA-C   loperamide 2 mg Oral 4x Daily PRN Bucky Barry PA-C   loratadine 10 mg Oral Daily Bucky Barry PA-C   metoprolol 5 mg Intravenous Q6H PRN Shakeel Rivera MD   metoprolol tartrate 50 mg Oral Q6H Caty Manuel PA-C   ondansetron 4 mg Intravenous Q6H PRN Bucky Barry PA-C   tamsulosin 0 4 mg Oral Daily With Dinner Bucky Barry PA-C        Today, Patient Was Seen By: Gina Gunter MD    ** Please Note: Dragon 360 Dictation voice to text software may have been used in the creation of this document   **

## 2018-04-05 NOTE — PROGRESS NOTES
Progress Note - Cardiology     Fariha Mast 75 y o  male MRN: [id number]  Unit/Bed#: -Paige Encounter: [id number]      Subjective:   Patient reports that he is feeling well  He had been working with PT  Heart rates were stable overnight  He denies palpitations, dizziness, chest pain, or SOB  Objective:   Vitals:  Vitals:    04/05/18 1100   BP: 124/69   Pulse: 104   Resp: 20   Temp: 98 4 °F (36 9 °C)   SpO2: 97%       Body mass index is 34 55 kg/m²  Systolic (48KIL), TTV:925 , Min:92 , TOR:431     Diastolic (57BCS), OJB:39, Min:58, Max:80      Intake/Output Summary (Last 24 hours) at 04/05/18 1229  Last data filed at 04/04/18 1353   Gross per 24 hour   Intake              360 ml   Output                0 ml   Net              360 ml     Weight (last 2 days)     None          PHYSICAL EXAM:  General: Patient is not in acute distress  Laying comfortably in the bed  Awake, alert, responding to commands  Head: Normocephalic  Atraumatic  HEENT: Both pupils normal sized, round and reactive to light  Nonicteric  Neck: JVP not raised  Trachea central   Respiratory: Bilateral bronchovascular breath sounds with no crackles or rhonchi  Cardiovascular:  Irregular  S1 and S2 normal  No murmur, rub or gallop  GI: Abdomen soft, nontender  No guarding or rigidity  L  Neurologic: Patient is awake, alert, responding to command    Integumentary:  No skin rash  Extremities: No clubbing, cyanosis or edema    LABORATORY RESULTS:    Results from last 7 days  Lab Units 03/31/18  1801   TROPONIN I ng/mL <0 02     CBC with diff:   Results from last 7 days  Lab Units 04/05/18  0541 04/04/18  0429 04/03/18  0505   WBC Thousand/uL 8 63 7 42 6 95   HEMOGLOBIN g/dL 10 7* 10 8* 11 9*   HEMATOCRIT % 34 7* 35 3* 38 4   MCV fL 89 89 88   PLATELETS Thousands/uL 432* 478* 463*   MCH pg 27 4 27 1 27 4   MCHC g/dL 30 8* 30 6* 31 0*   RDW % 14 6 14 4 14 3   MPV fL 8 8* 8 8* 9 0   NRBC AUTO /100 WBCs 0 0 0       CMP:  Results from last 7 days  Lab Units 18  0541 18  0429 18  0505  18  1801   SODIUM mmol/L 138 140 138  < > 139   POTASSIUM mmol/L 4 2 4 0 4 1  < > 4 0   CHLORIDE mmol/L 104 103 101  < > 101   CO2 mmol/L 27 31 30  < > 27   ANION GAP mmol/L 7 6 7  < > 11   BUN mg/dL 10 10 9  < > 9   CREATININE mg/dL 0 66 0 70 0 57*  < > 0 67   GLUCOSE RANDOM mg/dL 106 102 104  < > 100   CALCIUM mg/dL 9 1 9 1 9 0  < > 8 9   AST U/L  --   --   --   --  23   ALT U/L  --   --   --   --  14   ALK PHOS U/L  --   --   --   --  78   TOTAL PROTEIN g/dL  --   --   --   --  6 6   BILIRUBIN TOTAL mg/dL  --   --   --   --  0 40   EGFR ml/min/1 73sq m 99 97 106  < > 99   < > = values in this interval not displayed      BMP:  Results from last 7 days  Lab Units 18  0541 18  0429 18  0505   SODIUM mmol/L 138 140 138   POTASSIUM mmol/L 4 2 4 0 4 1   CHLORIDE mmol/L 104 103 101   CO2 mmol/L 27 31 30   BUN mg/dL 10 10 9   CREATININE mg/dL 0 66 0 70 0 57*   GLUCOSE RANDOM mg/dL 106 102 104   CALCIUM mg/dL 9 1 9 1 9 0         Results from last 7 days  Lab Units 18  1801   NT-PRO BNP pg/mL 1,192*          Results from last 7 days  Lab Units 18  1801 18  0517   MAGNESIUM mg/dL 1 9 2 2               Results from last 7 days  Lab Units 18  1801   TSH 3RD GENERATON uIU/mL 5 674*             Lipid Profile:   Lab Results   Component Value Date    CHOL 193 2017     Lab Results   Component Value Date    HDL 47 2017       Cardiac testing:   Results for orders placed during the hospital encounter of 18   Echo complete with contrast if indicated    Narrative [address] [place] [id number]  [phone]    Transthoracic Echocardiogram  2D, M-mode, Doppler, and Color Doppler    Study date:  26-Mar-2018    Patient: Melissa Barrios  MR number: [id number]  Account number: [de-identified]  : 1950  Age: 75 years  Gender: Male  Status: Inpatient  Location: Bedside  Height: 66 in  Weight: 224 lb  BP: 124/ 68 mmHg    Indications: Atrial Fibrillation    Diagnoses: I48 0 - Atrial fibrillation    Sonographer:  AKILA Gusman,RDCS  Interpreting Physician:  Valentino Hipps, MD  Referring Physician:  Randy Dunbar PA-C  Group:  [facility]    SUMMARY    LEFT VENTRICLE:  Systolic function was normal  Ejection fraction was estimated in the range of 55 % to 65 %  There were no regional wall motion abnormalities  MITRAL VALVE:  There was mild regurgitation  TRICUSPID VALVE:  There was mild regurgitation  Estimated peak PA pressure was 40 mmHg  HISTORY: PRIOR HISTORY: Atrial Fibrillation, Hypertension, Mixed Hyperlipidemia, Hypokalemia    PROCEDURE: The procedure was performed at the bedside  This was a routine study  The transthoracic approach was used  The study included complete 2D imaging, M-mode, complete spectral Doppler, and color Doppler  The heart rate was 76 bpm,  at the start of the study  Images were obtained from the parasternal, apical, subcostal, and suprasternal notch acoustic windows  Image quality was adequate  LEFT VENTRICLE: Size was normal  Systolic function was normal  Ejection fraction was estimated in the range of 55 % to 65 %  There were no regional wall motion abnormalities  Wall thickness was normal  DOPPLER: Left ventricular diastolic  function parameters were abnormal     RIGHT VENTRICLE: The size was normal  Systolic function was normal  Wall thickness was normal     LEFT ATRIUM: Size was normal     RIGHT ATRIUM: Size was normal     MITRAL VALVE: Valve structure was normal  There was normal leaflet separation  DOPPLER: The transmitral velocity was within the normal range  There was no evidence for stenosis  There was mild regurgitation  AORTIC VALVE: The valve was trileaflet  Leaflets exhibited normal thickness and normal cuspal separation  DOPPLER: Transaortic velocity was minimally increased   There was no evidence for stenosis  There was no regurgitation  TRICUSPID VALVE: The valve structure was normal  There was normal leaflet separation  DOPPLER: The transtricuspid velocity was within the normal range  There was no evidence for stenosis  There was mild regurgitation  Estimated peak PA  pressure was 40 mmHg  PULMONIC VALVE: Leaflets exhibited normal thickness, no calcification, and normal cuspal separation  DOPPLER: The transpulmonic velocity was within the normal range  There was no regurgitation  PERICARDIUM: There was no pericardial effusion  The pericardium was normal in appearance  AORTA: The root exhibited normal size  SYSTEMIC VEINS: IVC: The inferior vena cava was normal in size and course  Respirophasic changes were normal     SYSTEM MEASUREMENT TABLES    2D  %FS: 25 2 %  Ao Diam: 2 7 cm  EDV(Teich): 83 6 ml  EF(Teich): 50 %  ESV(Teich): 41 8 ml  IVSd: 1 cm  LA Area: 23 9 cm2  LA Diam: 3 7 cm  LVEDV MOD A4C: 54 7 ml  LVEF MOD A4C: 63 9 %  LVESV MOD A4C: 19 8 ml  LVIDd: 4 3 cm  LVIDs: 3 2 cm  LVLd A4C: 6 8 cm  LVLs A4C: 6 cm  LVOT Diam: 1 9 cm  LVPWd: 1 cm  RA Area: 20 6 cm2  RVIDd: 3 4 cm  SV MOD A4C: 35 ml  SV(Teich): 41 8 ml    CW  AV Env  Ti: 213 1 ms  AV VTI: 29 7 cm  AV Vmax: 2 1 m/s  AV Vmean: 1 4 m/s  AV maxP 7 mmHg  AV meanP 1 mmHg  TR Vmax: 3 3 m/s  TR maxP mmHg    MM  TAPSE: 2 1 cm    PW  SALUD (VTI): 2 cm2  SALUD Vmax: 1 8 cm2  LVOT Env  Ti: 262 6 ms  LVOT VTI: 20 4 cm  LVOT Vmax: 1 3 m/s  LVOT Vmean: 0 8 m/s  LVOT maxP 5 mmHg  LVOT meanP 9 mmHg  LVSV Dopp: 59 3 ml    [employer] Accredited Echocardiography Laboratory    Prepared and electronically signed by    Nanette Dove MD  Signed 26-Mar-2018 18:09:34           Meds/Allergies   all current active meds have been reviewed  Prescriptions Prior to Admission   Medication    allopurinol (ZYLOPRIM) 300 mg tablet    apixaban (ELIQUIS) 5 mg    digoxin (LANOXIN) 0 125 mg tablet    metoprolol tartrate (LOPRESSOR) 50 mg tablet    baclofen 10 mg tablet    colchicine (COLCRYS) 0 6 mg tablet    fluticasone (FLONASE) 50 mcg/act nasal spray    loperamide (IMODIUM) 2 mg capsule    loratadine (CLARITIN) 10 mg tablet    meclizine (ANTIVERT) 12 5 MG tablet    Methylprednisolone 4 MG TBPK    miconazole 2 % cream    omeprazole (PriLOSEC) 20 mg delayed release capsule    tamsulosin (FLOMAX) 0 4 mg              Assessment and Plan:  Paroxysmal Atrial fibrillation with RVR  -previous echocardiogram 03/26/2018 revealed EF 55-65 percent without regional wall motion abnormality, mild MR, mild TR, estimated peak PA pressure 40 mmHg  -He failed digoxin + lopressor regimen and is currently being loaded with amiodarone with 400 mg BID   -heart rates much better overnight, no significant elevations even with activity, in light of this will hold off on CESAR/cardioversion  -Continue Lopressor to 50 mg, however reduce frequency to every 8 hours and monitor   -on Eliquis for anticoagulation     Hypertension- BP soft, frequency of lopressor decreased      DORA-on CPAP for which he is compliant       ** Please Note: Dragon 360 Dictation voice to text software may have been used in the creation of this document   **

## 2018-04-06 ENCOUNTER — APPOINTMENT (INPATIENT)
Dept: INTERVENTIONAL RADIOLOGY/VASCULAR | Facility: HOSPITAL | Age: 68
DRG: 308 | End: 2018-04-06
Payer: MEDICARE

## 2018-04-06 PROCEDURE — 99232 SBSQ HOSP IP/OBS MODERATE 35: CPT | Performed by: INTERNAL MEDICINE

## 2018-04-06 PROCEDURE — 93312 ECHO TRANSESOPHAGEAL: CPT

## 2018-04-06 PROCEDURE — 92960 CARDIOVERSION ELECTRIC EXT: CPT | Performed by: INTERNAL MEDICINE

## 2018-04-06 PROCEDURE — 92960 CARDIOVERSION ELECTRIC EXT: CPT | Performed by: PHYSICIAN ASSISTANT

## 2018-04-06 RX ORDER — DOCUSATE SODIUM 100 MG/1
100 CAPSULE, LIQUID FILLED ORAL 2 TIMES DAILY
Status: DISCONTINUED | OUTPATIENT
Start: 2018-04-06 | End: 2018-04-09 | Stop reason: HOSPADM

## 2018-04-06 RX ORDER — POLYETHYLENE GLYCOL 3350 17 G/17G
17 POWDER, FOR SOLUTION ORAL DAILY
Status: DISCONTINUED | OUTPATIENT
Start: 2018-04-06 | End: 2018-04-09 | Stop reason: HOSPADM

## 2018-04-06 RX ORDER — PROPOFOL 10 MG/ML
INJECTION, EMULSION INTRAVENOUS AS NEEDED
Status: DISCONTINUED | OUTPATIENT
Start: 2018-04-06 | End: 2018-04-06 | Stop reason: SURG

## 2018-04-06 RX ORDER — SENNOSIDES 8.6 MG
1 TABLET ORAL
Status: DISCONTINUED | OUTPATIENT
Start: 2018-04-06 | End: 2018-04-09 | Stop reason: HOSPADM

## 2018-04-06 RX ORDER — SODIUM CHLORIDE, SODIUM LACTATE, POTASSIUM CHLORIDE, CALCIUM CHLORIDE 600; 310; 30; 20 MG/100ML; MG/100ML; MG/100ML; MG/100ML
INJECTION, SOLUTION INTRAVENOUS CONTINUOUS PRN
Status: DISCONTINUED | OUTPATIENT
Start: 2018-04-06 | End: 2018-04-06 | Stop reason: SURG

## 2018-04-06 RX ADMIN — AMIODARONE HYDROCHLORIDE 400 MG: 200 TABLET ORAL at 17:07

## 2018-04-06 RX ADMIN — PROPOFOL 50 MG: 10 INJECTION, EMULSION INTRAVENOUS at 12:20

## 2018-04-06 RX ADMIN — SENNOSIDES 8.6 MG: 8.6 TABLET, FILM COATED ORAL at 21:16

## 2018-04-06 RX ADMIN — DOCUSATE SODIUM 100 MG: 100 CAPSULE, LIQUID FILLED ORAL at 17:08

## 2018-04-06 RX ADMIN — PROPOFOL 30 MG: 10 INJECTION, EMULSION INTRAVENOUS at 12:15

## 2018-04-06 RX ADMIN — APIXABAN 5 MG: 5 TABLET, FILM COATED ORAL at 17:08

## 2018-04-06 RX ADMIN — SODIUM CHLORIDE, SODIUM LACTATE, POTASSIUM CHLORIDE, AND CALCIUM CHLORIDE: .6; .31; .03; .02 INJECTION, SOLUTION INTRAVENOUS at 12:04

## 2018-04-06 RX ADMIN — PROPOFOL 120 MG: 10 INJECTION, EMULSION INTRAVENOUS at 12:12

## 2018-04-06 RX ADMIN — TOPICAL ANESTHETIC 1 SPRAY: 200 SPRAY DENTAL; PERIODONTAL at 12:10

## 2018-04-06 RX ADMIN — TAMSULOSIN HYDROCHLORIDE 0.4 MG: 0.4 CAPSULE ORAL at 17:07

## 2018-04-06 RX ADMIN — PROPOFOL 50 MG: 10 INJECTION, EMULSION INTRAVENOUS at 12:13

## 2018-04-06 RX ADMIN — BACLOFEN 10 MG: 10 TABLET ORAL at 17:08

## 2018-04-06 RX ADMIN — METOPROLOL TARTRATE 50 MG: 50 TABLET, FILM COATED ORAL at 17:08

## 2018-04-06 RX ADMIN — DOCUSATE SODIUM 100 MG: 100 CAPSULE, LIQUID FILLED ORAL at 13:17

## 2018-04-06 RX ADMIN — POLYETHYLENE GLYCOL (3350) 17 G: 17 POWDER, FOR SOLUTION ORAL at 13:17

## 2018-04-06 RX ADMIN — BACLOFEN 10 MG: 10 TABLET ORAL at 21:16

## 2018-04-06 NOTE — OCCUPATIONAL THERAPY NOTE
Occupational Therapy Cancellation Note        Patient Name: Tati Duke  NFGDR'U Date: 4/6/2018            Pt off floor at IR for procedure  Will follow up as appropriate

## 2018-04-06 NOTE — ANESTHESIA PREPROCEDURE EVALUATION
Review of Systems/Medical History  Patient summary reviewed  Chart reviewed  No history of anesthetic complications     Cardiovascular  Exercise tolerance: poor,  Hyperlipidemia, Hypertension , Dysrhythmias, atrial fibrillation, CHF ,    Pulmonary  Sleep apnea ,        GI/Hepatic    No GERD ,             Endo/Other    Obesity  morbid obesity   GYN       Hematology   Musculoskeletal    Arthritis     Neurology   Psychology           Physical Exam    Airway    Mallampati score:  I  TM Distance: >3 FB  Neck ROM: full     Dental   Comment: Multiple caps,     Cardiovascular      Pulmonary      Other Findings      Lab Results   Component Value Date    WBC 8 63 04/05/2018    HGB 10 7 (L) 04/05/2018     (H) 04/05/2018     Lab Results   Component Value Date     04/05/2018    K 4 2 04/05/2018    BUN 10 04/05/2018    CREATININE 0 66 04/05/2018    GLUCOSE 106 04/05/2018     Lab Results   Component Value Date    PTT 50 (H) 03/24/2018      Lab Results   Component Value Date    INR 1 73 (H) 03/24/2018     Scheduled Meds:  Current Facility-Administered Medications:  acetaminophen 650 mg Oral Q6H PRN Mary Lou Bray, PA-C   allopurinol 300 mg Oral Daily Mary Lou Bray, PA-C   amiodarone 400 mg Oral BID With [employer], PA-C   apixaban 5 mg Oral BID Mary Lou Bray, PA-C   baclofen 10 mg Oral TID Mary Lou Bray, PA-C   docusate sodium 100 mg Oral BID Rosenda Feldman MD   fluticasone 1 spray Nasal Daily Mary Lou Bray, PA-C   loperamide 2 mg Oral 4x Daily PRN Mary Lou Bray, PA-C   loratadine 10 mg Oral Daily Mary Lou Bray, PA-C   metoprolol 5 mg Intravenous Q6H PRN Veronica Blank MD   metoprolol tartrate 50 mg Oral Q8H Bhavesh Duran MD   ondansetron 4 mg Intravenous Q6H PRN Mary Lou Bray, PA-C   polyethylene glycol 17 g Oral Daily Rosenda Feldman MD   senna 1 tablet Oral HS Rosenda Feldman MD   tamsulosin 0 4 mg Oral Daily With [employer] , PA-C     Continuous Infusions: PRN Meds:   acetaminophen    loperamide    metoprolol    ondansetron      No results found for: HGBA1C    LEFT VENTRICLE:  Systolic function was normal  Ejection fraction was estimated in the range of 55 % to 65 %  There were no regional wall motion abnormalities      MITRAL VALVE:  There was mild regurgitation      TRICUSPID VALVE:  There was mild regurgitation  Estimated peak PA pressure was 40 mmHg  LEFT ATRIUM: Size was normal      RIGHT ATRIUM: Size was normal      MITRAL VALVE: Valve structure was normal  There was normal leaflet separation  DOPPLER: The transmitral velocity was within the normal range  There was no evidence for stenosis  There was mild regurgitation      AORTIC VALVE: The valve was trileaflet  Leaflets exhibited normal thickness and normal cuspal separation  DOPPLER: Transaortic velocity was minimally increased  There was no evidence for stenosis  There was no regurgitation      TRICUSPID VALVE: The valve structure was normal  There was normal leaflet separation  DOPPLER: The transtricuspid velocity was within the normal range  There was no evidence for stenosis  There was mild regurgitation  Estimated peak PA  pressure was 40 mmHg      PULMONIC VALVE: Leaflets exhibited normal thickness, no calcification, and normal cuspal separation  DOPPLER: The transpulmonic velocity was within the normal range  There was no regurgitation      PERICARDIUM: There was no pericardial effusion  The pericardium was normal in appearance      AORTA: The root exhibited normal size      SYSTEMIC VEINS: IVC: The inferior vena cava was normal in size and course  Respirophasic changes were normal        Anesthesia Plan  ASA Score- 3     Anesthesia Type- IV sedation with anesthesia with ASA Monitors  Additional Monitors:   Airway Plan:     Comment: I, Dr Grabiel Harrison, the attending physician, has personally seen and evaluated the patient prior to anesthetic care    I have reviewed the pre-anesthetic record, and other medical records if appropriate to the anesthetic care  Risks and benefits discussed with patient; patient consented and agrees to proceed  If a CRNA is involved in the case, I have reviewed the CRNA assessment, if present, and agree  The patient is in a suitable condition to proceed with my formulated anesthetic plan        Plan Factors-  Patient did not smoke on day of surgery  Induction- intravenous  Postoperative Plan-     Informed Consent- Anesthetic plan and risks discussed with patient  I personally reviewed this patient with the CRNA  Discussed and agreed on the Anesthesia Plan with the CRNA  Nan Loredo

## 2018-04-06 NOTE — PROGRESS NOTES
Progress Note - Cardiology     Leonor Ferrari 75 y o  male MRN: [id number]  Unit/Bed#: -01 Encounter: [id number]      Subjective:   Patient had physical therapy yesterday, at that time his heart rates did elevate to 140's-150's and were elevated this morning to 120's  Objective:   Vitals:  Vitals:    04/06/18 1250   BP: 106/57   Pulse: 102   Resp: 20   Temp:    SpO2: 97%       Body mass index is 34 55 kg/m²  Systolic (53TKS), PEM:699 , Min:101 , BFY:778     Diastolic (79HEL), KHE:05, Min:53, Max:78      Intake/Output Summary (Last 24 hours) at 04/06/18 1300  Last data filed at 04/06/18 1221   Gross per 24 hour   Intake              800 ml   Output              675 ml   Net              125 ml     Weight (last 2 days)     None          PHYSICAL EXAM:  General: Patient is not in acute distress  Laying comfortably in the bed  Awake, alert, responding to commands  Head: Normocephalic  Atraumatic  HEENT: Both pupils normal sized, round and reactive to light  Nonicteric  Neck: JVP not raised  Trachea central   Respiratory: Bilateral bronchovascular breath sounds with no crackles or rhonchi  Cardiovascular: Irregular  S1 and S2 normal  No murmur, rub or gallop  GI: Abdomen soft, nontender  No guarding or rigidity  Neurologic: Patient is awake, alert, responding to command   Moving all extremities  Integumentary:  No skin rash  Extremities: No clubbing, cyanosis or edema    LABORATORY RESULTS:    Results from last 7 days  Lab Units 03/31/18  1801   TROPONIN I ng/mL <0 02     CBC with diff:   Results from last 7 days  Lab Units 04/05/18  0541 04/04/18  0429 04/03/18  0505   WBC Thousand/uL 8 63 7 42 6 95   HEMOGLOBIN g/dL 10 7* 10 8* 11 9*   HEMATOCRIT % 34 7* 35 3* 38 4   MCV fL 89 89 88   PLATELETS Thousands/uL 432* 478* 463*   MCH pg 27 4 27 1 27 4   MCHC g/dL 30 8* 30 6* 31 0*   RDW % 14 6 14 4 14 3   MPV fL 8 8* 8 8* 9 0   NRBC AUTO /100 WBCs 0 0 0       CMP:  Results from last 7 days  Lab Units 18  0541 18  0429 18  0505  18  1801   SODIUM mmol/L 138 140 138  < > 139   POTASSIUM mmol/L 4 2 4 0 4 1  < > 4 0   CHLORIDE mmol/L 104 103 101  < > 101   CO2 mmol/L 27 31 30  < > 27   ANION GAP mmol/L 7 6 7  < > 11   BUN mg/dL 10 10 9  < > 9   CREATININE mg/dL 0 66 0 70 0 57*  < > 0 67   GLUCOSE RANDOM mg/dL 106 102 104  < > 100   CALCIUM mg/dL 9 1 9 1 9 0  < > 8 9   AST U/L  --   --   --   --  23   ALT U/L  --   --   --   --  14   ALK PHOS U/L  --   --   --   --  78   TOTAL PROTEIN g/dL  --   --   --   --  6 6   BILIRUBIN TOTAL mg/dL  --   --   --   --  0 40   EGFR ml/min/1 73sq m 99 97 106  < > 99   < > = values in this interval not displayed      BMP:  Results from last 7 days  Lab Units 18  0541 18  0429 18  0505   SODIUM mmol/L 138 140 138   POTASSIUM mmol/L 4 2 4 0 4 1   CHLORIDE mmol/L 104 103 101   CO2 mmol/L 27 31 30   BUN mg/dL 10 10 9   CREATININE mg/dL 0 66 0 70 0 57*   GLUCOSE RANDOM mg/dL 106 102 104   CALCIUM mg/dL 9 1 9 1 9 0         Results from last 7 days  Lab Units 18  1801   NT-PRO BNP pg/mL 1,192*          Results from last 7 days  Lab Units 18  1801   MAGNESIUM mg/dL 1 9               Results from last 7 days  Lab Units 18  1801   TSH 3RD GENERATON uIU/mL 5 674*             Lipid Profile:   Lab Results   Component Value Date    CHOL 193 2017     Lab Results   Component Value Date    HDL 47 2017     No results found for: LDLCALC  No results found for: TRIG    Cardiac testing:   Results for orders placed during the hospital encounter of 18   Echo complete with contrast if indicated    Narrative [address] [place], [place] [address] [phone]    Transthoracic Echocardiogram  2D, M-mode, Doppler, and Color Doppler    Study date:  26-Mar-2018    Patient: Zak Peres  MR number: [id number]  Account number: [de-identified]  : 1950  Age: 75 years  Gender: Male  Status: Inpatient  Location: Bedside  Height: 66 in  Weight: 224 lb  BP: 124/ 68 mmHg    Indications: Atrial Fibrillation    Diagnoses: I48 0 - Atrial fibrillation    Sonographer:  AKILA Hazel,RDCS  Interpreting Physician:  Sharan West MD  Referring Physician:  Vik Chase PA-C  Group:  [facility]    SUMMARY    LEFT VENTRICLE:  Systolic function was normal  Ejection fraction was estimated in the range of 55 % to 65 %  There were no regional wall motion abnormalities  MITRAL VALVE:  There was mild regurgitation  TRICUSPID VALVE:  There was mild regurgitation  Estimated peak PA pressure was 40 mmHg  HISTORY: PRIOR HISTORY: Atrial Fibrillation, Hypertension, Mixed Hyperlipidemia, Hypokalemia    PROCEDURE: The procedure was performed at the bedside  This was a routine study  The transthoracic approach was used  The study included complete 2D imaging, M-mode, complete spectral Doppler, and color Doppler  The heart rate was 76 bpm,  at the start of the study  Images were obtained from the parasternal, apical, subcostal, and suprasternal notch acoustic windows  Image quality was adequate  LEFT VENTRICLE: Size was normal  Systolic function was normal  Ejection fraction was estimated in the range of 55 % to 65 %  There were no regional wall motion abnormalities  Wall thickness was normal  DOPPLER: Left ventricular diastolic  function parameters were abnormal     RIGHT VENTRICLE: The size was normal  Systolic function was normal  Wall thickness was normal     LEFT ATRIUM: Size was normal     RIGHT ATRIUM: Size was normal     MITRAL VALVE: Valve structure was normal  There was normal leaflet separation  DOPPLER: The transmitral velocity was within the normal range  There was no evidence for stenosis  There was mild regurgitation  AORTIC VALVE: The valve was trileaflet  Leaflets exhibited normal thickness and normal cuspal separation   DOPPLER: Transaortic velocity was minimally increased  There was no evidence for stenosis  There was no regurgitation  TRICUSPID VALVE: The valve structure was normal  There was normal leaflet separation  DOPPLER: The transtricuspid velocity was within the normal range  There was no evidence for stenosis  There was mild regurgitation  Estimated peak PA  pressure was 40 mmHg  PULMONIC VALVE: Leaflets exhibited normal thickness, no calcification, and normal cuspal separation  DOPPLER: The transpulmonic velocity was within the normal range  There was no regurgitation  PERICARDIUM: There was no pericardial effusion  The pericardium was normal in appearance  AORTA: The root exhibited normal size  SYSTEMIC VEINS: IVC: The inferior vena cava was normal in size and course  Respirophasic changes were normal     SYSTEM MEASUREMENT TABLES    2D  %FS: 25 2 %  Ao Diam: 2 7 cm  EDV(Teich): 83 6 ml  EF(Teich): 50 %  ESV(Teich): 41 8 ml  IVSd: 1 cm  LA Area: 23 9 cm2  LA Diam: 3 7 cm  LVEDV MOD A4C: 54 7 ml  LVEF MOD A4C: 63 9 %  LVESV MOD A4C: 19 8 ml  LVIDd: 4 3 cm  LVIDs: 3 2 cm  LVLd A4C: 6 8 cm  LVLs A4C: 6 cm  LVOT Diam: 1 9 cm  LVPWd: 1 cm  RA Area: 20 6 cm2  RVIDd: 3 4 cm  SV MOD A4C: 35 ml  SV(Teich): 41 8 ml    CW  AV Env  Ti: 213 1 ms  AV VTI: 29 7 cm  AV Vmax: 2 1 m/s  AV Vmean: 1 4 m/s  AV maxP 7 mmHg  AV meanP 1 mmHg  TR Vmax: 3 3 m/s  TR maxP mmHg    MM  TAPSE: 2 1 cm    PW  SALUD (VTI): 2 cm2  SALUD Vmax: 1 8 cm2  LVOT Env  Ti: 262 6 ms  LVOT VTI: 20 4 cm  LVOT Vmax: 1 3 m/s  LVOT Vmean: 0 8 m/s  LVOT maxP 5 mmHg  LVOT meanP 9 mmHg  LVSV Dopp: 59 3 ml    [employer] Accredited Echocardiography Laboratory    Prepared and electronically signed by    Jeanette Paul MD  Signed 26-Mar-2018 18:09:34           Meds/Allergies   all current active meds have been reviewed  Prescriptions Prior to Admission   Medication    allopurinol (ZYLOPRIM) 300 mg tablet    apixaban (ELIQUIS) 5 mg    digoxin (LANOXIN) 0 125 mg tablet  metoprolol tartrate (LOPRESSOR) 50 mg tablet    baclofen 10 mg tablet    colchicine (COLCRYS) 0 6 mg tablet    fluticasone (FLONASE) 50 mcg/act nasal spray    loperamide (IMODIUM) 2 mg capsule    loratadine (CLARITIN) 10 mg tablet    meclizine (ANTIVERT) 12 5 MG tablet    Methylprednisolone 4 MG TBPK    miconazole 2 % cream    omeprazole (PriLOSEC) 20 mg delayed release capsule    tamsulosin (FLOMAX) 0 4 mg              Assessment and Plan:  Paroxysmal Atrial fibrillation with RVR  -previous echocardiogram 03/26/2018 revealed EF 55-65 percent without regional wall motion abnormality, mild MR, mild TR, estimated peak PA pressure 40 mmHg  -He failed digoxin + lopressor regimen and is currently being loaded with amiodarone with 400 mg BID   -rate continue to elevate with minimal activity  At this time will proceed with CESAR/cardioversion  -Continue Lopressor  -on Eliquis for anticoagulation     Hypertension- BP stable     DORA-on CPAP for which he is compliant          ** Please Note: Dragon 360 Dictation voice to text software may have been used in the creation of this document   **

## 2018-04-06 NOTE — ANESTHESIA POSTPROCEDURE EVALUATION
Post-Op Assessment Note      CV Status:  Stable    Mental Status:  Awake    Hydration Status:  Stable    PONV Controlled:  None    Airway Patency:  Patent    Post Op Vitals Reviewed: Yes          Staff: CRNA           BP   97/55   Temp      Pulse  103   Resp   18   SpO2   99% on 6LFM   Post procedure VS noted above, SV non obstructed

## 2018-04-06 NOTE — DISCHARGE INSTRUCTIONS
Transesophageal Echocardiogram   WHAT YOU NEED TO KNOW:   A transesophageal echocardiogram (CESAR) is a procedure used to check for problems with your heart  It will also show any problems in the blood vessels near your heart  Sound waves are sent to the heart through a tube inserted into your throat  The sound waves show the structure and function of your heart through pictures on a monitor  DISCHARGE INSTRUCTIONS:   Rest:  Rest until you are fully awake  You may be sleepy for a while after your procedure  Do not eat or drink until you are able to swallow normally:  Start with soft foods, such as oatmeal, yogurt, or applesauce  Once you can eat soft foods easily, you may begin to eat solid foods  Follow up with your healthcare provider as directed:  Write down your questions so you remember to ask them during your visits  Contact your healthcare provider if:   · You have a fever or chills  · You taste blood in your mouth  · You have a severe sore throat or difficulty swallowing  · You have questions or concerns about your condition or care  Seek care immediately or call 911 if:   · You have any of the following signs of a heart attack:      ¨ Squeezing, pressure, or pain in your chest that lasts longer than 5 minutes or returns    ¨ Discomfort or pain in your back, neck, jaw, stomach, or arm     ¨ Trouble breathing    ¨ Nausea or vomiting    ¨ Lightheadedness or a sudden cold sweat, especially with chest pain or trouble breathing    © 2017 [address] [place] [address] Street is for End User's use only and may not be sold, redistributed or otherwise used for commercial purposes  All illustrations and images included in CareNotes® are the copyrighted property of A D [employer] M [employer]  or [employer]  The above information is an  only  It is not intended as medical advice for individual conditions or treatments   Talk to your doctor, nurse or pharmacist before following any medical regimen to see if it is safe and effective for you  Cardioversion   WHAT YOU NEED TO KNOW:   Cardioversion is a procedure that uses medicine or electrical shocks to correct arrhythmias  An arrhythmias is a heartbeat that is too slow, too fast, or irregular  It may prevent your body from getting the blood and oxygen it needs  Your heart has 4 chambers, called the atria and ventricles  The atria are at the top of your heart, and the ventricles are at the bottom of your heart  Most arrhythmias that need cardioversion start in the atria  DISCHARGE INSTRUCTIONS:   Call 911 for any of the following:   · You have any of the following signs of a heart attack:      ¨ Squeezing, pressure, or pain in your chest that lasts longer than 5 minutes or returns    ¨ Discomfort or pain in your back, neck, jaw, stomach, or arm     ¨ Trouble breathing    ¨ Nausea or vomiting    ¨ Lightheadedness or a sudden cold sweat, especially with chest pain or trouble breathing    · You have any of the following signs of a stroke:      ¨ Numbness or drooping on one side of your face     ¨ Weakness in an arm or leg    ¨ Confusion or difficulty speaking    ¨ Dizziness, a severe headache, or vision loss    · You feel lightheaded, short of breath, and have chest pain  · You cough up blood  · You have trouble breathing  Seek care immediately if:   · You feel your heart beating fast or fluttering  · You feel weak or faint  · Your leg or arm is larger than usual, painful, and warm  Contact your healthcare provider if:   · Your skin is itchy, swollen, or you have a rash  · You have questions or concerns about your condition or care  Medicines: You may need any of the following:  · Heart medicines  help control your heart rate and rhythm  · Blood thinners    help prevent blood clots  Examples of blood thinners include heparin and warfarin  Clots can cause strokes, heart attacks, and death   The following are general safety guidelines to follow while you are taking a blood thinner:    ¨ Watch for bleeding and bruising while you take blood thinners  Watch for bleeding from your gums or nose  Watch for blood in your urine and bowel movements  Use a soft washcloth on your skin, and a soft toothbrush to brush your teeth  This can keep your skin and gums from bleeding  If you shave, use an electric shaver  Do not play contact sports  ¨ Tell your dentist and other healthcare providers that you take anticoagulants  Wear a bracelet or necklace that says you take this medicine  ¨ Do not start or stop any medicines unless your healthcare provider tells you to  Many medicines cannot be used with blood thinners  ¨ Tell your healthcare provider right away if you forget to take the medicine, or if you take too much  ¨ Warfarin  is a blood thinner that you may need to take  The following are things you should be aware of if you take warfarin  § Foods and medicines can affect the amount of warfarin in your blood  Do not make major changes to your diet while you take warfarin  Warfarin works best when you eat about the same amount of vitamin K every day  Vitamin K is found in green leafy vegetables and certain other foods  Ask for more information about what to eat when you are taking warfarin  § You will need to see your healthcare provider for follow-up visits when you are on warfarin  You will need regular blood tests  These tests are used to decide how much medicine you need  · Take your medicine as directed  Contact your healthcare provider if you think your medicine is not helping or if you have side effects  Tell him or her if you are allergic to any medicine  Keep a list of the medicines, vitamins, and herbs you take  Include the amounts, and when and why you take them  Bring the list or the pill bottles to follow-up visits  Carry your medicine list with you in case of an emergency    Self-care:   · Rest as directed  Do not drive for at least 24 hours  Ask your healthcare provider when you can return to your normal activities  · Check your heart rate and blood pressure as directed  Ask your healthcare provider what your heart rate and blood pressure should be  · Do not smoke  Nicotine and other chemicals in cigarettes and cigars can cause heart and lung damage  They can also increase your risk for another arrhythmia  Ask your healthcare provider for information if you currently smoke and need help to quit  E-cigarettes or smokeless tobacco still contain nicotine  Talk to your healthcare provider before you use these products  · Eat heart healthy foods  These include fruits, vegetables, whole-grain breads, low-fat dairy products, beans, lean meats, and fish  Replace butter and margarine with heart-healthy oils such as olive oil and canola oil  · Maintain a healthy weight  Ask your healthcare provider how much you should weigh  Ask him to help you create a weight loss plan if you are overweight  Follow up with your healthcare provider as directed:  Write down your questions so you remember to ask them during your visits  © 2017 [facility] Information is for End User's use only and may not be sold, redistributed or otherwise used for commercial purposes  All illustrations and images included in CareNotes® are the copyrighted property of A D A M [employer] Inc  or [employer]  The above information is an  only  It is not intended as medical advice for individual conditions or treatments  Talk to your doctor, nurse or pharmacist before following any medical regimen to see if it is safe and effective for you

## 2018-04-06 NOTE — PROGRESS NOTES
[facility] Progress Note  Patient: Moshe Mays 75 y o  male   MRN: [id number]  PCP: Angus Newman DO  Unit/Bed#: -01 Encounter: [id number]  Date Of Visit: 04/06/18    Assessment:    Principal Problem:    Atrial fibrillation with RVR ([facility] [address] )  Active Problems:    Essential hypertension    DORA on CPAP    Decubitus ulcer of heel    Acute encephalopathy    Altered mental status      Plan:    Atrial fibrillation with RVR  Patient failed digoxin plus Lopressor regimen and was later placed on amiodarone  Heart rates are better controlled over night when patient is sleeping but continues to have spikes when he is in physical therapy or exertion  Patient is scheduled for cardioversion today  Follow-up with Cardiology  Continue Lopressor, Eliquis, amiodarone for now    Acute toxic metabolic encephalopathy likely secondary to dehydration  Resolved    BPH  On Flomax    Constipation  Add stool softeners    Status post right femur fracture 5 weeks ago  Nonweightbearing to right lower extremity until patient has been evaluated by his surgeon after discharge  VTE Pharmacologic Prophylaxis:   Pharmacologic: Heparin  Mechanical VTE Prophylaxis in Place: Yes    Patient Centered Rounds: I have performed bedside rounds with nursing staff today  Discussions with Specialists or Other Care Team Provider:  cardiology    Education and Discussions with Family / Patient patient and his family    Time Spent for Care: 20 minutes  More than 50% of total time spent on counseling and coordination of care as described above  Current Length of Stay: 6 day(s)    Current Patient Status: Inpatient   Certification Statement: The patient will continue to require additional inpatient hospital stay due to Patient is scheduled for cardioversion today    Discharge Plan:  Likely discharge to rehab tomorrow    Code Status: Level 1 - Full Code      Subjective:   Patient seen and examined    He does note that he did not have any bowel movement for 3 days  Otherwise no other complaints  Telemetry reviewed patient's heart rate has been stable in 80-90 is but had spikes up to 140s yesterday morning when he was in physical therapy, as well spikes up to 120s early this morning  Objective:     Vitals:   Temp (24hrs), Av 1 °F (36 7 °C), Min:97 5 °F (36 4 °C), Max:98 5 °F (36 9 °C)    HR:  [] 105  Resp:  [18-27] 18  BP: (101-161)/(53-92) 161/92  SpO2:  [95 %-98 %] 97 %  Body mass index is 34 55 kg/m²  Input and Output Summary (last 24 hours): Intake/Output Summary (Last 24 hours) at 18 1604  Last data filed at 18 1446   Gross per 24 hour   Intake              680 ml   Output              675 ml   Net                5 ml       Physical Exam:     Alert, oriented x3  Mucous membranes moist  Lungs are clear to auscultation no Creps  Heart sounds irregular, no murmur  Abdomen is soft nontender  Trace pedal edema present in both lower extremities    Additional Data:     Labs:      Results from last 7 days  Lab Units 18  0541   WBC Thousand/uL 8 63   HEMOGLOBIN g/dL 10 7*   HEMATOCRIT % 34 7*   PLATELETS Thousands/uL 432*   NEUTROS PCT % 60   LYMPHS PCT % 20   MONOS PCT % 11   EOS PCT % 8*       Results from last 7 days  Lab Units 18  0541  18  1801   SODIUM mmol/L 138  < > 139   POTASSIUM mmol/L 4 2  < > 4 0   CHLORIDE mmol/L 104  < > 101   CO2 mmol/L 27  < > 27   BUN mg/dL 10  < > 9   CREATININE mg/dL 0 66  < > 0 67   CALCIUM mg/dL 9 1  < > 8 9   TOTAL PROTEIN g/dL  --   --  6 6   BILIRUBIN TOTAL mg/dL  --   --  0 40   ALK PHOS U/L  --   --  78   ALT U/L  --   --  14   AST U/L  --   --  23   GLUCOSE RANDOM mg/dL 106  < > 100   < > = values in this interval not displayed  * I Have Reviewed All Lab Data Listed Above  * Additional Pertinent Lab Tests Reviewed:  All [place] Admission Reviewed    Imaging:    Recent Cultures (last 7 days):       Results from last 7 days  Lab Units 03/31/18  1826 03/31/18  1801   BLOOD CULTURE  No Growth After 5 Days  No Growth After 5 Days  Last 24 Hours Medication List:     Current Facility-Administered Medications:  acetaminophen 650 mg Oral Q6H PRN Daun AyeshariSHANT dasilva   allopurinol 300 mg Oral Daily Daun LuriaSHANT   amiodarone 400 mg Oral BID With Meals Caty ABELINO SHANT Manuel   apixaban 5 mg Oral BID Daun LuriaSHANT   baclofen 10 mg Oral TID Daun LuriaSHANT   docusate sodium 100 mg Oral BID Sandro Espino MD   fluticasone 1 spray Nasal Daily Daun LuriaSHANT   loperamide 2 mg Oral 4x Daily PRN Daun LuriaSHANT   loratadine 10 mg Oral Daily Daun LuriaSHANT   metoprolol 5 mg Intravenous Q6H PRN Rigo Null MD   metoprolol tartrate 50 mg Oral Q8H Bhavesh Duran MD   ondansetron 4 mg Intravenous Q6H PRN Steven Taylor PA-C   polyethylene glycol 17 g Oral Daily Sandro Espino MD   senna 1 tablet Oral HS Sandro Espino MD   tamsulosin 0 4 mg Oral Daily With Dinner Steven Taylor PA-C        Today, Patient Was Seen By: Sandro Espino MD    ** Please Note: Dragon 360 Dictation voice to text software may have been used in the creation of this document   **

## 2018-04-07 LAB
ANION GAP SERPL CALCULATED.3IONS-SCNC: 9 MMOL/L (ref 4–13)
BUN SERPL-MCNC: 9 MG/DL (ref 5–25)
CALCIUM SERPL-MCNC: 9 MG/DL (ref 8.3–10.1)
CHLORIDE SERPL-SCNC: 102 MMOL/L (ref 100–108)
CO2 SERPL-SCNC: 27 MMOL/L (ref 21–32)
CREAT SERPL-MCNC: 0.59 MG/DL (ref 0.6–1.3)
GFR SERPL CREATININE-BSD FRML MDRD: 104 ML/MIN/1.73SQ M
GLUCOSE SERPL-MCNC: 108 MG/DL (ref 65–140)
POTASSIUM SERPL-SCNC: 4.2 MMOL/L (ref 3.5–5.3)
SODIUM SERPL-SCNC: 138 MMOL/L (ref 136–145)

## 2018-04-07 PROCEDURE — 99232 SBSQ HOSP IP/OBS MODERATE 35: CPT | Performed by: INTERNAL MEDICINE

## 2018-04-07 PROCEDURE — 80048 BASIC METABOLIC PNL TOTAL CA: CPT | Performed by: INTERNAL MEDICINE

## 2018-04-07 PROCEDURE — 94760 N-INVAS EAR/PLS OXIMETRY 1: CPT

## 2018-04-07 RX ADMIN — ONDANSETRON 4 MG: 2 INJECTION INTRAMUSCULAR; INTRAVENOUS at 10:37

## 2018-04-07 RX ADMIN — DOCUSATE SODIUM 100 MG: 100 CAPSULE, LIQUID FILLED ORAL at 08:28

## 2018-04-07 RX ADMIN — AMIODARONE HYDROCHLORIDE 400 MG: 200 TABLET ORAL at 17:24

## 2018-04-07 RX ADMIN — LORATADINE 10 MG: 10 TABLET ORAL at 08:28

## 2018-04-07 RX ADMIN — APIXABAN 5 MG: 5 TABLET, FILM COATED ORAL at 08:28

## 2018-04-07 RX ADMIN — BACLOFEN 10 MG: 10 TABLET ORAL at 08:28

## 2018-04-07 RX ADMIN — ALLOPURINOL 300 MG: 300 TABLET ORAL at 08:28

## 2018-04-07 RX ADMIN — TAMSULOSIN HYDROCHLORIDE 0.4 MG: 0.4 CAPSULE ORAL at 17:24

## 2018-04-07 RX ADMIN — BACLOFEN 10 MG: 10 TABLET ORAL at 17:24

## 2018-04-07 RX ADMIN — APIXABAN 5 MG: 5 TABLET, FILM COATED ORAL at 17:24

## 2018-04-07 RX ADMIN — METOPROLOL TARTRATE 50 MG: 50 TABLET, FILM COATED ORAL at 17:25

## 2018-04-07 RX ADMIN — METOPROLOL TARTRATE 50 MG: 50 TABLET, FILM COATED ORAL at 02:18

## 2018-04-07 RX ADMIN — FLUTICASONE PROPIONATE 1 SPRAY: 50 SPRAY, METERED NASAL at 08:29

## 2018-04-07 RX ADMIN — BACLOFEN 10 MG: 10 TABLET ORAL at 21:36

## 2018-04-07 RX ADMIN — AMIODARONE HYDROCHLORIDE 400 MG: 200 TABLET ORAL at 08:28

## 2018-04-07 NOTE — PROGRESS NOTES
Progress Note - Cardiology   Zoe Claros 75 y o  male MRN: [id number]  Unit/Bed#: -01 Encounter: [id number]    Assessment:  1  Atrial fibrillation  2  S/P cardioversion    Remains in NSR    Plan:  Stable for transfer to [facility]    Subjective/Objective   Chief Complaint: Diarrhea    Subjective: No palpitations or chest pain    Objective: No distress    Vitals: /64 (BP Location: Left arm)   Pulse 83   Temp 98 3 °F (36 8 °C) (Oral)   Resp 18   Ht 5' 6" (1 676 m)   Wt 97 1 kg (214 lb 1 1 oz)   SpO2 95%   BMI 34 55 kg/m²   Vitals:    03/31/18 1700 04/01/18 0051   Weight: 101 kg (221 lb 12 5 oz) 97 1 kg (214 lb 1 1 oz)     Orthostatic Blood Pressures    Flowsheet Row Most Recent Value   Blood Pressure  115/64 filed at 04/07/2018 0700   Patient Position - Orthostatic VS  Lying filed at 04/07/2018 0700            Intake/Output Summary (Last 24 hours) at 04/07/18 1116  Last data filed at 04/06/18 1446   Gross per 24 hour   Intake              440 ml   Output                0 ml   Net              440 ml       Invasive Devices     Peripheral Intravenous Line            Peripheral IV 04/06/18 Left Hand less than 1 day                Review of Systems: Cardiovascular ROS: no chest pain or dyspnea on exertion    Physical Exam:   Chest: CTA  Cardiac: RRR    Lab Results:   CBC with diff:   Results from last 7 days  Lab Units 04/05/18  0541   WBC Thousand/uL 8 63   RBC Million/uL 3 91   HEMOGLOBIN g/dL 10 7*   HEMATOCRIT % 34 7*   MCV fL 89   MCH pg 27 4   MCHC g/dL 30 8*   RDW % 14 6   MPV fL 8 8*   PLATELETS Thousands/uL 432*     CMP:   Results from last 7 days  Lab Units 04/07/18  0550  03/31/18  1801   SODIUM mmol/L 138  < > 139   POTASSIUM mmol/L 4 2  < > 4 0   CHLORIDE mmol/L 102  < > 101   CO2 mmol/L 27  < > 27   ANION GAP mmol/L 9  < > 11   BUN mg/dL 9  < > 9   CREATININE mg/dL 0 59*  < > 0 67   GLUCOSE RANDOM mg/dL 108  < > 100   CALCIUM mg/dL 9 0  < > 8 9   AST U/L  --   --  23   ALT U/L  --   --  14   ALK PHOS U/L  --   --  78   TOTAL PROTEIN g/dL  --   --  6 6   BILIRUBIN TOTAL mg/dL  --   --  0 40   EGFR ml/min/1 73sq m 104  < > 99   < > = values in this interval not displayed  Troponin:   0  Lab Value Date/Time   TROPONINI <0 02 03/31/2018 1801   TROPONINI <0 02 03/24/2018 1453   TROPONINI <0 02 11/13/2017 1329     BNP:   Results from last 7 days  Lab Units 04/07/18  0550   SODIUM mmol/L 138   POTASSIUM mmol/L 4 2   CHLORIDE mmol/L 102   CO2 mmol/L 27   ANION GAP mmol/L 9   BUN mg/dL 9   CREATININE mg/dL 0 59*   GLUCOSE RANDOM mg/dL 108   CALCIUM mg/dL 9 0   EGFR ml/min/1 73sq m 104     Coags:     TSH:   Results from last 7 days  Lab Units 03/31/18  1801   TSH 3RD GENERATON uIU/mL 5 674*     Magnesium:   Results from last 7 days  Lab Units 03/31/18  1801   MAGNESIUM mg/dL 1 9     Lipid Profile:     Imaging: I have personally reviewed pertinent reports  EKG: NSR  VTE Pharmacologic Prophylaxis:   VTE Mechanical Prophylaxis:     Counseling / Coordination of Care  Total time spent today 30 minutes  Greater than 50% of total time was spent with the patient and / or family counseling and / or coordination of care   A description of the counseling / coordination of care: 30

## 2018-04-07 NOTE — PLAN OF CARE
CARDIOVASCULAR - ADULT     Maintains optimal cardiac output and hemodynamic stability Progressing     Absence of cardiac dysrhythmias or at baseline rhythm Progressing        INFECTION - ADULT     Absence or prevention of progression during hospitalization Progressing        Knowledge Deficit     Patient/family/caregiver demonstrates understanding of disease process, treatment plan, medications, and discharge instructions Progressing        MUSCULOSKELETAL - ADULT     Maintain or return mobility to safest level of function Progressing     Maintain proper alignment of affected body part Progressing        NEUROSENSORY - ADULT     Achieves stable or improved neurological status Progressing     Achieves maximal functionality and self care Progressing        PAIN - ADULT     Verbalizes/displays adequate comfort level or baseline comfort level Progressing        Potential for Falls     Patient will remain free of falls Progressing        Prexisting or High Potential for Compromised Skin Integrity     Skin integrity is maintained or improved Progressing        RESPIRATORY - ADULT     Achieves optimal ventilation and oxygenation Progressing        SAFETY ADULT     Maintain or return to baseline ADL function Progressing     Maintain or return mobility status to optimal level Progressing     Patient will remain free of falls Progressing        SKIN/TISSUE INTEGRITY - ADULT     Skin integrity remains intact Progressing     Incision(s), wounds(s) or drain site(s) healing without S/S of infection Progressing     Oral mucous membranes remain intact Progressing

## 2018-04-07 NOTE — PLAN OF CARE
Problem: DISCHARGE PLANNING - CARE MANAGEMENT  Goal: Discharge to post-acute care or home with appropriate resources  INTERVENTIONS:  - Conduct assessment to determine patient/family and health care team treatment goals, and need for post-acute services based on payer coverage, community resources, and patient preferences, and barriers to discharge  - Address psychosocial, clinical, and financial barriers to discharge as identified in assessment in conjunction with the patient/family and health care team  - Arrange appropriate level of post-acute services according to patients   needs and preference and payer coverage in collaboration with the physician and health care team  - Communicate with and update the patient/family, physician, and health care team regarding progress on the discharge plan  - Arrange appropriate transportation to post-acute venues   Outcome: Progressing  CM met with pt and pt's wife at bedside to discuss STR options  CM explained to family that [facility] not [facility] the referral yet but will keep them updated as new information arrives  Pt's wife would want to take  home with PT/OT if [facility] not [facility] pt  CM to follow pt through discharge process  Pt has no other needs at this time

## 2018-04-07 NOTE — PROGRESS NOTES
[address] Internal Medicine Progress Note  Patient: Donal Mtz 75 y o  male   MRN: [id number]  PCP: Leia Cali DO  Unit/Bed#: -01 Encounter: [id number]  Date Of Visit: 18    Assessment:    Principal Problem:    Atrial fibrillation with RVR ([facility] [address] )  Active Problems:    Essential hypertension    DORA on CPAP    Decubitus ulcer of heel    Acute encephalopathy    Altered mental status      Plan:  AFib with RVR  Patient failed medical regimen, later underwent cardioversion on   Currently patient is in sinus rhythm and rate controlled  Continue Lopressor, amiodarone, Eliquis  Cardiology recommendations appreciated    Acute toxic metabolic encephalopathy likely secondary to dehydration  Resolved    BPH  On Flomax    Constipation  Resolved  Patient had loose stools this morning likely secondary to stool softeners        VTE Pharmacologic Prophylaxis:   Pharmacologic: Apixaban (Eliquis)  Mechanical VTE Prophylaxis in Place: Yes    Patient Centered Rounds: I have performed bedside rounds with nursing staff today  Discussions with Specialists or Other Care Team Provider:  Case management    Education and Discussions with Family / Patient:  Patient and his wife    Time Spent for Care: 20 minutes  More than 50% of total time spent on counseling and coordination of care as described above  Current Length of Stay: 7 day(s)    Current Patient Status: Inpatient   Certification Statement: The patient will continue to require additional inpatient hospital stay due to Placement    Discharge Plan:  Patient is medically stable discharge to short-term rehab when bed becomes available    Code Status: Level 1 - Full Code      Subjective:   Patient seen and examined  He does inform me he had few small bowel movements, but otherwise no other acute complaints    Telemetry showed normal sinus rhythm    Objective:     Vitals:   Temp (24hrs), Av 2 °F (36 8 °C), Min:98 °F (36 7 °C), Max:98 3 °F (36 8 °C)    HR: [] 85  Resp:  [18] 18  BP: (109-161)/(64-92) 109/69  SpO2:  [95 %-97 %] 95 %  Body mass index is 34 55 kg/m²  Input and Output Summary (last 24 hours): Intake/Output Summary (Last 24 hours) at 04/07/18 1359  Last data filed at 04/06/18 1446   Gross per 24 hour   Intake              240 ml   Output                0 ml   Net              240 ml       Physical Exam:     Alert, oriented x3  Mucous membranes moist  Lungs are clear to auscultation  Heart sounds regular  Abdomen soft and nontender    Additional Data:     Labs:      Results from last 7 days  Lab Units 04/05/18  0541   WBC Thousand/uL 8 63   HEMOGLOBIN g/dL 10 7*   HEMATOCRIT % 34 7*   PLATELETS Thousands/uL 432*   NEUTROS PCT % 60   LYMPHS PCT % 20   MONOS PCT % 11   EOS PCT % 8*       Results from last 7 days  Lab Units 04/07/18  0550  03/31/18  1801   SODIUM mmol/L 138  < > 139   POTASSIUM mmol/L 4 2  < > 4 0   CHLORIDE mmol/L 102  < > 101   CO2 mmol/L 27  < > 27   BUN mg/dL 9  < > 9   CREATININE mg/dL 0 59*  < > 0 67   CALCIUM mg/dL 9 0  < > 8 9   TOTAL PROTEIN g/dL  --   --  6 6   BILIRUBIN TOTAL mg/dL  --   --  0 40   ALK PHOS U/L  --   --  78   ALT U/L  --   --  14   AST U/L  --   --  23   GLUCOSE RANDOM mg/dL 108  < > 100   < > = values in this interval not displayed  * I Have Reviewed All Lab Data Listed Above  * Additional Pertinent Lab Tests Reviewed: [facility]    Imaging:        Recent Cultures (last 7 days):       Results from last 7 days  Lab Units 03/31/18  1826 03/31/18  1801   BLOOD CULTURE  No Growth After 5 Days  No Growth After 5 Days         Last 24 Hours Medication List:     Current Facility-Administered Medications:  acetaminophen 650 mg Oral Q6H PRN Chele Walker PA-C   allopurinol 300 mg Oral Daily Chele Walker PA-C   amiodarone 400 mg Oral BID With Meals Caty Manuel PA-C   apixaban 5 mg Oral BID Chele Walker PA-C   baclofen 10 mg Oral TID Fadi Leonardo SHANT Wu   docusate sodium 100 mg Oral BID Sandy Ruiz MD   fluticasone 1 spray Nasal Daily Benoit Fortune, SHANT   loperamide 2 mg Oral 4x Daily PRN Benoit FortSHANT azevedo   loratadine 10 mg Oral Daily Benoit Fortjolly, SHANT   metoprolol 5 mg Intravenous Q6H PRN Gilda Ruano MD   metoprolol tartrate 50 mg Oral Q8H Bhavesh Duran MD   ondansetron 4 mg Intravenous Q6H PRN Benoit Fortjolly, SHANT   polyethylene glycol 17 g Oral Daily Sandy Ruiz MD   senna 1 tablet Oral HS Sandy Ruiz MD   tamsulosin 0 4 mg Oral Daily With Dinner Benoit Gracia PA-C        Today, Patient Was Seen By: Sandy Ruiz MD    ** Please Note: Dragon 360 Dictation voice to text software may have been used in the creation of this document   **

## 2018-04-07 NOTE — SOCIAL WORK
CM met with pt and pt's wife at bedside to discuss STR options  CM explained to family that [facility] not [facility] the referral yet but will keep them updated as new information arrives  Pt's wife would want to take  home with PT/OT if [facility] pt  CM to follow pt through discharge process  Pt has no other needs at this time

## 2018-04-08 PROCEDURE — 99232 SBSQ HOSP IP/OBS MODERATE 35: CPT | Performed by: INTERNAL MEDICINE

## 2018-04-08 PROCEDURE — 5A2204Z RESTORATION OF CARDIAC RHYTHM, SINGLE: ICD-10-PCS | Performed by: INTERNAL MEDICINE

## 2018-04-08 RX ADMIN — AMIODARONE HYDROCHLORIDE 400 MG: 200 TABLET ORAL at 08:14

## 2018-04-08 RX ADMIN — APIXABAN 5 MG: 5 TABLET, FILM COATED ORAL at 18:08

## 2018-04-08 RX ADMIN — BACLOFEN 10 MG: 10 TABLET ORAL at 18:08

## 2018-04-08 RX ADMIN — ALLOPURINOL 300 MG: 300 TABLET ORAL at 08:14

## 2018-04-08 RX ADMIN — BACLOFEN 10 MG: 10 TABLET ORAL at 21:15

## 2018-04-08 RX ADMIN — METOPROLOL TARTRATE 50 MG: 50 TABLET, FILM COATED ORAL at 01:41

## 2018-04-08 RX ADMIN — AMIODARONE HYDROCHLORIDE 400 MG: 200 TABLET ORAL at 18:08

## 2018-04-08 RX ADMIN — APIXABAN 5 MG: 5 TABLET, FILM COATED ORAL at 08:14

## 2018-04-08 RX ADMIN — BACLOFEN 10 MG: 10 TABLET ORAL at 08:14

## 2018-04-08 RX ADMIN — TAMSULOSIN HYDROCHLORIDE 0.4 MG: 0.4 CAPSULE ORAL at 18:08

## 2018-04-08 RX ADMIN — LORATADINE 10 MG: 10 TABLET ORAL at 08:16

## 2018-04-08 RX ADMIN — METOPROLOL TARTRATE 50 MG: 50 TABLET, FILM COATED ORAL at 08:14

## 2018-04-08 RX ADMIN — FLUTICASONE PROPIONATE 1 SPRAY: 50 SPRAY, METERED NASAL at 08:16

## 2018-04-08 RX ADMIN — METOPROLOL TARTRATE 50 MG: 50 TABLET, FILM COATED ORAL at 18:09

## 2018-04-08 NOTE — PROGRESS NOTES
[address] Internal Medicine Progress Note  Patient: Gaurav Mclaughlin 75 y o  male   MRN: [id number]  PCP: Mark Ravi DO  Unit/Bed#: -Paige Encounter: [id number]  Date Of Visit: 18    Assessment:    Principal Problem:    Atrial fibrillation with RVR ([address] )  Active Problems:    Essential hypertension    DORA on CPAP    Decubitus ulcer of heel    Acute encephalopathy    Altered mental status      Plan:  AFib with RVR  Currently normal sinus rhythm after cardioversion  Rate control  Continue metoprolol, amiodarone, Eliquis    Acute toxic metabolic encephalopathy likely secondary to dehydration  Resolved    BPH  On Flomax    Constipation  Resolved      VTE Pharmacologic Prophylaxis:   Pharmacologic: Apixaban (Eliquis)  Mechanical VTE Prophylaxis in Place: Yes    Patient Centered Rounds: I have performed bedside rounds with nursing staff today  Discussions with Specialists or Other Care Team Provider:  Cardiology y  Education and Discussions with Family / Patient:  Patient    Time Spent for Care: 20 minutes  More than 50% of total time spent on counseling and coordination of care as described above  Current Length of Stay: 8 day(s)    Current Patient Status: Inpatient   Certification Statement: The patient will continue to require additional inpatient hospital stay due to Placement    Discharge Plan: To discharge to short-term rehab when available    Code Status: Level 1 - Full Code      Subjective:   Patient seen and examined  Denies any complaints  Objective:     Vitals:   Temp (24hrs), Av 1 °F (36 7 °C), Min:97 6 °F (36 4 °C), Max:98 3 °F (36 8 °C)    HR:  [71-91] 90  Resp:  [18-20] 18  BP: (104-140)/(56-83) 140/83  SpO2:  [94 %-97 %] 95 %  Body mass index is 34 55 kg/m²  Input and Output Summary (last 24 hours):        Intake/Output Summary (Last 24 hours) at 18 1553  Last data filed at 18 1300   Gross per 24 hour   Intake              480 ml   Output             1750 ml   Net -1270 ml       Physical Exam:     Alert, oriented x3  Mucous membranes moist  Lungs are clear to auscultation  Heart sounds regular S1-S2 normal  Abdomen soft and nontender  Extremities no edema    Additional Data:     Labs:      Results from last 7 days  Lab Units 04/05/18  0541   WBC Thousand/uL 8 63   HEMOGLOBIN g/dL 10 7*   HEMATOCRIT % 34 7*   PLATELETS Thousands/uL 432*   NEUTROS PCT % 60   LYMPHS PCT % 20   MONOS PCT % 11   EOS PCT % 8*       Results from last 7 days  Lab Units 04/07/18  0550   SODIUM mmol/L 138   POTASSIUM mmol/L 4 2   CHLORIDE mmol/L 102   CO2 mmol/L 27   BUN mg/dL 9   CREATININE mg/dL 0 59*   CALCIUM mg/dL 9 0   GLUCOSE RANDOM mg/dL 108           * I Have Reviewed All Lab Data Listed Above  * Additional Pertinent Lab Tests Reviewed:  [facility]    Imaging:        Recent Cultures (last 7 days):           Last 24 Hours Medication List:     Current Facility-Administered Medications:  acetaminophen 650 mg Oral Q6H PRN Yvone Alto, PA-C   allopurinol 300 mg Oral Daily Yvone Conyers, PA-C   amiodarone 400 mg Oral BID With Meals CROW Yañez-YI   apixaban 5 mg Oral BID Yvone Conyers, PA-C   baclofen 10 mg Oral TID Yvone Conyers, PA-YI   docusate sodium 100 mg Oral BID Dagoberto Kebede MD   fluticasone 1 spray Nasal Daily Yvone Conyers, PA-YI   loperamide 2 mg Oral 4x Daily PRN Yvone Conyers, PA-C   loratadine 10 mg Oral Daily Yvone Conyers, PA-C   metoprolol 5 mg Intravenous Q6H PRN Chao Rand MD   metoprolol tartrate 50 mg Oral Q8H Bhavesh Duran MD   ondansetron 4 mg Intravenous Q6H PRN YvoCROW Campbell-C   polyethylene glycol 17 g Oral Daily Dagoberto Kebede MD   senna 1 tablet Oral HS Dagoberto Kebede MD   tamsulosin 0 4 mg Oral Daily With Dinner Amos Tiwari PA-C        Today, Patient Was Seen By: Dagoberto Kebede MD    ** Please Note: Dragon 360 Dictation voice to text software may have been used in the creation of this document   **

## 2018-04-08 NOTE — BRIEF OP NOTE (RAD/CATH)
D/C cardioversion procedure note  CESAR was performed to r/o LA/TRAVIS thrombus  Patient was sedated by anesthesiologist  Synchronized D/C cardioversion was performed by 200 J  Patient was successfully cardioverted to normal sinus rhythm  No complications

## 2018-04-09 ENCOUNTER — HOSPITAL ENCOUNTER (OUTPATIENT)
Facility: HOSPITAL | Age: 68
Discharge: HOME HEALTH | End: 2018-05-04
Attending: PHYSICAL MEDICINE & REHABILITATION | Admitting: PHYSICAL MEDICINE & REHABILITATION

## 2018-04-09 ENCOUNTER — TELEPHONE (OUTPATIENT)
Dept: INTERNAL MEDICINE CLINIC | Facility: CLINIC | Age: 68
End: 2018-04-09

## 2018-04-09 VITALS
WEIGHT: 214.07 LBS | TEMPERATURE: 98.2 F | DIASTOLIC BLOOD PRESSURE: 63 MMHG | SYSTOLIC BLOOD PRESSURE: 107 MMHG | OXYGEN SATURATION: 97 % | HEART RATE: 71 BPM | HEIGHT: 66 IN | RESPIRATION RATE: 18 BRPM | BODY MASS INDEX: 34.4 KG/M2

## 2018-04-09 PROCEDURE — 94760 N-INVAS EAR/PLS OXIMETRY 1: CPT

## 2018-04-09 PROCEDURE — 97530 THERAPEUTIC ACTIVITIES: CPT

## 2018-04-09 PROCEDURE — 99232 SBSQ HOSP IP/OBS MODERATE 35: CPT | Performed by: INTERNAL MEDICINE

## 2018-04-09 PROCEDURE — 99238 HOSP IP/OBS DSCHRG MGMT 30/<: CPT | Performed by: INTERNAL MEDICINE

## 2018-04-09 PROCEDURE — 97110 THERAPEUTIC EXERCISES: CPT

## 2018-04-09 RX ORDER — POLYETHYLENE GLYCOL 3350 17 G/17G
17 POWDER, FOR SOLUTION ORAL DAILY
Qty: 14 EACH | Refills: 0 | Status: SHIPPED | OUTPATIENT
Start: 2018-04-10 | End: 2018-05-15

## 2018-04-09 RX ORDER — AMIODARONE HYDROCHLORIDE 400 MG/1
200 TABLET ORAL 2 TIMES DAILY WITH MEALS
Qty: 30 TABLET | Refills: 0 | Status: SHIPPED | OUTPATIENT
Start: 2018-04-09 | End: 2018-05-15

## 2018-04-09 RX ORDER — SENNOSIDES 8.6 MG
1 TABLET ORAL
Qty: 120 EACH | Refills: 0 | Status: SHIPPED | OUTPATIENT
Start: 2018-04-09 | End: 2018-05-29

## 2018-04-09 RX ADMIN — LORATADINE 10 MG: 10 TABLET ORAL at 09:16

## 2018-04-09 RX ADMIN — METOPROLOL TARTRATE 50 MG: 50 TABLET, FILM COATED ORAL at 01:02

## 2018-04-09 RX ADMIN — AMIODARONE HYDROCHLORIDE 400 MG: 200 TABLET ORAL at 09:15

## 2018-04-09 RX ADMIN — APIXABAN 5 MG: 5 TABLET, FILM COATED ORAL at 09:15

## 2018-04-09 RX ADMIN — METOPROLOL TARTRATE 50 MG: 50 TABLET, FILM COATED ORAL at 09:15

## 2018-04-09 RX ADMIN — ALLOPURINOL 300 MG: 300 TABLET ORAL at 09:15

## 2018-04-09 RX ADMIN — FLUTICASONE PROPIONATE 1 SPRAY: 50 SPRAY, METERED NASAL at 09:16

## 2018-04-09 RX ADMIN — BACLOFEN 10 MG: 10 TABLET ORAL at 09:16

## 2018-04-09 NOTE — PROGRESS NOTES
Progress Note - Cardiology     Last Simon 75 y o  male MRN: [id number]  Unit/Bed#: -01 Encounter: [id number]      Subjective:   Patient continues to maintain normal sinus rhythm  He reports feeling well with increased energy  He denies chest pain, palpitations, shortness breath, dizziness  He states that he has been working with physical therapy without difficulty  He is planning discharge to rehab  Objective:   Vitals:  Vitals:    04/09/18 0320   BP: 107/62   Pulse: 75   Resp: 19   Temp: 98 2 °F (36 8 °C)   SpO2: 98%       Body mass index is 34 55 kg/m²  Systolic (57VDI), SQP:037 , Min:107 , BWC:026     Diastolic (62QLJ), DAM:55, Min:57, Max:83      Intake/Output Summary (Last 24 hours) at 04/09/18 0858  Last data filed at 04/09/18 0717   Gross per 24 hour   Intake              820 ml   Output             2200 ml   Net            -1380 ml     Weight (last 2 days)     None          PHYSICAL EXAM:  General: Patient is not in acute distress  Laying comfortably in the bed  Awake, alert, responding to commands  Head: Normocephalic  Atraumatic  HEENT: Both pupils normal sized, round and reactive to light  Nonicteric  Neck: JVP not raised  Trachea central  No thyromegaly  Respiratory: Bilateral bronchovascular breath sounds with no crackles or rhonchi  Cardiovascular: RRR  S1 and S2 normal  No murmur, rub or gallop  GI: Abdomen soft, nontender  No guarding or rigidity  Liver and spleen not palpable  Bowel sounds present  Neurologic: Patient is awake, alert, responding to command   Moving all extremities  Integumentary:  No skin rash  Lymphatic: No cervical lymphadenopathy  Extremities: No clubbing, cyanosis or edema    LABORATORY RESULTS:      CBC with diff:   Results from last 7 days  Lab Units 04/05/18  0541 04/04/18  0429 04/03/18  0505   WBC Thousand/uL 8 63 7 42 6 95   HEMOGLOBIN g/dL 10 7* 10 8* 11 9*   HEMATOCRIT % 34 7* 35 3* 38 4   MCV fL 89 89 88   PLATELETS Thousands/uL 432* 478* 463*   MCH pg 27 4 27 1 27 4   MCHC g/dL 30 8* 30 6* 31 0*   RDW % 14 6 14 4 14 3   MPV fL 8 8* 8 8* 9 0   NRBC AUTO /100 WBCs 0 0 0       CMP:  Results from last 7 days  Lab Units 18  0550 18  0541 18  0429   SODIUM mmol/L 138 138 140   POTASSIUM mmol/L 4 2 4 2 4 0   CHLORIDE mmol/L 102 104 103   CO2 mmol/L 27 27 31   ANION GAP mmol/L 9 7 6   BUN mg/dL 9 10 10   CREATININE mg/dL 0 59* 0 66 0 70   GLUCOSE RANDOM mg/dL 108 106 102   CALCIUM mg/dL 9 0 9 1 9 1   EGFR ml/min/1 73sq m 104 99 97       BMP:  Results from last 7 days  Lab Units 18  0550 18  0541 18  0429   SODIUM mmol/L 138 138 140   POTASSIUM mmol/L 4 2 4 2 4 0   CHLORIDE mmol/L 102 104 103   CO2 mmol/L 27 27 31   BUN mg/dL 9 10 10   CREATININE mg/dL 0 59* 0 66 0 70   GLUCOSE RANDOM mg/dL 108 106 102   CALCIUM mg/dL 9 0 9 1 9 1                                      Lipid Profile:   Lab Results   Component Value Date    CHOL 193 2017     Lab Results   Component Value Date    HDL 47 2017       Cardiac testing:   Results for orders placed during the hospital encounter of 18   Echo complete with contrast if indicated    Narrative [address] [place], [place] [id number] [phone]    Transthoracic Echocardiogram  2D, M-mode, Doppler, and Color Doppler    Study date:  26-Mar-2018    Patient: Alejandrina Milner  MR number: [id number]  Account number: [de-identified]  : 1950  Age: 75 years  Gender: Male  Status: Inpatient  Location: Bedside  Height: 66 in  Weight: 224 lb  BP: 124/ 68 mmHg    Indications: Atrial Fibrillation    Diagnoses: I48 0 - Atrial fibrillation    Sonographer:  AKILA Aceves,RDCS  Interpreting Physician:  Aníbal Bradford MD  Referring Physician:  Manda Bashir PA-C  Group:  [facility]    SUMMARY    LEFT VENTRICLE:  Systolic function was normal  Ejection fraction was estimated in the range of 55 % to 65 %    There were no regional wall motion abnormalities  MITRAL VALVE:  There was mild regurgitation  TRICUSPID VALVE:  There was mild regurgitation  Estimated peak PA pressure was 40 mmHg  HISTORY: PRIOR HISTORY: Atrial Fibrillation, Hypertension, Mixed Hyperlipidemia, Hypokalemia    PROCEDURE: The procedure was performed at the bedside  This was a routine study  The transthoracic approach was used  The study included complete 2D imaging, M-mode, complete spectral Doppler, and color Doppler  The heart rate was 76 bpm,  at the start of the study  Images were obtained from the parasternal, apical, subcostal, and suprasternal notch acoustic windows  Image quality was adequate  LEFT VENTRICLE: Size was normal  Systolic function was normal  Ejection fraction was estimated in the range of 55 % to 65 %  There were no regional wall motion abnormalities  Wall thickness was normal  DOPPLER: Left ventricular diastolic  function parameters were abnormal     RIGHT VENTRICLE: The size was normal  Systolic function was normal  Wall thickness was normal     LEFT ATRIUM: Size was normal     RIGHT ATRIUM: Size was normal     MITRAL VALVE: Valve structure was normal  There was normal leaflet separation  DOPPLER: The transmitral velocity was within the normal range  There was no evidence for stenosis  There was mild regurgitation  AORTIC VALVE: The valve was trileaflet  Leaflets exhibited normal thickness and normal cuspal separation  DOPPLER: Transaortic velocity was minimally increased  There was no evidence for stenosis  There was no regurgitation  TRICUSPID VALVE: The valve structure was normal  There was normal leaflet separation  DOPPLER: The transtricuspid velocity was within the normal range  There was no evidence for stenosis  There was mild regurgitation  Estimated peak PA  pressure was 40 mmHg  PULMONIC VALVE: Leaflets exhibited normal thickness, no calcification, and normal cuspal separation   DOPPLER: The transpulmonic velocity was within the normal range  There was no regurgitation  PERICARDIUM: There was no pericardial effusion  The pericardium was normal in appearance  AORTA: The root exhibited normal size  SYSTEMIC VEINS: IVC: The inferior vena cava was normal in size and course  Respirophasic changes were normal     SYSTEM MEASUREMENT TABLES    2D  %FS: 25 2 %  Ao Diam: 2 7 cm  EDV(Teich): 83 6 ml  EF(Teich): 50 %  ESV(Teich): 41 8 ml  IVSd: 1 cm  LA Area: 23 9 cm2  LA Diam: 3 7 cm  LVEDV MOD A4C: 54 7 ml  LVEF MOD A4C: 63 9 %  LVESV MOD A4C: 19 8 ml  LVIDd: 4 3 cm  LVIDs: 3 2 cm  LVLd A4C: 6 8 cm  LVLs A4C: 6 cm  LVOT Diam: 1 9 cm  LVPWd: 1 cm  RA Area: 20 6 cm2  RVIDd: 3 4 cm  SV MOD A4C: 35 ml  SV(Teich): 41 8 ml    CW  AV Env  Ti: 213 1 ms  AV VTI: 29 7 cm  AV Vmax: 2 1 m/s  AV Vmean: 1 4 m/s  AV maxP 7 mmHg  AV meanP 1 mmHg  TR Vmax: 3 3 m/s  TR maxP mmHg    MM  TAPSE: 2 1 cm    PW  SALUD (VTI): 2 cm2  SALUD Vmax: 1 8 cm2  LVOT Env  Ti: 262 6 ms  LVOT VTI: 20 4 cm  LVOT Vmax: 1 3 m/s  LVOT Vmean: 0 8 m/s  LVOT maxP 5 mmHg  LVOT meanP 9 mmHg  LVSV Dopp: 59 3 ml    [facility] Accredited Echocardiography Laboratory    Prepared and electronically signed by    Preston Hernadez MD  Signed 26-Mar-2018 18:09:34       Results for orders placed during the hospital encounter of 18   CESAR    Narrative [address] [place] [id number] [phone]    Transesophageal Echocardiogram  2D and Color Doppler    Study date:  2018    Patient: Dinorah Alejo  MR number: [id number]  Account number: [de-identified]  : 1950  Age: 75 years  Gender: Male  Status: Inpatient  Location: Cath lab  Height: 66 in  Weight: 214 lb  BP: 102/ 53 mmHg    Indications: Atrial Fibrillation    Diagnoses: I48 0 - Atrial fibrillation    Sonographer:  AKILA Sarkar,RDCS  Interpreting Physician:  Preston Hernadez MD  Referring Physician:  Preston Hernadez MD  Group: [facility]    SUMMARY    LEFT VENTRICLE:  Systolic function was normal     LEFT ATRIAL APPENDAGE:  No thrombus was identified  ATRIAL SEPTUM:  No defect or patent foramen ovale was identified  MITRAL VALVE:  There was mild regurgitation  HISTORY: PRIOR HISTORY: Atrial Fibrillation, Obstructive Sleep Apnea, Confusion, Hypertension    PROCEDURE: The procedure was performed in the catheterization laboratory  This was a routine study  The risks and alternatives of the procedure were explained to the patient and informed consent was obtained  The transesophageal approach  was used  The study included complete 2D imaging and color Doppler  The heart rate was 132 bpm, at the start of the study  An adult omniplane probe was inserted by the attending cardiologist  Intubated with ease  One intubation attempt(s)  There was no blood detected on the probe  There were no complications during the procedure  MEDICATIONS: Benzocaine spray, topical to oropharynx, prior to procedure  Sedation administered by anesthesia team     LEFT VENTRICLE: Size was normal  Systolic function was normal  Wall thickness was normal     VENTRICULAR SEPTUM: Thickness was normal  There was normal motion  There was normal contour  The septum was intact  RIGHT VENTRICLE: The size was normal  Systolic function was normal  Wall thickness was normal     LEFT ATRIUM: Size was normal  No thrombus was identified  APPENDAGE: The size was normal  No thrombus was identified  DOPPLER: The function was normal (normal emptying velocity)  ATRIAL SEPTUM: No defect or patent foramen ovale was identified  RIGHT ATRIUM: Size was normal  No thrombus was identified  MITRAL VALVE: Valve structure was normal  There was normal leaflet separation  There was no echocardiographic evidence of vegetation  DOPPLER: There was mild regurgitation  AORTIC VALVE: The valve was trileaflet   Leaflets exhibited normal thickness and normal cuspal separation  There was no echocardiographic evidence of vegetation  DOPPLER: There was no regurgitation  TRICUSPID VALVE: The valve structure was normal  There was normal leaflet separation  There was no echocardiographic evidence of vegetation  DOPPLER: There was no regurgitation  PULMONIC VALVE: Leaflets exhibited normal thickness, no calcification, and normal cuspal separation  There was no echocardiographic evidence of vegetation  PERICARDIUM: There was no pericardial effusion  The pericardium was normal in appearance  AORTA: The root exhibited normal size  There was no atheroma  There was no evidence for dissection  There was no evidence for aneurysm  [address] Accredited Echocardiography Laboratory    Prepared and electronically signed by    Regina Gerard MD  Signed 06-Apr-2018 16:06:20       No results found for this or any previous visit  No procedure found  No results found for this or any previous visit  Meds/Allergies   all current active meds have been reviewed and current meds:   No current facility-administered medications for this encounter        Prescriptions Prior to Admission   Medication    allopurinol (ZYLOPRIM) 300 mg tablet    apixaban (ELIQUIS) 5 mg    digoxin (LANOXIN) 0 125 mg tablet    metoprolol tartrate (LOPRESSOR) 50 mg tablet    baclofen 10 mg tablet    colchicine (COLCRYS) 0 6 mg tablet    fluticasone (FLONASE) 50 mcg/act nasal spray    loperamide (IMODIUM) 2 mg capsule    loratadine (CLARITIN) 10 mg tablet    meclizine (ANTIVERT) 12 5 MG tablet    Methylprednisolone 4 MG TBPK    miconazole 2 % cream    omeprazole (PriLOSEC) 20 mg delayed release capsule    tamsulosin (FLOMAX) 0 4 mg              Assessment and Plan:  Paroxysmal Atrial fibrillation with RVR  -previous echocardiogram 03/26/2018 revealed EF 55-65 percent without regional wall motion abnormality, mild MR, mild TR, estimated peak PA pressure 40 mmHg  -currently normal sinus rhythm attack cardioversion--maintained on Lopressor and amiodarone (may now be discharged on 200mg BID)  -on Eliquis for anticoagulation     Hypertension- BP stable     DORA-on CPAP for which he is compliant       ** Please Note: Dragon 360 Dictation voice to text software may have been used in the creation of this document   **

## 2018-04-09 NOTE — DISCHARGE SUMMARY
Discharge Summary - [facility] Internal Medicine    Patient Information: Alexandrea Nickerson 75 y o  male MRN: [id number]  Unit/Bed#: -01 Encounter: [id number]    Discharging Physician / Practitioner: Quique Mccain MD  PCP: Sherly Nolan DO  Admission Date: 3/31/2018  Discharge Date: 04/09/18    Disposition:     Other: Short-term rehab at [facility]    Reason for Admission:  Acute encephalopathy    Discharge Diagnoses:     Principal Problem:    Atrial fibrillation with RVR ([address] )  Active Problems:    Essential hypertension    DORA on CPAP    Decubitus ulcer of heel    Acute encephalopathy    Altered mental status  Resolved Problems:    * No resolved hospital problems  *      Consultations During Hospital Stay:  · Cardiology  · Orthopedic  · Neurology    Procedures Performed:     · CESAR and synchronized D/C Cardioversion    Significant Findings / Test Results:   MRI brain-no MR evidence of acute ischemia  Volume loss/atrophy  EEG-normal      Incidental Findings:   None    Test Results Pending at Discharge (will require follow up): · None     Outpatient Tests Requested:  · None    Complications:  None    Hospital Course:     Alexandrea Nickerson is a 75 y o  male patient who originally presented to the hospital on 3/31/2018 due to altered mental status  Patient was recently admitted to the hospital from 03/24 to 3/30 for AFib with RVR and then later discharged to [facility] rehab status post ORIF right distal femur  Patient was evaluated for acute change in mental status, MRI of the brain was negative for acute abnormality  EEG did not show evidence of seizures  There was no evidence of infection/metabolic abnormalities to explain the cause of altered mental status  Neurology followed the patient as well  His family reported that he had few episodes of diarrhea when he was in the rehab  This likely cause of his change in mental status could be from dehydration/delirium  Patient's AFib was uncontrolled  Cardiology followed the patient in consultation  Patient was tried on digoxin, amiodarone with no improvement in heart rate  Later patient underwent D/C cardioversion and was successfully cardioverted to normal sinus rhythm  Patient's heart rate has been well controlled since then  Patient was discharged on metoprolol 50 mg 3 times daily, amiodarone 200 mg 2 times daily  Patient recommended to follow with Cardiology as outpatient for tapering amiodarone dose  Condition at Discharge: stable     Discharge Day Visit / Exam:     Subjective:  Patient seen and examined  He denies any complaints at this time  Vitals: Blood Pressure: 112/55 (04/09/18 0700)  Pulse: 72 (04/09/18 0700)  Temperature: 98 °F (36 7 °C) (04/09/18 0700)  Temp Source: Oral (04/09/18 0700)  Respirations: 18 (04/09/18 0700)  Height: 5' 6" (167 6 cm) (04/01/18 0051)  Weight - Scale: 97 1 kg (214 lb 1 1 oz) (04/01/18 0051)  SpO2: 97 % (04/09/18 0700)  Exam:   Physical Exam   Constitutional: He is oriented to person, place, and time  He appears well-developed and well-nourished  HENT:   Head: Normocephalic and atraumatic  Eyes: EOM are normal  Pupils are equal, round, and reactive to light  Neck: Normal range of motion  Neck supple  Cardiovascular: Normal rate, regular rhythm and normal heart sounds  Pulmonary/Chest: Effort normal and breath sounds normal    Abdominal: Soft  Bowel sounds are normal    Musculoskeletal: Normal range of motion  Neurological: He is alert and oriented to person, place, and time  Skin: Skin is warm and dry  Discussion with Family:  Discussed with patient and his wife in detail about the management    Discharge instructions/Information to patient and family:   See after visit summary for information provided to patient and family  Provisions for Follow-Up Care:  See after visit summary for information related to follow-up care and any pertinent home health orders        Planned Readmission: none     Discharge Statement:  I spent 25  minutes discharging the patient  This time was spent on the day of discharge  I had direct contact with the patient on the day of discharge  Greater than 50% of the total time was spent examining patient, answering all patient questions, arranging and discussing plan of care with patient as well as directly providing post-discharge instructions  Additional time then spent on discharge activities  Discharge Medications:  See after visit summary for reconciled discharge medications provided to patient and family        ** Please Note: This note has been constructed using a voice recognition system **

## 2018-04-09 NOTE — PLAN OF CARE
CARDIOVASCULAR - ADULT     Maintains optimal cardiac output and hemodynamic stability Progressing     Absence of cardiac dysrhythmias or at baseline rhythm Progressing        DISCHARGE PLANNING - CARE MANAGEMENT     Discharge to post-acute care or home with appropriate resources Progressing        INFECTION - ADULT     Absence or prevention of progression during hospitalization Progressing        Knowledge Deficit     Patient/family/caregiver demonstrates understanding of disease process, treatment plan, medications, and discharge instructions Progressing        MUSCULOSKELETAL - ADULT     Maintain or return mobility to safest level of function Progressing     Maintain proper alignment of affected body part Progressing        NEUROSENSORY - ADULT     Achieves stable or improved neurological status Progressing     Achieves maximal functionality and self care Progressing        PAIN - ADULT     Verbalizes/displays adequate comfort level or baseline comfort level Progressing        Potential for Falls     Patient will remain free of falls Progressing        Prexisting or High Potential for Compromised Skin Integrity     Skin integrity is maintained or improved Progressing        RESPIRATORY - ADULT     Achieves optimal ventilation and oxygenation Progressing        SAFETY ADULT     Maintain or return to baseline ADL function Progressing     Maintain or return mobility status to optimal level Progressing     Patient will remain free of falls Progressing        SKIN/TISSUE INTEGRITY - ADULT     Skin integrity remains intact Progressing     Incision(s), wounds(s) or drain site(s) healing without S/S of infection Progressing     Oral mucous membranes remain intact Progressing

## 2018-04-09 NOTE — CASE MANAGEMENT
Continued Stay Review    Date: 04/08/18    Vital Signs: /63 (BP Location: Left arm)   Pulse 71   Temp 98 2 °F (36 8 °C) (Oral)   Resp 18   Ht 5' 6" (1 676 m)   Wt 97 1 kg (214 lb 1 1 oz)   SpO2 97%   BMI 34 55 kg/m²     Medications:   Scheduled Meds:   Current Facility-Administered Medications:  acetaminophen 650 mg Oral Q6H PRN   allopurinol 300 mg Oral Daily   amiodarone 400 mg Oral BID With Meals   apixaban 5 mg Oral BID   baclofen 10 mg Oral TID   docusate sodium 100 mg Oral BID   fluticasone 1 spray Nasal Daily   loperamide 2 mg Oral 4x Daily PRN   loratadine 10 mg Oral Daily   metoprolol 5 mg Intravenous Q6H PRN   metoprolol tartrate 50 mg Oral Q8H   ondansetron 4 mg Intravenous Q6H PRN   polyethylene glycol 17 g Oral Daily   senna 1 tablet Oral HS   tamsulosin 0 4 mg Oral Daily With Dinner     Continuous Infusions:    PRN Meds:   acetaminophen    loperamide    metoprolol    ondansetron    Abnormal Labs/Diagnostic Results:   Results from last 7 days  Lab 04/05/18  0541   HEMOGLOBIN 10 7*   HEMATOCRIT 34 7*   PLATELETS 264*   EOS PCT 8*         Results from last 7 days  Lab 04/07/18  0550   CREATININE 0 59*     Age/Sex: 75 y o  male     Assessment:     Principal Problem:    Atrial fibrillation with RVR ([facility] [address] )  Active Problems:    Essential hypertension    DORA on CPAP    Decubitus ulcer of heel    Acute encephalopathy    Altered mental status        Plan:  AFib with RVR  Currently normal sinus rhythm after cardioversion  Rate control  Continue metoprolol, amiodarone, Eliquis     Acute toxic metabolic encephalopathy likely secondary to dehydration  Resolved     BPH  On Flomax     Constipation  Resolved        VTE Pharmacologic Prophylaxis:   Pharmacologic: Apixaban (Eliquis)  Mechanical VTE Prophylaxis in Place: Yes      Current Patient Status: Inpatient   Certification Statement: The patient will continue to require additional inpatient hospital stay due to Placement     Discharge Plan:   To discharge to short-term rehab when available       Thank you,  [address] [facility] in the [facility] by [facility] for 2017  Network Utilization Review Department  Phone: [phone]; Fax [phone]  ATTENTION: The Network Utilization Review Department is now centralized for our 7 Facilities  Make a note that we have a new phone and fax numbers for our Department  Please call with any questions or concerns to [phone] and carefully follow the prompts so that you are directed to the right person  All voicemails are confidential  Fax any determinations, approvals, denials, and requests for initial or continue stay review clinical to [phone]  Due to HIGH CALL volume, it would be easier if you could please send faxed requests to expedite your requests and in part, help us provide discharge notifications faster

## 2018-04-09 NOTE — PHYSICAL THERAPY NOTE
PT Progress Note (38min)  (8:15-8:53)       04/09/18 0853   Pain Assessment   Pain Assessment No/denies pain   Restrictions/Precautions   Weight Bearing Precautions Per Order Yes   RLE Weight Bearing Per Order NWB   Braces or Orthoses LE Braces  (short leg brace L LE)   Other Precautions Telemetry; Fall Risk;WBS  (NWB R LE)   General   Chart Reviewed Yes   Response to Previous Treatment Patient with no complaints from previous session  Family/Caregiver Present Yes  (spouse)   Cognition   Orientation Level Oriented X4   Subjective   Subjective pt agreed to PT session  "I haven't gotten out of bed in a few days"  Bed Mobility   Supine to Sit 3  Moderate assistance   Additional items Assist x 1;HOB elevated; Increased time required;Verbal cues;LE management   Transfers   Sit to Stand 2  Maximal assistance  (cues for NWB R LE 25% of the time)   Additional items Assist x 2;Verbal cues; Increased time required  (50% standing from elevated surface ht c RW)   Stand to Sit 2  Maximal assistance   Additional items Assist x 2;Verbal cues; Increased time required   Sliding Board transfer 4  Minimal assistance   Additional items Assist x 1;Verbal cues; Increased time required  (towards L; bed>w/c; (A) c placement/removal of board)   Ambulation/Elevation   Gait pattern Not appropriate   Wheelchair Activities   Wheelchair Parts Management Yes  (dependent for brake/leg rest mgmt)   Propulsion Yes   Propulsion Type 1 Manual   Level 1 Level tile   Method 1 Left lower extremity;Right upper extremity; Left upper extremity   Level of Assistance 1 Distant supervision   Description/ Details 1 propelled w/c 100' on level surface   Balance   Static Sitting Good   Dynamic Sitting Fair +   Static Standing Poor  (standing tolerance 30sec c RW)   Activity Tolerance   Activity Tolerance Patient limited by fatigue   Nurse Made Aware [facility]  LIBERTAS   Exercises   Quad Sets Sitting;10 reps;AROM; Bilateral   Glute Sets Sitting;10 reps;AROM; Bilateral   Knee AROM Long Arc Quad Sitting;10 reps;AAROM; Bilateral   Ankle Pumps Sitting;10 reps;AROM; Bilateral   Marching Sitting;10 reps;AROM;AAROM; Bilateral  (AAROM R LE)   Assessment   Prognosis Good   Problem List Decreased strength;Decreased range of motion;Decreased endurance; Impaired balance;Decreased mobility; Decreased skin integrity;Orthopedic restrictions   Assessment pt motivated for PT session  able to complete 50% of sit>stand transition max (A)x2 from elevated surface ht + support of RW  cueing required 25% of the time for NWB R LE; standing tolerance 30sec c cueing for upright posture + fwd gaze  completed lateral transfer via slide board towards L bed>w/c min (A)x1  cont to require (A) c placement + removal of board 2* chronic R UE weakness  propelled w/c 100' on level surface c B/L UEs + L LE  HR stable throughout session in 90's c pt in no apparent distress  performed seated A/AAROM ther ex B/L LE x10 reps  will cont skilled PT to further maximize functional mobility + improve quality of life  upon d/c, recommend STR  Barriers to Discharge Inaccessible home environment;Decreased caregiver support   Goals   Patient Goals "to get stronger"  STG Expiration Date 04/13/18   Treatment Day 4   Plan   Treatment/Interventions Functional transfer training;LE strengthening/ROM; Therapeutic exercise; Endurance training;Patient/family training;Equipment eval/education; Bed mobility;Spoke to nursing;Family   Progress Progressing toward goals   PT Frequency 5x/wk; Weekend  (1-2x wkd)   Recommendation   Recommendation Short-term skilled PT   PT - OK to Discharge Yes  (to STR )     Joleen Dancer, PT

## 2018-04-09 NOTE — SOCIAL WORK
CM met with pt and wife at bedside  Pt to be discharged to [facility]  Julia from [facility] states that any time after 11:00 will be fine  Nurse and doctor notified  IMM reviewed and signed  Copy to pt and copy to MR for scanning

## 2018-04-09 NOTE — PLAN OF CARE
CARDIOVASCULAR - ADULT     Maintains optimal cardiac output and hemodynamic stability Adequate for Discharge     Absence of cardiac dysrhythmias or at baseline rhythm Adequate for Discharge        DISCHARGE PLANNING     Discharge to home or other facility with appropriate resources Adequate for Discharge        INFECTION - ADULT     Absence or prevention of progression during hospitalization Adequate for Discharge        Knowledge Deficit     Patient/family/caregiver demonstrates understanding of disease process, treatment plan, medications, and discharge instructions Adequate for Discharge        MUSCULOSKELETAL - ADULT     Maintain or return mobility to safest level of function Adequate for Discharge     Maintain proper alignment of affected body part Adequate for Discharge        NEUROSENSORY - ADULT     Achieves stable or improved neurological status Adequate for Discharge     Achieves maximal functionality and self care Adequate for Discharge        PAIN - ADULT     Verbalizes/displays adequate comfort level or baseline comfort level Adequate for Discharge        Potential for Falls     Patient will remain free of falls Adequate for Discharge        Prexisting or High Potential for Compromised Skin Integrity     Skin integrity is maintained or improved Adequate for Discharge        RESPIRATORY - ADULT     Achieves optimal ventilation and oxygenation Adequate for Discharge        SAFETY ADULT     Maintain or return to baseline ADL function Adequate for Discharge     Maintain or return mobility status to optimal level Adequate for Discharge     Patient will remain free of falls Adequate for Discharge        SKIN/TISSUE INTEGRITY - ADULT     Skin integrity remains intact Adequate for Discharge     Incision(s), wounds(s) or drain site(s) healing without S/S of infection Adequate for Discharge     Oral mucous membranes remain intact Adequate for Discharge

## 2018-04-09 NOTE — PLAN OF CARE
Problem: DISCHARGE PLANNING - CARE MANAGEMENT  Goal: Discharge to post-acute care or home with appropriate resources  INTERVENTIONS:  - Conduct assessment to determine patient/family and health care team treatment goals, and need for post-acute services based on payer coverage, community resources, and patient preferences, and barriers to discharge  - Address psychosocial, clinical, and financial barriers to discharge as identified in assessment in conjunction with the patient/family and health care team  - Arrange appropriate level of post-acute services according to patient's   needs and preference and payer coverage in collaboration with the physician and health care team  - Communicate with and update the patient/family, physician, and health care team regarding progress on the discharge plan  - Arrange appropriate transportation to post-acute venues   Outcome: Completed Date Met: 04/09/18  CM met with pt and wife at bedside  Pt to be discharged to [facility]  Julia from [facility] states that any time after 11:00 will be fine  Nurse and doctor notified  IMM reviewed and signed  Copy to pt and copy to MR for scanning

## 2018-04-09 NOTE — PLAN OF CARE
Problem: PHYSICAL THERAPY ADULT  Goal: Performs mobility at highest level of function for planned discharge setting  See evaluation for individualized goals  Treatment/Interventions: Functional transfer training, LE strengthening/ROM, Therapeutic exercise, Endurance training, Patient/family training, Equipment eval/education, Bed mobility, Compensatory technique education, Spoke to nursing, OT, Family          See flowsheet documentation for full assessment, interventions and recommendations  Outcome: Progressing  Prognosis: Good  Problem List: Decreased strength, Decreased range of motion, Decreased endurance, Impaired balance, Decreased mobility, Decreased skin integrity, Orthopedic restrictions  Assessment: pt motivated for PT session  able to complete 50% of sit>stand transition max (A)x2 from elevated surface ht + support of RW  cueing required 25% of the time for NWB R LE; standing tolerance 30sec c cueing for upright posture + fwd gaze  completed lateral transfer via slide board towards L bed>w/c min (A)x1  cont to require (A) c placement + removal of board 2* chronic R UE weakness  propelled w/c 100' on level surface c B/L UEs + L LE  HR stable throughout session in 90's c pt in no apparent distress  performed seated A/AAROM ther ex B/L LE x10 reps  will cont skilled PT to further maximize functional mobility + improve quality of life  upon d/c, recommend STR  Barriers to Discharge: Inaccessible home environment, Decreased caregiver support     Recommendation: Short-term skilled PT     PT - OK to Discharge: Yes (to STR )    See flowsheet documentation for full assessment

## 2018-04-10 ENCOUNTER — TRANSITIONAL CARE MANAGEMENT (OUTPATIENT)
Dept: FAMILY MEDICINE CLINIC | Facility: CLINIC | Age: 68
End: 2018-04-10

## 2018-04-10 LAB
ALBUMIN SERPL BCP-MCNC: 2.4 G/DL (ref 3.5–5)
ALP SERPL-CCNC: 75 U/L (ref 46–116)
ALT SERPL W P-5'-P-CCNC: 17 U/L (ref 12–78)
ANION GAP SERPL CALCULATED.3IONS-SCNC: 8 MMOL/L (ref 4–13)
AST SERPL W P-5'-P-CCNC: 28 U/L (ref 5–45)
BASOPHILS # BLD AUTO: 0.07 THOUSANDS/ΜL (ref 0–0.1)
BASOPHILS NFR BLD AUTO: 1 % (ref 0–1)
BILIRUB SERPL-MCNC: 0.5 MG/DL (ref 0.2–1)
BUN SERPL-MCNC: 7 MG/DL (ref 5–25)
CALCIUM SERPL-MCNC: 9.2 MG/DL
CHLORIDE SERPL-SCNC: 102 MMOL/L (ref 100–108)
CO2 SERPL-SCNC: 26 MMOL/L (ref 21–32)
CREAT SERPL-MCNC: 0.61 MG/DL (ref 0.6–1.3)
EOSINOPHIL # BLD AUTO: 0.53 THOUSAND/ΜL (ref 0–0.61)
EOSINOPHIL NFR BLD AUTO: 6 % (ref 0–6)
ERYTHROCYTE [DISTWIDTH] IN BLOOD BY AUTOMATED COUNT: 14.7 % (ref 11.6–15.1)
GFR SERPL CREATININE-BSD FRML MDRD: 103 ML/MIN/1.73SQ M
GLUCOSE SERPL-MCNC: 88 MG/DL (ref 65–140)
HCT VFR BLD AUTO: 33.8 % (ref 36.5–49.3)
HGB BLD-MCNC: 10.3 G/DL (ref 12–17)
LYMPHOCYTES # BLD AUTO: 1.23 THOUSANDS/ΜL (ref 0.6–4.47)
LYMPHOCYTES NFR BLD AUTO: 14 % (ref 14–44)
MCH RBC QN AUTO: 26.6 PG (ref 26.8–34.3)
MCHC RBC AUTO-ENTMCNC: 30.5 G/DL (ref 31.4–37.4)
MCV RBC AUTO: 87 FL (ref 82–98)
MONOCYTES # BLD AUTO: 0.98 THOUSAND/ΜL (ref 0.17–1.22)
MONOCYTES NFR BLD AUTO: 11 % (ref 4–12)
NEUTROPHILS # BLD AUTO: 5.99 THOUSANDS/ΜL (ref 1.85–7.62)
NEUTS SEG NFR BLD AUTO: 68 % (ref 43–75)
NRBC BLD AUTO-RTO: 0 /100 WBCS
PLATELET # BLD AUTO: 352 THOUSANDS/UL (ref 149–390)
PMV BLD AUTO: 8.8 FL (ref 8.9–12.7)
POTASSIUM SERPL-SCNC: 4.2 MMOL/L (ref 3.5–5.3)
PREALB SERPL-MCNC: 12.5 MG/DL (ref 18–40)
PROT SERPL-MCNC: 6.6 G/DL (ref 6.4–8.2)
RBC # BLD AUTO: 3.87 MILLION/UL (ref 3.88–5.62)
SODIUM SERPL-SCNC: 136 MMOL/L (ref 136–145)
WBC # BLD AUTO: 8.85 THOUSAND/UL (ref 4.31–10.16)

## 2018-04-10 PROCEDURE — 84134 ASSAY OF PREALBUMIN: CPT | Performed by: PHYSICAL MEDICINE & REHABILITATION

## 2018-04-10 PROCEDURE — 80053 COMPREHEN METABOLIC PANEL: CPT | Performed by: PHYSICAL MEDICINE & REHABILITATION

## 2018-04-10 PROCEDURE — 85025 COMPLETE CBC W/AUTO DIFF WBC: CPT | Performed by: PHYSICAL MEDICINE & REHABILITATION

## 2018-04-12 LAB — NT-PROBNP SERPL-MCNC: 195 PG/ML

## 2018-04-12 PROCEDURE — 83880 ASSAY OF NATRIURETIC PEPTIDE: CPT | Performed by: NURSE PRACTITIONER

## 2018-04-16 LAB
ANION GAP SERPL CALCULATED.3IONS-SCNC: 9 MMOL/L (ref 4–13)
BASOPHILS # BLD AUTO: 0.06 THOUSANDS/ΜL (ref 0–0.1)
BASOPHILS NFR BLD AUTO: 1 % (ref 0–1)
BUN SERPL-MCNC: 11 MG/DL (ref 5–25)
CALCIUM SERPL-MCNC: 8.7 MG/DL
CHLORIDE SERPL-SCNC: 104 MMOL/L (ref 100–108)
CO2 SERPL-SCNC: 27 MMOL/L (ref 21–32)
CREAT SERPL-MCNC: 0.65 MG/DL (ref 0.6–1.3)
EOSINOPHIL # BLD AUTO: 0.66 THOUSAND/ΜL (ref 0–0.61)
EOSINOPHIL NFR BLD AUTO: 10 % (ref 0–6)
ERYTHROCYTE [DISTWIDTH] IN BLOOD BY AUTOMATED COUNT: 14.8 % (ref 11.6–15.1)
GFR SERPL CREATININE-BSD FRML MDRD: 100 ML/MIN/1.73SQ M
GLUCOSE P FAST SERPL-MCNC: 95 MG/DL (ref 65–99)
GLUCOSE SERPL-MCNC: 95 MG/DL (ref 65–140)
HCT VFR BLD AUTO: 33.9 % (ref 36.5–49.3)
HGB BLD-MCNC: 10.5 G/DL (ref 12–17)
LYMPHOCYTES # BLD AUTO: 1.34 THOUSANDS/ΜL (ref 0.6–4.47)
LYMPHOCYTES NFR BLD AUTO: 19 % (ref 14–44)
MCH RBC QN AUTO: 26.9 PG (ref 26.8–34.3)
MCHC RBC AUTO-ENTMCNC: 31 G/DL (ref 31.4–37.4)
MCV RBC AUTO: 87 FL (ref 82–98)
MONOCYTES # BLD AUTO: 0.7 THOUSAND/ΜL (ref 0.17–1.22)
MONOCYTES NFR BLD AUTO: 10 % (ref 4–12)
NEUTROPHILS # BLD AUTO: 4.09 THOUSANDS/ΜL (ref 1.85–7.62)
NEUTS SEG NFR BLD AUTO: 59 % (ref 43–75)
NRBC BLD AUTO-RTO: 0 /100 WBCS
PLATELET # BLD AUTO: 393 THOUSANDS/UL (ref 149–390)
PMV BLD AUTO: 9.1 FL (ref 8.9–12.7)
POTASSIUM SERPL-SCNC: 3.7 MMOL/L (ref 3.5–5.3)
RBC # BLD AUTO: 3.91 MILLION/UL (ref 3.88–5.62)
SODIUM SERPL-SCNC: 140 MMOL/L (ref 136–145)
WBC # BLD AUTO: 6.91 THOUSAND/UL (ref 4.31–10.16)

## 2018-04-16 PROCEDURE — 80048 BASIC METABOLIC PNL TOTAL CA: CPT | Performed by: INTERNAL MEDICINE

## 2018-04-16 PROCEDURE — 85025 COMPLETE CBC W/AUTO DIFF WBC: CPT | Performed by: INTERNAL MEDICINE

## 2018-04-23 LAB
ANION GAP SERPL CALCULATED.3IONS-SCNC: 6 MMOL/L (ref 4–13)
BASOPHILS # BLD AUTO: 0.06 THOUSANDS/ΜL (ref 0–0.1)
BASOPHILS NFR BLD AUTO: 1 % (ref 0–1)
BUN SERPL-MCNC: 8 MG/DL (ref 5–25)
CALCIUM SERPL-MCNC: 8.8 MG/DL (ref 8.3–10.1)
CHLORIDE SERPL-SCNC: 106 MMOL/L (ref 100–108)
CO2 SERPL-SCNC: 29 MMOL/L (ref 21–32)
CREAT SERPL-MCNC: 0.73 MG/DL (ref 0.6–1.3)
EOSINOPHIL # BLD AUTO: 0.73 THOUSAND/ΜL (ref 0–0.61)
EOSINOPHIL NFR BLD AUTO: 11 % (ref 0–6)
ERYTHROCYTE [DISTWIDTH] IN BLOOD BY AUTOMATED COUNT: 15 % (ref 11.6–15.1)
GFR SERPL CREATININE-BSD FRML MDRD: 95 ML/MIN/1.73SQ M
GLUCOSE SERPL-MCNC: 91 MG/DL (ref 65–140)
HCT VFR BLD AUTO: 34 % (ref 36.5–49.3)
HGB BLD-MCNC: 10.4 G/DL (ref 12–17)
LYMPHOCYTES # BLD AUTO: 1.39 THOUSANDS/ΜL (ref 0.6–4.47)
LYMPHOCYTES NFR BLD AUTO: 21 % (ref 14–44)
MCH RBC QN AUTO: 26.4 PG (ref 26.8–34.3)
MCHC RBC AUTO-ENTMCNC: 30.6 G/DL (ref 31.4–37.4)
MCV RBC AUTO: 86 FL (ref 82–98)
MONOCYTES # BLD AUTO: 0.62 THOUSAND/ΜL (ref 0.17–1.22)
MONOCYTES NFR BLD AUTO: 9 % (ref 4–12)
NEUTROPHILS # BLD AUTO: 3.85 THOUSANDS/ΜL (ref 1.85–7.62)
NEUTS SEG NFR BLD AUTO: 58 % (ref 43–75)
NRBC BLD AUTO-RTO: 0 /100 WBCS
PLATELET # BLD AUTO: 320 THOUSANDS/UL (ref 149–390)
PMV BLD AUTO: 9.2 FL (ref 8.9–12.7)
POTASSIUM SERPL-SCNC: 3.3 MMOL/L (ref 3.5–5.3)
RBC # BLD AUTO: 3.94 MILLION/UL (ref 3.88–5.62)
SODIUM SERPL-SCNC: 141 MMOL/L (ref 136–145)
WBC # BLD AUTO: 6.67 THOUSAND/UL (ref 4.31–10.16)

## 2018-04-23 PROCEDURE — 85025 COMPLETE CBC W/AUTO DIFF WBC: CPT | Performed by: INTERNAL MEDICINE

## 2018-04-23 PROCEDURE — 80048 BASIC METABOLIC PNL TOTAL CA: CPT | Performed by: INTERNAL MEDICINE

## 2018-04-26 LAB
ANION GAP SERPL CALCULATED.3IONS-SCNC: 6 MMOL/L (ref 4–13)
BASOPHILS # BLD AUTO: 0.07 THOUSANDS/ΜL (ref 0–0.1)
BASOPHILS NFR BLD AUTO: 1 % (ref 0–1)
BUN SERPL-MCNC: 9 MG/DL (ref 5–25)
CALCIUM SERPL-MCNC: 8.8 MG/DL (ref 8.3–10.1)
CHLORIDE SERPL-SCNC: 104 MMOL/L (ref 100–108)
CO2 SERPL-SCNC: 28 MMOL/L (ref 21–32)
CREAT SERPL-MCNC: 0.6 MG/DL (ref 0.6–1.3)
EOSINOPHIL # BLD AUTO: 0.84 THOUSAND/ΜL (ref 0–0.61)
EOSINOPHIL NFR BLD AUTO: 13 % (ref 0–6)
ERYTHROCYTE [DISTWIDTH] IN BLOOD BY AUTOMATED COUNT: 15.4 % (ref 11.6–15.1)
GFR SERPL CREATININE-BSD FRML MDRD: 103 ML/MIN/1.73SQ M
GLUCOSE P FAST SERPL-MCNC: 94 MG/DL (ref 65–99)
GLUCOSE SERPL-MCNC: 94 MG/DL (ref 65–140)
HCT VFR BLD AUTO: 36.7 % (ref 36.5–49.3)
HGB BLD-MCNC: 11.2 G/DL (ref 12–17)
LYMPHOCYTES # BLD AUTO: 1.3 THOUSANDS/ΜL (ref 0.6–4.47)
LYMPHOCYTES NFR BLD AUTO: 20 % (ref 14–44)
MCH RBC QN AUTO: 26.6 PG (ref 26.8–34.3)
MCHC RBC AUTO-ENTMCNC: 30.5 G/DL (ref 31.4–37.4)
MCV RBC AUTO: 87 FL (ref 82–98)
MONOCYTES # BLD AUTO: 0.57 THOUSAND/ΜL (ref 0.17–1.22)
MONOCYTES NFR BLD AUTO: 9 % (ref 4–12)
NEUTROPHILS # BLD AUTO: 3.85 THOUSANDS/ΜL (ref 1.85–7.62)
NEUTS SEG NFR BLD AUTO: 58 % (ref 43–75)
NRBC BLD AUTO-RTO: 0 /100 WBCS
PLATELET # BLD AUTO: 295 THOUSANDS/UL (ref 149–390)
PMV BLD AUTO: 9.4 FL (ref 8.9–12.7)
POTASSIUM SERPL-SCNC: 4.2 MMOL/L (ref 3.5–5.3)
RBC # BLD AUTO: 4.21 MILLION/UL (ref 3.88–5.62)
SODIUM SERPL-SCNC: 138 MMOL/L (ref 136–145)
WBC # BLD AUTO: 6.65 THOUSAND/UL (ref 4.31–10.16)

## 2018-04-26 PROCEDURE — 80048 BASIC METABOLIC PNL TOTAL CA: CPT | Performed by: NURSE PRACTITIONER

## 2018-04-26 PROCEDURE — 85025 COMPLETE CBC W/AUTO DIFF WBC: CPT | Performed by: INTERNAL MEDICINE

## 2018-04-30 LAB
ANION GAP SERPL CALCULATED.3IONS-SCNC: 8 MMOL/L (ref 4–13)
BASOPHILS # BLD AUTO: 0.06 THOUSANDS/ΜL (ref 0–0.1)
BASOPHILS NFR BLD AUTO: 1 % (ref 0–1)
BUN SERPL-MCNC: 11 MG/DL (ref 5–25)
CALCIUM SERPL-MCNC: 9.3 MG/DL (ref 8.3–10.1)
CHLORIDE SERPL-SCNC: 104 MMOL/L (ref 100–108)
CO2 SERPL-SCNC: 28 MMOL/L (ref 21–32)
CREAT SERPL-MCNC: 0.69 MG/DL (ref 0.6–1.3)
EOSINOPHIL # BLD AUTO: 0.98 THOUSAND/ΜL (ref 0–0.61)
EOSINOPHIL NFR BLD AUTO: 15 % (ref 0–6)
ERYTHROCYTE [DISTWIDTH] IN BLOOD BY AUTOMATED COUNT: 15.5 % (ref 11.6–15.1)
GFR SERPL CREATININE-BSD FRML MDRD: 98 ML/MIN/1.73SQ M
GLUCOSE P FAST SERPL-MCNC: 96 MG/DL (ref 65–99)
GLUCOSE SERPL-MCNC: 96 MG/DL (ref 65–140)
HCT VFR BLD AUTO: 36.7 % (ref 36.5–49.3)
HGB BLD-MCNC: 11.2 G/DL (ref 12–17)
LYMPHOCYTES # BLD AUTO: 1.39 THOUSANDS/ΜL (ref 0.6–4.47)
LYMPHOCYTES NFR BLD AUTO: 22 % (ref 14–44)
MCH RBC QN AUTO: 26.1 PG (ref 26.8–34.3)
MCHC RBC AUTO-ENTMCNC: 30.5 G/DL (ref 31.4–37.4)
MCV RBC AUTO: 86 FL (ref 82–98)
MONOCYTES # BLD AUTO: 0.65 THOUSAND/ΜL (ref 0.17–1.22)
MONOCYTES NFR BLD AUTO: 10 % (ref 4–12)
NEUTROPHILS # BLD AUTO: 3.33 THOUSANDS/ΜL (ref 1.85–7.62)
NEUTS SEG NFR BLD AUTO: 52 % (ref 43–75)
NRBC BLD AUTO-RTO: 0 /100 WBCS
PLATELET # BLD AUTO: 286 THOUSANDS/UL (ref 149–390)
PMV BLD AUTO: 9.5 FL (ref 8.9–12.7)
POTASSIUM SERPL-SCNC: 3.7 MMOL/L (ref 3.5–5.3)
RBC # BLD AUTO: 4.29 MILLION/UL (ref 3.88–5.62)
SODIUM SERPL-SCNC: 140 MMOL/L (ref 136–145)
WBC # BLD AUTO: 6.44 THOUSAND/UL (ref 4.31–10.16)

## 2018-04-30 PROCEDURE — 80048 BASIC METABOLIC PNL TOTAL CA: CPT | Performed by: INTERNAL MEDICINE

## 2018-04-30 PROCEDURE — 85025 COMPLETE CBC W/AUTO DIFF WBC: CPT | Performed by: INTERNAL MEDICINE

## 2018-05-02 LAB
BACTERIA UR QL AUTO: ABNORMAL /HPF
BILIRUB UR QL STRIP: NEGATIVE
CLARITY UR: CLEAR
COLOR UR: YELLOW
GLUCOSE UR STRIP-MCNC: NEGATIVE MG/DL
HGB UR QL STRIP.AUTO: ABNORMAL
KETONES UR STRIP-MCNC: NEGATIVE MG/DL
LEUKOCYTE ESTERASE UR QL STRIP: NEGATIVE
NITRITE UR QL STRIP: NEGATIVE
NON-SQ EPI CELLS URNS QL MICRO: ABNORMAL /HPF
PH UR STRIP.AUTO: 6.5 [PH] (ref 4.5–8)
PROT UR STRIP-MCNC: NEGATIVE MG/DL
RBC #/AREA URNS AUTO: ABNORMAL /HPF
SP GR UR STRIP.AUTO: <=1.005 (ref 1–1.03)
UROBILINOGEN UR QL STRIP.AUTO: 0.2 E.U./DL
WBC #/AREA URNS AUTO: ABNORMAL /HPF

## 2018-05-02 PROCEDURE — 81001 URINALYSIS AUTO W/SCOPE: CPT | Performed by: PHYSICAL MEDICINE & REHABILITATION

## 2018-05-03 ENCOUNTER — TELEPHONE (OUTPATIENT)
Dept: FAMILY MEDICINE CLINIC | Facility: CLINIC | Age: 68
End: 2018-05-03

## 2018-05-03 LAB
BASOPHILS # BLD AUTO: 0.1 THOUSANDS/ΜL (ref 0–0.1)
BASOPHILS NFR BLD AUTO: 2 % (ref 0–1)
EOSINOPHIL # BLD AUTO: 0.95 THOUSAND/ΜL (ref 0–0.61)
EOSINOPHIL NFR BLD AUTO: 14 % (ref 0–6)
ERYTHROCYTE [DISTWIDTH] IN BLOOD BY AUTOMATED COUNT: 15.6 % (ref 11.6–15.1)
HCT VFR BLD AUTO: 38.1 % (ref 36.5–49.3)
HGB BLD-MCNC: 11.6 G/DL (ref 12–17)
LYMPHOCYTES # BLD AUTO: 1.37 THOUSANDS/ΜL (ref 0.6–4.47)
LYMPHOCYTES NFR BLD AUTO: 20 % (ref 14–44)
MCH RBC QN AUTO: 26.2 PG (ref 26.8–34.3)
MCHC RBC AUTO-ENTMCNC: 30.4 G/DL (ref 31.4–37.4)
MCV RBC AUTO: 86 FL (ref 82–98)
MONOCYTES # BLD AUTO: 0.65 THOUSAND/ΜL (ref 0.17–1.22)
MONOCYTES NFR BLD AUTO: 9 % (ref 4–12)
NEUTROPHILS # BLD AUTO: 3.78 THOUSANDS/ΜL (ref 1.85–7.62)
NEUTS SEG NFR BLD AUTO: 55 % (ref 43–75)
NRBC BLD AUTO-RTO: 0 /100 WBCS
PLATELET # BLD AUTO: 306 THOUSANDS/UL (ref 149–390)
PMV BLD AUTO: 9.6 FL (ref 8.9–12.7)
RBC # BLD AUTO: 4.42 MILLION/UL (ref 3.88–5.62)
WBC # BLD AUTO: 6.88 THOUSAND/UL (ref 4.31–10.16)

## 2018-05-03 PROCEDURE — 85025 COMPLETE CBC W/AUTO DIFF WBC: CPT | Performed by: PHYSICAL MEDICINE & REHABILITATION

## 2018-05-03 NOTE — TELEPHONE ENCOUNTER
Pt's wife called asking if you can write a prescription for a Right leg brace  Pt broke her leg and [facility]  Please fax prescription to: [facility]eks  [phone]    Wife aware it will be done on Monday

## 2018-05-11 ENCOUNTER — TELEPHONE (OUTPATIENT)
Dept: FAMILY MEDICINE CLINIC | Facility: CLINIC | Age: 68
End: 2018-05-11

## 2018-05-11 DIAGNOSIS — R05.9 COUGH: Primary | ICD-10-CM

## 2018-05-11 RX ORDER — PROMETHAZINE HYDROCHLORIDE AND CODEINE PHOSPHATE 6.25; 1 MG/5ML; MG/5ML
5 SYRUP ORAL EVERY 4 HOURS PRN
Qty: 120 ML | Refills: 0 | Status: SHIPPED | OUTPATIENT
Start: 2018-05-11 | End: 2018-05-15

## 2018-05-11 NOTE — TELEPHONE ENCOUNTER
Pt has a bad cough and his wife would like something to be send to the pharmacy for him  He is on a wheelchair and is unable to come in for an appt  Please advise

## 2018-05-14 ENCOUNTER — TRANSITIONAL CARE MANAGEMENT (OUTPATIENT)
Dept: FAMILY MEDICINE CLINIC | Facility: CLINIC | Age: 68
End: 2018-05-14

## 2018-05-15 ENCOUNTER — OFFICE VISIT (OUTPATIENT)
Dept: FAMILY MEDICINE CLINIC | Facility: CLINIC | Age: 68
End: 2018-05-15
Payer: MEDICARE

## 2018-05-15 VITALS
OXYGEN SATURATION: 99 % | HEIGHT: 66 IN | DIASTOLIC BLOOD PRESSURE: 72 MMHG | TEMPERATURE: 97.7 F | SYSTOLIC BLOOD PRESSURE: 122 MMHG | HEART RATE: 60 BPM | RESPIRATION RATE: 15 BRPM

## 2018-05-15 DIAGNOSIS — E78.2 MIXED HYPERLIPIDEMIA: ICD-10-CM

## 2018-05-15 DIAGNOSIS — I10 ESSENTIAL HYPERTENSION: Chronic | ICD-10-CM

## 2018-05-15 DIAGNOSIS — I48.91 ATRIAL FIBRILLATION WITH RVR (HCC): Primary | ICD-10-CM

## 2018-05-15 PROCEDURE — 99495 TRANSJ CARE MGMT MOD F2F 14D: CPT | Performed by: FAMILY MEDICINE

## 2018-05-15 RX ORDER — AMIODARONE HYDROCHLORIDE 200 MG/1
200 TABLET ORAL DAILY
Refills: 0 | COMMUNITY
Start: 2018-05-03 | End: 2018-05-29 | Stop reason: SDUPTHER

## 2018-05-15 RX ORDER — FAMOTIDINE 20 MG/1
20 TABLET, FILM COATED ORAL DAILY
Refills: 0 | COMMUNITY
Start: 2018-05-03 | End: 2018-06-05 | Stop reason: SDUPTHER

## 2018-05-15 NOTE — PROGRESS NOTES
Date and time hospital follow up call was made:  5/14/2018 11:15 AM  Hospital care reviewed:  Records reviewed  Patient was hopsitalized at:  [facility], Other (comment)  Date of admission:  3/31/18  Date of discharge:  5/4/18  Diagnosis:  [address]  Were the patients medicaitons reviewed and updated:  Yes  Current symptoms:  None  Post hospital issues:  None  Should patient be enrolled in anticoag monitoring?:  No  Scheduled for follow up?:  Yes  Did you obtain your prescribed medications:  Yes  Do you need help managing your perscriptions or medications:  No  Is transportation to your appointments needed:  No  I have advised the patient to call PCP with any new or worsening symptoms (please type in name along with any credentials):  Charles Betancourt  Living Arrangements:  Family members  Are you recieving outpatient services:  No  Are you recieving home care services:  Yes  Types of home care services:  Home PT, Nurse visit  Are you using any community resources:  No  Current waiver service:  No  Have you fallen in the last 12 months:  Yes  How many times:  4/5  Interperter language line required?:  No  Counseling:  Family           Assessment/Plan:       Diagnoses and all orders for this visit:    Atrial fibrillation with RVR ([address] )    Essential hypertension    Mixed hyperlipidemia    Other orders  -     amiodarone 200 mg tablet; Take 200 mg by mouth daily  -     famotidine (PEPCID) 20 mg tablet; Take 20 mg by mouth daily        Continue current medications, follow up with cardiologist as scheduled  He is to let us know if he develops any shortness of breath, or dizziness  Continue with physical therapy  Subjective:      Patient ID: Last Simon is a 75 y o  male  Patient is seen today for a transition of care management, he was hospitalized at [address] [place] [address] for uncontrolled atrial fibrillation  He was started on amiodarone by Cardiology    He states that he is feeling much better since doing this, he denies any shortness of breath, or dizziness  He is due to follow up with his cardiologist in 2 weeks  He is taking Eliquis as prescribed  He states that he is still weak in his lower extremities, but is doing physical therapy at home and doing better with this  He states that he does have some intermittent right leg pain since he broke his right femur, which was repaired at [facility]  His surgical history was updated his chart  The following portions of the patient's history were reviewed and updated as appropriate:   He has a past medical history of A-fib ([address] ); Gout; HTN (hypertension); Neuropathy; Polio; and Sleep apnea  ,   does not have any pertinent problems on file  ,   has a past surgical history that includes Colon surgery; Hernia repair; and Femur fracture surgery (Right)  ,  family history is not on file  ,   reports that he has never smoked  He has never used smokeless tobacco  He reports that he does not drink alcohol or use drugs  ,  is allergic to cymbalta [duloxetine hcl]     Current Outpatient Prescriptions   Medication Sig Dispense Refill    allopurinol (ZYLOPRIM) 300 mg tablet TAKE 1 TABLET BY MOUTH EVERY DAY 30 tablet 5    amiodarone 200 mg tablet Take 200 mg by mouth daily  0    apixaban (ELIQUIS) 5 mg 1 TAB(S) 2 TIMES A DAY ORALLY 30      baclofen 10 mg tablet Take 10 mg by mouth 3 (three) times a day      famotidine (PEPCID) 20 mg tablet Take 20 mg by mouth daily  0    fluticasone (FLONASE) 50 mcg/act nasal spray 1 spray into each nostril daily      loratadine (CLARITIN) 10 mg tablet Take 10 mg by mouth daily      metoprolol tartrate (LOPRESSOR) 50 mg tablet Take 50 mg by mouth 3 (three) times a day        miconazole 2 % cream Apply topically 2 (two) times a day 28 35 g 0    omeprazole (PriLOSEC) 20 mg delayed release capsule       senna (SENOKOT) 8 6 mg Take 1 tablet (8 6 mg total) by mouth daily at bedtime 120 each 0    tamsulosin (FLOMAX) 0 4 mg Take 0 4 mg by mouth daily with dinner       No current facility-administered medications for this visit  Review of Systems   Constitutional: Negative for chills, fatigue and fever  HENT: Negative for congestion, ear pain, hearing loss, postnasal drip, rhinorrhea and sore throat  Eyes: Negative for pain and visual disturbance  Respiratory: Negative for chest tightness, shortness of breath and wheezing  Cardiovascular: Negative for chest pain and leg swelling  Gastrointestinal: Negative for abdominal distention, abdominal pain, constipation, diarrhea and vomiting  Endocrine: Negative for cold intolerance and heat intolerance  Genitourinary: Negative for difficulty urinating, frequency and urgency  Musculoskeletal: Negative for arthralgias and gait problem  Skin: Negative for color change  Neurological: Positive for weakness (  Of the lower extremities  )  Negative for dizziness, tremors, syncope, numbness and headaches  Hematological: Negative for adenopathy  Psychiatric/Behavioral: Negative for agitation, confusion and sleep disturbance  The patient is not nervous/anxious  Objective:  Vitals:    05/15/18 1129   BP: 122/72   Pulse: 60   Resp: 15   Temp: 97 7 °F (36 5 °C)   SpO2: 99%   Height: 5' 6" (1 676 m)     There is no height or weight on file to calculate BMI  Physical Exam   Constitutional: He is oriented to person, place, and time  He appears well-developed and well-nourished  HENT:   Head: Normocephalic  Mouth/Throat: Oropharynx is clear and moist    Eyes: Conjunctivae are normal  No scleral icterus  Neck: Normal range of motion  No thyromegaly present  Cardiovascular: Normal rate, regular rhythm and normal heart sounds  No murmur heard  Pulmonary/Chest: Effort normal and breath sounds normal  No respiratory distress  He has no wheezes  Abdominal: Soft  Bowel sounds are normal  He exhibits no distension  There is no tenderness  Musculoskeletal: Normal range of motion  He exhibits no edema or tenderness  Wheelchair-bound   Lymphadenopathy:     He has no cervical adenopathy  Neurological: He is alert and oriented to person, place, and time  No cranial nerve deficit  Skin: Skin is warm and dry  No rash noted  No pallor  Psychiatric: He has a normal mood and affect  His behavior is normal    Nursing note and vitals reviewed

## 2018-05-18 ENCOUNTER — TELEPHONE (OUTPATIENT)
Dept: FAMILY MEDICINE CLINIC | Facility: CLINIC | Age: 68
End: 2018-05-18

## 2018-05-18 DIAGNOSIS — M1A.0720 CHRONIC GOUT OF LEFT FOOT, UNSPECIFIED CAUSE: ICD-10-CM

## 2018-05-18 DIAGNOSIS — J30.1 SEASONAL ALLERGIC RHINITIS DUE TO POLLEN: ICD-10-CM

## 2018-05-18 DIAGNOSIS — I48.0 PAROXYSMAL ATRIAL FIBRILLATION (HCC): Primary | ICD-10-CM

## 2018-05-18 DIAGNOSIS — N40.0 PROSTATIC HYPERTROPHY: ICD-10-CM

## 2018-05-18 RX ORDER — TAMSULOSIN HYDROCHLORIDE 0.4 MG/1
0.4 CAPSULE ORAL
Qty: 90 CAPSULE | Refills: 3 | Status: SHIPPED | OUTPATIENT
Start: 2018-05-18 | End: 2019-01-17 | Stop reason: SDUPTHER

## 2018-05-18 RX ORDER — FLUTICASONE PROPIONATE 50 MCG
1 SPRAY, SUSPENSION (ML) NASAL DAILY
Qty: 3 BOTTLE | Refills: 3 | Status: SHIPPED | OUTPATIENT
Start: 2018-05-18 | End: 2020-01-01

## 2018-05-18 RX ORDER — ALLOPURINOL 300 MG/1
300 TABLET ORAL DAILY
Qty: 90 TABLET | Refills: 3 | Status: SHIPPED | OUTPATIENT
Start: 2018-05-18 | End: 2019-01-17 | Stop reason: SDUPTHER

## 2018-05-29 ENCOUNTER — OFFICE VISIT (OUTPATIENT)
Dept: CARDIOLOGY CLINIC | Facility: CLINIC | Age: 68
End: 2018-05-29
Payer: MEDICARE

## 2018-05-29 VITALS
HEART RATE: 63 BPM | OXYGEN SATURATION: 99 % | DIASTOLIC BLOOD PRESSURE: 76 MMHG | HEIGHT: 66 IN | SYSTOLIC BLOOD PRESSURE: 128 MMHG

## 2018-05-29 DIAGNOSIS — I48.0 PAROXYSMAL ATRIAL FIBRILLATION (HCC): Primary | ICD-10-CM

## 2018-05-29 DIAGNOSIS — I10 ESSENTIAL HYPERTENSION: ICD-10-CM

## 2018-05-29 DIAGNOSIS — I27.20 PULMONARY HYPERTENSION (HCC): ICD-10-CM

## 2018-05-29 DIAGNOSIS — Z79.899 ON AMIODARONE THERAPY: ICD-10-CM

## 2018-05-29 DIAGNOSIS — G47.33 OSA (OBSTRUCTIVE SLEEP APNEA): ICD-10-CM

## 2018-05-29 DIAGNOSIS — E66.9 OBESITY (BMI 30-39.9): ICD-10-CM

## 2018-05-29 PROCEDURE — 93000 ELECTROCARDIOGRAM COMPLETE: CPT | Performed by: INTERNAL MEDICINE

## 2018-05-29 PROCEDURE — 99214 OFFICE O/P EST MOD 30 MIN: CPT | Performed by: INTERNAL MEDICINE

## 2018-05-29 RX ORDER — AMIODARONE HYDROCHLORIDE 200 MG/1
200 TABLET ORAL DAILY
Qty: 90 TABLET | Refills: 2 | Status: SHIPPED | OUTPATIENT
Start: 2018-05-29 | End: 2018-06-24 | Stop reason: SDUPTHER

## 2018-05-29 RX ORDER — METOPROLOL TARTRATE 50 MG/1
50 TABLET, FILM COATED ORAL EVERY 12 HOURS SCHEDULED
Qty: 180 TABLET | Refills: 2 | Status: SHIPPED | OUTPATIENT
Start: 2018-05-29 | End: 2018-07-13 | Stop reason: SDUPTHER

## 2018-05-29 NOTE — PROGRESS NOTES
CARDIOLOGY OFFICE VISIT  [facility]  [facility] [address] [place] [address], [address], [facility] Lluvia Deng  Tel: [phone]      NAME: Moshe Mays  AGE: 75 y o  SEX: male  : 1950   MRN: [id number]      Chief Complaint:  Chief Complaint   Patient presents with    Follow-up         History of Present Illness:   Patient comes for [facility] / hospital follow up where he was seen for atrial fibrillation with RVR  Rate control was difficult so he ended up getting CESAR with cardioversion and Amiodarone was started  He has been in SR since then and states that he has been feeling great  Denies chest pain / pressure, SOB, palpitations, lightheadedness, syncope, swelling feet, orthopnea, PND, claudication  PAF -  Currently in sinus rhythm  On Amiodarone and beta-blocker and Eliquis  Denies bleeding from any site  HTN -  Has been hypertensive for many years  Taking medications regularly  Denies lightheadedness, headache, medication side effects  DORA -   States he uses his CPAP regularly    PHTN -  Mild  40 mm Hg  Likely due to his sleep apnea    Obesity -  Trying to lose weight  Past Medical History:  Past Medical History:   Diagnosis Date    A-fib ([address] )     Gout     HTN (hypertension)     Neuropathy     Polio     Sleep apnea          Past Surgical History:  Past Surgical History:   Procedure Laterality Date    COLON SURGERY      FEMUR FRACTURE SURGERY Right     HERNIA REPAIR           Family History:  No family history on file        Social History:  Social History     Social History    Marital status: /Civil Union     Spouse name: N/A    Number of children: N/A    Years of education: N/A     Social History Main Topics    Smoking status: Never Smoker    Smokeless tobacco: Never Used    Alcohol use No    Drug use: No    Sexual activity: Yes     Partners: Female     Birth control/ protection: None     Other Topics Concern    Not on file     Social History Narrative    No narrative on file         Active Problems:  Patient Active Problem List   Diagnosis    Bilateral arm weakness    Bilateral edema of lower extremity    Benign prostatic hyperplasia    Cervical myelopathy (HCC)    Essential hypertension    Gout, arthritis    Mixed hyperlipidemia    DORA on CPAP    Osteoporosis    Peptic reflux disease    Post-polio syndrome    Obesity (BMI 30-39  9)    Polio    Decubitus ulcer of heel    Leukocytosis    Atrial fibrillation with RVR (HCC)    Acute encephalopathy    Altered mental status         The following portions of the patient's history were reviewed and updated as appropriate: past medical history, past surgical history, past family history,  past social history, current medications, allergies and problem list       Review of Systems:  Constitutional: Denies fever, chills, fatigue  Eyes: Denies eye redness, eye discharge, double vision  ENT: Denies hearing loss, tinnitus, sneezing, nasal discharge, sore throat   Respiratory: Denies cough, expectoration, hemoptysis, shortness of breath  Cardiovascular: Denies chest pain, palpitations, orthopnea, PND  Gastrointestinal: Denies abdominal pain, nausea, vomiting, hematemesis, diarrhea, bloody stools  Genito-Urinary: Denies dysuria, incontinence  Neurologic: Denies confusion, lightheadedness, syncope, headache, seizures  Endocrine: Denies polyuria, polydipsia, temperature intolerance  Allergy and Immunology: Denies hives, insect bite sensitivity  Hematological and Lymphatic: Denies bleeding problems, swollen glands   Psychological: Denies depression, suicidal ideation, anxiety, panic  Dermatological: Denies pruritus, rash, skin lesion changes      Vitals:  Vitals:    05/29/18 1026   BP: 128/76   Pulse: 63   SpO2: 99%       There is no height or weight on file to calculate BMI      Weight (last 2 days)     Date/Time   Weight    05/29/18 1026  --    Weight: pt in wheelchair at 05/29/18 1026                Physical Examination:  General: On wheelchair  Wearing braces and compression stockings in both legs  Patient is not in acute distress  Awake, alert, oriented in time, place and person  Responding to commands  Head: Normocephalic  Atraumatic  Eyes: Both pupils normal sized, round and reactive to light  Nonicteric  ENT: Normal external ear canals  Nares normal, no drainage  Lips and oral mucosa normal  Neck: Supple  JVP not raised  Trachea central  No thyromegaly  Lungs: Bilateral bronchovascular breath sounds with no crackles or rhonchi  Chest wall: No tenderness  Cardiovascular: RRR  S1 and S2 normal  No murmur, rub or gallop  Gastrointestinal: Abdomen soft, nontender  No guarding or rigidity  Liver and spleen not palpable  Bowel sounds present  Neurologic: Patient is awake, alert, oriented in time, place and person  Responding to command  Moving all extremities  Integumentary:  No skin rash  Lymphatic: No cervical lymphadenopathy  Back: Symmetric   No CVA tenderness  Extremities: No clubbing, cyanosis or edema      Laboratory Results:  CBC with diff:   Lab Results   Component Value Date    WBC 6 88 05/03/2018    RBC 4 42 05/03/2018    HGB 11 6 (L) 05/03/2018    HCT 38 1 05/03/2018    MCV 86 05/03/2018    MCH 26 2 (L) 05/03/2018    RDW 15 6 (H) 05/03/2018     05/03/2018       CMP:  Lab Results   Component Value Date    CREATININE 0 69 04/30/2018    BUN 11 04/30/2018     04/30/2018    K 3 7 04/30/2018     04/30/2018    CO2 28 04/30/2018    GLUCOSE 96 04/30/2018    PROT 6 6 04/10/2018    PROT 6 8 07/21/2015    ALKPHOS 75 04/10/2018    ALT 17 04/10/2018    AST 28 04/10/2018    BILIDIR 0 15 03/24/2018       Lab Results   Component Value Date    MG 1 9 03/31/2018    PHOS 3 1 03/31/2018       Lab Results   Component Value Date    TROPONINI <0 02 03/31/2018    TROPONINI <0 02 03/24/2018    TROPONINI <0 02 11/13/2017       Lipid Profile:   Lab Results   Component Value Date CHOL 193 2017     Lab Results   Component Value Date    HDL 47 2017     No results found for: LDLCALC  No results found for: TRIG    Cardiac testing:   Results for orders placed during the hospital encounter of 18   Echo complete with contrast if indicated    Narrative [address] [place] [address] [id number] [phone]    Transthoracic Echocardiogram  2D, M-mode, Doppler, and Color Doppler    Study date:  26-Mar-2018    Patient: Gertrude Hannah  MR number: [id number]  Account number: [de-identified]  : 1950  Age: 75 years  Gender: Male  Status: Inpatient  Location: Bedside  Height: 66 in  Weight: 224 lb  BP: 124/ 68 mmHg    Indications: Atrial Fibrillation    Diagnoses: I48 0 - Atrial fibrillation    Sonographer:  AKILA Ruth,RDCS  Interpreting Physician:  Jeanette Paul MD  Referring Physician:  Bala Centeno PA-C  Group:  [facility]    SUMMARY    LEFT VENTRICLE:  Systolic function was normal  Ejection fraction was estimated in the range of 55 % to 65 %  There were no regional wall motion abnormalities  MITRAL VALVE:  There was mild regurgitation  TRICUSPID VALVE:  There was mild regurgitation  Estimated peak PA pressure was 40 mmHg  HISTORY: PRIOR HISTORY: Atrial Fibrillation, Hypertension, Mixed Hyperlipidemia, Hypokalemia    PROCEDURE: The procedure was performed at the bedside  This was a routine study  The transthoracic approach was used  The study included complete 2D imaging, M-mode, complete spectral Doppler, and color Doppler  The heart rate was 76 bpm,  at the start of the study  Images were obtained from the parasternal, apical, subcostal, and suprasternal notch acoustic windows  Image quality was adequate  LEFT VENTRICLE: Size was normal  Systolic function was normal  Ejection fraction was estimated in the range of 55 % to 65 %  There were no regional wall motion abnormalities   Wall thickness was normal  DOPPLER: Left ventricular diastolic  function parameters were abnormal     RIGHT VENTRICLE: The size was normal  Systolic function was normal  Wall thickness was normal     LEFT ATRIUM: Size was normal     RIGHT ATRIUM: Size was normal     MITRAL VALVE: Valve structure was normal  There was normal leaflet separation  DOPPLER: The transmitral velocity was within the normal range  There was no evidence for stenosis  There was mild regurgitation  AORTIC VALVE: The valve was trileaflet  Leaflets exhibited normal thickness and normal cuspal separation  DOPPLER: Transaortic velocity was minimally increased  There was no evidence for stenosis  There was no regurgitation  TRICUSPID VALVE: The valve structure was normal  There was normal leaflet separation  DOPPLER: The transtricuspid velocity was within the normal range  There was no evidence for stenosis  There was mild regurgitation  Estimated peak PA  pressure was 40 mmHg  PULMONIC VALVE: Leaflets exhibited normal thickness, no calcification, and normal cuspal separation  DOPPLER: The transpulmonic velocity was within the normal range  There was no regurgitation  PERICARDIUM: There was no pericardial effusion  The pericardium was normal in appearance  AORTA: The root exhibited normal size  SYSTEMIC VEINS: IVC: The inferior vena cava was normal in size and course  Respirophasic changes were normal     SYSTEM MEASUREMENT TABLES    2D  %FS: 25 2 %  Ao Diam: 2 7 cm  EDV(Teich): 83 6 ml  EF(Teich): 50 %  ESV(Teich): 41 8 ml  IVSd: 1 cm  LA Area: 23 9 cm2  LA Diam: 3 7 cm  LVEDV MOD A4C: 54 7 ml  LVEF MOD A4C: 63 9 %  LVESV MOD A4C: 19 8 ml  LVIDd: 4 3 cm  LVIDs: 3 2 cm  LVLd A4C: 6 8 cm  LVLs A4C: 6 cm  LVOT Diam: 1 9 cm  LVPWd: 1 cm  RA Area: 20 6 cm2  RVIDd: 3 4 cm  SV MOD A4C: 35 ml  SV(Teich): 41 8 ml    CW  AV Env  Ti: 213 1 ms  AV VTI: 29 7 cm  AV Vmax: 2 1 m/s  AV Vmean: 1 4 m/s  AV maxP 7 mmHg  AV meanP 1 mmHg  TR Vmax: 3 3 m/s  TR maxP mmHg    MM  TAPSE: 2 1 cm    PW  SALUD (VTI): 2 cm2  SALUD Vmax: 1 8 cm2  LVOT Env  Ti: 262 6 ms  LVOT VTI: 20 4 cm  LVOT Vmax: 1 3 m/s  LVOT Vmean: 0 8 m/s  LVOT maxP 5 mmHg  LVOT meanP 9 mmHg  LVSV Dopp: 59 3 ml    [facility]    Prepared and electronically signed by    Jeanette Paul MD  Signed 26-Mar-2018 18:09:34       Results for orders placed during the hospital encounter of 18   CESAR    Narrative [address] [place] [phone]    Transesophageal Echocardiogram  2D and Color Doppler    Study date:  2018    Patient: Gertrude Hannah  MR number: [id number]  Account number: [de-identified]  : 1950  Age: 75 years  Gender: Male  Status: Inpatient  Location: Cath lab  Height: 66 in  Weight: 214 lb  BP: 102/ 53 mmHg    Indications: Atrial Fibrillation    Diagnoses: I48 0 - Atrial fibrillation    Sonographer:  AKILA Ruth,RDCS  Interpreting Physician:  Jeanette Paul MD  Referring Physician:  Jeanette Paul MD  Group:  [facility]    SUMMARY    LEFT VENTRICLE:  Systolic function was normal     LEFT ATRIAL APPENDAGE:  No thrombus was identified  ATRIAL SEPTUM:  No defect or patent foramen ovale was identified  MITRAL VALVE:  There was mild regurgitation  HISTORY: PRIOR HISTORY: Atrial Fibrillation, Obstructive Sleep Apnea, Confusion, Hypertension    PROCEDURE: The procedure was performed in the catheterization laboratory  This was a routine study  The risks and alternatives of the procedure were explained to the patient and informed consent was obtained  The transesophageal approach  was used  The study included complete 2D imaging and color Doppler  The heart rate was 132 bpm, at the start of the study  An adult omniplane probe was inserted by the attending cardiologist  Intubated with ease  One intubation attempt(s)    There was no blood detected on the probe  There were no complications during the procedure  MEDICATIONS: Benzocaine spray, topical to oropharynx, prior to procedure  Sedation administered by anesthesia team     LEFT VENTRICLE: Size was normal  Systolic function was normal  Wall thickness was normal     VENTRICULAR SEPTUM: Thickness was normal  There was normal motion  There was normal contour  The septum was intact  RIGHT VENTRICLE: The size was normal  Systolic function was normal  Wall thickness was normal     LEFT ATRIUM: Size was normal  No thrombus was identified  APPENDAGE: The size was normal  No thrombus was identified  DOPPLER: The function was normal (normal emptying velocity)  ATRIAL SEPTUM: No defect or patent foramen ovale was identified  RIGHT ATRIUM: Size was normal  No thrombus was identified  MITRAL VALVE: Valve structure was normal  There was normal leaflet separation  There was no echocardiographic evidence of vegetation  DOPPLER: There was mild regurgitation  AORTIC VALVE: The valve was trileaflet  Leaflets exhibited normal thickness and normal cuspal separation  There was no echocardiographic evidence of vegetation  DOPPLER: There was no regurgitation  TRICUSPID VALVE: The valve structure was normal  There was normal leaflet separation  There was no echocardiographic evidence of vegetation  DOPPLER: There was no regurgitation  PULMONIC VALVE: Leaflets exhibited normal thickness, no calcification, and normal cuspal separation  There was no echocardiographic evidence of vegetation  PERICARDIUM: There was no pericardial effusion  The pericardium was normal in appearance  AORTA: The root exhibited normal size  There was no atheroma  There was no evidence for dissection  There was no evidence for aneurysm      [address] Accredited Echocardiography Laboratory    Prepared and electronically signed by    Mike Moreno MD  Signed 06-Apr-2018 16:06:20         EKG: Sinus bradycardia, 56 beats per min, QTc interval 457 msec      Medications:    Current Outpatient Prescriptions:     allopurinol (ZYLOPRIM) 300 mg tablet, Take 1 tablet (300 mg total) by mouth daily, Disp: 90 tablet, Rfl: 3    amiodarone 200 mg tablet, Take 200 mg by mouth daily, Disp: , Rfl: 0    apixaban (ELIQUIS) 5 mg, Take 1 tablet (5 mg total) by mouth 2 (two) times a day, Disp: 180 tablet, Rfl: 3    baclofen 10 mg tablet, Take 10 mg by mouth 3 (three) times a day, Disp: , Rfl:     famotidine (PEPCID) 20 mg tablet, Take 20 mg by mouth daily, Disp: , Rfl: 0    fluticasone (FLONASE) 50 mcg/act nasal spray, 1 spray into each nostril daily, Disp: 3 Bottle, Rfl: 3    loratadine (CLARITIN) 10 mg tablet, Take 10 mg by mouth daily, Disp: , Rfl:     metoprolol tartrate (LOPRESSOR) 50 mg tablet, Take 50 mg by mouth 3 (three) times a day  , Disp: , Rfl:     miconazole 2 % cream, Apply topically 2 (two) times a day, Disp: 28 35 g, Rfl: 0    omeprazole (PriLOSEC) 20 mg delayed release capsule, , Disp: , Rfl:     tamsulosin (FLOMAX) 0 4 mg, Take 1 capsule (0 4 mg total) by mouth daily with dinner, Disp: 90 capsule, Rfl: 3      Allergies: Allergies   Allergen Reactions    Cymbalta [Duloxetine Hcl] Delerium     hallucinations         Assessment and Plan:  1  Paroxysmal atrial fibrillation (HCC)   currently in sinus rhythm  Continue Amiodarone, Eliquis, metoprolol    2  On amiodarone therapy  EKG done today for QTc interval  On next visit he will be due for CMP, TSH, EKG  Yearly chest x-ray  Yearly eye exam discussed with patient and spouse    3  Essential hypertension   BP stable  Continue current medications    4  DORA (obstructive sleep apnea)   regular CPAP use promoted    5  Pulmonary hypertension (HCC)   mild  Likely from his sleep apnea    6  Obesity (BMI 30-39 9)   counseled to try to lose weight    Recommend aggressive risk factor modification and therapeutic lifestyle changes    Low-salt, low-calorie, low-fat, low-cholesterol diet with regular exercise and to optimize weight  I will defer the ordering and monitoring of necessity lab studies to you, but I am available and happy to review and manage any of the data at your request in the future  Discussed concepts of atherosclerosis, including signs and symptoms of cardiac disease  Previous studies were reviewed  Safety measures were reviewed  Questions were entertained and answered  Patient was advised to report any problems requiring medical attention  Follow-up with PCP and appropriate specialist and lab work as discussed  Return for follow up visit as scheduled or earlier, if needed  Thank you for allowing me to participate in the care and evaluation of your patient  Should you have any questions, please feel free to contact me        John Chin MD  5/29/2018,10:58 AM

## 2018-05-31 ENCOUNTER — TELEPHONE (OUTPATIENT)
Dept: CARDIOLOGY CLINIC | Facility: CLINIC | Age: 68
End: 2018-05-31

## 2018-05-31 NOTE — TELEPHONE ENCOUNTER
PT WAS IN THE HOSPITAL FOR OVER 2 MONTHS AND HAD HIS CPAP MACHINE WITH HIM, THE CPAP WAS DESTROYED [place]  THE CONTAINER THAT HOLDS THE WATER CRACKED, THE HOSE WENT MISSING  PT NEEDS THIS FOR HIS HEART  PT WIFE IS ASKING THAT DR KELSEY WRITE A LETTER STATING THE PT NEEDS THE CPAP FOR HIS HEART  THIS LETTER IS BEING REQUESTED  [facility]  PLEASE FAX [phone]   Rory Aparicio

## 2018-06-05 DIAGNOSIS — K21.9 GASTROESOPHAGEAL REFLUX DISEASE, ESOPHAGITIS PRESENCE NOT SPECIFIED: Primary | ICD-10-CM

## 2018-06-05 RX ORDER — FAMOTIDINE 20 MG/1
20 TABLET, FILM COATED ORAL DAILY
Qty: 30 TABLET | Refills: 0 | Status: SHIPPED | OUTPATIENT
Start: 2018-06-05 | End: 2018-06-24 | Stop reason: SDUPTHER

## 2018-06-19 ENCOUNTER — OFFICE VISIT (OUTPATIENT)
Dept: FAMILY MEDICINE CLINIC | Facility: CLINIC | Age: 68
End: 2018-06-19
Payer: MEDICARE

## 2018-06-19 VITALS
OXYGEN SATURATION: 98 % | DIASTOLIC BLOOD PRESSURE: 52 MMHG | HEIGHT: 66 IN | TEMPERATURE: 98 F | BODY MASS INDEX: 33.75 KG/M2 | WEIGHT: 210 LBS | SYSTOLIC BLOOD PRESSURE: 110 MMHG | HEART RATE: 59 BPM

## 2018-06-19 DIAGNOSIS — M25.561 ACUTE PAIN OF RIGHT KNEE: Primary | ICD-10-CM

## 2018-06-19 PROCEDURE — 99213 OFFICE O/P EST LOW 20 MIN: CPT | Performed by: FAMILY MEDICINE

## 2018-06-19 PROCEDURE — 20610 DRAIN/INJ JOINT/BURSA W/O US: CPT | Performed by: FAMILY MEDICINE

## 2018-06-19 RX ORDER — METHYLPREDNISOLONE ACETATE 40 MG/ML
1 INJECTION, SUSPENSION INTRA-ARTICULAR; INTRALESIONAL; INTRAMUSCULAR; SOFT TISSUE
Status: COMPLETED | OUTPATIENT
Start: 2018-06-19 | End: 2018-06-19

## 2018-06-19 RX ADMIN — METHYLPREDNISOLONE ACETATE 1 ML: 40 INJECTION, SUSPENSION INTRA-ARTICULAR; INTRALESIONAL; INTRAMUSCULAR; SOFT TISSUE at 18:31

## 2018-06-19 NOTE — PATIENT INSTRUCTIONS
Steroid Joint Injection   AMBULATORY CARE:   What you need to know about steroid joint injection:  A steroid joint injection is a procedure to inject steroid medicine into a joint  Steroid medicine decreases pain and inflammation  The injection may also contain an anesthetic (numbing medicine) to decrease pain  It may be done to treat conditions such as arthritis, gout, or carpal tunnel syndrome  The injections may be given in your knee, ankle, shoulder, elbow, wrist, or ankle  How to prepare for steroid joint injection:  Your healthcare provider will talk to you about how to prepare for this procedure  He will tell you what medicines to take or not take on the day of your procedure  You may need to stop taking blood thinners several days before your procedure  What will happen during steroid joint injection:  You may be given local anesthesia to numb the area where the injection will be given  With local anesthesia, you may still feel pressure during the procedure, but you should not feel any pain  Your healthcare provider may use ultrasound or fluoroscopy (a type of x-ray) to guide the needle to the right area  He will then inject the steroid into your joint  A bandage will be placed on the injection site  What will happen after steroid joint injection:  You may have redness, warmth, or sweating in your face and chest right after the steroid injection  Steroids can affect blood sugar levels  If you have diabetes, you should check your blood sugars closely in the first 24 hours after your procedure  Risks of steroid joint injection:  You may get an infection in your joint  The injection may also cause more pain during the first 24 to 36 hours  You may need more than one injection to feel pain relief  The skin near the injection site may be damaged and become discolored or indented  This can happen if the steroid is placed too close to your skin   A tendon near the injection site may rupture or a nerve can be damaged  Contact your healthcare provider if:   · You have fever or chills  · You have redness or swelling in the injection site  · You have more pain than usual in your joint for more than 72 hours  · You have questions or concerns about your condition or care  Medicines:   · Pain medicine  may be given  Ask how to take this medicine safely  · Take your medicine as directed  Contact your healthcare provider if you think your medicine is not helping or if you have side effects  Tell him or her if you are allergic to any medicine  Keep a list of the medicines, vitamins, and herbs you take  Include the amounts, and when and why you take them  Bring the list or the pill bottles to follow-up visits  Carry your medicine list with you in case of an emergency  Self-care:   · Leave the bandage on for 8 to 12 hours  Care for your wound as directed  · Rest the area  as directed  You may need to decrease weight on certain joints, such as the knee, for a period of time  Ask when you can return to your daily activities  · Elevate  your limb where the steroid injection was given  Elevate the limb above the level of your heart as often as you can  This will help decrease swelling and pain  Prop your limb on pillows or blankets to keep it elevated comfortably  · Apply ice  on your joint for 15 to 20 minutes every hour or as directed  Use an ice pack, or put crushed ice in a plastic bag  Cover it with a towel  Ice helps prevent tissue damage and decreases swelling and pain  Follow up with your healthcare provider as directed:  Write down your questions so you remember to ask them during your visits  © 2017 [employer] Information is for End User's use only and may not be sold, redistributed or otherwise used for commercial purposes  All illustrations and images included in CareNotes® are the copyrighted property of [employer]  or [employer]    The above information is an  only  It is not intended as medical advice for individual conditions or treatments  Talk to your doctor, nurse or pharmacist before following any medical regimen to see if it is safe and effective for you

## 2018-06-19 NOTE — PROGRESS NOTES
Assessment/Plan:           Problem List Items Addressed This Visit     Acute pain of right knee - Primary    Relevant Orders    Large joint arthrocentesis            Subjective:      Patient ID: Sidney Rivera is a 75 y o  male  Patient comes in with right knee pain  He recently fractured his right femur and is going through physical therapy with increased pain of his right knee  His orthopedic surgeon has recommended knee injection  The following portions of the patient's history were reviewed and updated as appropriate: allergies, current medications, past family history, past medical history, past social history, past surgical history and problem list     Review of Systems   Constitutional: Negative  HENT: Negative  Respiratory: Negative  Cardiovascular: Negative  Objective:      /52   Pulse 59   Temp 98 °F (36 7 °C)   Ht 5' 6" (1 676 m)   Wt 95 3 kg (210 lb)   SpO2 98%   BMI 33 89 kg/m²            Physical Exam   Musculoskeletal:   Mild edema of right knee without joint instability       Large joint arthrocentesis  Date/Time: 6/19/2018 6:31 PM  Consent given by: patient  Site marked: site marked  Timeout: Immediately prior to procedure a time out was called to verify the correct patient, procedure, equipment, support staff and site/side marked as required   Supporting Documentation  Indications: pain   Procedure Details  Location: knee - R knee  Preparation: Patient was prepped and draped in the usual sterile fashion  Needle size: 25 G  Ultrasound guidance: no  Approach: anteromedial  Medications administered: 1 mL methylPREDNISolone acetate 40 mg/mL    Patient tolerance: patient tolerated the procedure well with no immediate complications  Dressing:  Sterile dressing applied

## 2018-06-24 DIAGNOSIS — K21.9 GASTROESOPHAGEAL REFLUX DISEASE, ESOPHAGITIS PRESENCE NOT SPECIFIED: ICD-10-CM

## 2018-06-24 DIAGNOSIS — I48.0 PAROXYSMAL ATRIAL FIBRILLATION (HCC): ICD-10-CM

## 2018-06-24 RX ORDER — FAMOTIDINE 20 MG/1
TABLET, FILM COATED ORAL
Qty: 30 TABLET | Refills: 0 | Status: SHIPPED | OUTPATIENT
Start: 2018-06-24 | End: 2018-07-03 | Stop reason: SDUPTHER

## 2018-06-25 RX ORDER — AMIODARONE HYDROCHLORIDE 200 MG/1
TABLET ORAL
Qty: 30 TABLET | Refills: 0 | Status: SHIPPED | OUTPATIENT
Start: 2018-06-25 | End: 2018-07-03 | Stop reason: SDUPTHER

## 2018-06-27 ENCOUNTER — TRANSCRIBE ORDERS (OUTPATIENT)
Dept: ADMINISTRATIVE | Facility: HOSPITAL | Age: 68
End: 2018-06-27

## 2018-06-27 DIAGNOSIS — M25.561 RIGHT KNEE PAIN, UNSPECIFIED CHRONICITY: Primary | ICD-10-CM

## 2018-07-03 DIAGNOSIS — I48.0 PAROXYSMAL ATRIAL FIBRILLATION (HCC): ICD-10-CM

## 2018-07-03 DIAGNOSIS — K21.9 GASTROESOPHAGEAL REFLUX DISEASE, ESOPHAGITIS PRESENCE NOT SPECIFIED: ICD-10-CM

## 2018-07-05 RX ORDER — FAMOTIDINE 20 MG/1
20 TABLET, FILM COATED ORAL DAILY
Qty: 90 TABLET | Refills: 3 | Status: SHIPPED | OUTPATIENT
Start: 2018-07-05 | End: 2019-06-24 | Stop reason: SDUPTHER

## 2018-07-05 RX ORDER — AMIODARONE HYDROCHLORIDE 200 MG/1
200 TABLET ORAL DAILY
Qty: 90 TABLET | Refills: 0 | Status: SHIPPED | OUTPATIENT
Start: 2018-07-05 | End: 2018-09-07 | Stop reason: SDUPTHER

## 2018-07-13 DIAGNOSIS — I48.0 PAROXYSMAL ATRIAL FIBRILLATION (HCC): ICD-10-CM

## 2018-07-13 DIAGNOSIS — G89.29 CHRONIC BILATERAL LOW BACK PAIN WITHOUT SCIATICA: Primary | ICD-10-CM

## 2018-07-13 DIAGNOSIS — M54.50 CHRONIC BILATERAL LOW BACK PAIN WITHOUT SCIATICA: Primary | ICD-10-CM

## 2018-07-13 RX ORDER — METOPROLOL TARTRATE 50 MG/1
50 TABLET, FILM COATED ORAL EVERY 12 HOURS SCHEDULED
Qty: 180 TABLET | Refills: 3 | Status: SHIPPED | OUTPATIENT
Start: 2018-07-13 | End: 2018-07-19 | Stop reason: SDUPTHER

## 2018-07-13 RX ORDER — BACLOFEN 10 MG/1
10 TABLET ORAL 3 TIMES DAILY
Qty: 90 TABLET | Refills: 0 | Status: SHIPPED | OUTPATIENT
Start: 2018-07-13 | End: 2018-09-02 | Stop reason: SDUPTHER

## 2018-07-19 ENCOUNTER — TELEPHONE (OUTPATIENT)
Dept: FAMILY MEDICINE CLINIC | Facility: CLINIC | Age: 68
End: 2018-07-19

## 2018-07-19 DIAGNOSIS — I48.0 PAROXYSMAL ATRIAL FIBRILLATION (HCC): ICD-10-CM

## 2018-07-19 NOTE — TELEPHONE ENCOUNTER
Pt is currently taking Metoprolol and Amiodarone  Both medications together can cause:    Symptomatic bradycardia  Sinus arrest  A-fib block  Please advise    Ref #: [id number]

## 2018-07-20 DIAGNOSIS — I48.0 PAROXYSMAL ATRIAL FIBRILLATION (HCC): ICD-10-CM

## 2018-07-20 RX ORDER — METOPROLOL TARTRATE 50 MG/1
50 TABLET, FILM COATED ORAL EVERY 12 HOURS SCHEDULED
Qty: 180 TABLET | Refills: 0 | Status: SHIPPED | OUTPATIENT
Start: 2018-07-20 | End: 2018-07-20 | Stop reason: SDUPTHER

## 2018-07-20 RX ORDER — METOPROLOL TARTRATE 50 MG/1
50 TABLET, FILM COATED ORAL EVERY 12 HOURS SCHEDULED
Qty: 180 TABLET | Refills: 0 | Status: SHIPPED | OUTPATIENT
Start: 2018-07-20 | End: 2018-07-24 | Stop reason: SDUPTHER

## 2018-07-24 DIAGNOSIS — I48.0 PAROXYSMAL ATRIAL FIBRILLATION (HCC): ICD-10-CM

## 2018-07-24 RX ORDER — METOPROLOL TARTRATE 50 MG/1
50 TABLET, FILM COATED ORAL EVERY 12 HOURS SCHEDULED
Qty: 180 TABLET | Refills: 0 | Status: SHIPPED | OUTPATIENT
Start: 2018-07-24 | End: 2018-09-06 | Stop reason: SDUPTHER

## 2018-08-06 ENCOUNTER — TELEPHONE (OUTPATIENT)
Dept: CARDIOLOGY CLINIC | Facility: CLINIC | Age: 68
End: 2018-08-06

## 2018-08-06 NOTE — TELEPHONE ENCOUNTER
PT HAS AN APPT W/ DR KELSEY ON 8/30/18 & WOULD LIKE BW ORDERED & PUT IN THE SYSTEM SO HE CAN GO TO A [facility] & GET THE BW DONE PRIOR TO HIS APPT W/ DR Michel Dys

## 2018-08-07 DIAGNOSIS — I48.0 PAROXYSMAL A-FIB (HCC): ICD-10-CM

## 2018-08-07 DIAGNOSIS — I27.20 PULMONARY HYPERTENSION (HCC): Primary | ICD-10-CM

## 2018-08-07 DIAGNOSIS — Z79.899 ON AMIODARONE THERAPY: ICD-10-CM

## 2018-08-22 ENCOUNTER — APPOINTMENT (OUTPATIENT)
Dept: LAB | Facility: HOSPITAL | Age: 68
End: 2018-08-22
Attending: INTERNAL MEDICINE
Payer: MEDICARE

## 2018-08-22 LAB
CHOLEST SERPL-MCNC: 207 MG/DL (ref 50–200)
HDLC SERPL-MCNC: 71 MG/DL (ref 40–60)
LDLC SERPL CALC-MCNC: 115 MG/DL (ref 0–100)
NONHDLC SERPL-MCNC: 136 MG/DL
TRIGL SERPL-MCNC: 107 MG/DL

## 2018-08-22 PROCEDURE — 80061 LIPID PANEL: CPT

## 2018-08-22 PROCEDURE — 36415 COLL VENOUS BLD VENIPUNCTURE: CPT

## 2018-08-30 ENCOUNTER — OFFICE VISIT (OUTPATIENT)
Dept: CARDIOLOGY CLINIC | Facility: CLINIC | Age: 68
End: 2018-08-30
Payer: MEDICARE

## 2018-08-30 VITALS — OXYGEN SATURATION: 96 % | WEIGHT: 225 LBS | BODY MASS INDEX: 36.16 KG/M2 | HEIGHT: 66 IN | HEART RATE: 55 BPM

## 2018-08-30 DIAGNOSIS — G47.33 OSA (OBSTRUCTIVE SLEEP APNEA): Chronic | ICD-10-CM

## 2018-08-30 DIAGNOSIS — Z79.899 ON AMIODARONE THERAPY: ICD-10-CM

## 2018-08-30 DIAGNOSIS — E66.9 OBESITY (BMI 30-39.9): ICD-10-CM

## 2018-08-30 DIAGNOSIS — I10 ESSENTIAL HYPERTENSION: Chronic | ICD-10-CM

## 2018-08-30 DIAGNOSIS — I27.20 PULMONARY HYPERTENSION (HCC): ICD-10-CM

## 2018-08-30 DIAGNOSIS — I48.0 PAROXYSMAL ATRIAL FIBRILLATION (HCC): Primary | ICD-10-CM

## 2018-08-30 PROCEDURE — 99214 OFFICE O/P EST MOD 30 MIN: CPT | Performed by: INTERNAL MEDICINE

## 2018-08-30 NOTE — PROGRESS NOTES
[facility]  [address]  [facility] [address]  Cardiology Consultation     Gaurav Mclaughlin  [id number]  1950      1  Paroxysmal atrial fibrillation (HCC)     2  Essential hypertension     3  DORA (obstructive sleep apnea)     4  Pulmonary hypertension (HCC)     5  Obesity (BMI 30-39  9)         Chief Complaint   Patient presents with    Follow-up     afib, HTN       HPI:  Patient with history of PAF on Eliquis, HTN, DORA, pulmonary HTN, obesity presents for follow up visit  Denies chest pain, SOB, lightheadedness, palpitations, leg swelling, syncope  Compliant with medications  Does not tolerate statins  PAF -  Currently in sinus rhythm  On Amiodarone and beta-blocker and Eliquis  Denies bleeding from any site      HTN -  Has been hypertensive for many years  Taking medications regularly  Denies lightheadedness, headache, medication side effects        DORA -   States he uses his CPAP regularly     PHTN -  Mild  40 mm Hg  Likely due to his sleep apnea     Obesity -  Trying to lose weight  Patient Active Problem List   Diagnosis    Paroxysmal atrial fibrillation (HCC)    Bilateral arm weakness    Bilateral edema of lower extremity    Benign prostatic hyperplasia    Cervical myelopathy (HCC)    Essential hypertension    Gout, arthritis    Mixed hyperlipidemia    DORA (obstructive sleep apnea)    Osteoporosis    Peptic reflux disease    Post-polio syndrome    Obesity (BMI 30-39  9)    Polio    Decubitus ulcer of heel    Leukocytosis    Atrial fibrillation with RVR (HCC)    Acute encephalopathy    Altered mental status    On amiodarone therapy    Pulmonary hypertension (HCC)    Acute pain of right knee     Past Medical History:   Diagnosis Date    [facility]     [facility]bestosis ([facility] )     Gout     HTN (hypertension)     Leukocytosis     Lumbar spinal stenosis     last assessed 12/15/2016    Neuropathy     Polio     Renal calculi     Sleep apnea Social History     Social History    Marital status: /Civil Union     Spouse name: N/A    Number of children: 2    Years of education: 12     Occupational History          Retired     Social History Main Topics    Smoking status: Never Smoker    Smokeless tobacco: Never Used    Alcohol use No    Drug use: No    Sexual activity: Yes     Partners: Female     Birth control/ protection: None     Other Topics Concern    Not on file     Social History Narrative    No narrative on file      Family History   Problem Relation Age of Onset    Hodgkin's lymphoma Mother         disease    Other Father         polio    Diabetes Brother     Cirrhosis Brother         hepatic    Cancer Maternal Grandmother      Past Surgical History:   Procedure Laterality Date    COLON SURGERY      partial colectomy    FEMUR FRACTURE SURGERY Right     HERNIA REPAIR      ventral    OTHER SURGICAL HISTORY      percutaneous lithotomy, 11/2015 and 04/10/2017       Current Outpatient Prescriptions:     allopurinol (ZYLOPRIM) 300 mg tablet, Take 1 tablet (300 mg total) by mouth daily, Disp: 90 tablet, Rfl: 3    amiodarone 200 mg tablet, Take 1 tablet (200 mg total) by mouth daily, Disp: 90 tablet, Rfl: 0    apixaban (ELIQUIS) 5 mg, Take 1 tablet (5 mg total) by mouth 2 (two) times a day, Disp: 180 tablet, Rfl: 2    baclofen 10 mg tablet, Take 1 tablet (10 mg total) by mouth 3 (three) times a day, Disp: 90 tablet, Rfl: 0    famotidine (PEPCID) 20 mg tablet, Take 1 tablet (20 mg total) by mouth daily, Disp: 90 tablet, Rfl: 3    fluticasone (FLONASE) 50 mcg/act nasal spray, 1 spray into each nostril daily, Disp: 3 Bottle, Rfl: 3    loratadine (CLARITIN) 10 mg tablet, Take 10 mg by mouth daily, Disp: , Rfl:     metoprolol tartrate (LOPRESSOR) 50 mg tablet, Take 1 tablet (50 mg total) by mouth every 12 (twelve) hours, Disp: 180 tablet, Rfl: 0    miconazole 2 % cream, Apply topically 2 (two) times a day, Disp: 28 35 g, Rfl: 0    omeprazole (PriLOSEC) 20 mg delayed release capsule, , Disp: , Rfl:     tamsulosin (FLOMAX) 0 4 mg, Take 1 capsule (0 4 mg total) by mouth daily with dinner, Disp: 90 capsule, Rfl: 3  Allergies   Allergen Reactions    Cymbalta [Duloxetine Hcl] Delerium     hallucinations     Vitals:    08/30/18 1452   Pulse: 55   SpO2: 96%   Weight: 102 kg (225 lb)   Height: 5' 6" (1 676 m)       Labs:  Orders Only on 08/07/2018   Component Date Value    Cholesterol 08/22/2018 207*    Triglycerides 08/22/2018 107     HDL, Direct 08/22/2018 71*    LDL Calculated 08/22/2018 115*    Non-HDL-Chol (CHOL-HDL) 08/22/2018 136      Imaging: No results found  Review of Systems:  Review of Systems   Constitutional: Negative for diaphoresis, fatigue and fever  HENT: Negative for congestion, ear discharge, hearing loss, sinus pain and sore throat  Eyes: Negative for pain, redness and visual disturbance  Respiratory: Negative for cough, chest tightness, shortness of breath and wheezing  Cardiovascular: Positive for leg swelling  Negative for chest pain and palpitations  Gastrointestinal: Negative for abdominal distention, abdominal pain, constipation, diarrhea, nausea and vomiting  Endocrine: Negative for cold intolerance and heat intolerance  Genitourinary: Negative for difficulty urinating and hematuria  Musculoskeletal: Negative for arthralgias, back pain, gait problem, joint swelling, myalgias, neck pain and neck stiffness  Skin: Negative for color change, pallor, rash and wound  Neurological: Negative for dizziness, syncope, weakness, numbness and headaches  Hematological: Does not bruise/bleed easily  Psychiatric/Behavioral: Negative for agitation, behavioral problems and confusion  The patient is not nervous/anxious          Pulse 55   Ht 5' 6" (1 676 m)   Wt 102 kg (225 lb)   SpO2 96%   BMI 36 32 kg/m²     Physical Exam:  Physical Exam   Constitutional: He is oriented to person, place, and time  He appears well-developed and well-nourished  HENT:   Head: Normocephalic and atraumatic  Eyes: Conjunctivae and EOM are normal  Pupils are equal, round, and reactive to light  Neck: Normal range of motion  Neck supple  Cardiovascular: Normal rate, regular rhythm, normal heart sounds and intact distal pulses  Exam reveals no gallop and no friction rub  No murmur heard  Pulmonary/Chest: Effort normal and breath sounds normal  No respiratory distress  He has no wheezes  He has no rales  He exhibits no tenderness  Abdominal: Soft  Bowel sounds are normal  He exhibits no distension  There is no rebound  Musculoskeletal: Normal range of motion  He exhibits edema  Neurological: He is alert and oriented to person, place, and time  He has normal reflexes  Skin: Skin is warm and dry  Psychiatric: He has a normal mood and affect  His behavior is normal  Judgment and thought content normal        Discussion/Summary:  1  Paroxysmal A Fib: Continue Lopressor, amiodarone  Continue Eliquis for anticoagulation  2  Amiodarone therapy:  -Will do EKG and CXR on next visit  -Will order CMP and TSH today  - patient gets annual eye exam regular    3  HTN: BP Stable, continue present regimen  4  DORA: On CPAP  5  Pulmonary HTN: Mild, likely due to DORA  6  Obesity: weight loss cessation counseling  Recommend aggressive risk factor modification and therapeutic lifestyle changes  Low salt, low calorie, low fat, low cholesterol diet with regular exercise and to optimize weight  I will defer the ordering and monitoring of necessary lab studies to you, but I am available and happy to review and manage any of that data at your request in the future  Discussed concepts of atherosclerosis, including signs and symptoms of cardiac disease  Previous studies were reviewed  Safety measures were reviewed  Questions were entertained and answered    Patient was advised to report any problem requiring medical attention  Follow up with appropriate specialists and lab work as discussed  Return for follow up visit as scheduled or earlier, if needed  Thank you for allowing me to participate in the care and evaluation of your patient  Should you have any questions, please feel free to contact me

## 2018-09-02 DIAGNOSIS — G89.29 CHRONIC BILATERAL LOW BACK PAIN WITHOUT SCIATICA: ICD-10-CM

## 2018-09-02 DIAGNOSIS — M54.50 CHRONIC BILATERAL LOW BACK PAIN WITHOUT SCIATICA: ICD-10-CM

## 2018-09-04 ENCOUNTER — TELEPHONE (OUTPATIENT)
Dept: FAMILY MEDICINE CLINIC | Facility: CLINIC | Age: 68
End: 2018-09-04

## 2018-09-04 RX ORDER — BACLOFEN 10 MG/1
TABLET ORAL
Qty: 90 TABLET | Refills: 1 | Status: SHIPPED | OUTPATIENT
Start: 2018-09-04 | End: 2018-10-28 | Stop reason: SDUPTHER

## 2018-09-04 NOTE — TELEPHONE ENCOUNTER
Pt wife called and pt broke his r femour and kendrick prosthetic advise him to call us pt needs a prescription for satya hines leg  [phone]  [phone]

## 2018-09-06 DIAGNOSIS — I48.0 PAROXYSMAL ATRIAL FIBRILLATION (HCC): ICD-10-CM

## 2018-09-06 RX ORDER — METOPROLOL TARTRATE 50 MG/1
50 TABLET, FILM COATED ORAL EVERY 12 HOURS SCHEDULED
Qty: 180 TABLET | Refills: 0 | Status: SHIPPED | OUTPATIENT
Start: 2018-09-06 | End: 2018-09-07 | Stop reason: SDUPTHER

## 2018-09-07 DIAGNOSIS — I48.0 PAROXYSMAL ATRIAL FIBRILLATION (HCC): ICD-10-CM

## 2018-09-07 RX ORDER — AMIODARONE HYDROCHLORIDE 200 MG/1
200 TABLET ORAL DAILY
Qty: 90 TABLET | Refills: 3 | Status: SHIPPED | OUTPATIENT
Start: 2018-09-07 | End: 2019-02-25

## 2018-09-07 RX ORDER — METOPROLOL TARTRATE 50 MG/1
50 TABLET, FILM COATED ORAL EVERY 12 HOURS SCHEDULED
Qty: 90 TABLET | Refills: 3 | Status: SHIPPED | OUTPATIENT
Start: 2018-09-07 | End: 2019-09-25 | Stop reason: SDUPTHER

## 2018-09-07 NOTE — TELEPHONE ENCOUNTER
PT NEEDS REFILLS ON AMIODARONE 200MG AND METOPROLOL 50MG SENT TO [facility]   Rory Aparicio

## 2018-10-03 ENCOUNTER — OFFICE VISIT (OUTPATIENT)
Dept: FAMILY MEDICINE CLINIC | Facility: CLINIC | Age: 68
End: 2018-10-03
Payer: MEDICARE

## 2018-10-03 DIAGNOSIS — Z23 NEED FOR IMMUNIZATION AGAINST INFLUENZA: Primary | ICD-10-CM

## 2018-10-03 PROCEDURE — G0008 ADMIN INFLUENZA VIRUS VAC: HCPCS | Performed by: FAMILY MEDICINE

## 2018-10-03 PROCEDURE — 90662 IIV NO PRSV INCREASED AG IM: CPT | Performed by: FAMILY MEDICINE

## 2018-10-03 NOTE — PROGRESS NOTES
Seen for a Nurse visit  High dose vaccine administered in right deltoid  Pt tolerated it well  Leo Kang

## 2018-10-16 ENCOUNTER — TELEPHONE (OUTPATIENT)
Dept: FAMILY MEDICINE CLINIC | Facility: CLINIC | Age: 68
End: 2018-10-16

## 2018-10-16 DIAGNOSIS — I10 ESSENTIAL HYPERTENSION: Chronic | ICD-10-CM

## 2018-10-16 DIAGNOSIS — E78.2 MIXED HYPERLIPIDEMIA: Primary | ICD-10-CM

## 2018-10-23 ENCOUNTER — APPOINTMENT (OUTPATIENT)
Dept: LAB | Facility: HOSPITAL | Age: 68
End: 2018-10-23
Attending: FAMILY MEDICINE
Payer: MEDICARE

## 2018-10-23 DIAGNOSIS — I10 ESSENTIAL HYPERTENSION: Chronic | ICD-10-CM

## 2018-10-23 DIAGNOSIS — E78.2 MIXED HYPERLIPIDEMIA: ICD-10-CM

## 2018-10-23 LAB
CHOLEST SERPL-MCNC: 201 MG/DL (ref 50–200)
ERYTHROCYTE [DISTWIDTH] IN BLOOD BY AUTOMATED COUNT: 14.8 % (ref 11.6–15.1)
HCT VFR BLD AUTO: 45.3 % (ref 36.5–49.3)
HDLC SERPL-MCNC: 59 MG/DL (ref 40–60)
HGB BLD-MCNC: 13.9 G/DL (ref 12–17)
LDLC SERPL CALC-MCNC: 112 MG/DL (ref 0–100)
MCH RBC QN AUTO: 27.7 PG (ref 26.8–34.3)
MCHC RBC AUTO-ENTMCNC: 30.7 G/DL (ref 31.4–37.4)
MCV RBC AUTO: 90 FL (ref 82–98)
NONHDLC SERPL-MCNC: 142 MG/DL
PLATELET # BLD AUTO: 241 THOUSANDS/UL (ref 149–390)
PMV BLD AUTO: 9.8 FL (ref 8.9–12.7)
RBC # BLD AUTO: 5.02 MILLION/UL (ref 3.88–5.62)
TRIGL SERPL-MCNC: 148 MG/DL
WBC # BLD AUTO: 7.08 THOUSAND/UL (ref 4.31–10.16)

## 2018-10-23 PROCEDURE — 36415 COLL VENOUS BLD VENIPUNCTURE: CPT

## 2018-10-23 PROCEDURE — 80061 LIPID PANEL: CPT

## 2018-10-23 PROCEDURE — 85027 COMPLETE CBC AUTOMATED: CPT

## 2018-10-28 DIAGNOSIS — M54.50 CHRONIC BILATERAL LOW BACK PAIN WITHOUT SCIATICA: ICD-10-CM

## 2018-10-28 DIAGNOSIS — G89.29 CHRONIC BILATERAL LOW BACK PAIN WITHOUT SCIATICA: ICD-10-CM

## 2018-10-29 RX ORDER — BACLOFEN 10 MG/1
TABLET ORAL
Qty: 90 TABLET | Refills: 3 | Status: SHIPPED | OUTPATIENT
Start: 2018-10-29 | End: 2019-02-21 | Stop reason: SDUPTHER

## 2018-11-23 ENCOUNTER — OFFICE VISIT (OUTPATIENT)
Dept: FAMILY MEDICINE CLINIC | Facility: CLINIC | Age: 68
End: 2018-11-23
Payer: MEDICARE

## 2018-11-23 VITALS
SYSTOLIC BLOOD PRESSURE: 110 MMHG | WEIGHT: 235 LBS | HEART RATE: 52 BPM | HEIGHT: 66 IN | OXYGEN SATURATION: 98 % | BODY MASS INDEX: 37.77 KG/M2 | DIASTOLIC BLOOD PRESSURE: 78 MMHG | TEMPERATURE: 98.1 F

## 2018-11-23 DIAGNOSIS — I27.20 PULMONARY HYPERTENSION (HCC): ICD-10-CM

## 2018-11-23 DIAGNOSIS — G47.33 OSA (OBSTRUCTIVE SLEEP APNEA): Chronic | ICD-10-CM

## 2018-11-23 DIAGNOSIS — I48.0 PAROXYSMAL ATRIAL FIBRILLATION (HCC): ICD-10-CM

## 2018-11-23 DIAGNOSIS — G95.9 CERVICAL MYELOPATHY (HCC): ICD-10-CM

## 2018-11-23 DIAGNOSIS — Z00.00 HEALTH CARE MAINTENANCE: Primary | ICD-10-CM

## 2018-11-23 DIAGNOSIS — I10 ESSENTIAL HYPERTENSION: Chronic | ICD-10-CM

## 2018-11-23 PROBLEM — I48.91 ATRIAL FIBRILLATION WITH RVR (HCC): Status: RESOLVED | Noted: 2018-03-31 | Resolved: 2018-11-23

## 2018-11-23 PROCEDURE — 99214 OFFICE O/P EST MOD 30 MIN: CPT | Performed by: FAMILY MEDICINE

## 2018-11-23 PROCEDURE — G0439 PPPS, SUBSEQ VISIT: HCPCS | Performed by: FAMILY MEDICINE

## 2018-11-23 NOTE — PROGRESS NOTES
Assessment and Plan:    Problem List Items Addressed This Visit     None      Visit Diagnoses     Health care maintenance    -  Primary        There are no preventive care reminders to display for this patient  HPI:  Akilah Suarez is a 75 y o  male here for his Subsequent Wellness Visit  Patient Active Problem List   Diagnosis    Paroxysmal atrial fibrillation (HCC)    Bilateral arm weakness    Bilateral edema of lower extremity    Benign prostatic hyperplasia    Cervical myelopathy (HCC)    Essential hypertension    Gout, arthritis    Mixed hyperlipidemia    DORA (obstructive sleep apnea)    Osteoporosis    Peptic reflux disease    Post-polio syndrome    Obesity (BMI 30-39  9)    Polio    Decubitus ulcer of heel    Leukocytosis    Atrial fibrillation with RVR (HCC)    Acute encephalopathy    Altered mental status    On amiodarone therapy    Pulmonary hypertension (HCC)    Acute pain of right knee     Past Medical History:   Diagnosis Date    [facility]     [facility]bestosis ([facility] )     Gout     HTN (hypertension)     Leukocytosis     Lumbar spinal stenosis     last assessed 12/15/2016    Neuropathy     Polio     Renal calculi     Sleep apnea      Past Surgical History:   Procedure Laterality Date    COLON SURGERY      partial colectomy    FEMUR FRACTURE SURGERY Right     HERNIA REPAIR      ventral    OTHER SURGICAL HISTORY      percutaneous lithotomy, 11/2015 and 04/10/2017     Family History   Problem Relation Age of Onset    Hodgkin's lymphoma Mother         disease    Other Father         polio    Diabetes Brother     Cirrhosis Brother         hepatic    Cancer Maternal Grandmother      History   Smoking Status    Never Smoker   Smokeless Tobacco    Never Used     History   Alcohol Use No      History   Drug Use No       Current Outpatient Prescriptions   Medication Sig Dispense Refill    allopurinol (ZYLOPRIM) 300 mg tablet Take 1 tablet (300 mg total) by mouth daily 90 tablet 3    amiodarone 200 mg tablet Take 1 tablet (200 mg total) by mouth daily 90 tablet 3    apixaban (ELIQUIS) 5 mg Take 1 tablet (5 mg total) by mouth 2 (two) times a day 180 tablet 2    baclofen 10 mg tablet TAKE 1 TABLET BY MOUTH 3  TIMES A DAY 90 tablet 3    famotidine (PEPCID) 20 mg tablet Take 1 tablet (20 mg total) by mouth daily 90 tablet 3    fluticasone (FLONASE) 50 mcg/act nasal spray 1 spray into each nostril daily 3 Bottle 3    loratadine (CLARITIN) 10 mg tablet Take 10 mg by mouth daily      metoprolol tartrate (LOPRESSOR) 50 mg tablet Take 1 tablet (50 mg total) by mouth every 12 (twelve) hours 90 tablet 3    omeprazole (PriLOSEC) 20 mg delayed release capsule       tamsulosin (FLOMAX) 0 4 mg Take 1 capsule (0 4 mg total) by mouth daily with dinner 90 capsule 3    miconazole 2 % cream Apply topically 2 (two) times a day (Patient not taking: Reported on 11/23/2018 ) 28 35 g 0     No current facility-administered medications for this visit  Allergies   Allergen Reactions    Cymbalta [Duloxetine Hcl] Delerium     hallucinations     Immunization History   Administered Date(s) Administered    H1N1, All Formulations 01/09/2010    Influenza 10/07/2009, 10/06/2010, 10/21/2015, 10/30/2017    Influenza Split High Dose Preservative Free IM 10/26/2016, 09/15/2017, 10/30/2017    Influenza, high dose seasonal 0 5 mL 10/03/2018    Pneumococcal Conjugate 13-Valent 01/31/2017    Pneumococcal Polysaccharide PPV23 09/18/2013, 01/01/2015       Patient Care Team:  Lynn Davison DO as PCP - General  Kristy Collet, MD Anna Killer, MD Lunda Barban, MD Blythe Carne, MD    Medicare Screening Tests and Risk Assessments:  Brennon Leal is here for his Subsequent Wellness visit  Last Medicare Wellness visit information reviewed, patient interviewed and updates made to the record today  Health Risk Assessment:  Patient rates overall health as fair  Eyesight was rated as Same  Hearing was rated as Same  Patient feels that their emotional and mental health rating is Same  Pain experienced by patient in the last 7 days has been Some  Patient's pain rating has been 5/10  Patient states that he has experienced no weight loss or gain in last 6 months  Emotional/Mental Health:  Patient has been feeling nervous/anxious  PHQ-9 Depression Screening:    Frequency of the following problems over the past two weeks:      1  Little interest or pleasure in doing things: 1 - several days      2  Feeling down, depressed, or hopeless: 1 - several days      3  Trouble falling or staying asleep, or sleeping too much: 0 - not at all      4  Feeling tired or having little energy: 1 - several days      5  Poor appetite or overeatin - not at all      6  Feeling bad about yourself - or that you are a failure or have let yourself or your family down: 0 - not at all      7  Trouble concentrating on things, such as reading the newspaper or watching television: 0 - not at all      8  Moving or speaking so slowly that other people could have noticed  Or the opposite - being so fidgety or restless that you have been moving around a lot more than usual: 0 - not at all      9  Thoughts that you would be better off dead, or of hurting yourself in some way: 0 - not at all  PHQ-2 Score: 2  PHQ-9 Score: 3    Broken Bones/Falls: Fall Risk Assessment:    In the past year, patient has experienced: No history of falling in past year          Bladder/Bowel:  Patient has not leaked urine accidently in the last six months  Patient reports no loss of bowel control  Immunizations:  Patient has had a flu vaccination within the last year  Patient has received a pneumonia shot  Patient has not received a shingles shot  Patient has not received tetanus/diphtheria shot  Home Safety:  Patient has trouble with stairs inside or outside of their home     Patient currently reports that there are no safety hazards present in home, working smoke alarms, working carbon monoxide detectors  Preventative Screenings:   no prostate cancer screen performed, no colon cancer screen completed, cholesterol screen completed, glaucoma eye exam completed,     Nutrition:  Current diet: Regular with servings of the following:    Medications:  Patient is not currently taking any over-the-counter supplements  Patient is able to manage medications  Lifestyle Choices:  Patient reports no tobacco use  Patient has not smoked or used tobacco in the past   Patient reports no alcohol use  Patient drives a vehicle  Patient wears seat belt  Current level of exercise of physical activity described by patient as: strething, walking  Activities of Daily Living:  Unable to get out of bed by his or her self, able to dress self, unable to make own meals, unable to do own shopping, able to bathe self, unable to do laundry/housekeeping, can manage own money, pay bills and track expenses    Previous Hospitalizations:  Hospitalization or ED visit in past 12 months  Number of hospitalizations within the last year: 1-2        Advanced Directives:  Patient has decided on a power of   Patient has spoken to designated power of   Patient has completed advanced directive          Preventative Screening/Counseling:      Cardiovascular:      General: Screening Current      Counseling: Healthy Weight          Diabetes:      General: Screening Current      Counseling: Healthy Diet          Colorectal Cancer:      General: Screening Current          Prostate Cancer:      General: Screening Current          Osteoporosis:      General: Screening Not Indicated          AAA:      General: Screening Current          Glaucoma:      General: Screening Current          HIV:      General: Screening Not Indicated          Hepatitis C:      General: Risks and Benefits Discussed        Advanced Directives:   Patient has living will for healthcare, has durable POA for healthcare, patient has an advanced directive       Immunizations:      Influenza: Influenza UTD This Year      Pneumococcal: Lifetime Vaccine Completed

## 2018-11-23 NOTE — PROGRESS NOTES
Assessment/Plan:       Diagnoses and all orders for this visit:    Health care maintenance    Paroxysmal atrial fibrillation (HCC)    Essential hypertension    Pulmonary hypertension (HCC)    DORA (obstructive sleep apnea)    Cervical myelopathy (HCC)        DORA (obstructive sleep apnea)  Compliant with CPAP    Pulmonary hypertension (HCC)  Stable, will follow with Echo's done by cardiology    Essential hypertension  Controlled with metoprolol    Paroxysmal atrial fibrillation (HCC)  Regular today, continue metoprolol and eliquis    Cervical myelopathy ([facility] )  Stable, continue baclofen    Continue current medications, follow up with his cardiologist as scheduled  He gets his PSA done with his urologist, and is up-to-date with this  We will see him back in 6 months time, sooner if he has any problems  Subjective:      Patient ID: Bong Vega is a 75 y o  male  Patient comes in today for follow-up on his paroxysmal atrial fibrillation, hyperlipidemia, hypertension, cervical myelopathy  He is taking his amiodarone, metoprolol for rate control, and is compliant with his Eliquis  He is up-to-date with his cardiologist   He is taking his Toprol as above for his blood pressure as well  He does check his blood pressure periodically and is well controlled  He is not taking his for his cholesterol, as he is afraid to do to his post-polio syndrome and the possibility of developing a myalgia  He is taking his baclofen as prescribed, states is working well for him  He did recently do inpatient physical therapy with [facility] for fractured femur  He states that he is feeling stronger  The following portions of the patient's history were reviewed and updated as appropriate:   He has a past medical history of A-fib ([facility] ); Asbestosis ([facility] ); Gout; HTN (hypertension); Leukocytosis; Lumbar spinal stenosis; Neuropathy; Polio; Renal calculi; and Sleep apnea  ,   does not have any pertinent problems on file ,   has a past surgical history that includes Colon surgery; Hernia repair; Femur fracture surgery (Right); and Other surgical history  ,  family history includes Cancer in his maternal grandmother; Cirrhosis in his brother; Diabetes in his brother; Hodgkin's lymphoma in his mother; Other in his father  ,   reports that he has never smoked  He has never used smokeless tobacco  He reports that he does not drink alcohol or use drugs  ,  is allergic to cymbalta [duloxetine hcl]     Current Outpatient Prescriptions   Medication Sig Dispense Refill    allopurinol (ZYLOPRIM) 300 mg tablet Take 1 tablet (300 mg total) by mouth daily 90 tablet 3    amiodarone 200 mg tablet Take 1 tablet (200 mg total) by mouth daily 90 tablet 3    apixaban (ELIQUIS) 5 mg Take 1 tablet (5 mg total) by mouth 2 (two) times a day 180 tablet 2    baclofen 10 mg tablet TAKE 1 TABLET BY MOUTH 3  TIMES A DAY 90 tablet 3    famotidine (PEPCID) 20 mg tablet Take 1 tablet (20 mg total) by mouth daily 90 tablet 3    fluticasone (FLONASE) 50 mcg/act nasal spray 1 spray into each nostril daily 3 Bottle 3    loratadine (CLARITIN) 10 mg tablet Take 10 mg by mouth daily      metoprolol tartrate (LOPRESSOR) 50 mg tablet Take 1 tablet (50 mg total) by mouth every 12 (twelve) hours 90 tablet 3    omeprazole (PriLOSEC) 20 mg delayed release capsule       tamsulosin (FLOMAX) 0 4 mg Take 1 capsule (0 4 mg total) by mouth daily with dinner 90 capsule 3     No current facility-administered medications for this visit  Review of Systems   Constitutional: Negative for chills, fatigue and fever  HENT: Negative for congestion, ear pain, hearing loss, postnasal drip, rhinorrhea and sore throat  Eyes: Negative for pain and visual disturbance  Respiratory: Negative for chest tightness, shortness of breath and wheezing  Cardiovascular: Negative for chest pain and leg swelling     Gastrointestinal: Negative for abdominal distention, abdominal pain, constipation, diarrhea and vomiting  Endocrine: Negative for cold intolerance and heat intolerance  Genitourinary: Negative for difficulty urinating, frequency and urgency  Musculoskeletal: Negative for arthralgias and gait problem  Skin: Negative for color change  Neurological: Positive for weakness  Negative for dizziness, tremors, syncope, numbness and headaches  Hematological: Negative for adenopathy  Psychiatric/Behavioral: Negative for agitation, confusion and sleep disturbance  The patient is not nervous/anxious  Objective:  Vitals:    11/23/18 1117   BP: 110/78   Pulse: (!) 52   Temp: 98 1 °F (36 7 °C)   SpO2: 98%   Weight: 107 kg (235 lb)   Height: 5' 6" (1 676 m)     Body mass index is 37 93 kg/m²  Physical Exam   Constitutional: He is oriented to person, place, and time  He appears well-developed and well-nourished  HENT:   Head: Normocephalic  Mouth/Throat: Oropharynx is clear and moist    Eyes: Conjunctivae are normal  No scleral icterus  Neck: Normal range of motion  No thyromegaly present  Cardiovascular: Normal rate, regular rhythm and normal heart sounds  No murmur heard  Pulmonary/Chest: Effort normal and breath sounds normal  No respiratory distress  He has no wheezes  Abdominal: Soft  Bowel sounds are normal  He exhibits no distension  There is no tenderness  Musculoskeletal: Normal range of motion  He exhibits deformity (The braces on his legs, contractures of his wrists)  He exhibits no edema or tenderness  Lymphadenopathy:     He has no cervical adenopathy  Neurological: He is alert and oriented to person, place, and time  No cranial nerve deficit  Skin: Skin is warm and dry  No rash noted  No pallor  Psychiatric: He has a normal mood and affect  His behavior is normal    Nursing note and vitals reviewed

## 2018-11-23 NOTE — PATIENT INSTRUCTIONS
Obesity   AMBULATORY CARE:   Obesity  is when your body mass index (BMI) is greater than 30  Your healthcare provider will use your height and weight to measure your BMI  The risks of obesity include  many health problems, such as injuries or physical disability  You may need tests to check for the following:  · Diabetes     · High blood pressure or high cholesterol     · Heart disease     · Gallbladder or liver disease     · Cancer of the colon, breast, prostate, liver, or kidney     · Sleep apnea     · Arthritis or gout  Seek care immediately if:   · You have a severe headache, confusion, or difficulty speaking  · You have weakness on one side of your body  · You have chest pain, sweating, or shortness of breath  Contact your healthcare provider if:   · You have symptoms of gallbladder or liver disease, such as pain in your upper abdomen  · You have knee or hip pain and discomfort while walking  · You have symptoms of diabetes, such as intense hunger and thirst, and frequent urination  · You have symptoms of sleep apnea, such as snoring or daytime sleepiness  · You have questions or concerns about your condition or care  Treatment for obesity  focuses on helping you lose weight to improve your health  Even a small decrease in BMI can reduce the risk for many health problems  Your healthcare provider will help you set a weight-loss goal   · Lifestyle changes  are the first step in treating obesity  These include making healthy food choices and getting regular physical activity  Your healthcare provider may suggest a weight-loss program that involves coaching, education, and therapy  · Medicine  may help you lose weight when it is used with a healthy diet and physical activity  · Surgery  can help you lose weight if you are very obese and have other health problems  There are several types of weight-loss surgery  Ask your healthcare provider for more information    Be successful losing weight:   · Set small, realistic goals  An example of a small goal is to walk for 20 minutes 5 days a week  Anther goal is to lose 5% of your body weight  · Tell friends, family members, and coworkers about your goals  and ask for their support  Ask a friend to lose weight with you, or join a weight-loss support group  · Identify foods or triggers that may cause you to overeat , and find ways to avoid them  Remove tempting high-calorie foods from your home and workplace  Place a bowl of fresh fruit on your kitchen counter  If stress causes you to eat, then find other ways to cope with stress  · Keep a diary to track what you eat and drink  Also write down how many minutes of physical activity you do each day  Weigh yourself once a week and record it in your diary  Eating changes: You will need to eat 500 to 1,000 fewer calories each day than you currently eat to lose 1 to 2 pounds a week  The following changes will help you cut calories:  · Eat smaller portions  Use small plates, no larger than 9 inches in diameter  Fill your plate half full of fruits and vegetables  Measure your food using measuring cups until you know what a serving size looks like  · Eat 3 meals and 1 or 2 snacks each day  Plan your meals in advance  Ibrahima Hooper and eat at home most of the time  Eat slowly  · Eat fruits and vegetables at every meal   They are low in calories and high in fiber, which makes you feel full  Do not add butter, margarine, or cream sauce to vegetables  Use herbs to season steamed vegetables  · Eat less fat and fewer fried foods  Eat more baked or grilled chicken and fish  These protein sources are lower in calories and fat than red meat  Limit fast food  Dress your salads with olive oil and vinegar instead of bottled dressing  · Limit the amount of sugar you eat  Do not drink sugary beverages  Limit alcohol  Activity changes:  Physical activity is good for your body in many ways   It helps you burn calories and build strong muscles  It decreases stress and depression, and improves your mood  It can also help you sleep better  Talk to your healthcare provider before you begin an exercise program   · Exercise for at least 30 minutes 5 days a week  Start slowly  Set aside time each day for physical activity that you enjoy and that is convenient for you  It is best to do both weight training and an activity that increases your heart rate, such as walking, bicycling, or swimming  · Find ways to be more active  Do yard work and housecleaning  Walk up the stairs instead of using elevators  Spend your leisure time going to events that require walking, such as outdoor festivals or fairs  This extra physical activity can help you lose weight and keep it off  Follow up with your healthcare provider as directed: You may need to meet with a dietitian  Write down your questions so you remember to ask them during your visits  © 2017 [address] Information is for End User's use only and may not be sold, redistributed or otherwise used for commercial purposes  All illustrations and images included in CareNotes® are the copyrighted property of [employer]  or [employer] Rose  The above information is an  only  It is not intended as medical advice for individual conditions or treatments  Talk to your doctor, nurse or pharmacist before following any medical regimen to see if it is safe and effective for you  Urinary Incontinence   WHAT YOU NEED TO KNOW:   What is urinary incontinence? Urinary incontinence (UI) is when you lose control of your bladder  What causes UI? UI occurs because your bladder cannot store or empty urine properly  The following are the most common types of UI:  · Stress incontinence  is when you leak urine due to increased bladder pressure  This may happen when you cough, sneeze, or exercise       · Urge incontinence  is when you feel the need to urinate right away and leak urine accidentally  · Mixed incontinence  is when you have both stress and urge UI  What are the signs and symptoms of UI?   · You feel like your bladder does not empty completely when you urinate  · You urinate often and need to urinate immediately  · You leak urine when you sleep, or you wake up with the urge to urinate  · You leak urine when you cough, sneeze, exercise, or laugh  How is UI diagnosed? Your healthcare provider will ask how often you leak urine and whether you have stress or urge symptoms  Tell him which medicines you take, how often you urinate, and how much liquid you drink each day  You may need any of the following tests:  · Urine tests  may show infection or kidney function  · A pelvic exam  may be done to check for blockages  A pelvic exam will also show if your bladder, uterus, or other organs have moved out of place  · An x-ray, ultrasound, or CT  may show problems with parts of your urinary system  You may be given contrast liquid to help your organs show up better in the pictures  Tell the healthcare provider if you have ever had an allergic reaction to contrast liquid  Do not enter the MRI room with anything metal  Metal can cause serious injury  Tell the healthcare provider if you have any metal in or on your body  · A bladder scan  will show how much urine is left in your bladder after you urinate  You will be asked to urinate and then healthcare providers will use a small ultrasound machine to check the urine left in your bladder  · Cystometry  is used to check the function of your urinary system  Your healthcare provider checks the pressure in your bladder while filling it with fluid  Your bladder pressure may also be tested when your bladder is full and while you urinate  How is UI treated? · Medicines  can help strengthen your bladder control      · Electrical stimulation  is used to send a small amount of electrical energy to your pelvic floor muscles  This helps control your bladder function  Electrodes may be placed outside your body or in your rectum  For women, the electrodes may be placed in the vagina  · A bulking agent  may be injected into the wall of your urethra to make it thicker  This helps keep your urethra closed and decreases urine leakage  · Devices  such as a clamp, pessary, or tampon may help stop urine leaks  Ask your healthcare provider for more information about these and other devices  · Surgery  may be needed if other treatments do not work  Several types of surgery can help improve your bladder control  Ask your healthcare provider for more information about the surgery you may need  How can I manage my symptoms? · Do pelvic muscle exercises often  Your pelvic muscles help you stop urinating  Squeeze these muscles tight for 5 seconds, then relax for 5 seconds  Gradually work up to squeezing for 10 seconds  Do 3 sets of 15 repetitions a day, or as directed  This will help strengthen your pelvic muscles and improve bladder control  · A catheter  may be used to help empty your bladder  A catheter is a tiny, plastic tube that is put into your bladder to drain your urine  Your healthcare provider may tell you to use a catheter to prevent your bladder from getting too full and leaking urine  · Keep a UI record  Write down how often you leak urine and how much you leak  Make a note of what you were doing when you leaked urine  · Train your bladder  Go to the bathroom at set times, such as every 2 hours, even if you do not feel the urge to go  You can also try to hold your urine when you feel the urge to go  For example, hold your urine for 5 minutes when you feel the urge to go  As that becomes easier, hold your urine for 10 minutes  · Drink liquids as directed  Ask your healthcare provider how much liquid to drink each day and which liquids are best for you   You may need to limit the amount of liquid you drink to help control your urine leakage  Limit or do not have drinks that contain caffeine or alcohol  Do not drink any liquid right before you go to bed  · Prevent constipation  Eat a variety of high-fiber foods  Good examples are high-fiber cereals, beans, vegetables, and whole-grain breads  Prune juice may help make your bowel movement softer  Walking is the best way to trigger your intestines to have a bowel movement  · Exercise regularly and maintain a healthy weight  Ask your healthcare provider how much you should weigh and about the best exercise plan for you  Weight loss and exercise will decrease pressure on your bladder and help you control your leakage  Ask him to help you create a weight loss plan if you are overweight  When should I seek immediate care? · You have severe pain  · You are confused or cannot think clearly  When should I contact my healthcare provider? · You have a fever  · You see blood in your urine  · You have pain when you urinate  · You have new or worse pain, even after treatment  · Your mouth feels dry or you have vision changes  · Your urine is cloudy or smells bad  · You have questions or concerns about your condition or care  CARE AGREEMENT:   You have the right to help plan your care  Learn about your health condition and how it may be treated  Discuss treatment options with your caregivers to decide what care you want to receive  You always have the right to refuse treatment  The above information is an  only  It is not intended as medical advice for individual conditions or treatments  Talk to your doctor, nurse or pharmacist before following any medical regimen to see if it is safe and effective for you  © 2017 [address] Information is for End User's use only and may not be sold, redistributed or otherwise used for commercial purposes   All illustrations and images included in CareNotes® are the copyrighted property of A [employer] A M , Inc  or Dong Rose  Cigarette Smoking and Your Health   AMBULATORY CARE:   Risks to your health if you smoke:  Nicotine and other chemicals found in tobacco damage every cell in your body  Even if you are a light smoker, you have an increased risk for cancer, heart disease, and lung disease  If you are pregnant or have diabetes, smoking increases your risk for complications  Benefits to your health if you stop smoking:   · You decrease respiratory symptoms such as coughing, wheezing, and shortness of breath  · You reduce your risk for cancers of the lung, mouth, throat, kidney, bladder, pancreas, stomach, and cervix  If you already have cancer, you increase the benefits of chemotherapy  You also reduce your risk for cancer returning or a second cancer from developing  · You reduce your risk for heart disease, blood clots, heart attack, and stroke  · You reduce your risk for lung infections, and diseases such as pneumonia, asthma, chronic bronchitis, and emphysema  · Your circulation improves  More oxygen can be delivered to your body  If you have diabetes, you lower your risk for complications, such as kidney, artery, and eye diseases  You also lower your risk for nerve damage  Nerve damage can lead to amputations, poor vision, and blindness  · You improve your body's ability to heal and to fight infections  Benefits to the health of others if you stop smoking:  Tobacco is harmful to nonsmokers who breathe in your secondhand smoke  The following are ways the health of others around you may improve when you stop smoking:  · You lower the risks for lung cancer and heart disease in nonsmoking adults  · If you are pregnant, you lower the risk for miscarriage, early delivery, low birth weight, and stillbirth  You also lower your baby's risk for SIDS, obesity, developmental delay, and neurobehavioral problems, such as ADHD  · If you have children, you lower their risk for ear infections, colds, pneumonia, bronchitis, and asthma  For more information and support to stop smoking:   · [employer]  Phone: 1- 396 - 429-0265  Web Address: www smokefree  gov  Follow up with your healthcare provider as directed:  Write down your questions so you remember to ask them during your visits  © 2017 [address] Information is for End User's use only and may not be sold, redistributed or otherwise used for commercial purposes  All illustrations and images included in CareNotes® are the copyrighted property of A [employer] A M [employer]  or Dong Rose  The above information is an  only  It is not intended as medical advice for individual conditions or treatments  Talk to your doctor, nurse or pharmacist before following any medical regimen to see if it is safe and effective for you  Fall Prevention   AMBULATORY CARE:   Fall prevention  includes ways to make your home and other areas safer  It also includes ways you can move more carefully to prevent a fall  Health conditions that cause changes in your blood pressure, vision, or muscle strength and coordination may increase your risk for falls  Medicines may also increase your risk for falls if they make you dizzy, weak, or sleepy  Call 911 or have someone else call if:   · You have fallen and are unconscious  · You have fallen and cannot move part of your body  Contact your healthcare provider if:   · You have fallen and have pain or a headache  · You have questions or concerns about your condition or care  Fall prevention tips:   · Stand or sit up slowly  This may help you keep your balance and prevent falls  · Use assistive devices as directed  Your healthcare provider may suggest that you use a cane or walker to help you keep your balance  You may need to have grab bars put in your bathroom near the toilet or in the shower      · Wear shoes that fit well and have soles that   Wear shoes both inside and outside  Use slippers with good   Do not wear shoes with high heels  · Wear a personal alarm  This is a device that allows you to call 911 if you fall and need help  Ask your healthcare provider for more information  · Stay active  Exercise can help strengthen your muscles and improve your balance  Your healthcare provider may recommend water aerobics or walking  He or she may also recommend physical therapy to improve your coordination  Never start an exercise program without talking to your healthcare provider first      · Manage your medical conditions  Keep all appointments with your healthcare providers  Visit your eye doctor as directed  Home safety tips:   · Add items to prevent falls in the bathroom  Put nonslip strips on your bath or shower floor to prevent you from slipping  Use a bath mat if you do not have carpet in the bathroom  This will prevent you from falling when you step out of the bath or shower  Use a shower seat so you do not need to stand while you shower  Sit on the toilet or a chair in your bathroom to dry yourself and put on clothing  This will prevent you from losing your balance from drying or dressing yourself while you are standing  · Keep paths clear  Remove books, shoes, and other objects from walkways and stairs  Place cords for telephones and lamps out of the way so that you do not need to walk over them  Tape them down if you cannot move them  Remove small rugs  If you cannot remove a rug, secure it with double-sided tape  This will prevent you from tripping  · Install bright lights in your home  Use night lights to help light paths to the bathroom or kitchen  Always turn on the light before you start walking  · Keep items you use often on shelves within reach  Do not use a step stool to help you reach an item  · Paint or place reflective tape on the edges of your stairs    This will help you see the stairs better  Follow up with your healthcare provider as directed:  Write down your questions so you remember to ask them during your visits  © 2017 [address] [employer] Information is for End User's use only and may not be sold, redistributed or otherwise used for commercial purposes  All illustrations and images included in CareNotes® are the copyrighted property of A [employer] M [employer]  or Dong Rose  The above information is an  only  It is not intended as medical advice for individual conditions or treatments  Talk to your doctor, nurse or pharmacist before following any medical regimen to see if it is safe and effective for you  Advance Directives   WHAT YOU NEED TO KNOW:   What are advance directives? Advance directives are legal documents that state your wishes and plans for medical care  These plans are made ahead of time in case you lose your ability to make decisions for yourself  Advance directives can apply to any medical decision, such as the treatments you want, and if you want to donate organs  What are the types of advance directives? There are many types of advance directives, and each state has rules about how to use them  You may choose a combination of any of the following:  · Living will: This is a written record of the treatment you want  You can also choose which treatments you do not want, which to limit, and which to stop at a certain time  This includes surgery, medicine, IV fluid, and tube feedings  · Durable power of  for healthcare [facility]): This is a written record that states who you want to make healthcare choices for you when you are unable to make them for yourself  This person, called a proxy, is usually a family member or a friend  You may choose more than 1 proxy  · Do not resuscitate (DNR) order:  A DNR order is used in case your heart stops beating or you stop breathing   It is a request not to have certain forms of treatment, such as CPR  A DNR order may be included in other types of advance directives  · Medical directive: This covers the care that you want if you are in a coma, near death, or unable to make decisions for yourself  You can list the treatments you want for each condition  Treatment may include pain medicine, surgery, blood transfusions, dialysis, IV or tube feedings, and a ventilator (breathing machine)  · Values history: This document has questions about your views, beliefs, and how you feel and think about life  This information can help others choose the care that you would choose  Why are advance directives important? An advance directive helps you control your care  Although spoken wishes may be used, it is better to have your wishes written down  Spoken wishes can be misunderstood, or not followed  Treatments may be given even if you do not want them  An advance directive may make it easier for your family to make difficult choices about your care  How do I decide what to put in my advance directives? · Make informed decisions:  Make sure you fully understand treatments or care you may receive  Think about the benefits and problems your decisions could cause for you or your family  Talk to healthcare providers if you have concerns or questions before you write down your wishes  You may also want to talk with your [employer] or , or a   Check your state laws to make sure that what you put in your advance directive is legal      · Sign all forms:  Sign and date your advance directive when you have finished  You may also need 2 witnesses to sign the forms  Witnesses cannot be your doctor or his staff, your spouse, heirs or beneficiaries, people you owe money to, or your chosen proxy  Talk to your family, proxy, and healthcare providers about your advance directive  Give each person a copy, and keep one for yourself in a place you can get to easily   Do not keep it hidden or locked away  · Review and revise your plans: You can revise your advance directive at any time, as long as you are able to make decisions  Review your plan every year, and when there are changes in your life, or your health  When you make changes, let your family, proxy, and healthcare providers know  Give each a new copy  Where can I find more information? · [employer]  [address] , [address]  Phone: 4- 181 - [phone]  Phone: 0- 700 - [phone]  Web Address: http://www  aafp org  · [address] [facility])  [address] [address] [facility] [place], [address] [facility] , [address] [place] [address]  Phone: [phone]  Phone: [phone]  Web Address: Rody morejon  CARE AGREEMENT:   You have the right to help plan your care  To help with this plan, you must learn about your health condition and treatment options  You must also learn about advance directives and how they are used  Work with your healthcare providers to decide what care will be used to treat you  You always have the right to refuse treatment  The above information is an  only  It is not intended as medical advice for individual conditions or treatments  Talk to your doctor, nurse or pharmacist before following any medical regimen to see if it is safe and effective for you  © 2017 [address] Information is for End User's use only and may not be sold, redistributed or otherwise used for commercial purposes  All illustrations and images included in CareNotes® are the copyrighted property of A D A M , [employer]  or Dong Rose

## 2019-01-01 ENCOUNTER — OFFICE VISIT (OUTPATIENT)
Dept: FAMILY MEDICINE CLINIC | Facility: CLINIC | Age: 69
End: 2019-01-01
Payer: MEDICARE

## 2019-01-01 ENCOUNTER — TELEPHONE (OUTPATIENT)
Dept: FAMILY MEDICINE CLINIC | Facility: CLINIC | Age: 69
End: 2019-01-01

## 2019-01-01 VITALS
RESPIRATION RATE: 18 BRPM | BODY MASS INDEX: 39.37 KG/M2 | WEIGHT: 245 LBS | OXYGEN SATURATION: 97 % | HEART RATE: 60 BPM | HEIGHT: 66 IN | TEMPERATURE: 97.9 F | DIASTOLIC BLOOD PRESSURE: 80 MMHG | SYSTOLIC BLOOD PRESSURE: 130 MMHG

## 2019-01-01 DIAGNOSIS — M1A.0720 CHRONIC GOUT OF LEFT FOOT, UNSPECIFIED CAUSE: ICD-10-CM

## 2019-01-01 DIAGNOSIS — I48.0 PAROXYSMAL ATRIAL FIBRILLATION (HCC): ICD-10-CM

## 2019-01-01 DIAGNOSIS — F32.A MILD DEPRESSION: ICD-10-CM

## 2019-01-01 DIAGNOSIS — I10 ESSENTIAL HYPERTENSION: Chronic | ICD-10-CM

## 2019-01-01 DIAGNOSIS — L03.115 CELLULITIS OF RIGHT LOWER EXTREMITY: ICD-10-CM

## 2019-01-01 DIAGNOSIS — E78.2 MIXED HYPERLIPIDEMIA: ICD-10-CM

## 2019-01-01 DIAGNOSIS — M54.50 CHRONIC BILATERAL LOW BACK PAIN WITHOUT SCIATICA: ICD-10-CM

## 2019-01-01 DIAGNOSIS — E03.9 ACQUIRED HYPOTHYROIDISM: ICD-10-CM

## 2019-01-01 DIAGNOSIS — G89.29 CHRONIC BILATERAL LOW BACK PAIN WITHOUT SCIATICA: ICD-10-CM

## 2019-01-01 DIAGNOSIS — Z00.00 HEALTH CARE MAINTENANCE: Primary | ICD-10-CM

## 2019-01-01 PROCEDURE — 99214 OFFICE O/P EST MOD 30 MIN: CPT | Performed by: FAMILY MEDICINE

## 2019-01-01 PROCEDURE — G0439 PPPS, SUBSEQ VISIT: HCPCS | Performed by: FAMILY MEDICINE

## 2019-01-01 RX ORDER — AMIODARONE HYDROCHLORIDE 400 MG/1
400 TABLET ORAL DAILY
Qty: 90 TABLET | Refills: 3 | Status: SHIPPED | OUTPATIENT
Start: 2019-01-01 | End: 2019-01-01

## 2019-01-01 RX ORDER — APIXABAN 5 MG/1
TABLET, FILM COATED ORAL
Qty: 180 TABLET | Refills: 2 | Status: SHIPPED | OUTPATIENT
Start: 2019-01-01 | End: 2020-01-01

## 2019-01-01 RX ORDER — AMIODARONE HYDROCHLORIDE 400 MG/1
TABLET ORAL
Qty: 30 TABLET | Refills: 4 | Status: SHIPPED | OUTPATIENT
Start: 2019-01-01 | End: 2020-01-01

## 2019-01-01 RX ORDER — BACLOFEN 10 MG/1
TABLET ORAL
Qty: 270 TABLET | Refills: 3 | Status: SHIPPED | OUTPATIENT
Start: 2019-01-01 | End: 2020-10-25 | Stop reason: HOSPADM

## 2019-01-01 RX ORDER — ALLOPURINOL 300 MG/1
TABLET ORAL
Qty: 90 TABLET | Refills: 1 | Status: SHIPPED | OUTPATIENT
Start: 2019-01-01 | End: 2020-01-01

## 2019-01-10 ENCOUNTER — TELEPHONE (OUTPATIENT)
Dept: FAMILY MEDICINE CLINIC | Facility: CLINIC | Age: 69
End: 2019-01-10

## 2019-01-10 DIAGNOSIS — H10.33 ACUTE BACTERIAL CONJUNCTIVITIS OF BOTH EYES: Primary | ICD-10-CM

## 2019-01-10 DIAGNOSIS — J32.9 RHINOSINUSITIS: ICD-10-CM

## 2019-01-10 DIAGNOSIS — J31.0 RHINOSINUSITIS: ICD-10-CM

## 2019-01-10 RX ORDER — AMOXICILLIN 500 MG/1
500 CAPSULE ORAL EVERY 8 HOURS SCHEDULED
Qty: 30 CAPSULE | Refills: 0 | Status: SHIPPED | OUTPATIENT
Start: 2019-01-10 | End: 2019-01-18

## 2019-01-10 RX ORDER — TOBRAMYCIN 3 MG/ML
1 SOLUTION/ DROPS OPHTHALMIC
Qty: 5 ML | Refills: 0 | Status: SHIPPED | OUTPATIENT
Start: 2019-01-10 | End: 2019-01-15

## 2019-01-10 NOTE — TELEPHONE ENCOUNTER
Patient is complaining of coughing and headache with phlegm coming up and possible pink eye and is requesting medication sent to [facility] on [phone]

## 2019-01-17 ENCOUNTER — TELEPHONE (OUTPATIENT)
Dept: FAMILY MEDICINE CLINIC | Facility: CLINIC | Age: 69
End: 2019-01-17

## 2019-01-17 DIAGNOSIS — I48.0 PAROXYSMAL ATRIAL FIBRILLATION (HCC): ICD-10-CM

## 2019-01-17 DIAGNOSIS — N40.0 PROSTATIC HYPERTROPHY: ICD-10-CM

## 2019-01-17 DIAGNOSIS — M1A.0720 CHRONIC GOUT OF LEFT FOOT, UNSPECIFIED CAUSE: ICD-10-CM

## 2019-01-17 RX ORDER — TAMSULOSIN HYDROCHLORIDE 0.4 MG/1
CAPSULE ORAL
Qty: 90 CAPSULE | Refills: 1 | Status: SHIPPED | OUTPATIENT
Start: 2019-01-17 | End: 2020-01-01

## 2019-01-17 RX ORDER — ALLOPURINOL 300 MG/1
TABLET ORAL
Qty: 90 TABLET | Refills: 1 | Status: SHIPPED | OUTPATIENT
Start: 2019-01-17 | End: 2019-01-01 | Stop reason: SDUPTHER

## 2019-01-17 RX ORDER — APIXABAN 5 MG/1
TABLET, FILM COATED ORAL
Qty: 180 TABLET | Refills: 1 | Status: SHIPPED | OUTPATIENT
Start: 2019-01-17 | End: 2019-01-18

## 2019-01-17 NOTE — TELEPHONE ENCOUNTER
Christin Mciknney and his wife are still not feeling well after 10 days and the amoxicillin you rx them  Jesenia states Christin Mckinney has low grade fever of 99 6  She states they are very weak ad coughing productive with mucus  Jesenia would like to know what you suggested at this point

## 2019-01-18 ENCOUNTER — OFFICE VISIT (OUTPATIENT)
Dept: FAMILY MEDICINE CLINIC | Facility: CLINIC | Age: 69
End: 2019-01-18
Payer: MEDICARE

## 2019-01-18 VITALS
DIASTOLIC BLOOD PRESSURE: 68 MMHG | HEIGHT: 66 IN | TEMPERATURE: 98.2 F | SYSTOLIC BLOOD PRESSURE: 124 MMHG | HEART RATE: 62 BPM | WEIGHT: 233 LBS | OXYGEN SATURATION: 95 % | BODY MASS INDEX: 37.45 KG/M2

## 2019-01-18 DIAGNOSIS — J32.9 RHINOSINUSITIS: Primary | ICD-10-CM

## 2019-01-18 DIAGNOSIS — J31.0 RHINOSINUSITIS: Primary | ICD-10-CM

## 2019-01-18 PROCEDURE — 99213 OFFICE O/P EST LOW 20 MIN: CPT | Performed by: FAMILY MEDICINE

## 2019-01-18 RX ORDER — METHYLPREDNISOLONE 4 MG/1
TABLET ORAL
Qty: 21 TABLET | Refills: 0 | Status: SHIPPED | OUTPATIENT
Start: 2019-01-18 | End: 2019-01-24

## 2019-01-18 RX ORDER — DOXYCYCLINE HYCLATE 100 MG/1
100 CAPSULE ORAL EVERY 12 HOURS SCHEDULED
Qty: 20 CAPSULE | Refills: 0 | Status: SHIPPED | OUTPATIENT
Start: 2019-01-18 | End: 2019-01-28

## 2019-01-18 NOTE — PATIENT INSTRUCTIONS
Rhinosinusitis   AMBULATORY CARE:   Rhinosinusitis (RS)  is inflammation of your nose and sinuses  It commonly begins as a virus, often as a common cold  Viruses usually last 7 to 10 days and do not need treatment  When the virus does not get better on its own, you may have bacterial RS  This means that bacteria have begun to grow inside your sinuses  Acute RS lasts less than 4 weeks  Chronic RS lasts 12 weeks or more  Recurrent RS is when you have 4 or more episodes of RS in one year  Your signs and symptoms  may be worse when you lie on your back or try to sleep  You may have any of the following:  · Stuffy nose and reduced sense of smell     · Runny nose with thick yellow or green mucus     · Pressure or pain on your face or a headache     · Pain in your teeth or bad breath     · Ear pain or pressure     · Fever or cough     · Tiredness  Seek care immediately if:   · You have double vision or you cannot see  · You have a stiff neck, a fever, or a bad headache  · Your eyeball bulges out or you cannot move your eye  · Your eye and eyelid are red, swollen, and painful  · You cannot open your eye  · You are more sleepy than normal, or you notice changes in your ability to think, move, or talk  · You have swelling of your forehead or scalp  Contact your healthcare provider if:   · Your symptoms are worse or do not improve after 3 to 5 days of treatment  · You have questions or concerns about your condition or care  Treatment for rhinosinusitis  may include any of the following:  · Acetaminophen  decreases pain and fever  It is available without a doctor's order  Ask how much to take and how often to take it  Follow directions  Acetaminophen can cause liver damage if not taken correctly  · NSAIDs , such as ibuprofen, help decrease swelling, pain, and fever  This medicine is available with or without a doctor's order   NSAIDs can cause stomach bleeding or kidney problems in certain people  If you take blood thinner medicine, always ask your healthcare provider if NSAIDs are safe for you  Always read the medicine label and follow directions  · Nasal steroid sprays  decrease inflammation in your nose and sinuses  · Decongestants  reduce swelling and drain mucus in the nose and sinuses  They may help you breathe easier  · Antihistamines  dry mucus in the nose and relieve sneezing  · Antibiotics  treat a bacterial infection and may be needed if your symptoms do not improve or they get worse  · Take your medicine as directed  Contact your healthcare provider if you think your medicine is not helping or if you have side effects  Tell him or her if you are allergic to any medicine  Keep a list of the medicines, vitamins, and herbs you take  Include the amounts, and when and why you take them  Bring the list or the pill bottles to follow-up visits  Carry your medicine list with you in case of an emergency  Self-care:   · Rinse your sinuses  Use a sinus rinse device to rinse your nasal passages with a saline (salt water) solution  This will help thin the mucus in your nose and rinse away pollen and dirt  It will also help reduce swelling so you can breathe normally  Ask your healthcare provider how often to do this  · Breathe in steam   Heat a bowl of water until you see steam  Lean over the bowl and make a tent over your head with a large towel  Breathe deeply for about 20 minutes  Be careful not to get too close to the steam or burn yourself  Do this 3 times a day  You can also breathe deeply when you take a hot shower  · Sleep with your head elevated  Place an extra pillow under your head before you go to sleep to help your sinuses drain  · Drink liquids as directed  Ask your healthcare provider how much liquid to drink each day and which liquids are best for you  Liquids will thin the mucus in your nose and help it drain   Avoid drinks that contain alcohol or caffeine  · Do not smoke, and avoid secondhand smoke  Nicotine and other chemicals in cigarettes and cigars can make your symptoms worse  Ask your healthcare provider for information if you currently smoke and need help to quit  E-cigarettes or smokeless tobacco still contain nicotine  Talk to your healthcare provider before you use these products  Follow up with your healthcare provider as directed: Follow up if your symptoms are worse or not better after 3 to 5 days of treatment  Write down your questions so you remember to ask them during your visits  © 2017 [employer] Information is for End User's use only and may not be sold, redistributed or otherwise used for commercial purposes  All illustrations and images included in CareNotes® are the copyrighted property of [employer]  or [employer]  The above information is an  only  It is not intended as medical advice for individual conditions or treatments  Talk to your doctor, nurse or pharmacist before following any medical regimen to see if it is safe and effective for you

## 2019-01-18 NOTE — PROGRESS NOTES
Assessment/Plan:       Diagnoses and all orders for this visit:    Rhinosinusitis  -     doxycycline hyclate (VIBRAMYCIN) 100 mg capsule; Take 1 capsule (100 mg total) by mouth every 12 (twelve) hours for 10 days  -     methylPREDNISolone 4 MG tablet therapy pack; Use as directed on package          Subjective:      Patient ID: Wanda Frias is a 70 y o  male  Sinusitis   This is a new problem  The current episode started in the past 7 days  The problem is unchanged  There has been no fever  The pain is moderate  Associated symptoms include chills, coughing, ear pain, headaches and a sore throat  Past treatments include antibiotics  The treatment provided mild relief  The following portions of the patient's history were reviewed and updated as appropriate:   He has a past medical history of A-fib ([facility] [address] ); Asbestosis ([facility] [address] ); Gout; HTN (hypertension); Leukocytosis; Lumbar spinal stenosis; Neuropathy; Polio; Renal calculi; and Sleep apnea  ,   does not have any pertinent problems on file  ,   has a past surgical history that includes Colon surgery; Hernia repair; Femur fracture surgery (Right); and Other surgical history  ,  family history includes Cancer in his maternal grandmother; Cirrhosis in his brother; Diabetes in his brother; Hodgkin's lymphoma in his mother; Other in his father  ,   reports that he has never smoked  He has never used smokeless tobacco  He reports that he does not drink alcohol or use drugs  ,  is allergic to cymbalta [duloxetine hcl]     Current Outpatient Prescriptions   Medication Sig Dispense Refill    allopurinol (ZYLOPRIM) 300 mg tablet TAKE 1 TABLET BY MOUTH  DAILY 90 tablet 1    amiodarone 200 mg tablet Take 1 tablet (200 mg total) by mouth daily 90 tablet 3    apixaban (ELIQUIS) 5 mg Take 1 tablet (5 mg total) by mouth 2 (two) times a day 180 tablet 2    baclofen 10 mg tablet TAKE 1 TABLET BY MOUTH 3  TIMES A DAY 90 tablet 3    famotidine (PEPCID) 20 mg tablet Take 1 tablet (20 mg total) by mouth daily 90 tablet 3    fluticasone (FLONASE) 50 mcg/act nasal spray 1 spray into each nostril daily 3 Bottle 3    loratadine (CLARITIN) 10 mg tablet Take 10 mg by mouth daily      metoprolol tartrate (LOPRESSOR) 50 mg tablet Take 1 tablet (50 mg total) by mouth every 12 (twelve) hours 90 tablet 3    omeprazole (PriLOSEC) 20 mg delayed release capsule       tamsulosin (FLOMAX) 0 4 mg TAKE 1 CAPSULE BY MOUTH  DAILY WITH DINNER 90 capsule 1    doxycycline hyclate (VIBRAMYCIN) 100 mg capsule Take 1 capsule (100 mg total) by mouth every 12 (twelve) hours for 10 days 20 capsule 0    methylPREDNISolone 4 MG tablet therapy pack Use as directed on package 21 tablet 0     No current facility-administered medications for this visit  Review of Systems   Constitutional: Positive for chills  HENT: Positive for ear pain, rhinorrhea, sinus pain and sore throat  Eyes: Negative for pain  Respiratory: Positive for cough  Cardiovascular: Negative for chest pain  Gastrointestinal: Negative for abdominal pain  Genitourinary: Negative for difficulty urinating  Neurological: Positive for headaches  Objective:  Vitals:    01/18/19 1038   BP: 124/68   Pulse: 62   Temp: 98 2 °F (36 8 °C)   SpO2: 95%   Weight: 106 kg (233 lb)   Height: 5' 6" (1 676 m)     Body mass index is 37 61 kg/m²  Physical Exam   Constitutional: He appears well-developed  HENT:   Head: Normocephalic  Nose: Mucosal edema, rhinorrhea and sinus tenderness present  Right sinus exhibits maxillary sinus tenderness and frontal sinus tenderness  Left sinus exhibits maxillary sinus tenderness and frontal sinus tenderness  Eyes: Conjunctivae are normal    Neck: Neck supple  Cardiovascular: Normal rate and regular rhythm  Pulmonary/Chest: No respiratory distress  He has no wheezes  Abdominal: There is no tenderness  Lymphadenopathy:     He has no cervical adenopathy  Nursing note and vitals reviewed

## 2019-01-27 DIAGNOSIS — R60.9 EDEMA, UNSPECIFIED TYPE: Primary | ICD-10-CM

## 2019-01-29 RX ORDER — FUROSEMIDE 40 MG/1
TABLET ORAL
Qty: 30 TABLET | Refills: 0 | Status: SHIPPED | OUTPATIENT
Start: 2019-01-29 | End: 2019-02-25 | Stop reason: SDUPTHER

## 2019-02-04 ENCOUNTER — TELEPHONE (OUTPATIENT)
Dept: CARDIOLOGY CLINIC | Facility: CLINIC | Age: 69
End: 2019-02-04

## 2019-02-04 NOTE — TELEPHONE ENCOUNTER
Pt called and stated that when he left the hospital he was prescribed Metoprolol and was advised to take it 3 times a day  But pt received medication from [facility] and the instructions state every 12 hours  Pt would like a call back

## 2019-02-21 DIAGNOSIS — G89.29 CHRONIC BILATERAL LOW BACK PAIN WITHOUT SCIATICA: ICD-10-CM

## 2019-02-21 DIAGNOSIS — M54.50 CHRONIC BILATERAL LOW BACK PAIN WITHOUT SCIATICA: ICD-10-CM

## 2019-02-21 RX ORDER — BACLOFEN 10 MG/1
TABLET ORAL
Qty: 90 TABLET | Refills: 3 | Status: SHIPPED | OUTPATIENT
Start: 2019-02-21 | End: 2019-07-22 | Stop reason: SDUPTHER

## 2019-02-25 ENCOUNTER — OFFICE VISIT (OUTPATIENT)
Dept: CARDIOLOGY CLINIC | Facility: CLINIC | Age: 69
End: 2019-02-25
Payer: MEDICARE

## 2019-02-25 VITALS
BODY MASS INDEX: 38.57 KG/M2 | WEIGHT: 240 LBS | HEART RATE: 69 BPM | DIASTOLIC BLOOD PRESSURE: 70 MMHG | SYSTOLIC BLOOD PRESSURE: 132 MMHG | HEIGHT: 66 IN | OXYGEN SATURATION: 97 %

## 2019-02-25 DIAGNOSIS — I10 ESSENTIAL HYPERTENSION: ICD-10-CM

## 2019-02-25 DIAGNOSIS — I27.20 PULMONARY HYPERTENSION (HCC): ICD-10-CM

## 2019-02-25 DIAGNOSIS — G47.33 OSA (OBSTRUCTIVE SLEEP APNEA): ICD-10-CM

## 2019-02-25 DIAGNOSIS — R60.9 EDEMA, UNSPECIFIED TYPE: ICD-10-CM

## 2019-02-25 DIAGNOSIS — E66.9 OBESITY (BMI 30-39.9): ICD-10-CM

## 2019-02-25 DIAGNOSIS — I48.0 PAROXYSMAL ATRIAL FIBRILLATION (HCC): Primary | ICD-10-CM

## 2019-02-25 DIAGNOSIS — Z79.01 ANTICOAGULANT LONG-TERM USE: ICD-10-CM

## 2019-02-25 PROCEDURE — 99214 OFFICE O/P EST MOD 30 MIN: CPT | Performed by: INTERNAL MEDICINE

## 2019-02-25 RX ORDER — FUROSEMIDE 40 MG/1
20 TABLET ORAL DAILY
Qty: 30 TABLET | Refills: 2
Start: 2019-02-25 | End: 2019-04-01 | Stop reason: SDUPTHER

## 2019-02-25 NOTE — PROGRESS NOTES
[facility] [place]  [address]  [address]  Cardiology Consultation     Scarlett Holm  [id number]  1950      1  Paroxysmal atrial fibrillation (HCC)     2  Anticoagulant long-term use     3  Essential hypertension     4  DORA (obstructive sleep apnea)     5  Pulmonary hypertension (HCC)     6  Obesity (BMI 30-39  9)         Chief Complaint   Patient presents with    Follow-up     6 month       HPI:  Patient with history of PAF on Amiodarone & Eliquis, HTN, DORA, pulmonary HTN, obesity presents for follow up visit  Denies chest pain, SOB, lightheadedness, palpitations, leg swelling, syncope  Compliant with medications  Does not tolerate statins  PAF -  Currently in sinus rhythm  On Amiodarone and beta-blocker and Eliquis  Denies bleeding from any site  Is wondering if he can get off Amiodarone as it was planned to be used for a short time around cardioversion     HTN -  Has been hypertensive for many years  Taking medications regularly  Denies lightheadedness, headache, medication side effects        DORA -   States he uses his CPAP regularly     PHTN -  Mild  40 mm Hg  Likely due to his sleep apnea     Obesity -  Trying to lose weight  Patient Active Problem List   Diagnosis    Paroxysmal atrial fibrillation (HCC)    Bilateral arm weakness    Bilateral edema of lower extremity    Benign prostatic hyperplasia    Cervical myelopathy (HCC)    Essential hypertension    Gout, arthritis    Mixed hyperlipidemia    DORA (obstructive sleep apnea)    Osteoporosis    Peptic reflux disease    Post-polio syndrome    Obesity (BMI 30-39  9)    Polio    Decubitus ulcer of heel    Leukocytosis    Acute encephalopathy    Altered mental status    On amiodarone therapy    Pulmonary hypertension (HCC)    Acute pain of right knee     Past Medical History:   Diagnosis Date    A-fib ([facility] [address] )     Asbestosis ([facility] [address] )     Gout     HTN (hypertension)     Leukocytosis     Lumbar spinal stenosis     last assessed 12/15/2016    Neuropathy     Polio     Renal calculi     Sleep apnea      Social History     Socioeconomic History    Marital status: /Civil Union     Spouse name: Not on file    Number of children: 2    Years of education: 12    Highest education level: Not on file   Occupational History    Occupation:      Comment: Retired   Social Needs    Financial resource strain: Not on file    Food insecurity:     Worry: Not on file     Inability: Not on file   Germmatters needs:     Medical: Not on file     Non-medical: Not on file   Tobacco Use    Smoking status: Never Smoker    Smokeless tobacco: Never Used   Substance and Sexual Activity    Alcohol use: No    Drug use: No    Sexual activity: Yes     Partners: Female     Birth control/protection: None   Lifestyle    Physical activity:     Days per week: Not on file     Minutes per session: Not on file    Stress: Not on file   Relationships    Social connections:     Talks on phone: Not on file     Gets together: Not on file     Attends Faith service: Not on file     Active member of club or organization: Not on file     Attends meetings of clubs or organizations: Not on file     Relationship status: Not on file    Intimate partner violence:     Fear of current or ex partner: Not on file     Emotionally abused: Not on file     Physically abused: Not on file     Forced sexual activity: Not on file   Other Topics Concern    Not on file   Social History Narrative    Not on file      Family History   Problem Relation Age of Onset    Hodgkin's lymphoma Mother         disease    Other Father         polio    Diabetes Brother     Cirrhosis Brother         hepatic    Cancer Maternal Grandmother      Past Surgical History:   Procedure Laterality Date    COLON SURGERY      partial colectomy    FEMUR FRACTURE SURGERY Right     HERNIA REPAIR      ventral    OTHER SURGICAL HISTORY      percutaneous lithotomy, 11/2015 and 04/10/2017       Current Outpatient Medications:     allopurinol (ZYLOPRIM) 300 mg tablet, TAKE 1 TABLET BY MOUTH  DAILY, Disp: 90 tablet, Rfl: 1    amiodarone 200 mg tablet, Take 1 tablet (200 mg total) by mouth daily, Disp: 90 tablet, Rfl: 3    apixaban (ELIQUIS) 5 mg, Take 1 tablet (5 mg total) by mouth 2 (two) times a day, Disp: 180 tablet, Rfl: 2    baclofen 10 mg tablet, TAKE 1 TABLET BY MOUTH 3  TIMES A DAY, Disp: 90 tablet, Rfl: 3    famotidine (PEPCID) 20 mg tablet, Take 1 tablet (20 mg total) by mouth daily, Disp: 90 tablet, Rfl: 3    fluticasone (FLONASE) 50 mcg/act nasal spray, 1 spray into each nostril daily, Disp: 3 Bottle, Rfl: 3    furosemide (LASIX) 40 mg tablet, TAKE 1 TABLET EVERY MORNING, Disp: 30 tablet, Rfl: 0    loratadine (CLARITIN) 10 mg tablet, Take 10 mg by mouth daily, Disp: , Rfl:     metoprolol tartrate (LOPRESSOR) 50 mg tablet, Take 1 tablet (50 mg total) by mouth every 12 (twelve) hours, Disp: 90 tablet, Rfl: 3    tamsulosin (FLOMAX) 0 4 mg, TAKE 1 CAPSULE BY MOUTH  DAILY WITH DINNER, Disp: 90 capsule, Rfl: 1    omeprazole (PriLOSEC) 20 mg delayed release capsule, , Disp: , Rfl:   Allergies   Allergen Reactions    Cymbalta [Duloxetine Hcl] Delerium     hallucinations     Vitals:    02/25/19 1437   BP: 132/70   BP Location: Left arm   Patient Position: Sitting   Cuff Size: Adult   Pulse: 69   SpO2: 97%   Weight: 109 kg (240 lb)   Height: 5' 6" (1 676 m)       Labs:  No visits with results within 2 Month(s) from this visit     Latest known visit with results is:   Appointment on 10/23/2018   Component Date Value    Cholesterol 10/23/2018 201*    Triglycerides 10/23/2018 148     HDL, Direct 10/23/2018 59     LDL Calculated 10/23/2018 112*    Non-HDL-Chol (CHOL-HDL) 10/23/2018 142     WBC 10/23/2018 7 08     RBC 10/23/2018 5 02     Hemoglobin 10/23/2018 13 9     Hematocrit 10/23/2018 45 3     MCV 10/23/2018 90  [facility] 10/23/2018 27 7     [facility] 10/23/2018 30 7*    RDW 10/23/2018 14 8     Platelets 57/47/2099 241     MPV 10/23/2018 9 8      Imaging: No results found  Review of Systems:  Review of Systems   Constitutional: Negative for diaphoresis, fatigue and fever  HENT: Negative for congestion, ear discharge, hearing loss, sinus pain and sore throat  Eyes: Negative for pain, redness and visual disturbance  Respiratory: Negative for cough, chest tightness, shortness of breath and wheezing  Cardiovascular: Positive for leg swelling  Negative for chest pain and palpitations  Gastrointestinal: Negative for abdominal distention, abdominal pain, constipation, diarrhea, nausea and vomiting  Endocrine: Negative for cold intolerance and heat intolerance  Genitourinary: Negative for difficulty urinating and hematuria  Musculoskeletal: Negative for arthralgias, back pain, gait problem, joint swelling, myalgias, neck pain and neck stiffness  Skin: Negative for color change, pallor, rash and wound  Neurological: Negative for dizziness, syncope, weakness, numbness and headaches  Hematological: Does not bruise/bleed easily  Psychiatric/Behavioral: Negative for agitation, behavioral problems and confusion  The patient is not nervous/anxious  /70 (BP Location: Left arm, Patient Position: Sitting, Cuff Size: Adult)   Pulse 69   Ht 5' 6" (1 676 m)   Wt 109 kg (240 lb)   SpO2 97%   BMI 38 74 kg/m²     Physical Exam:  Physical Exam   Constitutional: He is oriented to person, place, and time  He appears well-developed and well-nourished  HENT:   Head: Normocephalic and atraumatic  Eyes: Pupils are equal, round, and reactive to light  Conjunctivae and EOM are normal    Neck: Normal range of motion  Neck supple  Cardiovascular: Normal rate, regular rhythm, normal heart sounds and intact distal pulses  Exam reveals no gallop and no friction rub  No murmur heard    Pulmonary/Chest: Effort normal and breath sounds normal  No respiratory distress  He has no wheezes  He has no rales  He exhibits no tenderness  Abdominal: Soft  Bowel sounds are normal  He exhibits no distension  There is no rebound  Musculoskeletal: Normal range of motion  He exhibits edema  Neurological: He is alert and oriented to person, place, and time  He has normal reflexes  Skin: Skin is warm and dry  Psychiatric: He has a normal mood and affect  His behavior is normal  Judgment and thought content normal        Discussion/Summary:  1  Paroxysmal A Fib: Continue Lopressor for rate control  Continue Eliquis for anticoagulation  Can discontinue Amiodarone    2  HTN: BP Stable, continue present regimen  3  DORA: On CPAP  4  Pulmonary HTN: Mild, likely due to DORA  5  Obesity: weight loss counseling  6  B/L lankle edema:  Likely dependent edema  On furosemide 20 milligrams daily  Low-salt diet  Keep legs elevated while in bed    Recommend aggressive risk factor modification and therapeutic lifestyle changes  Low salt, low calorie, low fat, low cholesterol diet with regular exercise and to optimize weight  I will defer the ordering and monitoring of necessary lab studies to you, but I am available and happy to review and manage any of that data at your request in the future  Discussed concepts of atherosclerosis, including signs and symptoms of cardiac disease  Previous studies were reviewed  Safety measures were reviewed  Questions were entertained and answered  Patient was advised to report any problem requiring medical attention  Follow up with appropriate specialists and lab work as discussed  Return for follow up visit as scheduled or earlier, if needed  Thank you for allowing me to participate in the care and evaluation of your patient  Should you have any questions, please feel free to contact me

## 2019-02-26 ENCOUNTER — TELEPHONE (OUTPATIENT)
Dept: FAMILY MEDICINE CLINIC | Facility: CLINIC | Age: 69
End: 2019-02-26

## 2019-02-26 DIAGNOSIS — I10 ESSENTIAL HYPERTENSION: Chronic | ICD-10-CM

## 2019-02-26 DIAGNOSIS — E78.2 MIXED HYPERLIPIDEMIA: Primary | ICD-10-CM

## 2019-02-26 NOTE — TELEPHONE ENCOUNTER
Pt left a message requesting a b/w order, he saw Dr [place] Suero yesterday and he recommended that he should have b/w done by his PCP so they do not duplicate the order

## 2019-03-28 ENCOUNTER — APPOINTMENT (OUTPATIENT)
Dept: LAB | Facility: HOSPITAL | Age: 69
End: 2019-03-28
Payer: MEDICARE

## 2019-03-28 DIAGNOSIS — E78.2 MIXED HYPERLIPIDEMIA: ICD-10-CM

## 2019-03-28 DIAGNOSIS — I10 ESSENTIAL HYPERTENSION: Chronic | ICD-10-CM

## 2019-03-28 LAB
ALBUMIN SERPL BCP-MCNC: 3.5 G/DL (ref 3.5–5)
ALP SERPL-CCNC: 79 U/L (ref 46–116)
ALT SERPL W P-5'-P-CCNC: 30 U/L (ref 12–78)
ANION GAP SERPL CALCULATED.3IONS-SCNC: 6 MMOL/L (ref 4–13)
AST SERPL W P-5'-P-CCNC: 27 U/L (ref 5–45)
BILIRUB SERPL-MCNC: 0.3 MG/DL (ref 0.2–1)
BUN SERPL-MCNC: 25 MG/DL (ref 5–25)
CALCIUM SERPL-MCNC: 9.2 MG/DL (ref 8.3–10.1)
CHLORIDE SERPL-SCNC: 103 MMOL/L (ref 100–108)
CHOLEST SERPL-MCNC: 202 MG/DL (ref 50–200)
CO2 SERPL-SCNC: 31 MMOL/L (ref 21–32)
CREAT SERPL-MCNC: 0.73 MG/DL (ref 0.6–1.3)
ERYTHROCYTE [DISTWIDTH] IN BLOOD BY AUTOMATED COUNT: 14.1 % (ref 11.6–15.1)
GFR SERPL CREATININE-BSD FRML MDRD: 95 ML/MIN/1.73SQ M
GLUCOSE P FAST SERPL-MCNC: 95 MG/DL (ref 65–99)
HCT VFR BLD AUTO: 43.1 % (ref 36.5–49.3)
HDLC SERPL-MCNC: 61 MG/DL (ref 40–60)
HGB BLD-MCNC: 13.5 G/DL (ref 12–17)
LDLC SERPL CALC-MCNC: 111 MG/DL (ref 0–100)
MCH RBC QN AUTO: 28.6 PG (ref 26.8–34.3)
MCHC RBC AUTO-ENTMCNC: 31.3 G/DL (ref 31.4–37.4)
MCV RBC AUTO: 91 FL (ref 82–98)
NONHDLC SERPL-MCNC: 141 MG/DL
PLATELET # BLD AUTO: 251 THOUSANDS/UL (ref 149–390)
PMV BLD AUTO: 10.3 FL (ref 8.9–12.7)
POTASSIUM SERPL-SCNC: 4.4 MMOL/L (ref 3.5–5.3)
PROT SERPL-MCNC: 7 G/DL (ref 6.4–8.2)
RBC # BLD AUTO: 4.72 MILLION/UL (ref 3.88–5.62)
SODIUM SERPL-SCNC: 140 MMOL/L (ref 136–145)
TRIGL SERPL-MCNC: 151 MG/DL
TSH SERPL DL<=0.05 MIU/L-ACNC: 4.29 UIU/ML (ref 0.36–3.74)
WBC # BLD AUTO: 6.54 THOUSAND/UL (ref 4.31–10.16)

## 2019-03-28 PROCEDURE — 80053 COMPREHEN METABOLIC PANEL: CPT

## 2019-03-28 PROCEDURE — 85027 COMPLETE CBC AUTOMATED: CPT

## 2019-03-28 PROCEDURE — 80061 LIPID PANEL: CPT

## 2019-03-28 PROCEDURE — 84443 ASSAY THYROID STIM HORMONE: CPT

## 2019-03-28 PROCEDURE — 36415 COLL VENOUS BLD VENIPUNCTURE: CPT

## 2019-04-01 DIAGNOSIS — R60.9 EDEMA, UNSPECIFIED TYPE: ICD-10-CM

## 2019-04-01 RX ORDER — FUROSEMIDE 40 MG/1
20 TABLET ORAL DAILY
Qty: 90 TABLET | Refills: 2 | Status: SHIPPED | OUTPATIENT
Start: 2019-04-01 | End: 2020-01-01 | Stop reason: SDUPTHER

## 2019-04-15 ENCOUNTER — TELEPHONE (OUTPATIENT)
Dept: FAMILY MEDICINE CLINIC | Facility: CLINIC | Age: 69
End: 2019-04-15

## 2019-04-25 ENCOUNTER — TELEPHONE (OUTPATIENT)
Dept: FAMILY MEDICINE CLINIC | Facility: CLINIC | Age: 69
End: 2019-04-25

## 2019-04-25 ENCOUNTER — OFFICE VISIT (OUTPATIENT)
Dept: FAMILY MEDICINE CLINIC | Facility: CLINIC | Age: 69
End: 2019-04-25
Payer: MEDICARE

## 2019-04-25 VITALS
TEMPERATURE: 98 F | DIASTOLIC BLOOD PRESSURE: 62 MMHG | HEART RATE: 60 BPM | SYSTOLIC BLOOD PRESSURE: 122 MMHG | OXYGEN SATURATION: 96 %

## 2019-04-25 DIAGNOSIS — H66.002 NON-RECURRENT ACUTE SUPPURATIVE OTITIS MEDIA OF LEFT EAR WITHOUT SPONTANEOUS RUPTURE OF TYMPANIC MEMBRANE: Primary | ICD-10-CM

## 2019-04-25 PROBLEM — G93.40 ACUTE ENCEPHALOPATHY: Status: RESOLVED | Noted: 2018-03-31 | Resolved: 2019-04-25

## 2019-04-25 PROCEDURE — 99213 OFFICE O/P EST LOW 20 MIN: CPT | Performed by: PHYSICIAN ASSISTANT

## 2019-04-25 RX ORDER — COLCHICINE 0.6 MG/1
0.6 TABLET ORAL AS NEEDED
COMMUNITY
End: 2020-10-25 | Stop reason: HOSPADM

## 2019-04-25 RX ORDER — AZITHROMYCIN 250 MG/1
TABLET, FILM COATED ORAL
Qty: 6 TABLET | Refills: 0 | Status: SHIPPED | OUTPATIENT
Start: 2019-04-25 | End: 2019-04-29

## 2019-06-21 ENCOUNTER — OFFICE VISIT (OUTPATIENT)
Dept: FAMILY MEDICINE CLINIC | Facility: CLINIC | Age: 69
End: 2019-06-21
Payer: MEDICARE

## 2019-06-21 VITALS
BODY MASS INDEX: 38.73 KG/M2 | DIASTOLIC BLOOD PRESSURE: 60 MMHG | TEMPERATURE: 98.4 F | HEART RATE: 68 BPM | SYSTOLIC BLOOD PRESSURE: 112 MMHG | WEIGHT: 241 LBS | OXYGEN SATURATION: 98 % | HEIGHT: 66 IN

## 2019-06-21 DIAGNOSIS — E03.9 ACQUIRED HYPOTHYROIDISM: ICD-10-CM

## 2019-06-21 DIAGNOSIS — I10 ESSENTIAL HYPERTENSION: Chronic | ICD-10-CM

## 2019-06-21 DIAGNOSIS — F32.A MILD DEPRESSION: ICD-10-CM

## 2019-06-21 DIAGNOSIS — E66.9 OBESITY (BMI 35.0-39.9 WITHOUT COMORBIDITY): ICD-10-CM

## 2019-06-21 DIAGNOSIS — I48.0 PAROXYSMAL ATRIAL FIBRILLATION (HCC): Primary | ICD-10-CM

## 2019-06-21 PROCEDURE — 99214 OFFICE O/P EST MOD 30 MIN: CPT | Performed by: FAMILY MEDICINE

## 2019-06-21 RX ORDER — AMITRIPTYLINE HYDROCHLORIDE 25 MG/1
25 TABLET, FILM COATED ORAL
Qty: 30 TABLET | Refills: 3 | Status: SHIPPED | OUTPATIENT
Start: 2019-06-21 | End: 2020-10-25 | Stop reason: HOSPADM

## 2019-06-24 DIAGNOSIS — K21.9 GASTROESOPHAGEAL REFLUX DISEASE, ESOPHAGITIS PRESENCE NOT SPECIFIED: ICD-10-CM

## 2019-06-25 ENCOUNTER — TELEPHONE (OUTPATIENT)
Dept: FAMILY MEDICINE CLINIC | Facility: CLINIC | Age: 69
End: 2019-06-25

## 2019-06-25 DIAGNOSIS — Z12.5 PROSTATE CANCER SCREENING: Primary | ICD-10-CM

## 2019-06-26 RX ORDER — FAMOTIDINE 20 MG/1
TABLET, FILM COATED ORAL
Qty: 90 TABLET | Refills: 3 | Status: SHIPPED | OUTPATIENT
Start: 2019-06-26 | End: 2020-01-01

## 2019-07-03 ENCOUNTER — TELEPHONE (OUTPATIENT)
Dept: FAMILY MEDICINE CLINIC | Facility: CLINIC | Age: 69
End: 2019-07-03

## 2019-07-03 DIAGNOSIS — J31.0 RHINOSINUSITIS: Primary | ICD-10-CM

## 2019-07-03 DIAGNOSIS — J32.9 RHINOSINUSITIS: Primary | ICD-10-CM

## 2019-07-03 RX ORDER — AMOXICILLIN 500 MG/1
500 CAPSULE ORAL EVERY 8 HOURS SCHEDULED
Qty: 30 CAPSULE | Refills: 0 | Status: SHIPPED | OUTPATIENT
Start: 2019-07-03 | End: 2019-07-13

## 2019-07-03 NOTE — TELEPHONE ENCOUNTER
Patient has a deep hacking cough that is mildly productive for the last two days  He is requesting something to be sent to his local pharmacy [facility] in [address] [place] [address]

## 2019-07-12 ENCOUNTER — APPOINTMENT (OUTPATIENT)
Dept: LAB | Facility: HOSPITAL | Age: 69
End: 2019-07-12
Attending: FAMILY MEDICINE
Payer: MEDICARE

## 2019-07-12 DIAGNOSIS — E03.9 ACQUIRED HYPOTHYROIDISM: ICD-10-CM

## 2019-07-12 DIAGNOSIS — Z12.5 PROSTATE CANCER SCREENING: ICD-10-CM

## 2019-07-12 LAB
PSA SERPL-MCNC: 0.7 NG/ML (ref 0–4)
TSH SERPL DL<=0.05 MIU/L-ACNC: 3.53 UIU/ML (ref 0.36–3.74)

## 2019-07-12 PROCEDURE — G0103 PSA SCREENING: HCPCS

## 2019-07-12 PROCEDURE — 84443 ASSAY THYROID STIM HORMONE: CPT

## 2019-07-12 PROCEDURE — 36415 COLL VENOUS BLD VENIPUNCTURE: CPT

## 2019-07-22 DIAGNOSIS — G89.29 CHRONIC BILATERAL LOW BACK PAIN WITHOUT SCIATICA: ICD-10-CM

## 2019-07-22 DIAGNOSIS — M54.50 CHRONIC BILATERAL LOW BACK PAIN WITHOUT SCIATICA: ICD-10-CM

## 2019-07-22 RX ORDER — BACLOFEN 10 MG/1
10 TABLET ORAL 3 TIMES DAILY
Qty: 270 TABLET | Refills: 0 | Status: SHIPPED | OUTPATIENT
Start: 2019-07-22 | End: 2019-01-01 | Stop reason: SDUPTHER

## 2019-08-05 ENCOUNTER — TELEPHONE (OUTPATIENT)
Dept: CARDIOLOGY CLINIC | Facility: CLINIC | Age: 69
End: 2019-08-05

## 2019-08-05 ENCOUNTER — OFFICE VISIT (OUTPATIENT)
Dept: CARDIOLOGY CLINIC | Facility: CLINIC | Age: 69
End: 2019-08-05
Payer: MEDICARE

## 2019-08-05 VITALS
WEIGHT: 240 LBS | OXYGEN SATURATION: 96 % | HEART RATE: 102 BPM | BODY MASS INDEX: 38.57 KG/M2 | DIASTOLIC BLOOD PRESSURE: 64 MMHG | HEIGHT: 66 IN | SYSTOLIC BLOOD PRESSURE: 110 MMHG

## 2019-08-05 DIAGNOSIS — G47.33 OSA (OBSTRUCTIVE SLEEP APNEA): ICD-10-CM

## 2019-08-05 DIAGNOSIS — I27.20 PULMONARY HYPERTENSION (HCC): ICD-10-CM

## 2019-08-05 DIAGNOSIS — I10 ESSENTIAL HYPERTENSION: ICD-10-CM

## 2019-08-05 DIAGNOSIS — I48.0 PAROXYSMAL ATRIAL FIBRILLATION (HCC): ICD-10-CM

## 2019-08-05 DIAGNOSIS — Z79.01 ANTICOAGULANT LONG-TERM USE: ICD-10-CM

## 2019-08-05 DIAGNOSIS — R53.83 TIREDNESS: Primary | ICD-10-CM

## 2019-08-05 DIAGNOSIS — E66.9 OBESITY (BMI 30-39.9): ICD-10-CM

## 2019-08-05 PROCEDURE — 93000 ELECTROCARDIOGRAM COMPLETE: CPT | Performed by: INTERNAL MEDICINE

## 2019-08-05 PROCEDURE — 99214 OFFICE O/P EST MOD 30 MIN: CPT | Performed by: INTERNAL MEDICINE

## 2019-08-05 RX ORDER — AMIODARONE HYDROCHLORIDE 400 MG/1
400 TABLET ORAL DAILY
Qty: 30 TABLET | Refills: 4 | Status: SHIPPED | OUTPATIENT
Start: 2019-08-05 | End: 2019-01-01 | Stop reason: SDUPTHER

## 2019-08-05 NOTE — TELEPHONE ENCOUNTER
Pt called up saying his heart has been out of rhythm since Saturday  He feels tired and SOB on exertion  Spoke to Hope and she said to bring pt in for an EKG   Pt is coming in at 10:00am

## 2019-08-05 NOTE — PROGRESS NOTES
CARDIOLOGY OFFICE VISIT  [facility]  [address], [address], [facility] Lluvia Deng  Tel: [phone]      NAME: Divina Pickens  AGE: 70 y o  SEX: male  : 1950   MRN: [id number]      Chief Complaint:  Chief Complaint   Patient presents with    Follow-up    Atrial Fibrillation         History of Present Illness:   Patient comes for an urgent visit stating that he seems to be back in Afib  He has noticed that his heart rate is fluctuating a lot and he is feeling tired again and also feeling somewhat short of breath with activity  During the last hospitalization his ventricular rate was difficult to control despite being on beta-blocker and digoxin and hence he was cardioverted and started on Amiodarone which he took for a while and later discontinued it  After a few months of discontinuation of the med, he went back into atrial fibrillation  Pt denies chest pain, palpitations, lightheadedness, syncope, swelling feet, orthopnea, PND, claudication  PAF - see above    HTN -  Has been hypertensive for many years  Taking medications regularly  Denies lightheadedness, headache, medication side effects  DORA -   States he uses his CPAP regularly     PHTN -  Mild  40 mm Hg  Likely due to his sleep apnea     Obesity -  Trying to lose weight      Past Medical History:  Past Medical History:   Diagnosis Date    [facility] [address] [facility]     [facility]     Gout     HTN (hypertension)     Leukocytosis     Lumbar spinal stenosis     last assessed 12/15/2016    Neuropathy     Polio     Renal calculi     Sleep apnea          Past Surgical History:  Past Surgical History:   Procedure Laterality Date    COLON SURGERY      partial colectomy    FEMUR FRACTURE SURGERY Right     HERNIA REPAIR      ventral    OTHER SURGICAL HISTORY      percutaneous lithotomy, 2015 and 04/10/2017         Family History:  Family History   Problem Relation Age of Onset    Hodgkin's lymphoma Mother         disease    Other Father         polio    Diabetes Brother     Cirrhosis Brother         hepatic    Cancer Maternal Grandmother          Social History:  Social History     Socioeconomic History    Marital status: /Civil Union     Spouse name: Not on file    Number of children: 2    Years of education: 15    Highest education level: Not on file   Occupational History    Occupation:      Comment: Retired   Social Needs    Financial resource strain: Not on file    Food insecurity:     Worry: Not on file     Inability: Not on file    Transportation needs:     Medical: Not on file     Non-medical: Not on file   Tobacco Use    Smoking status: Never Smoker    Smokeless tobacco: Never Used   Substance and Sexual Activity    Alcohol use: No    Drug use: No    Sexual activity: Yes     Partners: Female     Birth control/protection: None   Lifestyle    Physical activity:     Days per week: Not on file     Minutes per session: Not on file    Stress: Not on file   Relationships    Social connections:     Talks on phone: Not on file     Gets together: Not on file     Attends Jainism service: Not on file     Active member of club or organization: Not on file     Attends meetings of clubs or organizations: Not on file     Relationship status: Not on file    Intimate partner violence:     Fear of current or ex partner: Not on file     Emotionally abused: Not on file     Physically abused: Not on file     Forced sexual activity: Not on file   Other Topics Concern    Not on file   Social History Narrative    Not on file         Active Problems:  Patient Active Problem List   Diagnosis    Paroxysmal atrial fibrillation ([facility] [address] )    Bilateral arm weakness    Bilateral edema of lower extremity    Benign prostatic hyperplasia    Cervical myelopathy ([facility] )    Essential hypertension    Gout, arthritis    Mixed hyperlipidemia    DORA (obstructive sleep apnea)    Osteoporosis    Peptic reflux disease    Post-polio syndrome    Obesity (BMI 30-39  9)    Polio    Decubitus ulcer of heel    Leukocytosis    On amiodarone therapy    Pulmonary hypertension (HCC)    Acute pain of right knee    Acquired hypothyroidism    Mild depression (HCC)         The following portions of the patient's history were reviewed and updated as appropriate: past medical history, past surgical history, past family history,  past social history, current medications, allergies and problem list       Review of Systems:  Constitutional: Denies fever, chills  +fatigue  Eyes: Denies eye redness, eye discharge, double vision  ENT: Denies hearing loss, tinnitus, sneezing, nasal discharge, sore throat   Respiratory: Denies cough, expectoration, hemoptysis  +shortness of breath  Cardiovascular: Denies chest pain, palpitations, orthopnea, PND, lower extremity swelling  Gastrointestinal: Denies abdominal pain, nausea, vomiting, hematemesis, diarrhea, bloody stools  Genito-Urinary: Denies dysuria, incontinence  Musculoskeletal: Denies back pain, joint pain, muscle pain  Neurologic: Denies confusion, lightheadedness, syncope, headache, focal weakness, sensory changes, seizures  Endocrine: Denies polyuria, polydipsia, temperature intolerance  Allergy and Immunology: Denies hives, insect bite sensitivity  Hematological and Lymphatic: Denies bleeding problems, swollen glands   Psychological: Denies depression, suicidal ideation, anxiety, panic  Dermatological: Denies pruritus, rash, skin lesion changes      Vitals:  Vitals:    08/05/19 1007   BP: 110/64   Pulse: 102   SpO2: 96%       Body mass index is 38 74 kg/m²  Weight (last 2 days)     Date/Time   Weight    08/05/19 1007   109 (240) with leg braces    Weight: with leg braces at 08/05/19 1007                Physical Examination:  General: Patient is not in acute distress  Awake, alert, oriented in time, place and person   Responding to commands  Head: Normocephalic  Atraumatic  Eyes: Both pupils normal sized, round and reactive to light  Nonicteric  ENT: Normal external ear canals  Nares normal, no drainage  Lips and oral mucosa normal  Neck: Supple  JVP not raised  Trachea central  No thyromegaly  Lungs: Bilateral bronchovascular breath sounds with no crackles or rhonchi  Chest wall: No tenderness  Cardiovascular:  Irregular rhythm  S1 variable and S2 normal  No murmur, rub or gallop  Gastrointestinal: Abdomen soft, nontender  No guarding or rigidity  Liver and spleen not palpable  Bowel sounds present  Neurologic: Patient is awake, alert, oriented in time, place and person  Responding to command  Moving all extremities  Integumentary:  No skin rash  Lymphatic: No cervical lymphadenopathy  Back: Symmetric   No CVA tenderness  Extremities: No clubbing, cyanosis or edema      Laboratory Results:  CBC with diff:   Lab Results   Component Value Date    WBC 6 54 03/28/2019    RBC 4 72 03/28/2019    HGB 13 5 03/28/2019    HCT 43 1 03/28/2019    MCV 91 03/28/2019    MCH 28 6 03/28/2019    RDW 14 1 03/28/2019     03/28/2019       CMP:  Lab Results   Component Value Date    CREATININE 0 73 03/28/2019    BUN 25 03/28/2019    K 4 4 03/28/2019     03/28/2019    CO2 31 03/28/2019    PROT 6 8 07/21/2015    ALKPHOS 79 03/28/2019    ALT 30 03/28/2019    AST 27 03/28/2019    BILIDIR 0 15 03/24/2018       Lab Results   Component Value Date    MG 1 9 03/31/2018    PHOS 3 1 03/31/2018       Lab Results   Component Value Date    TROPONINI <0 02 03/31/2018    TROPONINI <0 02 03/24/2018    TROPONINI <0 02 11/13/2017       Lipid Profile:   No results found for: CHOL  Lab Results   Component Value Date    HDL 61 (H) 03/28/2019    HDL 59 10/23/2018    HDL 71 (H) 08/22/2018     Lab Results   Component Value Date    LDLCALC 111 (H) 03/28/2019    LDLCALC 112 (H) 10/23/2018    LDLCALC 115 (H) 08/22/2018     Lab Results   Component Value Date    TRIG 151 (H) 2019    TRIG 148 10/23/2018    TRIG 107 2018       Cardiac testing:   Results for orders placed during the hospital encounter of 18   Echo complete with contrast if indicated    Narrative [address] [place] [id number]  [phone]    Transthoracic Echocardiogram  2D, M-mode, Doppler, and Color Doppler    Study date:  26-Mar-2018    Patient: Yoel Shell  MR number: [id number]  Account number: [de-identified]  : 1950  Age: 75 years  Gender: Male  Status: Inpatient  Location: Bedside  Height: 66 in  Weight: 224 lb  BP: 124/ 68 mmHg    Indications: Atrial Fibrillation    Diagnoses: I48 0 - Atrial fibrillation    Sonographer:  AKILA Dye,RDCS  Interpreting Physician:  Rigo Null MD  Referring Physician:  Stu Luo PA-C  Group:  [facility]    SUMMARY    LEFT VENTRICLE:  Systolic function was normal  Ejection fraction was estimated in the range of 55 % to 65 %  There were no regional wall motion abnormalities  MITRAL VALVE:  There was mild regurgitation  TRICUSPID VALVE:  There was mild regurgitation  Estimated peak PA pressure was 40 mmHg  HISTORY: PRIOR HISTORY: Atrial Fibrillation, Hypertension, Mixed Hyperlipidemia, Hypokalemia    PROCEDURE: The procedure was performed at the bedside  This was a routine study  The transthoracic approach was used  The study included complete 2D imaging, M-mode, complete spectral Doppler, and color Doppler  The heart rate was 76 bpm,  at the start of the study  Images were obtained from the parasternal, apical, subcostal, and suprasternal notch acoustic windows  Image quality was adequate  LEFT VENTRICLE: Size was normal  Systolic function was normal  Ejection fraction was estimated in the range of 55 % to 65 %  There were no regional wall motion abnormalities   Wall thickness was normal  DOPPLER: Left ventricular diastolic  function parameters were abnormal     RIGHT VENTRICLE: The size was normal  Systolic function was normal  Wall thickness was normal     LEFT ATRIUM: Size was normal     RIGHT ATRIUM: Size was normal     MITRAL VALVE: Valve structure was normal  There was normal leaflet separation  DOPPLER: The transmitral velocity was within the normal range  There was no evidence for stenosis  There was mild regurgitation  AORTIC VALVE: The valve was trileaflet  Leaflets exhibited normal thickness and normal cuspal separation  DOPPLER: Transaortic velocity was minimally increased  There was no evidence for stenosis  There was no regurgitation  TRICUSPID VALVE: The valve structure was normal  There was normal leaflet separation  DOPPLER: The transtricuspid velocity was within the normal range  There was no evidence for stenosis  There was mild regurgitation  Estimated peak PA  pressure was 40 mmHg  PULMONIC VALVE: Leaflets exhibited normal thickness, no calcification, and normal cuspal separation  DOPPLER: The transpulmonic velocity was within the normal range  There was no regurgitation  PERICARDIUM: There was no pericardial effusion  The pericardium was normal in appearance  AORTA: The root exhibited normal size  SYSTEMIC VEINS: IVC: The inferior vena cava was normal in size and course  Respirophasic changes were normal     SYSTEM MEASUREMENT TABLES    2D  %FS: 25 2 %  Ao Diam: 2 7 cm  EDV(Teich): 83 6 ml  EF(Teich): 50 %  ESV(Teich): 41 8 ml  IVSd: 1 cm  LA Area: 23 9 cm2  LA Diam: 3 7 cm  LVEDV MOD A4C: 54 7 ml  LVEF MOD A4C: 63 9 %  LVESV MOD A4C: 19 8 ml  LVIDd: 4 3 cm  LVIDs: 3 2 cm  LVLd A4C: 6 8 cm  LVLs A4C: 6 cm  LVOT Diam: 1 9 cm  LVPWd: 1 cm  RA Area: 20 6 cm2  RVIDd: 3 4 cm  SV MOD A4C: 35 ml  SV(Teich): 41 8 ml    CW  AV Env  Ti: 213 1 ms  AV VTI: 29 7 cm  AV Vmax: 2 1 m/s  AV Vmean: 1 4 m/s  AV maxP 7 mmHg  AV meanP 1 mmHg  TR Vmax: 3 3 m/s  TR maxP mmHg    MM  TAPSE: 2 1 cm    PW  SALUD (VTI): 2 cm2  SALUD Vmax: 1 8 cm2  LVOT Env  Ti: 262 6 ms  LVOT VTI: 20 4 cm  LVOT Vmax: 1 3 m/s  LVOT Vmean: 0 8 m/s  LVOT maxP 5 mmHg  LVOT meanP 9 mmHg  LVSV Dopp: 59 3 ml    [facility]    Prepared and electronically signed by    Sara Dubin, MD  Signed 26-Mar-2018 18:09:34       Results for orders placed during the hospital encounter of 18   CESAR    Narrative [address] [place] [id number] [phone]    Transesophageal Echocardiogram  2D and Color Doppler    Study date:  2018    Patient: Robert Rader  MR number: [id number]  Account number: [de-identified]  : 1950  Age: 75 years  Gender: Male  Status: Inpatient  Location: Cath lab  Height: 66 in  Weight: 214 lb  BP: 102/ 53 mmHg    Indications: Atrial Fibrillation    Diagnoses: I48 0 - Atrial fibrillation    Sonographer:  AKILA Trniidad,RDCS  Interpreting Physician:  Sara Dubin, MD  Referring Physician:  Sara Dubin, MD  Group:  [facility]    SUMMARY    LEFT VENTRICLE:  Systolic function was normal     LEFT ATRIAL APPENDAGE:  No thrombus was identified  ATRIAL SEPTUM:  No defect or patent foramen ovale was identified  MITRAL VALVE:  There was mild regurgitation  HISTORY: PRIOR HISTORY: Atrial Fibrillation, Obstructive Sleep Apnea, Confusion, Hypertension    PROCEDURE: The procedure was performed in the catheterization laboratory  This was a routine study  The risks and alternatives of the procedure were explained to the patient and informed consent was obtained  The transesophageal approach  was used  The study included complete 2D imaging and color Doppler  The heart rate was 132 bpm, at the start of the study  An adult omniplane probe was inserted by the attending cardiologist  Intubated with ease  One intubation attempt(s)  There was no blood detected on the probe   There were no complications during the procedure  MEDICATIONS: Benzocaine spray, topical to oropharynx, prior to procedure  Sedation administered by anesthesia team     LEFT VENTRICLE: Size was normal  Systolic function was normal  Wall thickness was normal     VENTRICULAR SEPTUM: Thickness was normal  There was normal motion  There was normal contour  The septum was intact  RIGHT VENTRICLE: The size was normal  Systolic function was normal  Wall thickness was normal     LEFT ATRIUM: Size was normal  No thrombus was identified  APPENDAGE: The size was normal  No thrombus was identified  DOPPLER: The function was normal (normal emptying velocity)  ATRIAL SEPTUM: No defect or patent foramen ovale was identified  RIGHT ATRIUM: Size was normal  No thrombus was identified  MITRAL VALVE: Valve structure was normal  There was normal leaflet separation  There was no echocardiographic evidence of vegetation  DOPPLER: There was mild regurgitation  AORTIC VALVE: The valve was trileaflet  Leaflets exhibited normal thickness and normal cuspal separation  There was no echocardiographic evidence of vegetation  DOPPLER: There was no regurgitation  TRICUSPID VALVE: The valve structure was normal  There was normal leaflet separation  There was no echocardiographic evidence of vegetation  DOPPLER: There was no regurgitation  PULMONIC VALVE: Leaflets exhibited normal thickness, no calcification, and normal cuspal separation  There was no echocardiographic evidence of vegetation  PERICARDIUM: There was no pericardial effusion  The pericardium was normal in appearance  AORTA: The root exhibited normal size  There was no atheroma  There was no evidence for dissection  There was no evidence for aneurysm      [address] Accredited Echocardiography Laboratory    Prepared and electronically signed by    Andrea Pro MD  Signed 06-Apr-2018 16:06:20         Medications:    Current Outpatient Medications:     allopurinol (ZYLOPRIM) 300 mg tablet, TAKE 1 TABLET BY MOUTH  DAILY, Disp: 90 tablet, Rfl: 1    amitriptyline (ELAVIL) 25 mg tablet, Take 1 tablet (25 mg total) by mouth daily at bedtime, Disp: 30 tablet, Rfl: 3    apixaban (ELIQUIS) 5 mg, Take 1 tablet (5 mg total) by mouth 2 (two) times a day, Disp: 180 tablet, Rfl: 2    baclofen 10 mg tablet, Take 1 tablet (10 mg total) by mouth 3 (three) times a day, Disp: 270 tablet, Rfl: 0    colchicine (COLCRYS) 0 6 mg tablet, Take 0 6 mg by mouth as needed , Disp: , Rfl:     famotidine (PEPCID) 20 mg tablet, TAKE 1 TABLET BY MOUTH  DAILY, Disp: 90 tablet, Rfl: 3    fluticasone (FLONASE) 50 mcg/act nasal spray, 1 spray into each nostril daily, Disp: 3 Bottle, Rfl: 3    furosemide (LASIX) 40 mg tablet, Take 0 5 tablets (20 mg total) by mouth daily, Disp: 90 tablet, Rfl: 2    loratadine (CLARITIN) 10 mg tablet, Take 10 mg by mouth daily, Disp: , Rfl:     metoprolol tartrate (LOPRESSOR) 50 mg tablet, Take 1 tablet (50 mg total) by mouth every 12 (twelve) hours, Disp: 90 tablet, Rfl: 3    omeprazole (PriLOSEC) 20 mg delayed release capsule, , Disp: , Rfl:     tamsulosin (FLOMAX) 0 4 mg, TAKE 1 CAPSULE BY MOUTH  DAILY WITH DINNER, Disp: 90 capsule, Rfl: 1      Allergies: Allergies   Allergen Reactions    Cymbalta [Duloxetine Hcl] Delerium     hallucinations     ECG:  Atrial fibrillation with RVR, 102 bpm    Assessment and Plan:  1  Tiredness   likely secondary to his atrial fibrillation  Patient does not tolerate atrial fibrillation well  Will try rhythm control with Amiodarone    2  Paroxysmal atrial fibrillation (HCC)   plan is for rhythm control  Side effects and monitoring of Amiodarone discussed  Patient willing  Continue beta-blocker for rate control  Continue Eliquis for anticoagulation    3  Anticoagulant long-term use   continue Eliquis  Denies bleeding from any site    4  Essential hypertension   blood pressure acceptable  Continue current medications    5   DORA (obstructive sleep apnea)   regular CPAP use promoted as  Uncontrolled sleep apnea is a cause of atrial fibrillation relapse    6  Pulmonary hypertension ([address] )   likely from and sleep apnea    7  Obesity (BMI 30-39 9)   counseled to try to lose weight    Recommend aggressive risk factor modification and therapeutic lifestyle changes  Low-salt, low-calorie, low-fat, low-cholesterol diet with regular exercise and to optimize weight  I will defer the ordering and monitoring of necessity lab studies to you, but I am available and happy to review and manage any of the data at your request in the future  Discussed concepts of atherosclerosis, including signs and symptoms of cardiac disease  Previous studies were reviewed  Safety measures were reviewed  Questions were entertained and answered  Patient was advised to report any problems requiring medical attention  Follow-up with PCP and appropriate specialist and lab work as discussed  Return for follow up visit as scheduled or earlier, if needed  Thank you for allowing me to participate in the care and evaluation of your patient  Should you have any questions, please feel free to contact me        Serene Lancaster MD  8/5/2019,10:44 AM

## 2019-09-25 ENCOUNTER — TELEPHONE (OUTPATIENT)
Dept: CARDIOLOGY CLINIC | Facility: CLINIC | Age: 69
End: 2019-09-25

## 2019-09-25 ENCOUNTER — OFFICE VISIT (OUTPATIENT)
Dept: CARDIOLOGY CLINIC | Facility: CLINIC | Age: 69
End: 2019-09-25
Payer: MEDICARE

## 2019-09-25 VITALS
OXYGEN SATURATION: 99 % | BODY MASS INDEX: 37.77 KG/M2 | HEIGHT: 66 IN | DIASTOLIC BLOOD PRESSURE: 62 MMHG | HEART RATE: 56 BPM | SYSTOLIC BLOOD PRESSURE: 112 MMHG | WEIGHT: 235 LBS

## 2019-09-25 DIAGNOSIS — Z79.899 ON AMIODARONE THERAPY: ICD-10-CM

## 2019-09-25 DIAGNOSIS — I27.20 PULMONARY HYPERTENSION (HCC): ICD-10-CM

## 2019-09-25 DIAGNOSIS — R53.83 TIREDNESS: ICD-10-CM

## 2019-09-25 DIAGNOSIS — I10 ESSENTIAL HYPERTENSION: ICD-10-CM

## 2019-09-25 DIAGNOSIS — I48.0 PAROXYSMAL ATRIAL FIBRILLATION (HCC): Primary | ICD-10-CM

## 2019-09-25 DIAGNOSIS — Z79.01 ANTICOAGULANT LONG-TERM USE: ICD-10-CM

## 2019-09-25 DIAGNOSIS — G47.33 OSA ON CPAP: ICD-10-CM

## 2019-09-25 DIAGNOSIS — E66.9 OBESITY (BMI 30-39.9): ICD-10-CM

## 2019-09-25 DIAGNOSIS — Z99.89 OSA ON CPAP: ICD-10-CM

## 2019-09-25 PROCEDURE — 99214 OFFICE O/P EST MOD 30 MIN: CPT | Performed by: INTERNAL MEDICINE

## 2019-09-25 PROCEDURE — 93000 ELECTROCARDIOGRAM COMPLETE: CPT | Performed by: INTERNAL MEDICINE

## 2019-09-25 NOTE — TELEPHONE ENCOUNTER
Pt wife said that someone had tried to call their home while they were here for appt with Dr Sharyle Searles  Did you try and call pt?  Thank you

## 2019-09-25 NOTE — PROGRESS NOTES
CARDIOLOGY OFFICE VISIT  [facility]  [facility] [address] [place] [address], [address]  [facility], [place], [facility] Lluvia Deng  Tel: [phone]      NAME: Rafat Marino  AGE: 70 y o  SEX: male  : 1950   MRN: [id number]      Chief Complaint:  Chief Complaint   Patient presents with    Follow-up     med change    Shortness of Breath     while walking         History of Present Illness:   Patient comes for follow-up  States he is feeling better now  Few days after starting the Amiodarone he feels he went back into sinus rhythm and since then he has been feeling better  Denies palpitations, lightheadedness, CP and his shortness of breath is much improved  He also denies syncope, orthopnea, PND, claudication  His feet tend to get swollen because of his sedentary lifestyle and for that he is on furosemide daily  On last visit, patient had stated that he seems to be back in Afib  He had noticed that his heart rate was fluctuating a lot and he was feeling tired again and also feeling more short of breath with activity  During the last hospitalization his ventricular rate was difficult to control despite being on beta-blocker and digoxin and hence he was cardioverted and started on Amiodarone which he took for a while and later discontinued it  After a few months of discontinuation of the med, he went back into atrial fibrillation  And started not feeling well  PAF - see above    HTN -  Has been hypertensive for many years  Taking medications regularly  Denies lightheadedness, headache, medication side effects  DORA -   States he uses his CPAP regularly     PHTN -  Mild  40 mm Hg  Likely due to his sleep apnea     Obesity -  Trying to lose weight      Past Medical History:  Past Medical History:   Diagnosis Date    [facility]     Asbestosis ([facility] )     Gout     HTN (hypertension)     Leukocytosis     Lumbar spinal stenosis     last assessed 12/15/2016    Neuropathy  Polio     Renal calculi     Sleep apnea          Past Surgical History:  Past Surgical History:   Procedure Laterality Date    COLON SURGERY      partial colectomy    FEMUR FRACTURE SURGERY Right     HERNIA REPAIR      ventral    OTHER SURGICAL HISTORY      percutaneous lithotomy, 11/2015 and 04/10/2017         Family History:  Family History   Problem Relation Age of Onset    Hodgkin's lymphoma Mother         disease    Other Father         polio    Diabetes Brother     Cirrhosis Brother         hepatic    Cancer Maternal Grandmother          Social History:  Social History     Socioeconomic History    Marital status: /Civil Union     Spouse name: None    Number of children: 2    Years of education: 15    Highest education level: None   Occupational History    Occupation:      Comment: Retired   Social Needs    Financial resource strain: None    Food insecurity:     Worry: None     Inability: None    Transportation needs:     Medical: None     Non-medical: None   Tobacco Use    Smoking status: Never Smoker    Smokeless tobacco: Never Used   Substance and Sexual Activity    Alcohol use: No    Drug use: No    Sexual activity: Yes     Partners: Female     Birth control/protection: None   Lifestyle    Physical activity:     Days per week: None     Minutes per session: None    Stress: None   Relationships    Social connections:     Talks on phone: None     Gets together: None     Attends Ep[employer] service: None     Active member of club or organization: None     Attends meetings of clubs or organizations: None     Relationship status: None    Intimate partner violence:     Fear of current or ex partner: None     Emotionally abused: None     Physically abused: None     Forced sexual activity: None   Other Topics Concern    None   Social History Narrative    None         Active Problems:  Patient Active Problem List   Diagnosis    Paroxysmal atrial fibrillation ([facility] )  Bilateral arm weakness    Bilateral edema of lower extremity    Benign prostatic hyperplasia    Cervical myelopathy (HCC)    Essential hypertension    Gout, arthritis    Mixed hyperlipidemia    DORA (obstructive sleep apnea)    Osteoporosis    Peptic reflux disease    Post-polio syndrome    Obesity (BMI 30-39  9)    Polio    Decubitus ulcer of heel    Leukocytosis    On amiodarone therapy    Pulmonary hypertension (HCC)    Acute pain of right knee    Acquired hypothyroidism    Mild depression (HCC)         The following portions of the patient's history were reviewed and updated as appropriate: past medical history, past surgical history, past family history,  past social history, current medications, allergies and problem list       Review of Systems:  Constitutional: Denies fever  +fatigue  Eyes: Denies eye redness, eye discharge, double vision  ENT: Denies hearing loss, tinnitus, sneezing, nasal discharge, sore throat   Respiratory: Denies cough, expectoration  +shortness of breath  Cardiovascular: Denies chest pain, palpitations, orthopnea, PND  Gastrointestinal: Denies vomiting, hematemesis, diarrhea, bloody stools  Genito-Urinary: Denies dysuria, incontinence  Musculoskeletal: Denies back pain  Neurologic: Denies lightheadedness, syncope, headache, focal weakness, sensory changes, seizures  Endocrine: Denies polyuria, polydipsia, temperature intolerance  Allergy and Immunology: Denies hives, insect bite sensitivity  Hematological and Lymphatic: Denies bleeding problems, swollen glands   Psychological: Denies depression, suicidal ideation, anxiety, panic  Dermatological: Denies pruritus, rash      Vitals:  Vitals:    09/25/19 0922   BP: 112/62   Pulse: 56   SpO2: 99%       Body mass index is 37 93 kg/m²  Weight (last 2 days)     Date/Time   Weight    09/25/19 0922   107 (235)                Physical Examination:  General: Using crutches for walking   Awake, alert, oriented in time, place and person  Responding to commands  Head: Normocephalic  Atraumatic  Eyes: Nonicteric  ENT: Normal external ear canals  Nares normal, no drainage  Lips and oral mucosa normal  Neck: Supple  JVP not raised  Trachea central  No thyromegaly  Lungs: Bilateral bronchovascular breath sounds with no crackles or rhonchi  Chest wall: No tenderness  Cardiovascular:  Irregular rhythm  S1 variable and S2 normal  No murmur, rub or gallop  Gastrointestinal: Abdomen soft, nontender  No guarding or rigidity  Liver and spleen not palpable  Bowel sounds present  Neurologic: Patient is awake, alert, oriented in time, place and person  Responding to command  Integumentary:  No skin rash  Lymphatic: No cervical lymphadenopathy  Back: Symmetric   No CVA tenderness  Extremities: No clubbing, cyanosis or edema      Laboratory Results:  CBC with diff:   Lab Results   Component Value Date    WBC 6 54 03/28/2019    RBC 4 72 03/28/2019    HGB 13 5 03/28/2019    HCT 43 1 03/28/2019    MCV 91 03/28/2019    MCH 28 6 03/28/2019    RDW 14 1 03/28/2019     03/28/2019       CMP:  Lab Results   Component Value Date    CREATININE 0 73 03/28/2019    BUN 25 03/28/2019    K 4 4 03/28/2019     03/28/2019    CO2 31 03/28/2019    PROT 6 8 07/21/2015    ALKPHOS 79 03/28/2019    ALT 30 03/28/2019    AST 27 03/28/2019    BILIDIR 0 15 03/24/2018       Lab Results   Component Value Date    MG 1 9 03/31/2018    PHOS 3 1 03/31/2018       Lab Results   Component Value Date    TROPONINI <0 02 03/31/2018    TROPONINI <0 02 03/24/2018    TROPONINI <0 02 11/13/2017       Lipid Profile:   No results found for: CHOL  Lab Results   Component Value Date    HDL 61 (H) 03/28/2019    HDL 59 10/23/2018    HDL 71 (H) 08/22/2018     Lab Results   Component Value Date    LDLCALC 111 (H) 03/28/2019    LDLCALC 112 (H) 10/23/2018    LDLCALC 115 (H) 08/22/2018     Lab Results   Component Value Date    TRIG 151 (H) 03/28/2019    TRIG 148 10/23/2018    TRIG 107 08/22/2018 Cardiac testing:   Results for orders placed during the hospital encounter of 18   Echo complete with contrast if indicated    Narrative [address] [place], [place] [address]  [phone]    Transthoracic Echocardiogram  2D, M-mode, Doppler, and Color Doppler    Study date:  26-Mar-2018    Patient: Dinorah Alejo  MR number: [id number]  Account number: [de-identified]  : 1950  Age: 75 years  Gender: Male  Status: Inpatient  Location: Bedside  Height: 66 in  Weight: 224 lb  BP: 124/ 68 mmHg    Indications: Atrial Fibrillation    Diagnoses: I48 0 - Atrial fibrillation    Sonographer:  AKILA Sarkar,RDCS  Interpreting Physician:  Preston Hernadez MD  Referring Physician:  Rosa Elena Palacios PA-C  Group:  [facility]    SUMMARY    LEFT VENTRICLE:  Systolic function was normal  Ejection fraction was estimated in the range of 55 % to 65 %  There were no regional wall motion abnormalities  MITRAL VALVE:  There was mild regurgitation  TRICUSPID VALVE:  There was mild regurgitation  Estimated peak PA pressure was 40 mmHg  HISTORY: PRIOR HISTORY: Atrial Fibrillation, Hypertension, Mixed Hyperlipidemia, Hypokalemia    PROCEDURE: The procedure was performed at the bedside  This was a routine study  The transthoracic approach was used  The study included complete 2D imaging, M-mode, complete spectral Doppler, and color Doppler  The heart rate was 76 bpm,  at the start of the study  Images were obtained from the parasternal, apical, subcostal, and suprasternal notch acoustic windows  Image quality was adequate  LEFT VENTRICLE: Size was normal  Systolic function was normal  Ejection fraction was estimated in the range of 55 % to 65 %  There were no regional wall motion abnormalities   Wall thickness was normal  DOPPLER: Left ventricular diastolic  function parameters were abnormal     RIGHT VENTRICLE: The size was normal  Systolic function was normal  Wall thickness was normal     LEFT ATRIUM: Size was normal     RIGHT ATRIUM: Size was normal     MITRAL VALVE: Valve structure was normal  There was normal leaflet separation  DOPPLER: The transmitral velocity was within the normal range  There was no evidence for stenosis  There was mild regurgitation  AORTIC VALVE: The valve was trileaflet  Leaflets exhibited normal thickness and normal cuspal separation  DOPPLER: Transaortic velocity was minimally increased  There was no evidence for stenosis  There was no regurgitation  TRICUSPID VALVE: The valve structure was normal  There was normal leaflet separation  DOPPLER: The transtricuspid velocity was within the normal range  There was no evidence for stenosis  There was mild regurgitation  Estimated peak PA  pressure was 40 mmHg  PULMONIC VALVE: Leaflets exhibited normal thickness, no calcification, and normal cuspal separation  DOPPLER: The transpulmonic velocity was within the normal range  There was no regurgitation  PERICARDIUM: There was no pericardial effusion  The pericardium was normal in appearance  AORTA: The root exhibited normal size  SYSTEMIC VEINS: IVC: The inferior vena cava was normal in size and course  Respirophasic changes were normal     SYSTEM MEASUREMENT TABLES    2D  %FS: 25 2 %  Ao Diam: 2 7 cm  EDV(Teich): 83 6 ml  EF(Teich): 50 %  ESV(Teich): 41 8 ml  IVSd: 1 cm  LA Area: 23 9 cm2  LA Diam: 3 7 cm  LVEDV MOD A4C: 54 7 ml  LVEF MOD A4C: 63 9 %  LVESV MOD A4C: 19 8 ml  LVIDd: 4 3 cm  LVIDs: 3 2 cm  LVLd A4C: 6 8 cm  LVLs A4C: 6 cm  LVOT Diam: 1 9 cm  LVPWd: 1 cm  RA Area: 20 6 cm2  RVIDd: 3 4 cm  SV MOD A4C: 35 ml  SV(Teich): 41 8 ml    CW  AV Env  Ti: 213 1 ms  AV VTI: 29 7 cm  AV Vmax: 2 1 m/s  AV Vmean: 1 4 m/s  AV maxP 7 mmHg  AV meanP 1 mmHg  TR Vmax: 3 3 m/s  TR maxP mmHg    MM  TAPSE: 2 1 cm    PW  SALUD (VTI): 2 cm2  SALUD Vmax: 1 8 cm2  LVOT Env  Ti: 262 6 ms  LVOT VTI: 20 4 cm  LVOT Vmax: 1 3 m/s  LVOT Vmean: 0 8 m/s  LVOT maxP 5 mmHg  LVOT meanP 9 mmHg  LVSV Dopp: 59 3 ml    [facility]    Prepared and electronically signed by    Mary Collins MD  Signed 26-Mar-2018 18:09:34       Results for orders placed during the hospital encounter of 18   CESAR    Narrative [address] [place] [id number]  [phone]    Transesophageal Echocardiogram  2D and Color Doppler    Study date:  2018    Patient: Abhilash Marinelli  MR number: [id number]  Account number: [de-identified]  : 1950  Age: 75 years  Gender: Male  Status: Inpatient  Location: Cath lab  Height: 66 in  Weight: 214 lb  BP: 102/ 53 mmHg    Indications: Atrial Fibrillation    Diagnoses: I48 0 - Atrial fibrillation    Sonographer:  AKILA Mccrary,RDCS  Interpreting Physician:  Mary Collins MD  Referring Physician:  Mary Collnis MD  Group:  [facility]    SUMMARY    LEFT VENTRICLE:  Systolic function was normal     LEFT ATRIAL APPENDAGE:  No thrombus was identified  ATRIAL SEPTUM:  No defect or patent foramen ovale was identified  MITRAL VALVE:  There was mild regurgitation  HISTORY: PRIOR HISTORY: Atrial Fibrillation, Obstructive Sleep Apnea, Confusion, Hypertension    PROCEDURE: The procedure was performed in the catheterization laboratory  This was a routine study  The risks and alternatives of the procedure were explained to the patient and informed consent was obtained  The transesophageal approach  was used  The study included complete 2D imaging and color Doppler  The heart rate was 132 bpm, at the start of the study  An adult omniplane probe was inserted by the attending cardiologist  Intubated with ease  One intubation attempt(s)  There was no blood detected on the probe  There were no complications during the procedure   MEDICATIONS: Benzocaine spray, topical to oropharynx, prior to procedure  Sedation administered by anesthesia team     LEFT VENTRICLE: Size was normal  Systolic function was normal  Wall thickness was normal     VENTRICULAR SEPTUM: Thickness was normal  There was normal motion  There was normal contour  The septum was intact  RIGHT VENTRICLE: The size was normal  Systolic function was normal  Wall thickness was normal     LEFT ATRIUM: Size was normal  No thrombus was identified  APPENDAGE: The size was normal  No thrombus was identified  DOPPLER: The function was normal (normal emptying velocity)  ATRIAL SEPTUM: No defect or patent foramen ovale was identified  RIGHT ATRIUM: Size was normal  No thrombus was identified  MITRAL VALVE: Valve structure was normal  There was normal leaflet separation  There was no echocardiographic evidence of vegetation  DOPPLER: There was mild regurgitation  AORTIC VALVE: The valve was trileaflet  Leaflets exhibited normal thickness and normal cuspal separation  There was no echocardiographic evidence of vegetation  DOPPLER: There was no regurgitation  TRICUSPID VALVE: The valve structure was normal  There was normal leaflet separation  There was no echocardiographic evidence of vegetation  DOPPLER: There was no regurgitation  PULMONIC VALVE: Leaflets exhibited normal thickness, no calcification, and normal cuspal separation  There was no echocardiographic evidence of vegetation  PERICARDIUM: There was no pericardial effusion  The pericardium was normal in appearance  AORTA: The root exhibited normal size  There was no atheroma  There was no evidence for dissection  There was no evidence for aneurysm      [address] Accredited Echocardiography Laboratory    Prepared and electronically signed by    Ranjit Hurst MD  Signed 06-Apr-2018 16:06:20         Medications:    Current Outpatient Medications:     allopurinol (ZYLOPRIM) 300 mg tablet, TAKE 1 TABLET BY MOUTH  DAILY, Disp: 90 tablet, Rfl: 1   amiodarone (PACERONE) 400 MG tablet, Take 1 tablet (400 mg total) by mouth daily 400 mg BID for a week, then once daily, Disp: 30 tablet, Rfl: 4    amitriptyline (ELAVIL) 25 mg tablet, Take 1 tablet (25 mg total) by mouth daily at bedtime, Disp: 30 tablet, Rfl: 3    apixaban (ELIQUIS) 5 mg, Take 1 tablet (5 mg total) by mouth 2 (two) times a day, Disp: 180 tablet, Rfl: 2    baclofen 10 mg tablet, Take 1 tablet (10 mg total) by mouth 3 (three) times a day, Disp: 270 tablet, Rfl: 0    colchicine (COLCRYS) 0 6 mg tablet, Take 0 6 mg by mouth as needed , Disp: , Rfl:     famotidine (PEPCID) 20 mg tablet, TAKE 1 TABLET BY MOUTH  DAILY, Disp: 90 tablet, Rfl: 3    fluticasone (FLONASE) 50 mcg/act nasal spray, 1 spray into each nostril daily, Disp: 3 Bottle, Rfl: 3    furosemide (LASIX) 40 mg tablet, Take 0 5 tablets (20 mg total) by mouth daily, Disp: 90 tablet, Rfl: 2    loratadine (CLARITIN) 10 mg tablet, Take 10 mg by mouth daily, Disp: , Rfl:     metoprolol tartrate (LOPRESSOR) 50 mg tablet, Take 1 tablet (50 mg total) by mouth every 12 (twelve) hours, Disp: 90 tablet, Rfl: 3    tamsulosin (FLOMAX) 0 4 mg, TAKE 1 CAPSULE BY MOUTH  DAILY WITH DINNER, Disp: 90 capsule, Rfl: 1    omeprazole (PriLOSEC) 20 mg delayed release capsule, , Disp: , Rfl:       Allergies: Allergies   Allergen Reactions    Cymbalta [Duloxetine Hcl] Delirium     hallucinations     ECG:    Sinus bradycardia, 46 bpm  QTc interval 486 milliseconds  Inferior and anterior / septal ST-T changes are nonspecific    Assessment and Plan:  1  Tiredness  Improved  Was likely secondary to his atrial fibrillation as patient does not tolerate atrial fibrillation well  Improved with rhythm control with Amiodarone    2  Paroxysmal atrial fibrillation (HCC)  currently in sinus rhythm  Cont rhythm control  Side effects and monitoring of Amiodarone discussed   EKG with normal QTc interval  Continue beta-blocker for rate control but decrease dose to 25 mg twice daily  Continue Eliquis for anticoagulation    3  Anticoagulant long-term use   continue Eliquis  Denies bleeding from any site    4  Essential hypertension   blood pressure acceptable  Continue current medications    5  DORA (obstructive sleep apnea)   regular CPAP use promoted as  Uncontrolled sleep apnea is a cause of atrial fibrillation relapse    6  Pulmonary hypertension ([address] )   likely from his sleep apnea    7  Obesity (BMI 30-39 9)   counseled to try to lose weight    Recommend aggressive risk factor modification and therapeutic lifestyle changes  Low-salt, low-calorie, low-fat, low-cholesterol diet with regular exercise and to optimize weight  I will defer the ordering and monitoring of necessity lab studies to you, but I am available and happy to review and manage any of the data at your request in the future  Discussed concepts of atherosclerosis, including signs and symptoms of cardiac disease  Previous studies were reviewed  Safety measures were reviewed  Questions were entertained and answered  Patient was advised to report any problems requiring medical attention  Follow-up with PCP and appropriate specialist and lab work as discussed  Return for follow up visit as scheduled or earlier, if needed  Thank you for allowing me to participate in the care and evaluation of your patient  Should you have any questions, please feel free to contact me        Kimberlyn Quiroga MD  8/[id number]/5356,0:35 AM

## 2019-12-30 NOTE — ASSESSMENT & PLAN NOTE
Rate controlled, continue his  Amiodarone and metoprolol  Continue his Eliquis  Follow-up with his cardiologist as scheduled

## 2019-12-30 NOTE — PATIENT INSTRUCTIONS
Medicare Preventive Visit Patient Instructions  Thank you for completing your Welcome to Medicare Visit or Medicare Annual Wellness Visit today  Your next wellness visit will be due in one year (12/30/2020)  The screening/preventive services that you may require over the next 5-10 years are detailed below  Some tests may not apply to you based off risk factors and/or age  Screening tests ordered at today's visit but not completed yet may show as past due  Also, please note that scanned in results may not display below  Preventive Screenings:  Service Recommendations Previous Testing/Comments   Colorectal Cancer Screening  · Colonoscopy    · Fecal Occult Blood Test (FOBT)/Fecal Immunochemical Test (FIT)  · Fecal DNA/Cologuard Test  · Flexible Sigmoidoscopy Age: 54-65 years old   Colonoscopy: every 10 years (May be performed more frequently if at higher risk)  OR  FOBT/FIT: every 1 year  OR  Cologuard: every 3 years  OR  Sigmoidoscopy: every 5 years  Screening may be recommended earlier than age 48 if at higher risk for colorectal cancer  Also, an individualized decision between you and your healthcare provider will decide whether screening between the ages of 74-80 would be appropriate   Colonoscopy: Not on file  FOBT/FIT: Not on file  Cologuard: Not on file  Sigmoidoscopy: Not on file    Screening Current     Prostate Cancer Screening Individualized decision between patient and health care provider in men between ages of 53-78   Medicare will cover every 12 months beginning on the day after your 50th birthday PSA: 0 7 ng/mL     Screening Current     Hepatitis C Screening Once for adults born between [facility]  More frequently in patients at high risk for Hepatitis C Hep C Antibody: Not on file    Screening Current   Diabetes Screening 1-2 times per year if you're at risk for diabetes or have pre-diabetes Fasting glucose: 95 mg/dL   A1C: No results in last 5 years    Screening Current   Cholesterol Screening Once every 5 years if you don't have a lipid disorder  May order more often based on risk factors  Lipid panel: 03/28/2019    Screening Not Indicated  History Lipid Disorder      Other Preventive Screenings Covered by Medicare:  1  Abdominal Aortic Aneurysm (AAA) Screening: covered once if your at risk  You're considered to be at risk if you have a family history of AAA or a male between the age of 73-68 who smoking at least 100 cigarettes in your lifetime  2  Lung Cancer Screening: covers low dose CT scan once per year if you meet all of the following conditions: (1) Age 50-69; (2) No signs or symptoms of lung cancer; (3) Current smoker or have quit smoking within the last 15 years; (4) You have a tobacco smoking history of at least 30 pack years (packs per day x number of years you smoked); (5) You get a written order from a healthcare provider  3  Glaucoma Screening: covered annually if you're considered high risk: (1) You have diabetes OR (2) Family history of glaucoma OR (3)  aged 48 and older OR (3)  American aged 72 and older  3  Osteoporosis Screening: covered every 2 years if you meet one of the following conditions: (1) Have a vertebral abnormality; (2) On glucocorticoid therapy for more than 3 months; (3) Have primary hyperparathyroidism; (4) On osteoporosis medications and need to assess response to drug therapy  5  HIV Screening: covered annually if you're between the age of 12-76  Also covered annually if you are younger than 13 and older than 72 with risk factors for HIV infection  For pregnant patients, it is covered up to 3 times per pregnancy      Immunizations:  Immunization Recommendations   Influenza Vaccine Annual influenza vaccination during flu season is recommended for all persons aged >= 6 months who do not have contraindications   Pneumococcal Vaccine (Prevnar and Pneumovax)  * Prevnar = PCV13  * Pneumovax = PPSV23 Adults 25-60 years old: 1-3 doses may be recommended based on certain risk factors  Adults 72 years old: Prevnar (PCV13) vaccine recommended followed by Pneumovax (PPSV23) vaccine  If already received PPSV23 since turning 65, then PCV13 recommended at least one year after PPSV23 dose  Hepatitis B Vaccine 3 dose series if at intermediate or high risk (ex: diabetes, end stage renal disease, liver disease)   Tetanus (Td) Vaccine - COST NOT COVERED BY MEDICARE PART B Following completion of primary series, a booster dose should be given every 10 years to maintain immunity against tetanus  Td may also be given as tetanus wound prophylaxis  Tdap Vaccine - COST NOT COVERED BY MEDICARE PART B Recommended at least once for all adults  For pregnant patients, recommended with each pregnancy  Shingles Vaccine (Shingrix) - COST NOT COVERED BY MEDICARE PART B  2 shot series recommended in those aged 48 and above     Health Maintenance Due:      Topic Date Due    CRC Screening: Colonoscopy  06/19/2028    Hepatitis C Screening  Addressed     Immunizations Due:  There are no preventive care reminders to display for this patient  Advance Directives   What are advance directives? Advance directives are legal documents that state your wishes and plans for medical care  These plans are made ahead of time in case you lose your ability to make decisions for yourself  Advance directives can apply to any medical decision, such as the treatments you want, and if you want to donate organs  What are the types of advance directives? There are many types of advance directives, and each state has rules about how to use them  You may choose a combination of any of the following:  · Living will: This is a written record of the treatment you want  You can also choose which treatments you do not want, which to limit, and which to stop at a certain time  This includes surgery, medicine, IV fluid, and tube feedings  · Durable power of  for healthcare [facility]):   This is a written record that states who you want to make healthcare choices for you when you are unable to make them for yourself  This person, called a proxy, is usually a family member or a friend  You may choose more than 1 proxy  · Do not resuscitate (DNR) order:  A DNR order is used in case your heart stops beating or you stop breathing  It is a request not to have certain forms of treatment, such as CPR  A DNR order may be included in other types of advance directives  · Medical directive: This covers the care that you want if you are in a coma, near death, or unable to make decisions for yourself  You can list the treatments you want for each condition  Treatment may include pain medicine, surgery, blood transfusions, dialysis, IV or tube feedings, and a ventilator (breathing machine)  · Values history: This document has questions about your views, beliefs, and how you feel and think about life  This information can help others choose the care that you would choose  Why are advance directives important? An advance directive helps you control your care  Although spoken wishes may be used, it is better to have your wishes written down  Spoken wishes can be misunderstood, or not followed  Treatments may be given even if you do not want them  An advance directive may make it easier for your family to make difficult choices about your care  Fall Prevention    Fall prevention  includes ways to make your home and other areas safer  It also includes ways you can move more carefully to prevent a fall  Health conditions that cause changes in your blood pressure, vision, or muscle strength and coordination may increase your risk for falls  Medicines may also increase your risk for falls if they make you dizzy, weak, or sleepy  Fall prevention tips:   · Stand or sit up slowly  · Use assistive devices as directed  · Wear shoes that fit well and have soles that   · Wear a personal alarm  · Stay active  · Manage your medical conditions  Home Safety Tips:  · Add items to prevent falls in the bathroom  · Keep paths clear  · Install bright lights in your home  · Keep items you use often on shelves within reach  · Paint or place reflective tape on the edges of your stairs  Weight Management   Why it is important to manage your weight:  Being overweight increases your risk of health conditions such as heart disease, high blood pressure, type 2 diabetes, and certain types of cancer  It can also increase your risk for osteoarthritis, sleep apnea, and other respiratory problems  Aim for a slow, steady weight loss  Even a small amount of weight loss can lower your risk of health problems  How to lose weight safely:  A safe and healthy way to lose weight is to eat fewer calories and get regular exercise  You can lose up about 1 pound a week by decreasing the number of calories you eat by 500 calories each day  Healthy meal plan for weight management:  A healthy meal plan includes a variety of foods, contains fewer calories, and helps you stay healthy  A healthy meal plan includes the following:  · Eat whole-grain foods more often  A healthy meal plan should contain fiber  Fiber is the part of grains, fruits, and vegetables that is not broken down by your body  Whole-grain foods are healthy and provide extra fiber in your diet  Some examples of whole-grain foods are whole-wheat breads and pastas, oatmeal, brown rice, and bulgur  · Eat a variety of vegetables every day  Include dark, leafy greens such as spinach, kale, rufina greens, and mustard greens  Eat yellow and orange vegetables such as carrots, sweet potatoes, and winter squash  · Eat a variety of fruits every day  Choose fresh or canned fruit (canned in its own juice or light syrup) instead of juice  Fruit juice has very little or no fiber  · Eat low-fat dairy foods  Drink fat-free (skim) milk or 1% milk   Eat fat-free yogurt and low-fat cottage cheese  Try low-fat cheeses such as mozzarella and other reduced-fat cheeses  · Choose meat and other protein foods that are low in fat  Choose beans or other legumes such as split peas or lentils  Choose fish, skinless poultry (chicken or turkey), or lean cuts of red meat (beef or pork)  Before you cook meat or poultry, cut off any visible fat  · Use less fat and oil  Try baking foods instead of frying them  Add less fat, such as margarine, sour cream, regular salad dressing and mayonnaise to foods  Eat fewer high-fat foods  Some examples of high-fat foods include french fries, doughnuts, ice cream, and cakes  · Eat fewer sweets  Limit foods and drinks that are high in sugar  This includes candy, cookies, regular soda, and sweetened drinks  Exercise:  Exercise at least 30 minutes per day on most days of the week  Some examples of exercise include walking, biking, dancing, and swimming  You can also fit in more physical activity by taking the stairs instead of the elevator or parking farther away from stores  Ask your healthcare provider about the best exercise plan for you  © Copyright [employer] 2018 Information is for End User's use only and may not be sold, redistributed or otherwise used for commercial purposes   All illustrations and images included in CareNotes® are the copyrighted property of A D A M , Inc  or [address] [place] [address]

## 2019-12-30 NOTE — PROGRESS NOTES
Assessment and Plan:     Problem List Items Addressed This Visit     None      Visit Diagnoses     Health care maintenance    -  Primary           Preventive health issues were discussed with patient, and age appropriate screening tests were ordered as noted in patient's After Visit Summary  Personalized health advice and appropriate referrals for health education or preventive services given if needed, as noted in patient's After Visit Summary  History of Present Illness:     Patient presents for Medicare Annual Wellness visit    Patient Care Team:  Susan Francois DO as PCP - MD Aaron Rodgers MD Lubertha Burlington, MD Luwanna Burn, MD     Problem List:     Patient Active Problem List   Diagnosis    Paroxysmal atrial fibrillation ([facility] [address] )    Bilateral arm weakness    Bilateral edema of lower extremity    Benign prostatic hyperplasia    Cervical myelopathy ([facility] [address] )    Essential hypertension    Gout, arthritis    Mixed hyperlipidemia    DORA (obstructive sleep apnea)    Osteoporosis    Peptic reflux disease    Post-polio syndrome    Obesity (BMI 30-39  9)    Polio    Decubitus ulcer of heel    Leukocytosis    On amiodarone therapy    Pulmonary hypertension (HCC)    Acute pain of right knee    Acquired hypothyroidism    Mild depression (HCC)      Past Medical and Surgical History:     Past Medical History:   Diagnosis Date    A-fib ([facility] [address] [facility]     Asbestosis ([facility] [address] [facility]     Gout     HTN (hypertension)     Leukocytosis     Lumbar spinal stenosis     last assessed 12/15/2016    Neuropathy     Polio     Renal calculi     Sleep apnea      Past Surgical History:   Procedure Laterality Date    COLON SURGERY      partial colectomy    FEMUR FRACTURE SURGERY Right     HERNIA REPAIR      ventral    OTHER SURGICAL HISTORY      percutaneous lithotomy, 11/2015 and 04/10/2017      Family History:     Family History   Problem Relation Age of Onset    Hodgkin's lymphoma Mother disease    Other Father         polio    Diabetes Brother     Cirrhosis Brother         hepatic    Cancer Maternal Grandmother       Social History:     Social History     Socioeconomic History    Marital status: /Civil Union     Spouse name: None    Number of children: 2    Years of education: 12    Highest education level: None   Occupational History    Occupation:      Comment: Retired   Social Needs    Financial resource strain: None    Food insecurity:     Worry: None     Inability: None    Transportation needs:     Medical: None     Non-medical: None   Tobacco Use    Smoking status: Never Smoker    Smokeless tobacco: Never Used   Substance and Sexual Activity    Alcohol use: No    Drug use: No    Sexual activity: Yes     Partners: Female     Birth control/protection: None   Lifestyle    Physical activity:     Days per week: None     Minutes per session: None    Stress: None   Relationships    Social connections:     Talks on phone: None     Gets together: None     Attends Gnosticist service: None     Active member of club or organization: None     Attends meetings of clubs or organizations: None     Relationship status: None    Intimate partner violence:     Fear of current or ex partner: None     Emotionally abused: None     Physically abused: None     Forced sexual activity: None   Other Topics Concern    None   Social History Narrative    None       Medications and Allergies:     Current Outpatient Medications   Medication Sig Dispense Refill    allopurinol (ZYLOPRIM) 300 mg tablet TAKE 1 TABLET BY MOUTH  DAILY 90 tablet 1    amiodarone (PACERONE) 400 MG tablet TAKE 1 TABLET BY MOUTH TWICE A DAY FOR A WEEK AND THEN EVERY DAY 30 tablet 4    amitriptyline (ELAVIL) 25 mg tablet Take 1 tablet (25 mg total) by mouth daily at bedtime 30 tablet 3    baclofen 10 mg tablet TAKE 1 TABLET BY MOUTH 3  TIMES A  tablet 3    colchicine (COLCRYS) 0 6 mg tablet Take 0 6 mg by mouth as needed       ELIQUIS 5 MG TAKE 1 TABLET BY MOUTH TWO  TIMES DAILY 180 tablet 2    famotidine (PEPCID) 20 mg tablet TAKE 1 TABLET BY MOUTH  DAILY 90 tablet 3    fluticasone (FLONASE) 50 mcg/act nasal spray 1 spray into each nostril daily 3 Bottle 3    furosemide (LASIX) 40 mg tablet Take 0 5 tablets (20 mg total) by mouth daily 90 tablet 2    loratadine (CLARITIN) 10 mg tablet Take 10 mg by mouth daily      metoprolol tartrate (LOPRESSOR) 25 mg tablet Take 1 tablet (25 mg total) by mouth every 12 (twelve) hours 180 tablet 1    omeprazole (PriLOSEC) 20 mg delayed release capsule       tamsulosin (FLOMAX) 0 4 mg TAKE 1 CAPSULE BY MOUTH  DAILY WITH DINNER 90 capsule 1     No current facility-administered medications for this visit  Allergies   Allergen Reactions    Cymbalta [Duloxetine Hcl] Delirium     hallucinations      Immunizations:     Immunization History   Administered Date(s) Administered    H1N1, All Formulations 01/09/2010    INFLUENZA 10/07/2009, 10/06/2010, 10/21/2015, 10/30/2017    Influenza Split High Dose Preservative Free IM 10/26/2016, 09/15/2017, 10/30/2017, 10/15/2019    Influenza, high dose seasonal 0 5 mL 10/03/2018    Pneumococcal Conjugate 13-Valent 01/31/2017    Pneumococcal Polysaccharide PPV23 09/18/2013, 01/01/2015      Health Maintenance:         Topic Date Due    CRC Screening: Colonoscopy  06/19/2028    Hepatitis C Screening  Addressed     There are no preventive care reminders to display for this patient  Medicare Health Risk Assessment:     /80   Pulse 60   Temp 97 9 °F (36 6 °C) (Tympanic)   Resp 18   Ht 5' 6" (1 676 m)   Wt 111 kg (245 lb)   SpO2 97%   BMI 39 54 kg/m²      Nano Chaudhari is here for his Subsequent Wellness visit  Health Risk Assessment:   Patient rates overall health as poor  Patient feels that their physical health rating is same  Eyesight was rated as same  Hearing was rated as same   Patient feels that their emotional and mental health rating is same  Pain experienced in the last 7 days has been some  Patient's pain rating has been 8/10  Patient states that he has experienced weight loss or gain in last 6 months  Depression Screening:   PHQ-2 Score: 1  PHQ-9 Score: 2      Fall Risk Screening: In the past year, patient has experienced: history of falling in past year    Number of falls: 1  Injured during fall?: Yes    Feels unsteady when standing or walking?: Yes    Worried about falling?: Yes      Home Safety:  Patient has trouble with stairs inside or outside of their home  Patient has working smoke alarms and has no working carbon monoxide detector  Home safety hazards include: none  Nutrition:   Current diet is Regular and Low Carb  Medications:   Patient is currently taking over-the-counter supplements  OTC medications include: see medication list  Patient is able to manage medications  Activities of Daily Living (ADLs)/Instrumental Activities of Daily Living (IADLs):   Walk and transfer into and out of bed and chair?: No  Dress and groom yourself?: No    Bathe or shower yourself?: No    Feed yourself? Yes  Do your laundry/housekeeping?: No  Manage your money, pay your bills and track your expenses?: Yes  Make your own meals?: No    Do your own shopping?: No    Advance Care Planning:   Living will: Yes    Durable POA for healthcare:  Yes    Advanced directive: Yes    Five wishes given: Yes      Cognitive Screening:   Provider or family/friend/caregiver concerned regarding cognition?: No    PREVENTIVE SCREENINGS      Cardiovascular Screening:    General: Screening Not Indicated and History Lipid Disorder      Diabetes Screening:     General: Screening Current      Colorectal Cancer Screening:     General: Screening Current      Prostate Cancer Screening:    General: Screening Current      Osteoporosis Screening:    General: Screening Not Indicated and History Osteoporosis      Abdominal Aortic Aneurysm (AAA) Screening:    Risk factors include: age between 73-67 yo        Lung Cancer Screening:     General: Screening Not Indicated      Hepatitis C Screening:    General: Screening Current      Bishop Ogden DO

## 2019-12-30 NOTE — PROGRESS NOTES
Assessment/Plan:       Diagnoses and all orders for this visit:    Health care maintenance    Acquired hypothyroidism  -     Lipid panel; Future    Essential hypertension  -     CBC; Future  -     Comprehensive metabolic panel; Future    Paroxysmal atrial fibrillation (HCC)    Mild depression (HCC)    Cellulitis of right lower extremity  -     mupirocin (BACTROBAN) 2 % ointment; Apply topically 3 (three) times a day    Mixed hyperlipidemia  -     TSH, 3rd generation; Future        Acquired hypothyroidism  Doing well, continue his levothyroxine, check TSH  Essential hypertension    Controlled, continue his metoprolol recheck his blood pressure in 6 months  Paroxysmal atrial fibrillation (HCC)    Rate controlled, continue his  Amiodarone and metoprolol  Continue his Eliquis  Follow-up with his cardiologist as scheduled  Mixed hyperlipidemia    Controlled, continue  To watch his diet, check a lipid profile  Mild depression ([facility] [address] )    Doing well as amitriptyline, continue this  Follow-up in 6 months  Subjective:      Patient ID: Inocencia Lilly is a 78 y o  male  Patient comes in today for checkup on his hypothyroidism, hypertension, paroxysmal atrial fibrillation, hyperlipidemia  He continues take his medications as prescribed  He is up-to-date with his cardiologist   He does complain of a abrasion to his right anterior knee from a fall  His wife has been cleaning the area with hydrogen peroxide and changing the dressing daily  The following portions of the patient's history were reviewed and updated as appropriate:   He has a past medical history of A-fib ([facility] [address] ), Asbestosis ([facility] [address] ), Gout, HTN (hypertension), Leukocytosis, Lumbar spinal stenosis, Neuropathy, Polio, Renal calculi, and Sleep apnea  ,  does not have any pertinent problems on file  ,   has a past surgical history that includes Colon surgery; Hernia repair; Femur fracture surgery (Right); and Other surgical history  ,  family history includes Cancer in his maternal grandmother; Cirrhosis in his brother; Diabetes in his brother; Hodgkin's lymphoma in his mother; Other in his father  ,   reports that he has never smoked  He has never used smokeless tobacco  He reports that he does not drink alcohol or use drugs  ,  is allergic to cymbalta [duloxetine hcl]     Current Outpatient Medications   Medication Sig Dispense Refill    allopurinol (ZYLOPRIM) 300 mg tablet TAKE 1 TABLET BY MOUTH  DAILY 90 tablet 1    amiodarone (PACERONE) 400 MG tablet TAKE 1 TABLET BY MOUTH TWICE A DAY FOR A WEEK AND THEN EVERY DAY 30 tablet 4    amitriptyline (ELAVIL) 25 mg tablet Take 1 tablet (25 mg total) by mouth daily at bedtime 30 tablet 3    baclofen 10 mg tablet TAKE 1 TABLET BY MOUTH 3  TIMES A  tablet 3    colchicine (COLCRYS) 0 6 mg tablet Take 0 6 mg by mouth as needed       ELIQUIS 5 MG TAKE 1 TABLET BY MOUTH TWO  TIMES DAILY 180 tablet 2    famotidine (PEPCID) 20 mg tablet TAKE 1 TABLET BY MOUTH  DAILY 90 tablet 3    fluticasone (FLONASE) 50 mcg/act nasal spray 1 spray into each nostril daily 3 Bottle 3    furosemide (LASIX) 40 mg tablet Take 0 5 tablets (20 mg total) by mouth daily 90 tablet 2    loratadine (CLARITIN) 10 mg tablet Take 10 mg by mouth daily      metoprolol tartrate (LOPRESSOR) 25 mg tablet Take 1 tablet (25 mg total) by mouth every 12 (twelve) hours 180 tablet 1    omeprazole (PriLOSEC) 20 mg delayed release capsule       tamsulosin (FLOMAX) 0 4 mg TAKE 1 CAPSULE BY MOUTH  DAILY WITH DINNER 90 capsule 1    mupirocin (BACTROBAN) 2 % ointment Apply topically 3 (three) times a day 22 g 0     No current facility-administered medications for this visit  Review of Systems   Constitutional: Negative for chills, fatigue and fever  HENT: Negative for congestion, ear pain, hearing loss, postnasal drip, rhinorrhea and sore throat  Eyes: Negative for pain and visual disturbance     Respiratory: Negative for chest tightness, shortness of breath and wheezing  Cardiovascular: Negative for chest pain and leg swelling  Gastrointestinal: Negative for abdominal distention, abdominal pain, constipation, diarrhea and vomiting  Endocrine: Negative for cold intolerance and heat intolerance  Genitourinary: Negative for difficulty urinating, frequency and urgency  Musculoskeletal: Negative for arthralgias and gait problem  Skin: Positive for wound  Negative for color change  Neurological: Positive for weakness  Negative for dizziness, tremors, syncope, numbness and headaches  Hematological: Negative for adenopathy  Psychiatric/Behavioral: Negative for agitation, confusion and sleep disturbance  The patient is not nervous/anxious  Objective:  Vitals:    12/30/19 1511   BP: 130/80   Pulse: 60   Resp: 18   Temp: 97 9 °F (36 6 °C)   TempSrc: Tympanic   SpO2: 97%   Weight: 111 kg (245 lb)   Height: 5' 6" (1 676 m)     Body mass index is 39 54 kg/m²  Physical Exam   Constitutional: He is oriented to person, place, and time  He appears well-developed and well-nourished  HENT:   Head: Normocephalic  Mouth/Throat: Oropharynx is clear and moist    Eyes: Conjunctivae are normal  No scleral icterus  Neck: Normal range of motion  No thyromegaly present  Cardiovascular: Normal rate, regular rhythm and normal heart sounds  No murmur heard  Pulmonary/Chest: Effort normal and breath sounds normal  No respiratory distress  He has no wheezes  Abdominal: Soft  Bowel sounds are normal  He exhibits no distension  There is no tenderness  Musculoskeletal: Normal range of motion  He exhibits no edema or tenderness  Bilateral lower extremity braces   Lymphadenopathy:     He has no cervical adenopathy  Neurological: He is alert and oriented to person, place, and time  No cranial nerve deficit  Skin: Skin is warm and dry  No rash noted  No pallor     Abrasion to right anterior knee with granulation tissue present Psychiatric: He has a normal mood and affect  His behavior is normal    Nursing note and vitals reviewed

## 2020-01-01 ENCOUNTER — TRANSITIONAL CARE MANAGEMENT (OUTPATIENT)
Dept: FAMILY MEDICINE CLINIC | Facility: CLINIC | Age: 70
End: 2020-01-01

## 2020-01-01 ENCOUNTER — APPOINTMENT (INPATIENT)
Dept: MRI IMAGING | Facility: HOSPITAL | Age: 70
DRG: 423 | End: 2020-01-01
Payer: MEDICARE

## 2020-01-01 ENCOUNTER — HOSPITAL ENCOUNTER (INPATIENT)
Facility: HOSPITAL | Age: 70
LOS: 7 days | DRG: 423 | End: 2020-10-18
Attending: EMERGENCY MEDICINE | Admitting: ANESTHESIOLOGY
Payer: MEDICARE

## 2020-01-01 ENCOUNTER — APPOINTMENT (EMERGENCY)
Dept: ULTRASOUND IMAGING | Facility: HOSPITAL | Age: 70
DRG: 423 | End: 2020-01-01
Payer: MEDICARE

## 2020-01-01 ENCOUNTER — APPOINTMENT (INPATIENT)
Dept: RADIOLOGY | Facility: HOSPITAL | Age: 70
DRG: 871 | End: 2020-01-01
Payer: MEDICARE

## 2020-01-01 ENCOUNTER — ANESTHESIA (INPATIENT)
Dept: PERIOP | Facility: HOSPITAL | Age: 70
DRG: 423 | End: 2020-01-01
Payer: MEDICARE

## 2020-01-01 ENCOUNTER — TELEPHONE (OUTPATIENT)
Dept: FAMILY MEDICINE CLINIC | Facility: CLINIC | Age: 70
End: 2020-01-01

## 2020-01-01 ENCOUNTER — NURSE TRIAGE (OUTPATIENT)
Dept: OTHER | Facility: OTHER | Age: 70
End: 2020-01-01

## 2020-01-01 ENCOUNTER — ANESTHESIA (INPATIENT)
Dept: PERIOP | Facility: HOSPITAL | Age: 70
DRG: 660 | End: 2020-01-01
Payer: MEDICARE

## 2020-01-01 ENCOUNTER — APPOINTMENT (INPATIENT)
Dept: NON INVASIVE DIAGNOSTICS | Facility: HOSPITAL | Age: 70
DRG: 423 | End: 2020-01-01
Payer: MEDICARE

## 2020-01-01 ENCOUNTER — ANESTHESIA EVENT (INPATIENT)
Dept: PERIOP | Facility: HOSPITAL | Age: 70
DRG: 423 | End: 2020-01-01
Payer: MEDICARE

## 2020-01-01 ENCOUNTER — APPOINTMENT (EMERGENCY)
Dept: CT IMAGING | Facility: HOSPITAL | Age: 70
DRG: 660 | End: 2020-01-01
Payer: MEDICARE

## 2020-01-01 ENCOUNTER — HOSPITAL ENCOUNTER (INPATIENT)
Facility: HOSPITAL | Age: 70
LOS: 7 days | DRG: 871 | End: 2020-10-25
Attending: SURGERY | Admitting: SURGERY
Payer: MEDICARE

## 2020-01-01 ENCOUNTER — APPOINTMENT (OUTPATIENT)
Dept: ULTRASOUND IMAGING | Facility: HOSPITAL | Age: 70
DRG: 660 | End: 2020-01-01
Payer: MEDICARE

## 2020-01-01 ENCOUNTER — OFFICE VISIT (OUTPATIENT)
Dept: FAMILY MEDICINE CLINIC | Facility: CLINIC | Age: 70
End: 2020-01-01
Payer: MEDICARE

## 2020-01-01 ENCOUNTER — APPOINTMENT (INPATIENT)
Dept: CT IMAGING | Facility: HOSPITAL | Age: 70
DRG: 423 | End: 2020-01-01
Attending: RADIOLOGY
Payer: MEDICARE

## 2020-01-01 ENCOUNTER — PREP FOR PROCEDURE (OUTPATIENT)
Dept: UROLOGY | Facility: CLINIC | Age: 70
End: 2020-01-01

## 2020-01-01 ENCOUNTER — TELEPHONE (OUTPATIENT)
Dept: UROLOGY | Facility: CLINIC | Age: 70
End: 2020-01-01

## 2020-01-01 ENCOUNTER — APPOINTMENT (EMERGENCY)
Dept: RADIOLOGY | Facility: HOSPITAL | Age: 70
DRG: 423 | End: 2020-01-01
Payer: MEDICARE

## 2020-01-01 ENCOUNTER — OFFICE VISIT (OUTPATIENT)
Dept: CARDIOLOGY CLINIC | Facility: CLINIC | Age: 70
End: 2020-01-01
Payer: MEDICARE

## 2020-01-01 ENCOUNTER — HOSPITAL ENCOUNTER (INPATIENT)
Facility: HOSPITAL | Age: 70
LOS: 1 days | Discharge: HOME | DRG: 660 | End: 2020-10-08
Attending: EMERGENCY MEDICINE | Admitting: STUDENT IN AN ORGANIZED HEALTH CARE EDUCATION/TRAINING PROGRAM
Payer: MEDICARE

## 2020-01-01 ENCOUNTER — APPOINTMENT (INPATIENT)
Dept: PERIOP | Facility: HOSPITAL | Age: 70
DRG: 423 | End: 2020-01-01
Attending: INTERNAL MEDICINE
Payer: MEDICARE

## 2020-01-01 ENCOUNTER — APPOINTMENT (INPATIENT)
Dept: CT IMAGING | Facility: HOSPITAL | Age: 70
DRG: 423 | End: 2020-01-01
Payer: MEDICARE

## 2020-01-01 ENCOUNTER — APPOINTMENT (INPATIENT)
Dept: PERIOP | Facility: HOSPITAL | Age: 70
DRG: 423 | End: 2020-01-01
Payer: MEDICARE

## 2020-01-01 ENCOUNTER — APPOINTMENT (INPATIENT)
Dept: RADIOLOGY | Facility: HOSPITAL | Age: 70
DRG: 660 | End: 2020-01-01
Payer: MEDICARE

## 2020-01-01 ENCOUNTER — APPOINTMENT (INPATIENT)
Dept: RADIOLOGY | Facility: HOSPITAL | Age: 70
DRG: 423 | End: 2020-01-01
Payer: MEDICARE

## 2020-01-01 ENCOUNTER — ANESTHESIA EVENT (INPATIENT)
Dept: PERIOP | Facility: HOSPITAL | Age: 70
DRG: 660 | End: 2020-01-01
Payer: MEDICARE

## 2020-01-01 ENCOUNTER — APPOINTMENT (OUTPATIENT)
Dept: LAB | Facility: HOSPITAL | Age: 70
End: 2020-01-01
Attending: FAMILY MEDICINE
Payer: MEDICARE

## 2020-01-01 VITALS
SYSTOLIC BLOOD PRESSURE: 130 MMHG | TEMPERATURE: 97.5 F | HEIGHT: 66 IN | WEIGHT: 257 LBS | OXYGEN SATURATION: 94 % | HEART RATE: 74 BPM | DIASTOLIC BLOOD PRESSURE: 74 MMHG | BODY MASS INDEX: 41.3 KG/M2

## 2020-01-01 VITALS
HEIGHT: 66 IN | OXYGEN SATURATION: 68 % | DIASTOLIC BLOOD PRESSURE: 62 MMHG | TEMPERATURE: 99 F | BODY MASS INDEX: 41.63 KG/M2 | WEIGHT: 259.04 LBS | SYSTOLIC BLOOD PRESSURE: 130 MMHG

## 2020-01-01 VITALS
OXYGEN SATURATION: 97 % | SYSTOLIC BLOOD PRESSURE: 149 MMHG | DIASTOLIC BLOOD PRESSURE: 76 MMHG | WEIGHT: 250 LBS | HEIGHT: 66 IN | BODY MASS INDEX: 40.18 KG/M2 | TEMPERATURE: 97.8 F | RESPIRATION RATE: 16 BRPM | HEART RATE: 61 BPM

## 2020-01-01 VITALS
WEIGHT: 257.4 LBS | BODY MASS INDEX: 41.37 KG/M2 | SYSTOLIC BLOOD PRESSURE: 138 MMHG | DIASTOLIC BLOOD PRESSURE: 84 MMHG | HEART RATE: 65 BPM | OXYGEN SATURATION: 100 % | HEIGHT: 66 IN | TEMPERATURE: 98.1 F

## 2020-01-01 VITALS
BODY MASS INDEX: 39.05 KG/M2 | TEMPERATURE: 98.6 F | RESPIRATION RATE: 16 BRPM | HEART RATE: 64 BPM | WEIGHT: 243 LBS | HEIGHT: 66 IN | SYSTOLIC BLOOD PRESSURE: 130 MMHG | OXYGEN SATURATION: 99 % | DIASTOLIC BLOOD PRESSURE: 80 MMHG

## 2020-01-01 VITALS — HEART RATE: 62 BPM

## 2020-01-01 VITALS
SYSTOLIC BLOOD PRESSURE: 147 MMHG | RESPIRATION RATE: 21 BRPM | OXYGEN SATURATION: 100 % | WEIGHT: 229.28 LBS | TEMPERATURE: 98.8 F | BODY MASS INDEX: 36.85 KG/M2 | DIASTOLIC BLOOD PRESSURE: 73 MMHG | HEIGHT: 66 IN | HEART RATE: 65 BPM

## 2020-01-01 VITALS
HEIGHT: 66 IN | BODY MASS INDEX: 38.57 KG/M2 | DIASTOLIC BLOOD PRESSURE: 80 MMHG | HEART RATE: 66 BPM | WEIGHT: 240 LBS | SYSTOLIC BLOOD PRESSURE: 140 MMHG | OXYGEN SATURATION: 97 %

## 2020-01-01 VITALS — HEART RATE: 78 BPM

## 2020-01-01 DIAGNOSIS — D72.829 LEUKOCYTOSIS: ICD-10-CM

## 2020-01-01 DIAGNOSIS — E03.9 ACQUIRED HYPOTHYROIDISM: Primary | ICD-10-CM

## 2020-01-01 DIAGNOSIS — I48.0 PAROXYSMAL ATRIAL FIBRILLATION (HCC): Primary | ICD-10-CM

## 2020-01-01 DIAGNOSIS — K21.9 GASTROESOPHAGEAL REFLUX DISEASE, ESOPHAGITIS PRESENCE NOT SPECIFIED: ICD-10-CM

## 2020-01-01 DIAGNOSIS — R79.1 ELEVATED PARTIAL THROMBOPLASTIN TIME (PTT): ICD-10-CM

## 2020-01-01 DIAGNOSIS — L97.511 SKIN ULCER OF RIGHT FOOT, LIMITED TO BREAKDOWN OF SKIN (HCC): Primary | ICD-10-CM

## 2020-01-01 DIAGNOSIS — R79.89 ELEVATED LACTIC ACID LEVEL: ICD-10-CM

## 2020-01-01 DIAGNOSIS — R79.1 ELEVATED INR: ICD-10-CM

## 2020-01-01 DIAGNOSIS — I48.0 PAROXYSMAL ATRIAL FIBRILLATION (HCC): ICD-10-CM

## 2020-01-01 DIAGNOSIS — N20.0 CALCULUS OF RENAL PELVIS: ICD-10-CM

## 2020-01-01 DIAGNOSIS — B91 POST-POLIO MUSCLE WEAKNESS: Primary | ICD-10-CM

## 2020-01-01 DIAGNOSIS — K83.1 OBSTRUCTIVE JAUNDICE: ICD-10-CM

## 2020-01-01 DIAGNOSIS — Z79.01 ANTICOAGULANT LONG-TERM USE: ICD-10-CM

## 2020-01-01 DIAGNOSIS — I27.20 PULMONARY HYPERTENSION (HCC): ICD-10-CM

## 2020-01-01 DIAGNOSIS — Z99.89 OSA ON CPAP: ICD-10-CM

## 2020-01-01 DIAGNOSIS — E80.6 HYPERBILIRUBINEMIA: ICD-10-CM

## 2020-01-01 DIAGNOSIS — I10 ESSENTIAL HYPERTENSION: Chronic | ICD-10-CM

## 2020-01-01 DIAGNOSIS — M1A.0720 CHRONIC GOUT OF LEFT FOOT, UNSPECIFIED CAUSE: ICD-10-CM

## 2020-01-01 DIAGNOSIS — E87.1 HYPONATREMIA WITH EXCESS EXTRACELLULAR FLUID VOLUME: ICD-10-CM

## 2020-01-01 DIAGNOSIS — N20.0 RIGHT NEPHROLITHIASIS: Primary | ICD-10-CM

## 2020-01-01 DIAGNOSIS — K81.0 ACUTE CHOLECYSTITIS: Primary | ICD-10-CM

## 2020-01-01 DIAGNOSIS — K81.0 ACUTE CHOLECYSTITIS: ICD-10-CM

## 2020-01-01 DIAGNOSIS — J30.1 SEASONAL ALLERGIC RHINITIS DUE TO POLLEN: ICD-10-CM

## 2020-01-01 DIAGNOSIS — R60.9 EDEMA, UNSPECIFIED TYPE: ICD-10-CM

## 2020-01-01 DIAGNOSIS — E03.9 ACQUIRED HYPOTHYROIDISM: ICD-10-CM

## 2020-01-01 DIAGNOSIS — J96.91 RESPIRATORY FAILURE WITH HYPOXIA (HCC): ICD-10-CM

## 2020-01-01 DIAGNOSIS — Z79.01 CURRENT USE OF LONG TERM ANTICOAGULATION: ICD-10-CM

## 2020-01-01 DIAGNOSIS — N40.0 PROSTATIC HYPERTROPHY: ICD-10-CM

## 2020-01-01 DIAGNOSIS — R79.89 ELEVATED BRAIN NATRIURETIC PEPTIDE (BNP) LEVEL: ICD-10-CM

## 2020-01-01 DIAGNOSIS — L97.511 SKIN ULCER OF RIGHT FOOT, LIMITED TO BREAKDOWN OF SKIN (HCC): ICD-10-CM

## 2020-01-01 DIAGNOSIS — R74.01 TRANSAMINITIS: ICD-10-CM

## 2020-01-01 DIAGNOSIS — Z01.818 PRE-OP EXAM: ICD-10-CM

## 2020-01-01 DIAGNOSIS — M62.81 POST-POLIO MUSCLE WEAKNESS: Primary | ICD-10-CM

## 2020-01-01 DIAGNOSIS — R11.10 VOMITING: ICD-10-CM

## 2020-01-01 DIAGNOSIS — G47.33 OSA ON CPAP: ICD-10-CM

## 2020-01-01 DIAGNOSIS — E78.2 MIXED HYPERLIPIDEMIA: ICD-10-CM

## 2020-01-01 DIAGNOSIS — E87.6 HYPOKALEMIA: ICD-10-CM

## 2020-01-01 DIAGNOSIS — E66.9 OBESITY (BMI 30-39.9): ICD-10-CM

## 2020-01-01 DIAGNOSIS — E87.1 HYPONATREMIA: ICD-10-CM

## 2020-01-01 DIAGNOSIS — N20.0 RIGHT KIDNEY STONE: Primary | ICD-10-CM

## 2020-01-01 DIAGNOSIS — Z96.0 STATUS POST PLACEMENT OF URETERAL STENT: ICD-10-CM

## 2020-01-01 DIAGNOSIS — K80.81 OTHER CHOLELITHIASIS WITH OBSTRUCTION: ICD-10-CM

## 2020-01-01 DIAGNOSIS — F32.A MILD DEPRESSION: ICD-10-CM

## 2020-01-01 DIAGNOSIS — R31.9 HEMATURIA: ICD-10-CM

## 2020-01-01 DIAGNOSIS — I10 ESSENTIAL HYPERTENSION: ICD-10-CM

## 2020-01-01 DIAGNOSIS — Z79.899 ON AMIODARONE THERAPY: ICD-10-CM

## 2020-01-01 DIAGNOSIS — R31.0 GROSS HEMATURIA: ICD-10-CM

## 2020-01-01 DIAGNOSIS — G14 POST-POLIO SYNDROME: Primary | ICD-10-CM

## 2020-01-01 DIAGNOSIS — Z86.79 HISTORY OF ATRIAL FIBRILLATION: ICD-10-CM

## 2020-01-01 DIAGNOSIS — R06.00 DYSPNEA ON EXERTION: ICD-10-CM

## 2020-01-01 DIAGNOSIS — E87.70 FLUID OVERLOAD: ICD-10-CM

## 2020-01-01 DIAGNOSIS — G14 POST-POLIO SYNDROME: ICD-10-CM

## 2020-01-01 DIAGNOSIS — K83.09 ACUTE CHOLANGITIS: Primary | ICD-10-CM

## 2020-01-01 DIAGNOSIS — Z46.6 ENCOUNTER FOR ADJUSTMENT OF URETERAL STENT: ICD-10-CM

## 2020-01-01 LAB
ABO GROUP BLD BPU: NORMAL
ABO GROUP BLD BPU: NORMAL
ABO GROUP BLD: NORMAL
ABO GROUP BLD: NORMAL
ALBUMIN SERPL BCP-MCNC: 1.5 G/DL (ref 3.5–5)
ALBUMIN SERPL BCP-MCNC: 1.5 G/DL (ref 3.5–5)
ALBUMIN SERPL BCP-MCNC: 1.7 G/DL (ref 3.5–5)
ALBUMIN SERPL BCP-MCNC: 1.7 G/DL (ref 3.5–5)
ALBUMIN SERPL BCP-MCNC: 1.8 G/DL (ref 3.5–5)
ALBUMIN SERPL BCP-MCNC: 1.9 G/DL (ref 3.5–5)
ALBUMIN SERPL BCP-MCNC: 2 G/DL (ref 3.5–5)
ALBUMIN SERPL BCP-MCNC: 2 G/DL (ref 3.5–5)
ALBUMIN SERPL BCP-MCNC: 2.1 G/DL (ref 3.5–5)
ALBUMIN SERPL BCP-MCNC: 2.2 G/DL (ref 3.5–5)
ALBUMIN SERPL BCP-MCNC: 2.3 G/DL (ref 3.5–5)
ALBUMIN SERPL BCP-MCNC: 2.4 G/DL (ref 3.5–5)
ALBUMIN SERPL BCP-MCNC: 3 G/DL (ref 3.5–5)
ALBUMIN SERPL BCP-MCNC: 3.1 G/DL (ref 3.5–5)
ALBUMIN SERPL BCP-MCNC: 3.4 G/DL (ref 3.5–5)
ALBUMIN SERPL BCP-MCNC: 3.8 G/DL (ref 3.5–5)
ALP SERPL-CCNC: 101 U/L (ref 46–116)
ALP SERPL-CCNC: 191 U/L (ref 46–116)
ALP SERPL-CCNC: 226 U/L (ref 46–116)
ALP SERPL-CCNC: 228 U/L (ref 46–116)
ALP SERPL-CCNC: 252 U/L (ref 46–116)
ALP SERPL-CCNC: 262 U/L (ref 46–116)
ALP SERPL-CCNC: 264 U/L (ref 46–116)
ALP SERPL-CCNC: 285 U/L (ref 46–116)
ALP SERPL-CCNC: 302 U/L (ref 46–116)
ALP SERPL-CCNC: 325 U/L (ref 46–116)
ALP SERPL-CCNC: 336 U/L (ref 46–116)
ALP SERPL-CCNC: 336 U/L (ref 46–116)
ALP SERPL-CCNC: 396 U/L (ref 46–116)
ALP SERPL-CCNC: 428 U/L (ref 46–116)
ALP SERPL-CCNC: 441 U/L (ref 46–116)
ALP SERPL-CCNC: 469 U/L (ref 46–116)
ALP SERPL-CCNC: 476 U/L (ref 46–116)
ALP SERPL-CCNC: 508 U/L (ref 46–116)
ALP SERPL-CCNC: 545 U/L (ref 46–116)
ALP SERPL-CCNC: 596 U/L (ref 46–116)
ALP SERPL-CCNC: 84 U/L (ref 46–116)
ALP SERPL-CCNC: 88 U/L (ref 46–116)
ALT SERPL W P-5'-P-CCNC: 105 U/L (ref 12–78)
ALT SERPL W P-5'-P-CCNC: 132 U/L (ref 12–78)
ALT SERPL W P-5'-P-CCNC: 177 U/L (ref 12–78)
ALT SERPL W P-5'-P-CCNC: 186 U/L (ref 12–78)
ALT SERPL W P-5'-P-CCNC: 257 U/L (ref 12–78)
ALT SERPL W P-5'-P-CCNC: 336 U/L (ref 12–78)
ALT SERPL W P-5'-P-CCNC: 35 U/L (ref 12–78)
ALT SERPL W P-5'-P-CCNC: 40 U/L (ref 12–78)
ALT SERPL W P-5'-P-CCNC: 414 U/L (ref 12–78)
ALT SERPL W P-5'-P-CCNC: 43 U/L (ref 12–78)
ALT SERPL W P-5'-P-CCNC: 443 U/L (ref 12–78)
ALT SERPL W P-5'-P-CCNC: 486 U/L (ref 12–78)
ALT SERPL W P-5'-P-CCNC: 53 U/L (ref 12–78)
ALT SERPL W P-5'-P-CCNC: 54 U/L (ref 12–78)
ALT SERPL W P-5'-P-CCNC: 56 U/L (ref 12–78)
ALT SERPL W P-5'-P-CCNC: 60 U/L (ref 12–78)
ALT SERPL W P-5'-P-CCNC: 61 U/L (ref 12–78)
ALT SERPL W P-5'-P-CCNC: 62 U/L (ref 12–78)
ALT SERPL W P-5'-P-CCNC: 64 U/L (ref 12–78)
ALT SERPL W P-5'-P-CCNC: 68 U/L (ref 12–78)
ALT SERPL W P-5'-P-CCNC: 74 U/L (ref 12–78)
ALT SERPL W P-5'-P-CCNC: 75 U/L (ref 12–78)
ANION GAP SERPL CALCULATED.3IONS-SCNC: 11 MMOL/L (ref 4–13)
ANION GAP SERPL CALCULATED.3IONS-SCNC: 12 MMOL/L (ref 4–13)
ANION GAP SERPL CALCULATED.3IONS-SCNC: 12 MMOL/L (ref 4–13)
ANION GAP SERPL CALCULATED.3IONS-SCNC: 3 MMOL/L (ref 4–13)
ANION GAP SERPL CALCULATED.3IONS-SCNC: 3 MMOL/L (ref 4–13)
ANION GAP SERPL CALCULATED.3IONS-SCNC: 4 MMOL/L (ref 4–13)
ANION GAP SERPL CALCULATED.3IONS-SCNC: 5 MMOL/L (ref 4–13)
ANION GAP SERPL CALCULATED.3IONS-SCNC: 6 MMOL/L (ref 4–13)
ANION GAP SERPL CALCULATED.3IONS-SCNC: 7 MMOL/L (ref 4–13)
ANION GAP SERPL CALCULATED.3IONS-SCNC: 7 MMOL/L (ref 4–13)
ANION GAP SERPL CALCULATED.3IONS-SCNC: 8 MMOL/L (ref 4–13)
ANION GAP SERPL CALCULATED.3IONS-SCNC: 9 MMOL/L (ref 4–13)
ANION GAP SERPL CALCULATED.3IONS-SCNC: 9 MMOL/L (ref 4–13)
ANISOCYTOSIS BLD QL SMEAR: PRESENT
APTT PPP: 40 SECONDS (ref 23–37)
APTT PPP: 41 SECONDS (ref 23–37)
APTT PPP: 45 SECONDS (ref 23–37)
APTT PPP: 48 SECONDS (ref 23–37)
ARTERIAL PATENCY WRIST A: YES
AST SERPL W P-5'-P-CCNC: 102 U/L (ref 5–45)
AST SERPL W P-5'-P-CCNC: 106 U/L (ref 5–45)
AST SERPL W P-5'-P-CCNC: 161 U/L (ref 5–45)
AST SERPL W P-5'-P-CCNC: 202 U/L (ref 5–45)
AST SERPL W P-5'-P-CCNC: 28 U/L (ref 5–45)
AST SERPL W P-5'-P-CCNC: 31 U/L (ref 5–45)
AST SERPL W P-5'-P-CCNC: 36 U/L (ref 5–45)
AST SERPL W P-5'-P-CCNC: 388 U/L (ref 5–45)
AST SERPL W P-5'-P-CCNC: 56 U/L (ref 5–45)
AST SERPL W P-5'-P-CCNC: 57 U/L (ref 5–45)
AST SERPL W P-5'-P-CCNC: 59 U/L (ref 5–45)
AST SERPL W P-5'-P-CCNC: 59 U/L (ref 5–45)
AST SERPL W P-5'-P-CCNC: 594 U/L (ref 5–45)
AST SERPL W P-5'-P-CCNC: 60 U/L (ref 5–45)
AST SERPL W P-5'-P-CCNC: 60 U/L (ref 5–45)
AST SERPL W P-5'-P-CCNC: 639 U/L (ref 5–45)
AST SERPL W P-5'-P-CCNC: 71 U/L (ref 5–45)
AST SERPL W P-5'-P-CCNC: 75 U/L (ref 5–45)
AST SERPL W P-5'-P-CCNC: 75 U/L (ref 5–45)
AST SERPL W P-5'-P-CCNC: 826 U/L (ref 5–45)
AST SERPL W P-5'-P-CCNC: 91 U/L (ref 5–45)
AST SERPL W P-5'-P-CCNC: 99 U/L (ref 5–45)
ATRIAL RATE: 102 BPM
ATRIAL RATE: 72 BPM
ATRIAL RATE: 86 BPM
BACTERIA BLD CULT: NORMAL
BACTERIA BLD CULT: NORMAL
BACTERIA SPEC BFLD CULT: NO GROWTH
BACTERIA SPT RESP CULT: ABNORMAL
BACTERIA UR QL AUTO: ABNORMAL /HPF
BASE EXCESS BLDA CALC-SCNC: -3.3 MMOL/L
BASE EXCESS BLDA CALC-SCNC: 0.3 MMOL/L
BASE EXCESS BLDA CALC-SCNC: 1.3 MMOL/L
BASE EXCESS BLDA CALC-SCNC: 13 MMOL/L (ref -2–3)
BASE EXCESS BLDA CALC-SCNC: 2.1 MMOL/L
BASE EXCESS BLDA CALC-SCNC: 2.7 MMOL/L
BASE EXCESS BLDA CALC-SCNC: 3 MMOL/L
BASE EXCESS BLDA CALC-SCNC: 5 MMOL/L
BASE EXCESS BLDA CALC-SCNC: 5.3 MMOL/L
BASE EXCESS BLDA CALC-SCNC: 5.6 MMOL/L
BASE EXCESS BLDA CALC-SCNC: 6.5 MMOL/L
BASOPHILS # BLD AUTO: 0.01 THOUSANDS/ΜL (ref 0–0.1)
BASOPHILS # BLD AUTO: 0.04 THOUSANDS/ΜL (ref 0–0.1)
BASOPHILS # BLD AUTO: 0.05 THOUSANDS/ΜL (ref 0–0.1)
BASOPHILS # BLD AUTO: 0.06 THOUSANDS/ΜL (ref 0–0.1)
BASOPHILS # BLD AUTO: 0.08 THOUSANDS/ΜL (ref 0–0.1)
BASOPHILS # BLD AUTO: 0.09 THOUSANDS/ΜL (ref 0–0.1)
BASOPHILS # BLD MANUAL: 0 THOUSAND/UL (ref 0–0.1)
BASOPHILS # BLD MANUAL: 0.37 THOUSAND/UL (ref 0–0.1)
BASOPHILS NFR BLD AUTO: 0 % (ref 0–1)
BASOPHILS NFR BLD AUTO: 1 % (ref 0–1)
BASOPHILS NFR BLD AUTO: 1 % (ref 0–1)
BASOPHILS NFR MAR MANUAL: 0 % (ref 0–1)
BASOPHILS NFR MAR MANUAL: 2 % (ref 0–1)
BILIRUB DIRECT SERPL-MCNC: 0.13 MG/DL (ref 0–0.2)
BILIRUB DIRECT SERPL-MCNC: 0.17 MG/DL (ref 0–0.2)
BILIRUB DIRECT SERPL-MCNC: 2.87 MG/DL (ref 0–0.2)
BILIRUB DIRECT SERPL-MCNC: 3.08 MG/DL (ref 0–0.2)
BILIRUB DIRECT SERPL-MCNC: 4.12 MG/DL (ref 0–0.2)
BILIRUB DIRECT SERPL-MCNC: 4.16 MG/DL (ref 0–0.2)
BILIRUB DIRECT SERPL-MCNC: 4.38 MG/DL (ref 0–0.2)
BILIRUB DIRECT SERPL-MCNC: 4.83 MG/DL (ref 0–0.2)
BILIRUB DIRECT SERPL-MCNC: 5.01 MG/DL (ref 0–0.2)
BILIRUB DIRECT SERPL-MCNC: 5.15 MG/DL (ref 0–0.2)
BILIRUB DIRECT SERPL-MCNC: 5.31 MG/DL (ref 0–0.2)
BILIRUB DIRECT SERPL-MCNC: 6.25 MG/DL (ref 0–0.2)
BILIRUB SERPL-MCNC: 0.4 MG/DL (ref 0.2–1)
BILIRUB SERPL-MCNC: 0.4 MG/DL (ref 0.2–1)
BILIRUB SERPL-MCNC: 0.6 MG/DL (ref 0.2–1)
BILIRUB SERPL-MCNC: 1.2 MG/DL (ref 0.2–1)
BILIRUB SERPL-MCNC: 3.3 MG/DL (ref 0.2–1)
BILIRUB SERPL-MCNC: 3.6 MG/DL (ref 0.2–1)
BILIRUB SERPL-MCNC: 4.61 MG/DL (ref 0.2–1)
BILIRUB SERPL-MCNC: 4.64 MG/DL (ref 0.2–1)
BILIRUB SERPL-MCNC: 5.17 MG/DL (ref 0.2–1)
BILIRUB SERPL-MCNC: 5.4 MG/DL (ref 0.2–1)
BILIRUB SERPL-MCNC: 5.6 MG/DL (ref 0.2–1)
BILIRUB SERPL-MCNC: 5.62 MG/DL (ref 0.2–1)
BILIRUB SERPL-MCNC: 5.8 MG/DL (ref 0.2–1)
BILIRUB SERPL-MCNC: 6.21 MG/DL (ref 0.2–1)
BILIRUB SERPL-MCNC: 6.3 MG/DL (ref 0.2–1)
BILIRUB SERPL-MCNC: 6.8 MG/DL (ref 0.2–1)
BILIRUB SERPL-MCNC: 6.89 MG/DL (ref 0.2–1)
BILIRUB SERPL-MCNC: 7 MG/DL (ref 0.2–1)
BILIRUB SERPL-MCNC: 7.2 MG/DL (ref 0.2–1)
BILIRUB SERPL-MCNC: 7.55 MG/DL (ref 0.2–1)
BILIRUB SERPL-MCNC: 7.79 MG/DL (ref 0.2–1)
BILIRUB SERPL-MCNC: 8.7 MG/DL (ref 0.2–1)
BILIRUB UR QL STRIP: NEGATIVE
BLD GP AB SCN SERPL QL: NEGATIVE
BPU ID: NORMAL
BPU ID: NORMAL
BUN SERPL-MCNC: 10 MG/DL (ref 5–25)
BUN SERPL-MCNC: 11 MG/DL (ref 5–25)
BUN SERPL-MCNC: 12 MG/DL (ref 5–25)
BUN SERPL-MCNC: 13 MG/DL (ref 5–25)
BUN SERPL-MCNC: 13 MG/DL (ref 5–25)
BUN SERPL-MCNC: 14 MG/DL (ref 5–25)
BUN SERPL-MCNC: 15 MG/DL (ref 5–25)
BUN SERPL-MCNC: 15 MG/DL (ref 5–25)
BUN SERPL-MCNC: 16 MG/DL (ref 5–25)
BUN SERPL-MCNC: 17 MG/DL (ref 5–25)
BUN SERPL-MCNC: 17 MG/DL (ref 5–25)
BUN SERPL-MCNC: 19 MG/DL (ref 5–25)
BUN SERPL-MCNC: 21 MG/DL (ref 5–25)
BUN SERPL-MCNC: 21 MG/DL (ref 5–25)
BUN SERPL-MCNC: 24 MG/DL (ref 5–25)
BUN SERPL-MCNC: 5 MG/DL (ref 5–25)
BUN SERPL-MCNC: 7 MG/DL (ref 5–25)
C DIFF TOX A+B STL QL IA: POSITIVE
C DIFF TOX B TCDB STL QL NAA+PROBE: POSITIVE
CA-I BLD-SCNC: 0.93 MMOL/L (ref 1.12–1.32)
CA-I BLD-SCNC: 0.96 MMOL/L (ref 1.12–1.32)
CA-I BLD-SCNC: 0.98 MMOL/L (ref 1.12–1.32)
CA-I BLD-SCNC: 0.99 MMOL/L (ref 1.12–1.32)
CA-I BLD-SCNC: 1.02 MMOL/L (ref 1.12–1.32)
CA-I BLD-SCNC: 1.15 MMOL/L (ref 1.12–1.32)
CALCIUM ALBUM COR SERPL-MCNC: 10 MG/DL (ref 8.3–10.1)
CALCIUM ALBUM COR SERPL-MCNC: 10.1 MG/DL (ref 8.3–10.1)
CALCIUM ALBUM COR SERPL-MCNC: 9.1 MG/DL (ref 8.3–10.1)
CALCIUM ALBUM COR SERPL-MCNC: 9.3 MG/DL (ref 8.3–10.1)
CALCIUM ALBUM COR SERPL-MCNC: 9.3 MG/DL (ref 8.3–10.1)
CALCIUM ALBUM COR SERPL-MCNC: 9.6 MG/DL (ref 8.3–10.1)
CALCIUM ALBUM COR SERPL-MCNC: 9.9 MG/DL (ref 8.3–10.1)
CALCIUM ALBUM COR SERPL-MCNC: 9.9 MG/DL (ref 8.3–10.1)
CALCIUM SERPL-MCNC: 7.3 MG/DL (ref 8.3–10.1)
CALCIUM SERPL-MCNC: 7.5 MG/DL (ref 8.3–10.1)
CALCIUM SERPL-MCNC: 7.7 MG/DL (ref 8.3–10.1)
CALCIUM SERPL-MCNC: 7.8 MG/DL (ref 8.3–10.1)
CALCIUM SERPL-MCNC: 7.9 MG/DL (ref 8.3–10.1)
CALCIUM SERPL-MCNC: 8 MG/DL (ref 8.3–10.1)
CALCIUM SERPL-MCNC: 8.1 MG/DL (ref 8.3–10.1)
CALCIUM SERPL-MCNC: 8.1 MG/DL (ref 8.3–10.1)
CALCIUM SERPL-MCNC: 8.2 MG/DL (ref 8.3–10.1)
CALCIUM SERPL-MCNC: 8.2 MG/DL (ref 8.3–10.1)
CALCIUM SERPL-MCNC: 8.3 MG/DL (ref 8.3–10.1)
CALCIUM SERPL-MCNC: 8.4 MG/DL (ref 8.3–10.1)
CALCIUM SERPL-MCNC: 8.5 MG/DL (ref 8.3–10.1)
CALCIUM SERPL-MCNC: 8.5 MG/DL (ref 8.3–10.1)
CALCIUM SERPL-MCNC: 8.6 MG/DL (ref 8.3–10.1)
CALCIUM SERPL-MCNC: 8.7 MG/DL (ref 8.3–10.1)
CALCIUM SERPL-MCNC: 8.8 MG/DL (ref 8.3–10.1)
CALCIUM SERPL-MCNC: 9.1 MG/DL (ref 8.3–10.1)
CALCIUM SERPL-MCNC: 9.1 MG/DL (ref 8.3–10.1)
CALCIUM SERPL-MCNC: 9.5 MG/DL (ref 8.3–10.1)
CHLORIDE SERPL-SCNC: 100 MMOL/L (ref 100–108)
CHLORIDE SERPL-SCNC: 102 MMOL/L (ref 100–108)
CHLORIDE SERPL-SCNC: 102 MMOL/L (ref 100–108)
CHLORIDE SERPL-SCNC: 103 MMOL/L (ref 100–108)
CHLORIDE SERPL-SCNC: 103 MMOL/L (ref 100–108)
CHLORIDE SERPL-SCNC: 104 MMOL/L (ref 100–108)
CHLORIDE SERPL-SCNC: 104 MMOL/L (ref 100–108)
CHLORIDE SERPL-SCNC: 106 MMOL/L (ref 100–108)
CHLORIDE SERPL-SCNC: 108 MMOL/L (ref 100–108)
CHLORIDE SERPL-SCNC: 108 MMOL/L (ref 100–108)
CHLORIDE SERPL-SCNC: 86 MMOL/L (ref 100–108)
CHLORIDE SERPL-SCNC: 91 MMOL/L (ref 100–108)
CHLORIDE SERPL-SCNC: 94 MMOL/L (ref 100–108)
CHLORIDE SERPL-SCNC: 95 MMOL/L (ref 100–108)
CHLORIDE SERPL-SCNC: 96 MMOL/L (ref 100–108)
CHLORIDE SERPL-SCNC: 98 MMOL/L (ref 100–108)
CHLORIDE SERPL-SCNC: 98 MMOL/L (ref 100–108)
CHLORIDE SERPL-SCNC: 99 MMOL/L (ref 100–108)
CHOLEST SERPL-MCNC: 187 MG/DL (ref 50–200)
CLARITY UR: CLEAR
CMV IGG SERPL IA-ACNC: <0.6 U/ML (ref 0–0.59)
CMV IGM SERPL IA-ACNC: <30 AU/ML (ref 0–29.9)
CO2 SERPL-SCNC: 24 MMOL/L (ref 21–32)
CO2 SERPL-SCNC: 25 MMOL/L (ref 21–32)
CO2 SERPL-SCNC: 26 MMOL/L (ref 21–32)
CO2 SERPL-SCNC: 26 MMOL/L (ref 21–32)
CO2 SERPL-SCNC: 28 MMOL/L (ref 21–32)
CO2 SERPL-SCNC: 29 MMOL/L (ref 21–32)
CO2 SERPL-SCNC: 29 MMOL/L (ref 21–32)
CO2 SERPL-SCNC: 30 MMOL/L (ref 21–32)
CO2 SERPL-SCNC: 30 MMOL/L (ref 21–32)
CO2 SERPL-SCNC: 31 MMOL/L (ref 21–32)
CO2 SERPL-SCNC: 32 MMOL/L (ref 21–32)
CO2 SERPL-SCNC: 34 MMOL/L (ref 21–32)
CO2 SERPL-SCNC: 34 MMOL/L (ref 21–32)
CO2 SERPL-SCNC: 36 MMOL/L (ref 21–32)
CO2 SERPL-SCNC: 39 MMOL/L (ref 21–32)
CO2 SERPL-SCNC: 39 MMOL/L (ref 21–32)
COLOR UR: YELLOW
CORTIS SERPL-MCNC: 18.9 UG/DL
CREAT SERPL-MCNC: 0.42 MG/DL (ref 0.6–1.3)
CREAT SERPL-MCNC: 0.51 MG/DL (ref 0.6–1.3)
CREAT SERPL-MCNC: 0.52 MG/DL (ref 0.6–1.3)
CREAT SERPL-MCNC: 0.56 MG/DL (ref 0.6–1.3)
CREAT SERPL-MCNC: 0.63 MG/DL (ref 0.6–1.3)
CREAT SERPL-MCNC: 0.68 MG/DL (ref 0.6–1.3)
CREAT SERPL-MCNC: 0.78 MG/DL (ref 0.6–1.3)
CREAT SERPL-MCNC: 0.79 MG/DL (ref 0.6–1.3)
CREAT SERPL-MCNC: 0.79 MG/DL (ref 0.6–1.3)
CREAT SERPL-MCNC: 0.8 MG/DL (ref 0.6–1.3)
CREAT SERPL-MCNC: 0.82 MG/DL (ref 0.6–1.3)
CREAT SERPL-MCNC: 0.83 MG/DL (ref 0.6–1.3)
CREAT SERPL-MCNC: 0.85 MG/DL (ref 0.6–1.3)
CREAT SERPL-MCNC: 0.85 MG/DL (ref 0.6–1.3)
CREAT SERPL-MCNC: 0.86 MG/DL (ref 0.6–1.3)
CREAT SERPL-MCNC: 0.87 MG/DL (ref 0.6–1.3)
CREAT SERPL-MCNC: 0.91 MG/DL (ref 0.6–1.3)
CREAT SERPL-MCNC: 0.92 MG/DL (ref 0.6–1.3)
CREAT SERPL-MCNC: 0.92 MG/DL (ref 0.6–1.3)
CREAT SERPL-MCNC: 1.06 MG/DL (ref 0.6–1.3)
CREAT SERPL-MCNC: 1.06 MG/DL (ref 0.6–1.3)
CREAT SERPL-MCNC: 1.08 MG/DL (ref 0.6–1.3)
CROSSMATCH: NORMAL
CROSSMATCH: NORMAL
EBV NA IGG SER IA-ACNC: 386 U/ML (ref 0–17.9)
EBV VCA IGG SER IA-ACNC: 339 U/ML (ref 0–17.9)
EBV VCA IGM SER IA-ACNC: <36 U/ML (ref 0–35.9)
EOSINOPHIL # BLD AUTO: 0 THOUSAND/ΜL (ref 0–0.61)
EOSINOPHIL # BLD AUTO: 0.03 THOUSAND/ΜL (ref 0–0.61)
EOSINOPHIL # BLD AUTO: 0.08 THOUSAND/ΜL (ref 0–0.61)
EOSINOPHIL # BLD AUTO: 0.16 THOUSAND/ΜL (ref 0–0.61)
EOSINOPHIL # BLD AUTO: 0.19 THOUSAND/ΜL (ref 0–0.61)
EOSINOPHIL # BLD AUTO: 0.24 THOUSAND/ΜL (ref 0–0.61)
EOSINOPHIL # BLD AUTO: 0.28 THOUSAND/ΜL (ref 0–0.61)
EOSINOPHIL # BLD AUTO: 0.35 THOUSAND/ΜL (ref 0–0.61)
EOSINOPHIL # BLD MANUAL: 0 THOUSAND/UL (ref 0–0.4)
EOSINOPHIL # BLD MANUAL: 0.26 THOUSAND/UL (ref 0–0.4)
EOSINOPHIL # BLD MANUAL: 0.42 THOUSAND/UL (ref 0–0.4)
EOSINOPHIL # BLD MANUAL: 0.52 THOUSAND/UL (ref 0–0.4)
EOSINOPHIL # BLD MANUAL: 0.77 THOUSAND/UL (ref 0–0.4)
EOSINOPHIL NFR BLD AUTO: 0 % (ref 0–6)
EOSINOPHIL NFR BLD AUTO: 0 % (ref 0–6)
EOSINOPHIL NFR BLD AUTO: 1 % (ref 0–6)
EOSINOPHIL NFR BLD AUTO: 2 % (ref 0–6)
EOSINOPHIL NFR BLD AUTO: 3 % (ref 0–6)
EOSINOPHIL NFR BLD MANUAL: 0 % (ref 0–6)
EOSINOPHIL NFR BLD MANUAL: 1 % (ref 0–6)
EOSINOPHIL NFR BLD MANUAL: 2 % (ref 0–6)
EOSINOPHIL NFR BLD MANUAL: 3 % (ref 0–6)
EOSINOPHIL NFR BLD MANUAL: 3 % (ref 0–6)
ERYTHROCYTE [DISTWIDTH] IN BLOOD BY AUTOMATED COUNT: 14.1 % (ref 11.6–15.1)
ERYTHROCYTE [DISTWIDTH] IN BLOOD BY AUTOMATED COUNT: 14.4 % (ref 11.6–15.1)
ERYTHROCYTE [DISTWIDTH] IN BLOOD BY AUTOMATED COUNT: 14.4 % (ref 11.6–15.1)
ERYTHROCYTE [DISTWIDTH] IN BLOOD BY AUTOMATED COUNT: 14.5 % (ref 11.6–15.1)
ERYTHROCYTE [DISTWIDTH] IN BLOOD BY AUTOMATED COUNT: 14.6 % (ref 11.6–15.1)
ERYTHROCYTE [DISTWIDTH] IN BLOOD BY AUTOMATED COUNT: 14.6 % (ref 11.6–15.1)
ERYTHROCYTE [DISTWIDTH] IN BLOOD BY AUTOMATED COUNT: 14.8 % (ref 11.6–15.1)
ERYTHROCYTE [DISTWIDTH] IN BLOOD BY AUTOMATED COUNT: 15.1 % (ref 11.6–15.1)
ERYTHROCYTE [DISTWIDTH] IN BLOOD BY AUTOMATED COUNT: 15.1 % (ref 11.6–15.1)
ERYTHROCYTE [DISTWIDTH] IN BLOOD BY AUTOMATED COUNT: 15.3 % (ref 11.6–15.1)
ERYTHROCYTE [DISTWIDTH] IN BLOOD BY AUTOMATED COUNT: 15.4 % (ref 11.6–15.1)
ERYTHROCYTE [DISTWIDTH] IN BLOOD BY AUTOMATED COUNT: 15.6 % (ref 11.6–15.1)
ERYTHROCYTE [DISTWIDTH] IN BLOOD BY AUTOMATED COUNT: 15.6 % (ref 11.6–15.1)
ERYTHROCYTE [DISTWIDTH] IN BLOOD BY AUTOMATED COUNT: 15.7 % (ref 11.6–15.1)
ERYTHROCYTE [DISTWIDTH] IN BLOOD BY AUTOMATED COUNT: 16.2 % (ref 11.6–15.1)
ERYTHROCYTE [DISTWIDTH] IN BLOOD BY AUTOMATED COUNT: 16.6 % (ref 11.6–15.1)
ERYTHROCYTE [DISTWIDTH] IN BLOOD BY AUTOMATED COUNT: 16.7 % (ref 11.6–15.1)
ERYTHROCYTE [DISTWIDTH] IN BLOOD BY AUTOMATED COUNT: 16.9 % (ref 11.6–15.1)
ERYTHROCYTE [DISTWIDTH] IN BLOOD BY AUTOMATED COUNT: 17.1 % (ref 11.6–15.1)
FIBRINOGEN PPP-MCNC: 719 MG/DL (ref 227–495)
FIO2 GAS DIL.REBREATH: 100 L
GFR SERPL CREATININE-BSD FRML MDRD: 100 ML/MIN/1.73SQ M
GFR SERPL CREATININE-BSD FRML MDRD: 105 ML/MIN/1.73SQ M
GFR SERPL CREATININE-BSD FRML MDRD: 108 ML/MIN/1.73SQ M
GFR SERPL CREATININE-BSD FRML MDRD: 109 ML/MIN/1.73SQ M
GFR SERPL CREATININE-BSD FRML MDRD: 118 ML/MIN/1.73SQ M
GFR SERPL CREATININE-BSD FRML MDRD: 69 ML/MIN/1.73SQ M
GFR SERPL CREATININE-BSD FRML MDRD: 71 ML/MIN/1.73SQ M
GFR SERPL CREATININE-BSD FRML MDRD: 71 ML/MIN/1.73SQ M
GFR SERPL CREATININE-BSD FRML MDRD: 84 ML/MIN/1.73SQ M
GFR SERPL CREATININE-BSD FRML MDRD: 84 ML/MIN/1.73SQ M
GFR SERPL CREATININE-BSD FRML MDRD: 85 ML/MIN/1.73SQ M
GFR SERPL CREATININE-BSD FRML MDRD: 87 ML/MIN/1.73SQ M
GFR SERPL CREATININE-BSD FRML MDRD: 88 ML/MIN/1.73SQ M
GFR SERPL CREATININE-BSD FRML MDRD: 89 ML/MIN/1.73SQ M
GFR SERPL CREATININE-BSD FRML MDRD: 90 ML/MIN/1.73SQ M
GFR SERPL CREATININE-BSD FRML MDRD: 91 ML/MIN/1.73SQ M
GFR SERPL CREATININE-BSD FRML MDRD: 97 ML/MIN/1.73SQ M
GLUCOSE P FAST SERPL-MCNC: 104 MG/DL (ref 65–99)
GLUCOSE P FAST SERPL-MCNC: 83 MG/DL (ref 65–99)
GLUCOSE SERPL-MCNC: 100 MG/DL (ref 65–140)
GLUCOSE SERPL-MCNC: 101 MG/DL (ref 65–140)
GLUCOSE SERPL-MCNC: 102 MG/DL (ref 65–140)
GLUCOSE SERPL-MCNC: 103 MG/DL (ref 65–140)
GLUCOSE SERPL-MCNC: 104 MG/DL (ref 65–140)
GLUCOSE SERPL-MCNC: 104 MG/DL (ref 65–140)
GLUCOSE SERPL-MCNC: 105 MG/DL (ref 65–140)
GLUCOSE SERPL-MCNC: 112 MG/DL (ref 65–140)
GLUCOSE SERPL-MCNC: 112 MG/DL (ref 65–140)
GLUCOSE SERPL-MCNC: 113 MG/DL (ref 65–140)
GLUCOSE SERPL-MCNC: 113 MG/DL (ref 65–140)
GLUCOSE SERPL-MCNC: 114 MG/DL (ref 65–140)
GLUCOSE SERPL-MCNC: 121 MG/DL (ref 65–140)
GLUCOSE SERPL-MCNC: 122 MG/DL (ref 65–140)
GLUCOSE SERPL-MCNC: 126 MG/DL (ref 65–140)
GLUCOSE SERPL-MCNC: 131 MG/DL (ref 65–140)
GLUCOSE SERPL-MCNC: 131 MG/DL (ref 65–140)
GLUCOSE SERPL-MCNC: 52 MG/DL (ref 65–140)
GLUCOSE SERPL-MCNC: 53 MG/DL (ref 65–140)
GLUCOSE SERPL-MCNC: 63 MG/DL (ref 65–140)
GLUCOSE SERPL-MCNC: 64 MG/DL (ref 65–140)
GLUCOSE SERPL-MCNC: 67 MG/DL (ref 65–140)
GLUCOSE SERPL-MCNC: 69 MG/DL (ref 65–140)
GLUCOSE SERPL-MCNC: 76 MG/DL (ref 65–140)
GLUCOSE SERPL-MCNC: 76 MG/DL (ref 65–140)
GLUCOSE SERPL-MCNC: 78 MG/DL (ref 65–140)
GLUCOSE SERPL-MCNC: 79 MG/DL (ref 65–140)
GLUCOSE SERPL-MCNC: 79 MG/DL (ref 65–140)
GLUCOSE SERPL-MCNC: 81 MG/DL (ref 65–140)
GLUCOSE SERPL-MCNC: 84 MG/DL (ref 65–140)
GLUCOSE SERPL-MCNC: 85 MG/DL (ref 65–140)
GLUCOSE SERPL-MCNC: 85 MG/DL (ref 65–140)
GLUCOSE SERPL-MCNC: 86 MG/DL (ref 65–140)
GLUCOSE SERPL-MCNC: 92 MG/DL (ref 65–140)
GLUCOSE SERPL-MCNC: 94 MG/DL (ref 65–140)
GLUCOSE SERPL-MCNC: 96 MG/DL (ref 65–140)
GLUCOSE SERPL-MCNC: 98 MG/DL (ref 65–140)
GLUCOSE UR STRIP-MCNC: NEGATIVE MG/DL
GRAM STN SPEC: ABNORMAL
HAV IGM SER QL: NORMAL
HBV CORE IGM SER QL: NORMAL
HBV SURFACE AG SER QL: NORMAL
HCO3 BLDA-SCNC: 21.3 MMOL/L (ref 22–28)
HCO3 BLDA-SCNC: 25.3 MMOL/L (ref 22–28)
HCO3 BLDA-SCNC: 25.3 MMOL/L (ref 22–28)
HCO3 BLDA-SCNC: 25.8 MMOL/L (ref 22–28)
HCO3 BLDA-SCNC: 26.9 MMOL/L (ref 22–28)
HCO3 BLDA-SCNC: 27.6 MMOL/L (ref 22–28)
HCO3 BLDA-SCNC: 27.7 MMOL/L (ref 22–28)
HCO3 BLDA-SCNC: 28.3 MMOL/L (ref 22–28)
HCO3 BLDA-SCNC: 28.4 MMOL/L (ref 22–28)
HCO3 BLDA-SCNC: 29.4 MMOL/L (ref 22–28)
HCO3 BLDA-SCNC: 36.5 MMOL/L (ref 22–28)
HCT VFR BLD AUTO: 22.1 % (ref 36.5–49.3)
HCT VFR BLD AUTO: 23.3 % (ref 36.5–49.3)
HCT VFR BLD AUTO: 23.3 % (ref 36.5–49.3)
HCT VFR BLD AUTO: 23.6 % (ref 36.5–49.3)
HCT VFR BLD AUTO: 23.7 % (ref 36.5–49.3)
HCT VFR BLD AUTO: 24.1 % (ref 36.5–49.3)
HCT VFR BLD AUTO: 25 % (ref 36.5–49.3)
HCT VFR BLD AUTO: 25.2 % (ref 36.5–49.3)
HCT VFR BLD AUTO: 25.2 % (ref 36.5–49.3)
HCT VFR BLD AUTO: 25.4 % (ref 36.5–49.3)
HCT VFR BLD AUTO: 25.4 % (ref 36.5–49.3)
HCT VFR BLD AUTO: 25.5 % (ref 36.5–49.3)
HCT VFR BLD AUTO: 25.5 % (ref 36.5–49.3)
HCT VFR BLD AUTO: 26.1 % (ref 36.5–49.3)
HCT VFR BLD AUTO: 28.3 % (ref 36.5–49.3)
HCT VFR BLD AUTO: 31.4 % (ref 36.5–49.3)
HCT VFR BLD AUTO: 32.9 % (ref 36.5–49.3)
HCT VFR BLD AUTO: 35.3 % (ref 36.5–49.3)
HCT VFR BLD AUTO: 36 % (ref 36.5–49.3)
HCT VFR BLD AUTO: 37.9 % (ref 36.5–49.3)
HCT VFR BLD AUTO: 39.1 % (ref 36.5–49.3)
HCT VFR BLD AUTO: 39.4 % (ref 36.5–49.3)
HCT VFR BLD AUTO: 40 % (ref 36.5–49.3)
HCT VFR BLD AUTO: 40.2 % (ref 36.5–49.3)
HCT VFR BLD AUTO: 41.8 % (ref 36.5–49.3)
HCT VFR BLD AUTO: 41.9 % (ref 36.5–49.3)
HCT VFR BLD AUTO: 45.2 % (ref 36.5–49.3)
HCT VFR BLD CALC: 27 % (ref 36.5–49.3)
HCV AB SER QL: NORMAL
HDLC SERPL-MCNC: 60 MG/DL
HGB BLD-MCNC: 10.3 G/DL (ref 12–17)
HGB BLD-MCNC: 10.5 G/DL (ref 12–17)
HGB BLD-MCNC: 11.2 G/DL (ref 12–17)
HGB BLD-MCNC: 11.7 G/DL (ref 12–17)
HGB BLD-MCNC: 11.8 G/DL (ref 12–17)
HGB BLD-MCNC: 12.2 G/DL (ref 12–17)
HGB BLD-MCNC: 12.5 G/DL (ref 12–17)
HGB BLD-MCNC: 12.8 G/DL (ref 12–17)
HGB BLD-MCNC: 12.8 G/DL (ref 12–17)
HGB BLD-MCNC: 13.2 G/DL (ref 12–17)
HGB BLD-MCNC: 13.4 G/DL (ref 12–17)
HGB BLD-MCNC: 14.2 G/DL (ref 12–17)
HGB BLD-MCNC: 7.5 G/DL (ref 12–17)
HGB BLD-MCNC: 7.5 G/DL (ref 12–17)
HGB BLD-MCNC: 7.6 G/DL (ref 12–17)
HGB BLD-MCNC: 7.7 G/DL (ref 12–17)
HGB BLD-MCNC: 7.9 G/DL (ref 12–17)
HGB BLD-MCNC: 7.9 G/DL (ref 12–17)
HGB BLD-MCNC: 8 G/DL (ref 12–17)
HGB BLD-MCNC: 8.1 G/DL (ref 12–17)
HGB BLD-MCNC: 8.1 G/DL (ref 12–17)
HGB BLD-MCNC: 8.2 G/DL (ref 12–17)
HGB BLD-MCNC: 8.4 G/DL (ref 12–17)
HGB BLD-MCNC: 8.6 G/DL (ref 12–17)
HGB BLD-MCNC: 8.7 G/DL (ref 12–17)
HGB BLD-MCNC: 8.9 G/DL (ref 12–17)
HGB BLD-MCNC: 9.3 G/DL (ref 12–17)
HGB BLDA-MCNC: 9.2 G/DL (ref 12–17)
HGB UR QL STRIP.AUTO: NEGATIVE
HOROWITZ INDEX BLDA+IHG-RTO: 100 MM[HG]
HOROWITZ INDEX BLDA+IHG-RTO: 40 MM[HG]
HOROWITZ INDEX BLDA+IHG-RTO: 50 MM[HG]
HOROWITZ INDEX BLDA+IHG-RTO: 60 MM[HG]
HOROWITZ INDEX BLDA+IHG-RTO: 80 MM[HG]
HOROWITZ INDEX BLDA+IHG-RTO: 80 MM[HG]
HOROWITZ INDEX BLDA+IHG-RTO: 90 MM[HG]
HSV1 IGM TITR SER IF: NORMAL TITER
HSV2 IGM TITR SER IF: NORMAL TITER
HYALINE CASTS #/AREA URNS LPF: ABNORMAL /LPF
IMM GRANULOCYTES # BLD AUTO: 0.03 THOUSAND/UL (ref 0–0.2)
IMM GRANULOCYTES # BLD AUTO: 0.05 THOUSAND/UL (ref 0–0.2)
IMM GRANULOCYTES # BLD AUTO: 0.15 THOUSAND/UL (ref 0–0.2)
IMM GRANULOCYTES # BLD AUTO: 0.17 THOUSAND/UL (ref 0–0.2)
IMM GRANULOCYTES # BLD AUTO: 0.3 THOUSAND/UL (ref 0–0.2)
IMM GRANULOCYTES # BLD AUTO: >0.5 THOUSAND/UL (ref 0–0.2)
IMM GRANULOCYTES NFR BLD AUTO: 0 % (ref 0–2)
IMM GRANULOCYTES NFR BLD AUTO: 1 % (ref 0–2)
IMM GRANULOCYTES NFR BLD AUTO: 2 % (ref 0–2)
IMM GRANULOCYTES NFR BLD AUTO: 3 % (ref 0–2)
INR PPP: 0.88 (ref 0.84–1.19)
INR PPP: 0.91 (ref 0.84–1.19)
INR PPP: 1.13 (ref 0.84–1.19)
INR PPP: 1.17 (ref 0.84–1.19)
INR PPP: 1.19 (ref 0.84–1.19)
INR PPP: 1.19 (ref 0.84–1.19)
INR PPP: 1.28 (ref 0.84–1.19)
INR PPP: 1.98 (ref 0.84–1.19)
INTERPRETATION: ABNORMAL
KETONES UR STRIP-MCNC: NEGATIVE MG/DL
LACTATE SERPL-SCNC: 0.5 MMOL/L (ref 0.5–2)
LACTATE SERPL-SCNC: 0.7 MMOL/L (ref 0.5–2)
LACTATE SERPL-SCNC: 0.9 MMOL/L (ref 0.5–2)
LACTATE SERPL-SCNC: 1.5 MMOL/L (ref 0.5–2)
LACTATE SERPL-SCNC: 2 MMOL/L (ref 0.5–2)
LACTATE SERPL-SCNC: 2.5 MMOL/L (ref 0.5–2)
LACTATE SERPL-SCNC: 8 MMOL/L (ref 0.5–2)
LDLC SERPL CALC-MCNC: 108 MG/DL (ref 0–100)
LEUKOCYTE ESTERASE UR QL STRIP: NEGATIVE
LIPASE SERPL-CCNC: 1136 U/L (ref 73–393)
LIPASE SERPL-CCNC: 14 U/L (ref 73–393)
LIPASE SERPL-CCNC: 18 U/L (ref 73–393)
LIPASE SERPL-CCNC: 28 U/L (ref 73–393)
LIPASE SERPL-CCNC: 53 U/L (ref 73–393)
LYMPHOCYTES # BLD AUTO: 0.26 THOUSAND/UL (ref 0.6–4.47)
LYMPHOCYTES # BLD AUTO: 0.32 THOUSANDS/ΜL (ref 0.6–4.47)
LYMPHOCYTES # BLD AUTO: 0.52 THOUSAND/UL (ref 0.6–4.47)
LYMPHOCYTES # BLD AUTO: 0.6 THOUSANDS/ΜL (ref 0.6–4.47)
LYMPHOCYTES # BLD AUTO: 0.77 THOUSANDS/ΜL (ref 0.6–4.47)
LYMPHOCYTES # BLD AUTO: 1 % (ref 14–44)
LYMPHOCYTES # BLD AUTO: 1.02 THOUSAND/UL (ref 0.6–4.47)
LYMPHOCYTES # BLD AUTO: 1.11 THOUSAND/UL (ref 0.6–4.47)
LYMPHOCYTES # BLD AUTO: 1.17 THOUSANDS/ΜL (ref 0.6–4.47)
LYMPHOCYTES # BLD AUTO: 1.21 THOUSANDS/ΜL (ref 0.6–4.47)
LYMPHOCYTES # BLD AUTO: 1.3 THOUSAND/UL (ref 0.6–4.47)
LYMPHOCYTES # BLD AUTO: 1.34 THOUSANDS/ΜL (ref 0.6–4.47)
LYMPHOCYTES # BLD AUTO: 1.53 THOUSANDS/ΜL (ref 0.6–4.47)
LYMPHOCYTES # BLD AUTO: 1.65 THOUSANDS/ΜL (ref 0.6–4.47)
LYMPHOCYTES # BLD AUTO: 2 % (ref 14–44)
LYMPHOCYTES # BLD AUTO: 4 % (ref 14–44)
LYMPHOCYTES # BLD AUTO: 7 % (ref 14–44)
LYMPHOCYTES # BLD AUTO: 8 % (ref 14–44)
LYMPHOCYTES NFR BLD AUTO: 13 % (ref 14–44)
LYMPHOCYTES NFR BLD AUTO: 15 % (ref 14–44)
LYMPHOCYTES NFR BLD AUTO: 2 % (ref 14–44)
LYMPHOCYTES NFR BLD AUTO: 4 % (ref 14–44)
LYMPHOCYTES NFR BLD AUTO: 4 % (ref 14–44)
LYMPHOCYTES NFR BLD AUTO: 5 % (ref 14–44)
MACROCYTES BLD QL AUTO: PRESENT
MAGNESIUM SERPL-MCNC: 1.4 MG/DL (ref 1.6–2.6)
MAGNESIUM SERPL-MCNC: 1.6 MG/DL (ref 1.6–2.6)
MAGNESIUM SERPL-MCNC: 1.7 MG/DL (ref 1.6–2.6)
MAGNESIUM SERPL-MCNC: 1.9 MG/DL (ref 1.6–2.6)
MAGNESIUM SERPL-MCNC: 2 MG/DL (ref 1.6–2.6)
MAGNESIUM SERPL-MCNC: 2 MG/DL (ref 1.6–2.6)
MAGNESIUM SERPL-MCNC: 2.1 MG/DL (ref 1.6–2.6)
MAGNESIUM SERPL-MCNC: 2.2 MG/DL (ref 1.6–2.6)
MAGNESIUM SERPL-MCNC: 2.2 MG/DL (ref 1.6–2.6)
MAGNESIUM SERPL-MCNC: 2.3 MG/DL (ref 1.6–2.6)
MAGNESIUM SERPL-MCNC: 2.4 MG/DL (ref 1.6–2.6)
MCH RBC QN AUTO: 27.5 PG (ref 26.8–34.3)
MCH RBC QN AUTO: 27.8 PG (ref 26.8–34.3)
MCH RBC QN AUTO: 27.8 PG (ref 26.8–34.3)
MCH RBC QN AUTO: 28 PG (ref 26.8–34.3)
MCH RBC QN AUTO: 28.1 PG (ref 26.8–34.3)
MCH RBC QN AUTO: 28.3 PG (ref 26.8–34.3)
MCH RBC QN AUTO: 28.4 PG (ref 26.8–34.3)
MCH RBC QN AUTO: 28.5 PG (ref 26.8–34.3)
MCH RBC QN AUTO: 28.7 PG (ref 26.8–34.3)
MCH RBC QN AUTO: 28.7 PG (ref 26.8–34.3)
MCH RBC QN AUTO: 28.8 PG (ref 26.8–34.3)
MCH RBC QN AUTO: 29 PG (ref 26.8–34.3)
MCH RBC QN AUTO: 29.2 PG (ref 26.8–34.3)
MCH RBC QN AUTO: 29.4 PG (ref 26.8–34.3)
MCHC RBC AUTO-ENTMCNC: 30.5 G/DL (ref 31.4–37.4)
MCHC RBC AUTO-ENTMCNC: 31.1 G/DL (ref 31.4–37.4)
MCHC RBC AUTO-ENTMCNC: 31.1 G/DL (ref 31.4–37.4)
MCHC RBC AUTO-ENTMCNC: 31.2 G/DL (ref 31.4–37.4)
MCHC RBC AUTO-ENTMCNC: 31.3 G/DL (ref 31.4–37.4)
MCHC RBC AUTO-ENTMCNC: 31.4 G/DL (ref 31.4–37.4)
MCHC RBC AUTO-ENTMCNC: 31.6 G/DL (ref 31.4–37.4)
MCHC RBC AUTO-ENTMCNC: 31.7 G/DL (ref 31.4–37.4)
MCHC RBC AUTO-ENTMCNC: 31.9 G/DL (ref 31.4–37.4)
MCHC RBC AUTO-ENTMCNC: 32 G/DL (ref 31.4–37.4)
MCHC RBC AUTO-ENTMCNC: 32.2 G/DL (ref 31.4–37.4)
MCHC RBC AUTO-ENTMCNC: 32.2 G/DL (ref 31.4–37.4)
MCHC RBC AUTO-ENTMCNC: 32.5 G/DL (ref 31.4–37.4)
MCHC RBC AUTO-ENTMCNC: 32.5 G/DL (ref 31.4–37.4)
MCHC RBC AUTO-ENTMCNC: 32.6 G/DL (ref 31.4–37.4)
MCHC RBC AUTO-ENTMCNC: 32.8 G/DL (ref 31.4–37.4)
MCHC RBC AUTO-ENTMCNC: 33.2 G/DL (ref 31.4–37.4)
MCHC RBC AUTO-ENTMCNC: 33.3 G/DL (ref 31.4–37.4)
MCHC RBC AUTO-ENTMCNC: 33.9 G/DL (ref 31.4–37.4)
MCHC RBC AUTO-ENTMCNC: 34.3 G/DL (ref 31.4–37.4)
MCHC RBC AUTO-ENTMCNC: 34.9 G/DL (ref 31.4–37.4)
MCV RBC AUTO: 84 FL (ref 82–98)
MCV RBC AUTO: 84 FL (ref 82–98)
MCV RBC AUTO: 85 FL (ref 82–98)
MCV RBC AUTO: 85 FL (ref 82–98)
MCV RBC AUTO: 86 FL (ref 82–98)
MCV RBC AUTO: 87 FL (ref 82–98)
MCV RBC AUTO: 88 FL (ref 82–98)
MCV RBC AUTO: 89 FL (ref 82–98)
MCV RBC AUTO: 89 FL (ref 82–98)
MCV RBC AUTO: 90 FL (ref 82–98)
MCV RBC AUTO: 91 FL (ref 82–98)
MCV RBC AUTO: 92 FL (ref 82–98)
METAMYELOCYTES NFR BLD MANUAL: 1 % (ref 0–1)
METAMYELOCYTES NFR BLD MANUAL: 1 % (ref 0–1)
MITOCHONDRIA M2 IGG SER-ACNC: <20 UNITS (ref 0–20)
MONOCYTES # BLD AUTO: 0.26 THOUSAND/UL (ref 0–1.22)
MONOCYTES # BLD AUTO: 0.86 THOUSAND/ΜL (ref 0.17–1.22)
MONOCYTES # BLD AUTO: 1.19 THOUSAND/ΜL (ref 0.17–1.22)
MONOCYTES # BLD AUTO: 1.28 THOUSAND/UL (ref 0–1.22)
MONOCYTES # BLD AUTO: 1.46 THOUSAND/ΜL (ref 0.17–1.22)
MONOCYTES # BLD AUTO: 1.53 THOUSAND/ΜL (ref 0.17–1.22)
MONOCYTES # BLD AUTO: 1.54 THOUSAND/ΜL (ref 0.17–1.22)
MONOCYTES # BLD AUTO: 1.58 THOUSAND/ΜL (ref 0.17–1.22)
MONOCYTES # BLD AUTO: 1.67 THOUSAND/UL (ref 0–1.22)
MONOCYTES # BLD AUTO: 1.73 THOUSAND/ΜL (ref 0.17–1.22)
MONOCYTES # BLD AUTO: 1.79 THOUSAND/UL (ref 0–1.22)
MONOCYTES # BLD AUTO: 2.02 THOUSAND/ΜL (ref 0.17–1.22)
MONOCYTES # BLD AUTO: 2.6 THOUSAND/UL (ref 0–1.22)
MONOCYTES NFR BLD AUTO: 10 % (ref 4–12)
MONOCYTES NFR BLD AUTO: 12 % (ref 4–12)
MONOCYTES NFR BLD AUTO: 12 % (ref 4–12)
MONOCYTES NFR BLD AUTO: 13 % (ref 4–12)
MONOCYTES NFR BLD AUTO: 5 % (ref 4–12)
MONOCYTES NFR BLD AUTO: 5 % (ref 4–12)
MONOCYTES NFR BLD AUTO: 6 % (ref 4–12)
MONOCYTES NFR BLD AUTO: 9 % (ref 4–12)
MONOCYTES NFR BLD: 1 % (ref 4–12)
MONOCYTES NFR BLD: 12 % (ref 4–12)
MONOCYTES NFR BLD: 14 % (ref 4–12)
MONOCYTES NFR BLD: 5 % (ref 4–12)
MONOCYTES NFR BLD: 7 % (ref 4–12)
MUCOUS THREADS UR QL AUTO: ABNORMAL
NEUTROPHILS # BLD AUTO: 10.32 THOUSANDS/ΜL (ref 1.85–7.62)
NEUTROPHILS # BLD AUTO: 15.01 THOUSANDS/ΜL (ref 1.85–7.62)
NEUTROPHILS # BLD AUTO: 15.37 THOUSANDS/ΜL (ref 1.85–7.62)
NEUTROPHILS # BLD AUTO: 25.15 THOUSANDS/ΜL (ref 1.85–7.62)
NEUTROPHILS # BLD AUTO: 25.79 THOUSANDS/ΜL (ref 1.85–7.62)
NEUTROPHILS # BLD AUTO: 28.2 THOUSANDS/ΜL (ref 1.85–7.62)
NEUTROPHILS # BLD AUTO: 6.41 THOUSANDS/ΜL (ref 1.85–7.62)
NEUTROPHILS # BLD AUTO: 7.14 THOUSANDS/ΜL (ref 1.85–7.62)
NEUTROPHILS # BLD MANUAL: 10.71 THOUSAND/UL (ref 1.85–7.62)
NEUTROPHILS # BLD MANUAL: 14.09 THOUSAND/UL (ref 1.85–7.62)
NEUTROPHILS # BLD MANUAL: 22.5 THOUSAND/UL (ref 1.85–7.62)
NEUTROPHILS # BLD MANUAL: 23.33 THOUSAND/UL (ref 1.85–7.62)
NEUTROPHILS # BLD MANUAL: 24.31 THOUSAND/UL (ref 1.85–7.62)
NEUTS BAND NFR BLD MANUAL: 2 % (ref 0–8)
NEUTS BAND NFR BLD MANUAL: 5 % (ref 0–8)
NEUTS BAND NFR BLD MANUAL: 5 % (ref 0–8)
NEUTS BAND NFR BLD MANUAL: 6 % (ref 0–8)
NEUTS SEG NFR BLD AUTO: 69 % (ref 43–75)
NEUTS SEG NFR BLD AUTO: 71 % (ref 43–75)
NEUTS SEG NFR BLD AUTO: 73 % (ref 43–75)
NEUTS SEG NFR BLD AUTO: 77 % (ref 43–75)
NEUTS SEG NFR BLD AUTO: 80 % (ref 43–75)
NEUTS SEG NFR BLD AUTO: 85 % (ref 43–75)
NEUTS SEG NFR BLD AUTO: 85 % (ref 43–75)
NEUTS SEG NFR BLD AUTO: 86 % (ref 43–75)
NEUTS SEG NFR BLD AUTO: 87 % (ref 43–75)
NEUTS SEG NFR BLD AUTO: 87 % (ref 43–75)
NEUTS SEG NFR BLD AUTO: 88 % (ref 43–75)
NITRITE UR QL STRIP: POSITIVE
NON-SQ EPI CELLS URNS QL MICRO: ABNORMAL /HPF
NONHDLC SERPL-MCNC: 127 MG/DL
NRBC BLD AUTO-RTO: 0 /100 WBCS
NT-PROBNP SERPL-MCNC: 1821 PG/ML
NT-PROBNP SERPL-MCNC: 814 PG/ML
O2 CT BLDA-SCNC: 10.8 ML/DL (ref 16–23)
O2 CT BLDA-SCNC: 11.5 ML/DL (ref 16–23)
O2 CT BLDA-SCNC: 11.8 ML/DL (ref 16–23)
O2 CT BLDA-SCNC: 11.9 ML/DL (ref 16–23)
O2 CT BLDA-SCNC: 12.2 ML/DL (ref 16–23)
O2 CT BLDA-SCNC: 12.5 ML/DL (ref 16–23)
O2 CT BLDA-SCNC: 12.8 ML/DL (ref 16–23)
O2 CT BLDA-SCNC: 13 ML/DL (ref 16–23)
O2 CT BLDA-SCNC: 13.1 ML/DL (ref 16–23)
O2 CT BLDA-SCNC: 13.6 ML/DL (ref 16–23)
OXYHGB MFR BLDA: 89.9 % (ref 94–97)
OXYHGB MFR BLDA: 93.4 % (ref 94–97)
OXYHGB MFR BLDA: 93.5 % (ref 94–97)
OXYHGB MFR BLDA: 94.5 % (ref 94–97)
OXYHGB MFR BLDA: 94.5 % (ref 94–97)
OXYHGB MFR BLDA: 97.7 % (ref 94–97)
OXYHGB MFR BLDA: 97.8 % (ref 94–97)
OXYHGB MFR BLDA: 97.8 % (ref 94–97)
OXYHGB MFR BLDA: 98 % (ref 94–97)
OXYHGB MFR BLDA: 98.7 % (ref 94–97)
P AXIS: -11 DEGREES
P AXIS: 145 DEGREES
P AXIS: 6 DEGREES
PCO2 BLD: 38 MMOL/L (ref 21–32)
PCO2 BLD: 39.6 MM HG (ref 36–44)
PCO2 BLDA: 33.5 MM HG (ref 36–44)
PCO2 BLDA: 34.6 MM HG (ref 36–44)
PCO2 BLDA: 35 MM HG (ref 36–44)
PCO2 BLDA: 35.3 MM HG (ref 36–44)
PCO2 BLDA: 36.2 MM HG (ref 36–44)
PCO2 BLDA: 36.6 MM HG (ref 36–44)
PCO2 BLDA: 37.3 MM HG (ref 36–44)
PCO2 BLDA: 39.8 MM HG (ref 36–44)
PCO2 BLDA: 42.2 MM HG (ref 36–44)
PCO2 BLDA: 42.9 MM HG (ref 36–44)
PEEP RESPIRATORY: 5 CM[H2O]
PEEP RESPIRATORY: 8 CM[H2O]
PH BLD: 7.57 [PH] (ref 7.35–7.45)
PH BLDA: 7.38 [PH] (ref 7.35–7.45)
PH BLDA: 7.39 [PH] (ref 7.35–7.45)
PH BLDA: 7.43 [PH] (ref 7.35–7.45)
PH BLDA: 7.45 [PH] (ref 7.35–7.45)
PH BLDA: 7.45 [PH] (ref 7.35–7.45)
PH BLDA: 7.47 [PH] (ref 7.35–7.45)
PH BLDA: 7.52 [PH] (ref 7.35–7.45)
PH BLDA: 7.53 [PH] (ref 7.35–7.45)
PH BLDA: 7.54 [PH] (ref 7.35–7.45)
PH BLDA: 7.54 [PH] (ref 7.35–7.45)
PH UR STRIP.AUTO: 5.5 [PH]
PHOSPHATE SERPL-MCNC: 1.8 MG/DL (ref 2.3–4.1)
PHOSPHATE SERPL-MCNC: 2 MG/DL (ref 2.3–4.1)
PHOSPHATE SERPL-MCNC: 2 MG/DL (ref 2.3–4.1)
PHOSPHATE SERPL-MCNC: 2.1 MG/DL (ref 2.3–4.1)
PHOSPHATE SERPL-MCNC: 2.4 MG/DL (ref 2.3–4.1)
PHOSPHATE SERPL-MCNC: 2.6 MG/DL (ref 2.3–4.1)
PHOSPHATE SERPL-MCNC: 2.6 MG/DL (ref 2.3–4.1)
PHOSPHATE SERPL-MCNC: 3.3 MG/DL (ref 2.3–4.1)
PHOSPHATE SERPL-MCNC: 4.4 MG/DL (ref 2.3–4.1)
PLATELET # BLD AUTO: 197 THOUSANDS/UL (ref 149–390)
PLATELET # BLD AUTO: 200 THOUSANDS/UL (ref 149–390)
PLATELET # BLD AUTO: 210 THOUSANDS/UL (ref 149–390)
PLATELET # BLD AUTO: 230 THOUSANDS/UL (ref 149–390)
PLATELET # BLD AUTO: 243 THOUSANDS/UL (ref 149–390)
PLATELET # BLD AUTO: 254 THOUSANDS/UL (ref 149–390)
PLATELET # BLD AUTO: 263 THOUSANDS/UL (ref 149–390)
PLATELET # BLD AUTO: 275 THOUSANDS/UL (ref 149–390)
PLATELET # BLD AUTO: 293 THOUSANDS/UL (ref 149–390)
PLATELET # BLD AUTO: 318 THOUSANDS/UL (ref 149–390)
PLATELET # BLD AUTO: 328 THOUSANDS/UL (ref 149–390)
PLATELET # BLD AUTO: 344 THOUSANDS/UL (ref 149–390)
PLATELET # BLD AUTO: 357 THOUSANDS/UL (ref 149–390)
PLATELET # BLD AUTO: 367 THOUSANDS/UL (ref 149–390)
PLATELET # BLD AUTO: 367 THOUSANDS/UL (ref 149–390)
PLATELET # BLD AUTO: 455 THOUSANDS/UL (ref 149–390)
PLATELET # BLD AUTO: 479 THOUSANDS/UL (ref 149–390)
PLATELET # BLD AUTO: 542 THOUSANDS/UL (ref 149–390)
PLATELET # BLD AUTO: 628 THOUSANDS/UL (ref 149–390)
PLATELET BLD QL SMEAR: ABNORMAL
PLATELET BLD QL SMEAR: ABNORMAL
PLATELET BLD QL SMEAR: ADEQUATE
PMV BLD AUTO: 10 FL (ref 8.9–12.7)
PMV BLD AUTO: 10.2 FL (ref 8.9–12.7)
PMV BLD AUTO: 10.2 FL (ref 8.9–12.7)
PMV BLD AUTO: 10.8 FL (ref 8.9–12.7)
PMV BLD AUTO: 9.1 FL (ref 8.9–12.7)
PMV BLD AUTO: 9.2 FL (ref 8.9–12.7)
PMV BLD AUTO: 9.3 FL (ref 8.9–12.7)
PMV BLD AUTO: 9.4 FL (ref 8.9–12.7)
PMV BLD AUTO: 9.4 FL (ref 8.9–12.7)
PMV BLD AUTO: 9.6 FL (ref 8.9–12.7)
PMV BLD AUTO: 9.6 FL (ref 8.9–12.7)
PMV BLD AUTO: 9.7 FL (ref 8.9–12.7)
PMV BLD AUTO: 9.8 FL (ref 8.9–12.7)
PMV BLD AUTO: 9.9 FL (ref 8.9–12.7)
PO2 BLD: >400 MM HG (ref 75–129)
PO2 BLDA: 136.1 MM HG (ref 75–129)
PO2 BLDA: 136.2 MM HG (ref 75–129)
PO2 BLDA: 153.6 MM HG (ref 75–129)
PO2 BLDA: 175.6 MM HG (ref 75–129)
PO2 BLDA: 192.9 MM HG (ref 75–129)
PO2 BLDA: 53.3 MM HG (ref 75–129)
PO2 BLDA: 63.9 MM HG (ref 75–129)
PO2 BLDA: 75.3 MM HG (ref 75–129)
PO2 BLDA: 75.8 MM HG (ref 75–129)
PO2 BLDA: 78 MM HG (ref 75–129)
POIKILOCYTOSIS BLD QL SMEAR: PRESENT
POLYCHROMASIA BLD QL SMEAR: PRESENT
POTASSIUM BLD-SCNC: 3.1 MMOL/L (ref 3.5–5.3)
POTASSIUM SERPL-SCNC: 2.9 MMOL/L (ref 3.5–5.3)
POTASSIUM SERPL-SCNC: 2.9 MMOL/L (ref 3.5–5.3)
POTASSIUM SERPL-SCNC: 3 MMOL/L (ref 3.5–5.3)
POTASSIUM SERPL-SCNC: 3.1 MMOL/L (ref 3.5–5.3)
POTASSIUM SERPL-SCNC: 3.2 MMOL/L (ref 3.5–5.3)
POTASSIUM SERPL-SCNC: 3.3 MMOL/L (ref 3.5–5.3)
POTASSIUM SERPL-SCNC: 3.4 MMOL/L (ref 3.5–5.3)
POTASSIUM SERPL-SCNC: 3.6 MMOL/L (ref 3.5–5.3)
POTASSIUM SERPL-SCNC: 3.7 MMOL/L (ref 3.5–5.3)
POTASSIUM SERPL-SCNC: 3.8 MMOL/L (ref 3.5–5.3)
POTASSIUM SERPL-SCNC: 3.9 MMOL/L (ref 3.5–5.3)
POTASSIUM SERPL-SCNC: 4 MMOL/L (ref 3.5–5.3)
POTASSIUM SERPL-SCNC: 4 MMOL/L (ref 3.5–5.3)
POTASSIUM SERPL-SCNC: 4.1 MMOL/L (ref 3.5–5.3)
POTASSIUM SERPL-SCNC: 4.2 MMOL/L (ref 3.5–5.3)
POTASSIUM SERPL-SCNC: 4.2 MMOL/L (ref 3.5–5.3)
POTASSIUM SERPL-SCNC: 4.3 MMOL/L (ref 3.5–5.3)
POTASSIUM SERPL-SCNC: 4.8 MMOL/L (ref 3.5–5.3)
PR INTERVAL: 112 MS
PR INTERVAL: 182 MS
PR INTERVAL: 196 MS
PROCALCITONIN SERPL-MCNC: 1.55 NG/ML
PROT SERPL-MCNC: 4.6 G/DL (ref 6.4–8.2)
PROT SERPL-MCNC: 4.9 G/DL (ref 6.4–8.2)
PROT SERPL-MCNC: 5.1 G/DL (ref 6.4–8.2)
PROT SERPL-MCNC: 5.2 G/DL (ref 6.4–8.2)
PROT SERPL-MCNC: 5.2 G/DL (ref 6.4–8.2)
PROT SERPL-MCNC: 5.3 G/DL (ref 6.4–8.2)
PROT SERPL-MCNC: 5.5 G/DL (ref 6.4–8.2)
PROT SERPL-MCNC: 5.5 G/DL (ref 6.4–8.2)
PROT SERPL-MCNC: 5.6 G/DL (ref 6.4–8.2)
PROT SERPL-MCNC: 5.6 G/DL (ref 6.4–8.2)
PROT SERPL-MCNC: 5.7 G/DL (ref 6.4–8.2)
PROT SERPL-MCNC: 5.7 G/DL (ref 6.4–8.2)
PROT SERPL-MCNC: 6 G/DL (ref 6.4–8.2)
PROT SERPL-MCNC: 6.4 G/DL (ref 6.4–8.2)
PROT SERPL-MCNC: 6.6 G/DL (ref 6.4–8.2)
PROT SERPL-MCNC: 6.7 G/DL (ref 6.4–8.2)
PROT SERPL-MCNC: 7 G/DL (ref 6.4–8.2)
PROT SERPL-MCNC: 7.2 G/DL (ref 6.4–8.2)
PROT SERPL-MCNC: 7.8 G/DL (ref 6.4–8.2)
PROT UR STRIP-MCNC: ABNORMAL MG/DL
PROTHROMBIN TIME: 12.1 SECONDS (ref 11.6–14.5)
PROTHROMBIN TIME: 12.5 SECONDS (ref 11.6–14.5)
PROTHROMBIN TIME: 14.5 SECONDS (ref 11.6–14.5)
PROTHROMBIN TIME: 15.1 SECONDS (ref 11.6–14.5)
PROTHROMBIN TIME: 15.1 SECONDS (ref 11.6–14.5)
PROTHROMBIN TIME: 15.2 SECONDS (ref 11.6–14.5)
PROTHROMBIN TIME: 15.4 SECONDS (ref 11.6–14.5)
PROTHROMBIN TIME: 21.6 SECONDS (ref 11.6–14.5)
PS SUPP: 5
QRS AXIS: 108 DEGREES
QRS AXIS: 44 DEGREES
QRS AXIS: 72 DEGREES
QRSD INTERVAL: 106 MS
QRSD INTERVAL: 112 MS
QRSD INTERVAL: 113 MS
QT INTERVAL: 413 MS
QT INTERVAL: 446 MS
QT INTERVAL: 482 MS
QTC INTERVAL: 452 MS
QTC INTERVAL: 533 MS
QTC INTERVAL: 628 MS
RBC # BLD AUTO: 2.6 MILLION/UL (ref 3.88–5.62)
RBC # BLD AUTO: 2.61 MILLION/UL (ref 3.88–5.62)
RBC # BLD AUTO: 2.64 MILLION/UL (ref 3.88–5.62)
RBC # BLD AUTO: 2.67 MILLION/UL (ref 3.88–5.62)
RBC # BLD AUTO: 2.79 MILLION/UL (ref 3.88–5.62)
RBC # BLD AUTO: 2.81 MILLION/UL (ref 3.88–5.62)
RBC # BLD AUTO: 2.9 MILLION/UL (ref 3.88–5.62)
RBC # BLD AUTO: 2.98 MILLION/UL (ref 3.88–5.62)
RBC # BLD AUTO: 3.03 MILLION/UL (ref 3.88–5.62)
RBC # BLD AUTO: 3.63 MILLION/UL (ref 3.88–5.62)
RBC # BLD AUTO: 3.75 MILLION/UL (ref 3.88–5.62)
RBC # BLD AUTO: 3.98 MILLION/UL (ref 3.88–5.62)
RBC # BLD AUTO: 4.21 MILLION/UL (ref 3.88–5.62)
RBC # BLD AUTO: 4.29 MILLION/UL (ref 3.88–5.62)
RBC # BLD AUTO: 4.39 MILLION/UL (ref 3.88–5.62)
RBC # BLD AUTO: 4.39 MILLION/UL (ref 3.88–5.62)
RBC # BLD AUTO: 4.55 MILLION/UL (ref 3.88–5.62)
RBC # BLD AUTO: 4.56 MILLION/UL (ref 3.88–5.62)
RBC # BLD AUTO: 4.6 MILLION/UL (ref 3.88–5.62)
RBC # BLD AUTO: 4.77 MILLION/UL (ref 3.88–5.62)
RBC # BLD AUTO: 4.98 MILLION/UL (ref 3.88–5.62)
RBC #/AREA URNS AUTO: ABNORMAL /HPF
RBC MORPH BLD: PRESENT
RH BLD: POSITIVE
RH BLD: POSITIVE
SARS-COV-2 RNA RESP QL NAA+PROBE: NEGATIVE
SARS-COV-2 RNA RESP QL NAA+PROBE: NEGATIVE
SIMV VENT INSPIRED AIR FIO2: 40
SIMV VENT PEEP: 5
SIMV VENT: ABNORMAL
SODIUM BLD-SCNC: 132 MMOL/L (ref 136–145)
SODIUM SERPL-SCNC: 123 MMOL/L (ref 136–145)
SODIUM SERPL-SCNC: 128 MMOL/L (ref 136–145)
SODIUM SERPL-SCNC: 132 MMOL/L (ref 136–145)
SODIUM SERPL-SCNC: 133 MMOL/L (ref 136–145)
SODIUM SERPL-SCNC: 133 MMOL/L (ref 136–145)
SODIUM SERPL-SCNC: 134 MMOL/L (ref 136–145)
SODIUM SERPL-SCNC: 135 MMOL/L (ref 136–145)
SODIUM SERPL-SCNC: 135 MMOL/L (ref 136–145)
SODIUM SERPL-SCNC: 136 MMOL/L (ref 136–145)
SODIUM SERPL-SCNC: 137 MMOL/L (ref 136–145)
SODIUM SERPL-SCNC: 138 MMOL/L (ref 136–145)
SODIUM SERPL-SCNC: 139 MMOL/L (ref 136–145)
SODIUM SERPL-SCNC: 139 MMOL/L (ref 136–145)
SODIUM SERPL-SCNC: 140 MMOL/L (ref 136–145)
SODIUM SERPL-SCNC: 140 MMOL/L (ref 136–145)
SODIUM SERPL-SCNC: 141 MMOL/L (ref 136–145)
SODIUM SERPL-SCNC: 142 MMOL/L (ref 136–145)
SODIUM SERPL-SCNC: 143 MMOL/L (ref 136–145)
SP GR UR STRIP.AUTO: 1.02 (ref 1–1.03)
SPECIMEN EXPIRATION DATE: NORMAL
SPECIMEN SOURCE: ABNORMAL
T WAVE AXIS: -47 DEGREES
T WAVE AXIS: 11 DEGREES
T WAVE AXIS: 167 DEGREES
TARGETS BLD QL SMEAR: PRESENT
TOTAL CELLS COUNTED SPEC: 100
TOTAL CELLS COUNTED SPEC: 100
TRIGL SERPL-MCNC: 97 MG/DL
TROPONIN I SERPL-MCNC: <0.02 NG/ML
TROPONIN I SERPL-MCNC: <0.02 NG/ML
TSH SERPL DL<=0.05 MIU/L-ACNC: 4.03 UIU/ML (ref 0.36–3.74)
UNIT DISPENSE STATUS: NORMAL
UNIT DISPENSE STATUS: NORMAL
UNIT PRODUCT CODE: NORMAL
UNIT PRODUCT CODE: NORMAL
UNIT RH: NORMAL
UNIT RH: NORMAL
UROBILINOGEN UR QL STRIP.AUTO: 0.2 E.U./DL
VENT AC: 12
VENT AC: 16
VENT- AC: AC
VENTRICULAR RATE: 102 BPM
VENTRICULAR RATE: 72 BPM
VENTRICULAR RATE: 86 BPM
VT SETTING VENT: 400 ML
VT SETTING VENT: 450 ML
VT SETTING VENT: 450 ML
VT SETTING VENT: 500 ML
WBC # BLD AUTO: 12.13 THOUSAND/UL (ref 4.31–10.16)
WBC # BLD AUTO: 12.89 THOUSAND/UL (ref 4.31–10.16)
WBC # BLD AUTO: 13.91 THOUSAND/UL (ref 4.31–10.16)
WBC # BLD AUTO: 14.08 THOUSAND/UL (ref 4.31–10.16)
WBC # BLD AUTO: 14.87 THOUSAND/UL (ref 4.31–10.16)
WBC # BLD AUTO: 17.59 THOUSAND/UL (ref 4.31–10.16)
WBC # BLD AUTO: 18.03 THOUSAND/UL (ref 4.31–10.16)
WBC # BLD AUTO: 18.54 THOUSAND/UL (ref 4.31–10.16)
WBC # BLD AUTO: 18.99 THOUSAND/UL (ref 4.31–10.16)
WBC # BLD AUTO: 20.43 THOUSAND/UL (ref 4.31–10.16)
WBC # BLD AUTO: 25.57 THOUSAND/UL (ref 4.31–10.16)
WBC # BLD AUTO: 25.64 THOUSAND/UL (ref 4.31–10.16)
WBC # BLD AUTO: 25.86 THOUSAND/UL (ref 4.31–10.16)
WBC # BLD AUTO: 27.73 THOUSAND/UL (ref 4.31–10.16)
WBC # BLD AUTO: 28.67 THOUSAND/UL (ref 4.31–10.16)
WBC # BLD AUTO: 29.86 THOUSAND/UL (ref 4.31–10.16)
WBC # BLD AUTO: 31.8 THOUSAND/UL (ref 4.31–10.16)
WBC # BLD AUTO: 32.57 THOUSAND/UL (ref 4.31–10.16)
WBC # BLD AUTO: 6.39 THOUSAND/UL (ref 4.31–10.16)
WBC # BLD AUTO: 9.19 THOUSAND/UL (ref 4.31–10.16)
WBC # BLD AUTO: 9.56 THOUSAND/UL (ref 4.31–10.16)
WBC #/AREA URNS AUTO: ABNORMAL /HPF

## 2020-01-01 PROCEDURE — 94003 VENT MGMT INPAT SUBQ DAY: CPT

## 2020-01-01 PROCEDURE — 80048 BASIC METABOLIC PNL TOTAL CA: CPT | Performed by: INTERNAL MEDICINE

## 2020-01-01 PROCEDURE — NC001 PR NO CHARGE: Performed by: PHYSICIAN ASSISTANT

## 2020-01-01 PROCEDURE — 31500 INSERT EMERGENCY AIRWAY: CPT | Performed by: SURGERY

## 2020-01-01 PROCEDURE — 76705 ECHO EXAM OF ABDOMEN: CPT

## 2020-01-01 PROCEDURE — 96376 TX/PRO/DX INJ SAME DRUG ADON: CPT

## 2020-01-01 PROCEDURE — 99220 PR INITIAL OBSERVATION CARE/DAY 70 MINUTES: CPT | Performed by: STUDENT IN AN ORGANIZED HEALTH CARE EDUCATION/TRAINING PROGRAM

## 2020-01-01 PROCEDURE — 52332 CYSTOSCOPY AND TREATMENT: CPT | Performed by: UROLOGY

## 2020-01-01 PROCEDURE — 82330 ASSAY OF CALCIUM: CPT | Performed by: PHYSICIAN ASSISTANT

## 2020-01-01 PROCEDURE — 74183 MRI ABD W/O CNTR FLWD CNTR: CPT

## 2020-01-01 PROCEDURE — 85014 HEMATOCRIT: CPT

## 2020-01-01 PROCEDURE — 85730 THROMBOPLASTIN TIME PARTIAL: CPT | Performed by: PHYSICIAN ASSISTANT

## 2020-01-01 PROCEDURE — C1769 GUIDE WIRE: HCPCS | Performed by: UROLOGY

## 2020-01-01 PROCEDURE — 83605 ASSAY OF LACTIC ACID: CPT | Performed by: ORTHOPAEDIC SURGERY

## 2020-01-01 PROCEDURE — 82805 BLOOD GASES W/O2 SATURATION: CPT | Performed by: STUDENT IN AN ORGANIZED HEALTH CARE EDUCATION/TRAINING PROGRAM

## 2020-01-01 PROCEDURE — NC001 PR NO CHARGE: Performed by: UROLOGY

## 2020-01-01 PROCEDURE — NC001 PR NO CHARGE: Performed by: SURGERY

## 2020-01-01 PROCEDURE — 83605 ASSAY OF LACTIC ACID: CPT | Performed by: EMERGENCY MEDICINE

## 2020-01-01 PROCEDURE — 99232 SBSQ HOSP IP/OBS MODERATE 35: CPT | Performed by: SURGERY

## 2020-01-01 PROCEDURE — 82805 BLOOD GASES W/O2 SATURATION: CPT | Performed by: ORTHOPAEDIC SURGERY

## 2020-01-01 PROCEDURE — 74328 X-RAY BILE DUCT ENDOSCOPY: CPT

## 2020-01-01 PROCEDURE — 99232 SBSQ HOSP IP/OBS MODERATE 35: CPT | Performed by: INTERNAL MEDICINE

## 2020-01-01 PROCEDURE — 99291 CRITICAL CARE FIRST HOUR: CPT | Performed by: PHYSICIAN ASSISTANT

## 2020-01-01 PROCEDURE — 76377 3D RENDER W/INTRP POSTPROCES: CPT

## 2020-01-01 PROCEDURE — 83605 ASSAY OF LACTIC ACID: CPT | Performed by: PHYSICIAN ASSISTANT

## 2020-01-01 PROCEDURE — 80048 BASIC METABOLIC PNL TOTAL CA: CPT | Performed by: STUDENT IN AN ORGANIZED HEALTH CARE EDUCATION/TRAINING PROGRAM

## 2020-01-01 PROCEDURE — 99222 1ST HOSP IP/OBS MODERATE 55: CPT | Performed by: SURGERY

## 2020-01-01 PROCEDURE — 99232 SBSQ HOSP IP/OBS MODERATE 35: CPT | Performed by: STUDENT IN AN ORGANIZED HEALTH CARE EDUCATION/TRAINING PROGRAM

## 2020-01-01 PROCEDURE — 80076 HEPATIC FUNCTION PANEL: CPT | Performed by: STUDENT IN AN ORGANIZED HEALTH CARE EDUCATION/TRAINING PROGRAM

## 2020-01-01 PROCEDURE — 4A133J1 MONITORING OF ARTERIAL PULSE, PERIPHERAL, PERCUTANEOUS APPROACH: ICD-10-PCS | Performed by: SURGERY

## 2020-01-01 PROCEDURE — 1160F RVW MEDS BY RX/DR IN RCRD: CPT | Performed by: INTERNAL MEDICINE

## 2020-01-01 PROCEDURE — 71045 X-RAY EXAM CHEST 1 VIEW: CPT

## 2020-01-01 PROCEDURE — 94760 N-INVAS EAR/PLS OXIMETRY 1: CPT

## 2020-01-01 PROCEDURE — 99291 CRITICAL CARE FIRST HOUR: CPT | Performed by: SURGERY

## 2020-01-01 PROCEDURE — 99221 1ST HOSP IP/OBS SF/LOW 40: CPT | Performed by: PHYSICIAN ASSISTANT

## 2020-01-01 PROCEDURE — 36415 COLL VENOUS BLD VENIPUNCTURE: CPT | Performed by: EMERGENCY MEDICINE

## 2020-01-01 PROCEDURE — 71260 CT THORAX DX C+: CPT

## 2020-01-01 PROCEDURE — 80076 HEPATIC FUNCTION PANEL: CPT | Performed by: ORTHOPAEDIC SURGERY

## 2020-01-01 PROCEDURE — 83735 ASSAY OF MAGNESIUM: CPT | Performed by: SURGERY

## 2020-01-01 PROCEDURE — G1004 CDSM NDSC: HCPCS

## 2020-01-01 PROCEDURE — 3075F SYST BP GE 130 - 139MM HG: CPT | Performed by: FAMILY MEDICINE

## 2020-01-01 PROCEDURE — 82948 REAGENT STRIP/BLOOD GLUCOSE: CPT

## 2020-01-01 PROCEDURE — 3079F DIAST BP 80-89 MM HG: CPT | Performed by: INTERNAL MEDICINE

## 2020-01-01 PROCEDURE — 99214 OFFICE O/P EST MOD 30 MIN: CPT | Performed by: FAMILY MEDICINE

## 2020-01-01 PROCEDURE — 85610 PROTHROMBIN TIME: CPT | Performed by: PHYSICIAN ASSISTANT

## 2020-01-01 PROCEDURE — 99232 SBSQ HOSP IP/OBS MODERATE 35: CPT | Performed by: NURSE PRACTITIONER

## 2020-01-01 PROCEDURE — 80053 COMPREHEN METABOLIC PANEL: CPT | Performed by: STUDENT IN AN ORGANIZED HEALTH CARE EDUCATION/TRAINING PROGRAM

## 2020-01-01 PROCEDURE — 85007 BL SMEAR W/DIFF WBC COUNT: CPT | Performed by: STUDENT IN AN ORGANIZED HEALTH CARE EDUCATION/TRAINING PROGRAM

## 2020-01-01 PROCEDURE — 93005 ELECTROCARDIOGRAM TRACING: CPT

## 2020-01-01 PROCEDURE — 99152 MOD SED SAME PHYS/QHP 5/>YRS: CPT | Performed by: RADIOLOGY

## 2020-01-01 PROCEDURE — 99152 MOD SED SAME PHYS/QHP 5/>YRS: CPT

## 2020-01-01 PROCEDURE — 83735 ASSAY OF MAGNESIUM: CPT | Performed by: STUDENT IN AN ORGANIZED HEALTH CARE EDUCATION/TRAINING PROGRAM

## 2020-01-01 PROCEDURE — 0T768DZ DILATION OF RIGHT URETER WITH INTRALUMINAL DEVICE, VIA NATURAL OR ARTIFICIAL OPENING ENDOSCOPIC: ICD-10-PCS | Performed by: UROLOGY

## 2020-01-01 PROCEDURE — 85018 HEMOGLOBIN: CPT | Performed by: PHYSICIAN ASSISTANT

## 2020-01-01 PROCEDURE — 84100 ASSAY OF PHOSPHORUS: CPT | Performed by: PHYSICIAN ASSISTANT

## 2020-01-01 PROCEDURE — 99292 CRITICAL CARE ADDL 30 MIN: CPT | Performed by: SURGERY

## 2020-01-01 PROCEDURE — 82805 BLOOD GASES W/O2 SATURATION: CPT | Performed by: SURGERY

## 2020-01-01 PROCEDURE — 0BH17EZ INSERTION OF ENDOTRACHEAL AIRWAY INTO TRACHEA, VIA NATURAL OR ARTIFICIAL OPENING: ICD-10-PCS | Performed by: ANESTHESIOLOGY

## 2020-01-01 PROCEDURE — 05HY33Z INSERTION OF INFUSION DEVICE INTO UPPER VEIN, PERCUTANEOUS APPROACH: ICD-10-PCS | Performed by: SURGERY

## 2020-01-01 PROCEDURE — 85007 BL SMEAR W/DIFF WBC COUNT: CPT | Performed by: ORTHOPAEDIC SURGERY

## 2020-01-01 PROCEDURE — 97110 THERAPEUTIC EXERCISES: CPT

## 2020-01-01 PROCEDURE — 1036F TOBACCO NON-USER: CPT | Performed by: FAMILY MEDICINE

## 2020-01-01 PROCEDURE — 99291 CRITICAL CARE FIRST HOUR: CPT | Performed by: EMERGENCY MEDICINE

## 2020-01-01 PROCEDURE — 85018 HEMOGLOBIN: CPT | Performed by: INTERNAL MEDICINE

## 2020-01-01 PROCEDURE — 99233 SBSQ HOSP IP/OBS HIGH 50: CPT | Performed by: SURGERY

## 2020-01-01 PROCEDURE — 80076 HEPATIC FUNCTION PANEL: CPT | Performed by: PHYSICIAN ASSISTANT

## 2020-01-01 PROCEDURE — 49406 IMAGE CATH FLUID PERI/RETRO: CPT

## 2020-01-01 PROCEDURE — 85027 COMPLETE CBC AUTOMATED: CPT | Performed by: ORTHOPAEDIC SURGERY

## 2020-01-01 PROCEDURE — 82805 BLOOD GASES W/O2 SATURATION: CPT | Performed by: EMERGENCY MEDICINE

## 2020-01-01 PROCEDURE — 83605 ASSAY OF LACTIC ACID: CPT | Performed by: STUDENT IN AN ORGANIZED HEALTH CARE EDUCATION/TRAINING PROGRAM

## 2020-01-01 PROCEDURE — 36620 INSERTION CATHETER ARTERY: CPT | Performed by: PHYSICIAN ASSISTANT

## 2020-01-01 PROCEDURE — 4A133B1 MONITORING OF ARTERIAL PRESSURE, PERIPHERAL, PERCUTANEOUS APPROACH: ICD-10-PCS | Performed by: SURGERY

## 2020-01-01 PROCEDURE — 94150 VITAL CAPACITY TEST: CPT

## 2020-01-01 PROCEDURE — 80048 BASIC METABOLIC PNL TOTAL CA: CPT | Performed by: ORTHOPAEDIC SURGERY

## 2020-01-01 PROCEDURE — C1751 CATH, INF, PER/CENT/MIDLINE: HCPCS

## 2020-01-01 PROCEDURE — 82248 BILIRUBIN DIRECT: CPT | Performed by: PHYSICIAN ASSISTANT

## 2020-01-01 PROCEDURE — 86901 BLOOD TYPING SEROLOGIC RH(D): CPT | Performed by: PHYSICIAN ASSISTANT

## 2020-01-01 PROCEDURE — 83735 ASSAY OF MAGNESIUM: CPT | Performed by: INTERNAL MEDICINE

## 2020-01-01 PROCEDURE — 96375 TX/PRO/DX INJ NEW DRUG ADDON: CPT

## 2020-01-01 PROCEDURE — 1160F RVW MEDS BY RX/DR IN RCRD: CPT | Performed by: FAMILY MEDICINE

## 2020-01-01 PROCEDURE — C1769 GUIDE WIRE: HCPCS

## 2020-01-01 PROCEDURE — 80053 COMPREHEN METABOLIC PANEL: CPT | Performed by: PHYSICIAN ASSISTANT

## 2020-01-01 PROCEDURE — 99233 SBSQ HOSP IP/OBS HIGH 50: CPT | Performed by: STUDENT IN AN ORGANIZED HEALTH CARE EDUCATION/TRAINING PROGRAM

## 2020-01-01 PROCEDURE — 94660 CPAP INITIATION&MGMT: CPT

## 2020-01-01 PROCEDURE — 99233 SBSQ HOSP IP/OBS HIGH 50: CPT | Performed by: INTERNAL MEDICINE

## 2020-01-01 PROCEDURE — 83735 ASSAY OF MAGNESIUM: CPT | Performed by: ORTHOPAEDIC SURGERY

## 2020-01-01 PROCEDURE — BT1DYZZ FLUOROSCOPY OF RIGHT KIDNEY, URETER AND BLADDER USING OTHER CONTRAST: ICD-10-PCS | Performed by: UROLOGY

## 2020-01-01 PROCEDURE — C1729 CATH, DRAINAGE: HCPCS

## 2020-01-01 PROCEDURE — 85027 COMPLETE CBC AUTOMATED: CPT | Performed by: STUDENT IN AN ORGANIZED HEALTH CARE EDUCATION/TRAINING PROGRAM

## 2020-01-01 PROCEDURE — 81001 URINALYSIS AUTO W/SCOPE: CPT | Performed by: PHYSICIAN ASSISTANT

## 2020-01-01 PROCEDURE — 82330 ASSAY OF CALCIUM: CPT | Performed by: STUDENT IN AN ORGANIZED HEALTH CARE EDUCATION/TRAINING PROGRAM

## 2020-01-01 PROCEDURE — 97530 THERAPEUTIC ACTIVITIES: CPT

## 2020-01-01 PROCEDURE — 99153 MOD SED SAME PHYS/QHP EA: CPT

## 2020-01-01 PROCEDURE — C9113 INJ PANTOPRAZOLE SODIUM, VIA: HCPCS | Performed by: PHYSICIAN ASSISTANT

## 2020-01-01 PROCEDURE — 99222 1ST HOSP IP/OBS MODERATE 55: CPT | Performed by: INTERNAL MEDICINE

## 2020-01-01 PROCEDURE — 85014 HEMATOCRIT: CPT | Performed by: PHYSICIAN ASSISTANT

## 2020-01-01 PROCEDURE — 85610 PROTHROMBIN TIME: CPT | Performed by: EMERGENCY MEDICINE

## 2020-01-01 PROCEDURE — 36600 WITHDRAWAL OF ARTERIAL BLOOD: CPT

## 2020-01-01 PROCEDURE — 86256 FLUORESCENT ANTIBODY TITER: CPT | Performed by: INTERNAL MEDICINE

## 2020-01-01 PROCEDURE — 85025 COMPLETE CBC W/AUTO DIFF WBC: CPT | Performed by: PHYSICIAN ASSISTANT

## 2020-01-01 PROCEDURE — 80053 COMPREHEN METABOLIC PANEL: CPT

## 2020-01-01 PROCEDURE — 85730 THROMBOPLASTIN TIME PARTIAL: CPT | Performed by: SURGERY

## 2020-01-01 PROCEDURE — 94660 CPAP INITIATION&MGMT: CPT | Performed by: SOCIAL WORKER

## 2020-01-01 PROCEDURE — 30233N1 TRANSFUSION OF NONAUTOLOGOUS RED BLOOD CELLS INTO PERIPHERAL VEIN, PERCUTANEOUS APPROACH: ICD-10-PCS | Performed by: ANESTHESIOLOGY

## 2020-01-01 PROCEDURE — 93010 ELECTROCARDIOGRAM REPORT: CPT | Performed by: INTERNAL MEDICINE

## 2020-01-01 PROCEDURE — 36415 COLL VENOUS BLD VENIPUNCTURE: CPT

## 2020-01-01 PROCEDURE — U0003 INFECTIOUS AGENT DETECTION BY NUCLEIC ACID (DNA OR RNA); SEVERE ACUTE RESPIRATORY SYNDROME CORONAVIRUS 2 (SARS-COV-2) (CORONAVIRUS DISEASE [COVID-19]), AMPLIFIED PROBE TECHNIQUE, MAKING USE OF HIGH THROUGHPUT TECHNOLOGIES AS DESCRIBED BY CMS-2020-01-R: HCPCS | Performed by: PHYSICIAN ASSISTANT

## 2020-01-01 PROCEDURE — 94002 VENT MGMT INPAT INIT DAY: CPT

## 2020-01-01 PROCEDURE — 83880 ASSAY OF NATRIURETIC PEPTIDE: CPT | Performed by: STUDENT IN AN ORGANIZED HEALTH CARE EDUCATION/TRAINING PROGRAM

## 2020-01-01 PROCEDURE — 36415 COLL VENOUS BLD VENIPUNCTURE: CPT | Performed by: PHYSICIAN ASSISTANT

## 2020-01-01 PROCEDURE — 83690 ASSAY OF LIPASE: CPT | Performed by: PHYSICIAN ASSISTANT

## 2020-01-01 PROCEDURE — 83735 ASSAY OF MAGNESIUM: CPT | Performed by: PHYSICIAN ASSISTANT

## 2020-01-01 PROCEDURE — 99284 EMERGENCY DEPT VISIT MOD MDM: CPT | Performed by: PHYSICIAN ASSISTANT

## 2020-01-01 PROCEDURE — 85027 COMPLETE CBC AUTOMATED: CPT | Performed by: SURGERY

## 2020-01-01 PROCEDURE — 03HY32Z INSERTION OF MONITORING DEVICE INTO UPPER ARTERY, PERCUTANEOUS APPROACH: ICD-10-PCS | Performed by: ANESTHESIOLOGY

## 2020-01-01 PROCEDURE — 43255 EGD CONTROL BLEEDING ANY: CPT | Performed by: INTERNAL MEDICINE

## 2020-01-01 PROCEDURE — 85610 PROTHROMBIN TIME: CPT | Performed by: INTERNAL MEDICINE

## 2020-01-01 PROCEDURE — 80076 HEPATIC FUNCTION PANEL: CPT | Performed by: EMERGENCY MEDICINE

## 2020-01-01 PROCEDURE — 80074 ACUTE HEPATITIS PANEL: CPT | Performed by: PHYSICIAN ASSISTANT

## 2020-01-01 PROCEDURE — 43262 ENDO CHOLANGIOPANCREATOGRAPH: CPT | Performed by: INTERNAL MEDICINE

## 2020-01-01 PROCEDURE — 86900 BLOOD TYPING SEROLOGIC ABO: CPT | Performed by: PHYSICIAN ASSISTANT

## 2020-01-01 PROCEDURE — 3077F SYST BP >= 140 MM HG: CPT | Performed by: INTERNAL MEDICINE

## 2020-01-01 PROCEDURE — 86696 HERPES SIMPLEX TYPE 2 TEST: CPT | Performed by: INTERNAL MEDICINE

## 2020-01-01 PROCEDURE — 80048 BASIC METABOLIC PNL TOTAL CA: CPT | Performed by: SURGERY

## 2020-01-01 PROCEDURE — 03HY32Z INSERTION OF MONITORING DEVICE INTO UPPER ARTERY, PERCUTANEOUS APPROACH: ICD-10-PCS | Performed by: SURGERY

## 2020-01-01 PROCEDURE — 3079F DIAST BP 80-89 MM HG: CPT | Performed by: FAMILY MEDICINE

## 2020-01-01 PROCEDURE — 0F798ZZ DILATION OF COMMON BILE DUCT, VIA NATURAL OR ARTIFICIAL OPENING ENDOSCOPIC: ICD-10-PCS | Performed by: INTERNAL MEDICINE

## 2020-01-01 PROCEDURE — 80053 COMPREHEN METABOLIC PANEL: CPT | Performed by: UROLOGY

## 2020-01-01 PROCEDURE — BF111ZZ FLUOROSCOPY OF BILIARY AND PANCREATIC DUCTS USING LOW OSMOLAR CONTRAST: ICD-10-PCS | Performed by: INTERNAL MEDICINE

## 2020-01-01 PROCEDURE — 84484 ASSAY OF TROPONIN QUANT: CPT | Performed by: EMERGENCY MEDICINE

## 2020-01-01 PROCEDURE — 85027 COMPLETE CBC AUTOMATED: CPT

## 2020-01-01 PROCEDURE — 85027 COMPLETE CBC AUTOMATED: CPT | Performed by: PHYSICIAN ASSISTANT

## 2020-01-01 PROCEDURE — 82805 BLOOD GASES W/O2 SATURATION: CPT | Performed by: PHYSICIAN ASSISTANT

## 2020-01-01 PROCEDURE — 80053 COMPREHEN METABOLIC PANEL: CPT | Performed by: INTERNAL MEDICINE

## 2020-01-01 PROCEDURE — 02HV33Z INSERTION OF INFUSION DEVICE INTO SUPERIOR VENA CAVA, PERCUTANEOUS APPROACH: ICD-10-PCS | Performed by: ANESTHESIOLOGY

## 2020-01-01 PROCEDURE — P9016 RBC LEUKOCYTES REDUCED: HCPCS

## 2020-01-01 PROCEDURE — 97112 NEUROMUSCULAR REEDUCATION: CPT

## 2020-01-01 PROCEDURE — 85027 COMPLETE CBC AUTOMATED: CPT | Performed by: INTERNAL MEDICINE

## 2020-01-01 PROCEDURE — 4040F PNEUMOC VAC/ADMIN/RCVD: CPT | Performed by: FAMILY MEDICINE

## 2020-01-01 PROCEDURE — 85014 HEMATOCRIT: CPT | Performed by: ORTHOPAEDIC SURGERY

## 2020-01-01 PROCEDURE — 85007 BL SMEAR W/DIFF WBC COUNT: CPT | Performed by: SURGERY

## 2020-01-01 PROCEDURE — 87040 BLOOD CULTURE FOR BACTERIA: CPT | Performed by: PHYSICIAN ASSISTANT

## 2020-01-01 PROCEDURE — 82330 ASSAY OF CALCIUM: CPT

## 2020-01-01 PROCEDURE — 99214 OFFICE O/P EST MOD 30 MIN: CPT | Performed by: INTERNAL MEDICINE

## 2020-01-01 PROCEDURE — 82803 BLOOD GASES ANY COMBINATION: CPT

## 2020-01-01 PROCEDURE — 82330 ASSAY OF CALCIUM: CPT | Performed by: SURGERY

## 2020-01-01 PROCEDURE — 87205 SMEAR GRAM STAIN: CPT | Performed by: INTERNAL MEDICINE

## 2020-01-01 PROCEDURE — 99222 1ST HOSP IP/OBS MODERATE 55: CPT | Performed by: PHYSICIAN ASSISTANT

## 2020-01-01 PROCEDURE — 84100 ASSAY OF PHOSPHORUS: CPT | Performed by: ORTHOPAEDIC SURGERY

## 2020-01-01 PROCEDURE — 84100 ASSAY OF PHOSPHORUS: CPT | Performed by: STUDENT IN AN ORGANIZED HEALTH CARE EDUCATION/TRAINING PROGRAM

## 2020-01-01 PROCEDURE — 36556 INSERT NON-TUNNEL CV CATH: CPT | Performed by: PHYSICIAN ASSISTANT

## 2020-01-01 PROCEDURE — 71046 X-RAY EXAM CHEST 2 VIEWS: CPT

## 2020-01-01 PROCEDURE — BF101ZZ FLUOROSCOPY OF BILE DUCTS USING LOW OSMOLAR CONTRAST: ICD-10-PCS | Performed by: INTERNAL MEDICINE

## 2020-01-01 PROCEDURE — 80048 BASIC METABOLIC PNL TOTAL CA: CPT | Performed by: EMERGENCY MEDICINE

## 2020-01-01 PROCEDURE — 0W3P4ZZ CONTROL BLEEDING IN GASTROINTESTINAL TRACT, PERCUTANEOUS ENDOSCOPIC APPROACH: ICD-10-PCS | Performed by: INTERNAL MEDICINE

## 2020-01-01 PROCEDURE — 84132 ASSAY OF SERUM POTASSIUM: CPT

## 2020-01-01 PROCEDURE — 99285 EMERGENCY DEPT VISIT HI MDM: CPT

## 2020-01-01 PROCEDURE — 86645 CMV ANTIBODY IGM: CPT | Performed by: INTERNAL MEDICINE

## 2020-01-01 PROCEDURE — 5A1945Z RESPIRATORY VENTILATION, 24-96 CONSECUTIVE HOURS: ICD-10-PCS | Performed by: ANESTHESIOLOGY

## 2020-01-01 PROCEDURE — 87147 CULTURE TYPE IMMUNOLOGIC: CPT | Performed by: PHYSICIAN ASSISTANT

## 2020-01-01 PROCEDURE — 87205 SMEAR GRAM STAIN: CPT | Performed by: ORTHOPAEDIC SURGERY

## 2020-01-01 PROCEDURE — 86850 RBC ANTIBODY SCREEN: CPT | Performed by: PHYSICIAN ASSISTANT

## 2020-01-01 PROCEDURE — 80048 BASIC METABOLIC PNL TOTAL CA: CPT | Performed by: PHYSICIAN ASSISTANT

## 2020-01-01 PROCEDURE — 87070 CULTURE OTHR SPECIMN AEROBIC: CPT | Performed by: INTERNAL MEDICINE

## 2020-01-01 PROCEDURE — 85025 COMPLETE CBC W/AUTO DIFF WBC: CPT | Performed by: EMERGENCY MEDICINE

## 2020-01-01 PROCEDURE — 3078F DIAST BP <80 MM HG: CPT | Performed by: FAMILY MEDICINE

## 2020-01-01 PROCEDURE — 83880 ASSAY OF NATRIURETIC PEPTIDE: CPT | Performed by: EMERGENCY MEDICINE

## 2020-01-01 PROCEDURE — 97163 PT EVAL HIGH COMPLEX 45 MIN: CPT

## 2020-01-01 PROCEDURE — A9577 INJ MULTIHANCE: HCPCS | Performed by: PHYSICIAN ASSISTANT

## 2020-01-01 PROCEDURE — 74330 X-RAY BILE/PANC ENDOSCOPY: CPT

## 2020-01-01 PROCEDURE — 83735 ASSAY OF MAGNESIUM: CPT | Performed by: UROLOGY

## 2020-01-01 PROCEDURE — 97167 OT EVAL HIGH COMPLEX 60 MIN: CPT

## 2020-01-01 PROCEDURE — 84443 ASSAY THYROID STIM HORMONE: CPT

## 2020-01-01 PROCEDURE — A9585 GADOBUTROL INJECTION: HCPCS | Performed by: INTERNAL MEDICINE

## 2020-01-01 PROCEDURE — 84295 ASSAY OF SERUM SODIUM: CPT

## 2020-01-01 PROCEDURE — C1758 CATHETER, URETERAL: HCPCS | Performed by: UROLOGY

## 2020-01-01 PROCEDURE — 85384 FIBRINOGEN ACTIVITY: CPT | Performed by: PHYSICIAN ASSISTANT

## 2020-01-01 PROCEDURE — NC001 PR NO CHARGE: Performed by: RADIOLOGY

## 2020-01-01 PROCEDURE — 87040 BLOOD CULTURE FOR BACTERIA: CPT | Performed by: EMERGENCY MEDICINE

## 2020-01-01 PROCEDURE — 85025 COMPLETE CBC W/AUTO DIFF WBC: CPT | Performed by: STUDENT IN AN ORGANIZED HEALTH CARE EDUCATION/TRAINING PROGRAM

## 2020-01-01 PROCEDURE — 3008F BODY MASS INDEX DOCD: CPT | Performed by: FAMILY MEDICINE

## 2020-01-01 PROCEDURE — 74177 CT ABD & PELVIS W/CONTRAST: CPT

## 2020-01-01 PROCEDURE — 36620 INSERTION CATHETER ARTERY: CPT | Performed by: SURGERY

## 2020-01-01 PROCEDURE — 85018 HEMOGLOBIN: CPT | Performed by: ORTHOPAEDIC SURGERY

## 2020-01-01 PROCEDURE — 99213 OFFICE O/P EST LOW 20 MIN: CPT | Performed by: FAMILY MEDICINE

## 2020-01-01 PROCEDURE — 80076 HEPATIC FUNCTION PANEL: CPT | Performed by: SURGERY

## 2020-01-01 PROCEDURE — 86665 EPSTEIN-BARR CAPSID VCA: CPT | Performed by: INTERNAL MEDICINE

## 2020-01-01 PROCEDURE — 86644 CMV ANTIBODY: CPT | Performed by: INTERNAL MEDICINE

## 2020-01-01 PROCEDURE — 71250 CT THORAX DX C-: CPT

## 2020-01-01 PROCEDURE — 1036F TOBACCO NON-USER: CPT | Performed by: INTERNAL MEDICINE

## 2020-01-01 PROCEDURE — 96374 THER/PROPH/DIAG INJ IV PUSH: CPT

## 2020-01-01 PROCEDURE — 85007 BL SMEAR W/DIFF WBC COUNT: CPT | Performed by: INTERNAL MEDICINE

## 2020-01-01 PROCEDURE — 85025 COMPLETE CBC W/AUTO DIFF WBC: CPT | Performed by: UROLOGY

## 2020-01-01 PROCEDURE — 74150 CT ABDOMEN W/O CONTRAST: CPT

## 2020-01-01 PROCEDURE — 84100 ASSAY OF PHOSPHORUS: CPT | Performed by: SURGERY

## 2020-01-01 PROCEDURE — 86920 COMPATIBILITY TEST SPIN: CPT

## 2020-01-01 PROCEDURE — 83690 ASSAY OF LIPASE: CPT | Performed by: EMERGENCY MEDICINE

## 2020-01-01 PROCEDURE — 99223 1ST HOSP IP/OBS HIGH 75: CPT | Performed by: UROLOGY

## 2020-01-01 PROCEDURE — 85014 HEMATOCRIT: CPT | Performed by: INTERNAL MEDICINE

## 2020-01-01 PROCEDURE — U0003 INFECTIOUS AGENT DETECTION BY NUCLEIC ACID (DNA OR RNA); SEVERE ACUTE RESPIRATORY SYNDROME CORONAVIRUS 2 (SARS-COV-2) (CORONAVIRUS DISEASE [COVID-19]), AMPLIFIED PROBE TECHNIQUE, MAKING USE OF HIGH THROUGHPUT TECHNOLOGIES AS DESCRIBED BY CMS-2020-01-R: HCPCS | Performed by: STUDENT IN AN ORGANIZED HEALTH CARE EDUCATION/TRAINING PROGRAM

## 2020-01-01 PROCEDURE — 85610 PROTHROMBIN TIME: CPT | Performed by: SURGERY

## 2020-01-01 PROCEDURE — C2617 STENT, NON-COR, TEM W/O DEL: HCPCS | Performed by: UROLOGY

## 2020-01-01 PROCEDURE — 5A1935Z RESPIRATORY VENTILATION, LESS THAN 24 CONSECUTIVE HOURS: ICD-10-PCS | Performed by: ANESTHESIOLOGY

## 2020-01-01 PROCEDURE — 83735 ASSAY OF MAGNESIUM: CPT | Performed by: EMERGENCY MEDICINE

## 2020-01-01 PROCEDURE — 74420 UROGRAPHY RTRGR +-KUB: CPT

## 2020-01-01 PROCEDURE — 87493 C DIFF AMPLIFIED PROBE: CPT | Performed by: STUDENT IN AN ORGANIZED HEALTH CARE EDUCATION/TRAINING PROGRAM

## 2020-01-01 PROCEDURE — 82533 TOTAL CORTISOL: CPT | Performed by: PHYSICIAN ASSISTANT

## 2020-01-01 PROCEDURE — 86663 EPSTEIN-BARR ANTIBODY: CPT | Performed by: INTERNAL MEDICINE

## 2020-01-01 PROCEDURE — 0F9430Z DRAINAGE OF GALLBLADDER WITH DRAINAGE DEVICE, PERCUTANEOUS APPROACH: ICD-10-PCS | Performed by: INTERNAL MEDICINE

## 2020-01-01 PROCEDURE — 99292 CRITICAL CARE ADDL 30 MIN: CPT | Performed by: ANESTHESIOLOGY

## 2020-01-01 PROCEDURE — 43260 ERCP W/SPECIMEN COLLECTION: CPT | Performed by: INTERNAL MEDICINE

## 2020-01-01 PROCEDURE — 86695 HERPES SIMPLEX TYPE 1 TEST: CPT | Performed by: INTERNAL MEDICINE

## 2020-01-01 PROCEDURE — 97535 SELF CARE MNGMENT TRAINING: CPT

## 2020-01-01 PROCEDURE — 4040F PNEUMOC VAC/ADMIN/RCVD: CPT | Performed by: INTERNAL MEDICINE

## 2020-01-01 PROCEDURE — 36569 INSJ PICC 5 YR+ W/O IMAGING: CPT

## 2020-01-01 PROCEDURE — 82947 ASSAY GLUCOSE BLOOD QUANT: CPT

## 2020-01-01 PROCEDURE — 80061 LIPID PANEL: CPT

## 2020-01-01 PROCEDURE — 99223 1ST HOSP IP/OBS HIGH 75: CPT | Performed by: STUDENT IN AN ORGANIZED HEALTH CARE EDUCATION/TRAINING PROGRAM

## 2020-01-01 PROCEDURE — 4A133B1 MONITORING OF ARTERIAL PRESSURE, PERIPHERAL, PERCUTANEOUS APPROACH: ICD-10-PCS | Performed by: ANESTHESIOLOGY

## 2020-01-01 PROCEDURE — 4A133J1 MONITORING OF ARTERIAL PULSE, PERIPHERAL, PERCUTANEOUS APPROACH: ICD-10-PCS | Performed by: ANESTHESIOLOGY

## 2020-01-01 PROCEDURE — 84145 PROCALCITONIN (PCT): CPT | Performed by: PHYSICIAN ASSISTANT

## 2020-01-01 PROCEDURE — 99223 1ST HOSP IP/OBS HIGH 75: CPT | Performed by: SURGERY

## 2020-01-01 PROCEDURE — 99223 1ST HOSP IP/OBS HIGH 75: CPT | Performed by: INTERNAL MEDICINE

## 2020-01-01 PROCEDURE — 3008F BODY MASS INDEX DOCD: CPT | Performed by: INTERNAL MEDICINE

## 2020-01-01 PROCEDURE — 85730 THROMBOPLASTIN TIME PARTIAL: CPT | Performed by: EMERGENCY MEDICINE

## 2020-01-01 PROCEDURE — 83690 ASSAY OF LIPASE: CPT | Performed by: STUDENT IN AN ORGANIZED HEALTH CARE EDUCATION/TRAINING PROGRAM

## 2020-01-01 PROCEDURE — 86664 EPSTEIN-BARR NUCLEAR ANTIGEN: CPT | Performed by: INTERNAL MEDICINE

## 2020-01-01 PROCEDURE — 93306 TTE W/DOPPLER COMPLETE: CPT | Performed by: INTERNAL MEDICINE

## 2020-01-01 PROCEDURE — 94760 N-INVAS EAR/PLS OXIMETRY 1: CPT | Performed by: SOCIAL WORKER

## 2020-01-01 PROCEDURE — 74170 CT ABD WO CNTRST FLWD CNTRST: CPT

## 2020-01-01 PROCEDURE — 93306 TTE W/DOPPLER COMPLETE: CPT

## 2020-01-01 PROCEDURE — 47490 INCISION OF GALLBLADDER: CPT | Performed by: RADIOLOGY

## 2020-01-01 DEVICE — INLAY OPTIMA URETERAL STENT W/O GUIDEWIRE
Type: IMPLANTABLE DEVICE | Site: URETER | Status: FUNCTIONAL
Brand: BARD® INLAY OPTIMA® URETERAL STENT

## 2020-01-01 RX ORDER — SODIUM CHLORIDE, SODIUM GLUCONATE, SODIUM ACETATE, POTASSIUM CHLORIDE, MAGNESIUM CHLORIDE, SODIUM PHOSPHATE, DIBASIC, AND POTASSIUM PHOSPHATE .53; .5; .37; .037; .03; .012; .00082 G/100ML; G/100ML; G/100ML; G/100ML; G/100ML; G/100ML; G/100ML
500 INJECTION, SOLUTION INTRAVENOUS ONCE
Status: COMPLETED | OUTPATIENT
Start: 2020-01-01 | End: 2020-01-01

## 2020-01-01 RX ORDER — AMIODARONE HYDROCHLORIDE 200 MG/1
200 TABLET ORAL DAILY
Status: DISCONTINUED | OUTPATIENT
Start: 2020-01-01 | End: 2020-01-01 | Stop reason: HOSPADM

## 2020-01-01 RX ORDER — SODIUM CHLORIDE, SODIUM GLUCONATE, SODIUM ACETATE, POTASSIUM CHLORIDE, MAGNESIUM CHLORIDE, SODIUM PHOSPHATE, DIBASIC, AND POTASSIUM PHOSPHATE .53; .5; .37; .037; .03; .012; .00082 G/100ML; G/100ML; G/100ML; G/100ML; G/100ML; G/100ML; G/100ML
100 INJECTION, SOLUTION INTRAVENOUS CONTINUOUS
Status: DISCONTINUED | OUTPATIENT
Start: 2020-01-01 | End: 2020-01-01

## 2020-01-01 RX ORDER — PROPOFOL 10 MG/ML
5-50 INJECTION, EMULSION INTRAVENOUS
Status: DISCONTINUED | OUTPATIENT
Start: 2020-01-01 | End: 2020-01-01 | Stop reason: HOSPADM

## 2020-01-01 RX ORDER — ALBUMIN, HUMAN INJ 5% 5 %
SOLUTION INTRAVENOUS CONTINUOUS PRN
Status: DISCONTINUED | OUTPATIENT
Start: 2020-01-01 | End: 2020-01-01

## 2020-01-01 RX ORDER — PANTOPRAZOLE SODIUM 40 MG/1
40 INJECTION, POWDER, FOR SOLUTION INTRAVENOUS EVERY 12 HOURS SCHEDULED
Status: DISCONTINUED | OUTPATIENT
Start: 2020-01-01 | End: 2020-01-01 | Stop reason: HOSPADM

## 2020-01-01 RX ORDER — TAMSULOSIN HYDROCHLORIDE 0.4 MG/1
CAPSULE ORAL
Qty: 90 CAPSULE | Refills: 3 | Status: SHIPPED | OUTPATIENT
Start: 2020-01-01 | End: 2020-10-25 | Stop reason: HOSPADM

## 2020-01-01 RX ORDER — TAMSULOSIN HYDROCHLORIDE 0.4 MG/1
0.4 CAPSULE ORAL
Status: CANCELLED | OUTPATIENT
Start: 2020-01-01

## 2020-01-01 RX ORDER — LANOLIN ALCOHOL/MO/W.PET/CERES
3 CREAM (GRAM) TOPICAL
Status: DISCONTINUED | OUTPATIENT
Start: 2020-01-01 | End: 2020-01-01

## 2020-01-01 RX ORDER — POTASSIUM CHLORIDE 14.9 MG/ML
20 INJECTION INTRAVENOUS ONCE
Status: COMPLETED | OUTPATIENT
Start: 2020-01-01 | End: 2020-01-01

## 2020-01-01 RX ORDER — PROPOFOL 10 MG/ML
INJECTION, EMULSION INTRAVENOUS AS NEEDED
Status: DISCONTINUED | OUTPATIENT
Start: 2020-01-01 | End: 2020-01-01

## 2020-01-01 RX ORDER — ALLOPURINOL 300 MG/1
TABLET ORAL
Qty: 90 TABLET | Refills: 1 | Status: SHIPPED | OUTPATIENT
Start: 2020-01-01 | End: 2020-10-25 | Stop reason: HOSPADM

## 2020-01-01 RX ORDER — OXYCODONE HYDROCHLORIDE 5 MG/1
5 TABLET ORAL EVERY 4 HOURS PRN
Status: DISCONTINUED | OUTPATIENT
Start: 2020-01-01 | End: 2020-10-25 | Stop reason: HOSPADM

## 2020-01-01 RX ORDER — EPINEPHRINE 0.1 MG/ML
SYRINGE (ML) INJECTION CODE/TRAUMA/SEDATION MEDICATION
Status: COMPLETED | OUTPATIENT
Start: 2020-01-01 | End: 2020-01-01

## 2020-01-01 RX ORDER — FENTANYL CITRATE 50 UG/ML
INJECTION, SOLUTION INTRAMUSCULAR; INTRAVENOUS AS NEEDED
Status: DISCONTINUED | OUTPATIENT
Start: 2020-01-01 | End: 2020-01-01

## 2020-01-01 RX ORDER — HYDROMORPHONE HCL/PF 1 MG/ML
0.5 SYRINGE (ML) INJECTION ONCE
Status: COMPLETED | OUTPATIENT
Start: 2020-01-01 | End: 2020-01-01

## 2020-01-01 RX ORDER — METOCLOPRAMIDE HYDROCHLORIDE 5 MG/ML
10 INJECTION INTRAMUSCULAR; INTRAVENOUS ONCE AS NEEDED
Status: DISCONTINUED | OUTPATIENT
Start: 2020-01-01 | End: 2020-01-01

## 2020-01-01 RX ORDER — FUROSEMIDE 10 MG/ML
40 INJECTION INTRAMUSCULAR; INTRAVENOUS
Status: DISCONTINUED | OUTPATIENT
Start: 2020-01-01 | End: 2020-01-01

## 2020-01-01 RX ORDER — AMIODARONE HYDROCHLORIDE 200 MG/1
200 TABLET ORAL DAILY
Status: DISCONTINUED | OUTPATIENT
Start: 2020-01-01 | End: 2020-01-01

## 2020-01-01 RX ORDER — ACETAMINOPHEN 325 MG/1
975 TABLET ORAL ONCE
Status: DISCONTINUED | OUTPATIENT
Start: 2020-01-01 | End: 2020-10-25 | Stop reason: HOSPADM

## 2020-01-01 RX ORDER — SODIUM CHLORIDE, SODIUM GLUCONATE, SODIUM ACETATE, POTASSIUM CHLORIDE, MAGNESIUM CHLORIDE, SODIUM PHOSPHATE, DIBASIC, AND POTASSIUM PHOSPHATE .53; .5; .37; .037; .03; .012; .00082 G/100ML; G/100ML; G/100ML; G/100ML; G/100ML; G/100ML; G/100ML
125 INJECTION, SOLUTION INTRAVENOUS CONTINUOUS
Status: DISCONTINUED | OUTPATIENT
Start: 2020-01-01 | End: 2020-01-01

## 2020-01-01 RX ORDER — PROPOFOL 10 MG/ML
5-50 INJECTION, EMULSION INTRAVENOUS
Status: CANCELLED | OUTPATIENT
Start: 2020-01-01

## 2020-01-01 RX ORDER — FAMOTIDINE 20 MG/1
20 TABLET, FILM COATED ORAL DAILY
Status: DISCONTINUED | OUTPATIENT
Start: 2020-01-01 | End: 2020-01-01 | Stop reason: HOSPADM

## 2020-01-01 RX ORDER — CALCIUM GLUCONATE 20 MG/ML
2 INJECTION, SOLUTION INTRAVENOUS ONCE
Status: COMPLETED | OUTPATIENT
Start: 2020-01-01 | End: 2020-01-01

## 2020-01-01 RX ORDER — ACETAMINOPHEN 325 MG/1
650 TABLET ORAL EVERY 8 HOURS PRN
Status: DISCONTINUED | OUTPATIENT
Start: 2020-01-01 | End: 2020-01-01

## 2020-01-01 RX ORDER — CHLORHEXIDINE GLUCONATE 0.12 MG/ML
15 RINSE ORAL EVERY 12 HOURS SCHEDULED
Status: DISCONTINUED | OUTPATIENT
Start: 2020-01-01 | End: 2020-01-01 | Stop reason: HOSPADM

## 2020-01-01 RX ORDER — ACETAMINOPHEN 325 MG/1
650 TABLET ORAL EVERY 4 HOURS PRN
Status: DISCONTINUED | OUTPATIENT
Start: 2020-01-01 | End: 2020-10-25 | Stop reason: HOSPADM

## 2020-01-01 RX ORDER — FENTANYL CITRATE 50 UG/ML
50 INJECTION, SOLUTION INTRAMUSCULAR; INTRAVENOUS
Status: DISCONTINUED | OUTPATIENT
Start: 2020-01-01 | End: 2020-10-25 | Stop reason: HOSPADM

## 2020-01-01 RX ORDER — SUCCINYLCHOLINE/SOD CL,ISO/PF 100 MG/5ML
SYRINGE (ML) INTRAVENOUS AS NEEDED
Status: DISCONTINUED | OUTPATIENT
Start: 2020-01-01 | End: 2020-01-01

## 2020-01-01 RX ORDER — HEPARIN SODIUM 5000 [USP'U]/ML
5000 INJECTION, SOLUTION INTRAVENOUS; SUBCUTANEOUS EVERY 8 HOURS SCHEDULED
Status: DISCONTINUED | OUTPATIENT
Start: 2020-01-01 | End: 2020-10-25 | Stop reason: HOSPADM

## 2020-01-01 RX ORDER — DEXAMETHASONE SODIUM PHOSPHATE 4 MG/ML
INJECTION, SOLUTION INTRA-ARTICULAR; INTRALESIONAL; INTRAMUSCULAR; INTRAVENOUS; SOFT TISSUE AS NEEDED
Status: DISCONTINUED | OUTPATIENT
Start: 2020-01-01 | End: 2020-01-01

## 2020-01-01 RX ORDER — ONDANSETRON 2 MG/ML
4 INJECTION INTRAMUSCULAR; INTRAVENOUS ONCE
Status: COMPLETED | OUTPATIENT
Start: 2020-01-01 | End: 2020-01-01

## 2020-01-01 RX ORDER — DEXTROSE MONOHYDRATE 25 G/50ML
INJECTION, SOLUTION INTRAVENOUS
Status: DISPENSED
Start: 2020-01-01 | End: 2020-01-01

## 2020-01-01 RX ORDER — ACETAMINOPHEN 325 MG/1
650 TABLET ORAL EVERY 6 HOURS PRN
Status: DISCONTINUED | OUTPATIENT
Start: 2020-01-01 | End: 2020-01-01 | Stop reason: HOSPADM

## 2020-01-01 RX ORDER — FENTANYL CITRATE 50 UG/ML
50 INJECTION, SOLUTION INTRAMUSCULAR; INTRAVENOUS EVERY 2 HOUR PRN
Status: DISCONTINUED | OUTPATIENT
Start: 2020-01-01 | End: 2020-10-25 | Stop reason: HOSPADM

## 2020-01-01 RX ORDER — ONDANSETRON 2 MG/ML
4 INJECTION INTRAMUSCULAR; INTRAVENOUS EVERY 8 HOURS PRN
Status: DISCONTINUED | OUTPATIENT
Start: 2020-01-01 | End: 2020-10-25 | Stop reason: HOSPADM

## 2020-01-01 RX ORDER — SODIUM CHLORIDE 9 MG/ML
100 INJECTION, SOLUTION INTRAVENOUS CONTINUOUS
Status: DISCONTINUED | OUTPATIENT
Start: 2020-01-01 | End: 2020-01-01

## 2020-01-01 RX ORDER — ALLOPURINOL 300 MG/1
300 TABLET ORAL DAILY
Status: DISCONTINUED | OUTPATIENT
Start: 2020-01-01 | End: 2020-01-01 | Stop reason: HOSPADM

## 2020-01-01 RX ORDER — DEXTROSE MONOHYDRATE 25 G/50ML
50 INJECTION, SOLUTION INTRAVENOUS ONCE
Status: COMPLETED | OUTPATIENT
Start: 2020-01-01 | End: 2020-01-01

## 2020-01-01 RX ORDER — AMIODARONE HYDROCHLORIDE 400 MG/1
TABLET ORAL
Qty: 90 TABLET | Refills: 4 | Status: SHIPPED | OUTPATIENT
Start: 2020-01-01 | End: 2020-10-25 | Stop reason: HOSPADM

## 2020-01-01 RX ORDER — LIDOCAINE 50 MG/G
1 PATCH TOPICAL DAILY
Status: DISCONTINUED | OUTPATIENT
Start: 2020-01-01 | End: 2020-01-01

## 2020-01-01 RX ORDER — OXYCODONE HYDROCHLORIDE 5 MG/1
2.5 TABLET ORAL EVERY 4 HOURS PRN
Status: DISCONTINUED | OUTPATIENT
Start: 2020-01-01 | End: 2020-10-25 | Stop reason: HOSPADM

## 2020-01-01 RX ORDER — ATROPA BELLADONNA AND OPIUM 16.2; 3 MG/1; MG/1
30 SUPPOSITORY RECTAL EVERY 8 HOURS PRN
Status: CANCELLED | OUTPATIENT
Start: 2020-01-01

## 2020-01-01 RX ORDER — DOCUSATE SODIUM 100 MG/1
100 CAPSULE, LIQUID FILLED ORAL 3 TIMES DAILY
Qty: 90 CAPSULE | Refills: 0 | Status: SHIPPED | OUTPATIENT
Start: 2020-01-01 | End: 2020-10-25 | Stop reason: HOSPADM

## 2020-01-01 RX ORDER — TAMSULOSIN HYDROCHLORIDE 0.4 MG/1
0.4 CAPSULE ORAL
Status: DISCONTINUED | OUTPATIENT
Start: 2020-01-01 | End: 2020-01-01 | Stop reason: HOSPADM

## 2020-01-01 RX ORDER — POTASSIUM CHLORIDE 20 MEQ/1
40 TABLET, EXTENDED RELEASE ORAL ONCE
Status: COMPLETED | OUTPATIENT
Start: 2020-01-01 | End: 2020-01-01

## 2020-01-01 RX ORDER — PANTOPRAZOLE SODIUM 40 MG/1
40 INJECTION, POWDER, FOR SOLUTION INTRAVENOUS EVERY 12 HOURS SCHEDULED
Status: CANCELLED | OUTPATIENT
Start: 2020-01-01

## 2020-01-01 RX ORDER — FENTANYL CITRATE-0.9 % NACL/PF 10 MCG/ML
25 PLASTIC BAG, INJECTION (ML) INTRAVENOUS CONTINUOUS
Status: DISCONTINUED | OUTPATIENT
Start: 2020-01-01 | End: 2020-01-01

## 2020-01-01 RX ORDER — DEXTROSE MONOHYDRATE 25 G/50ML
25 INJECTION, SOLUTION INTRAVENOUS ONCE
Status: DISCONTINUED | OUTPATIENT
Start: 2020-01-01 | End: 2020-01-01

## 2020-01-01 RX ORDER — SODIUM CHLORIDE, SODIUM GLUCONATE, SODIUM ACETATE, POTASSIUM CHLORIDE, MAGNESIUM CHLORIDE, SODIUM PHOSPHATE, DIBASIC, AND POTASSIUM PHOSPHATE .53; .5; .37; .037; .03; .012; .00082 G/100ML; G/100ML; G/100ML; G/100ML; G/100ML; G/100ML; G/100ML
1000 INJECTION, SOLUTION INTRAVENOUS ONCE
Status: CANCELLED | OUTPATIENT
Start: 2020-01-01

## 2020-01-01 RX ORDER — SODIUM CHLORIDE, SODIUM LACTATE, POTASSIUM CHLORIDE, CALCIUM CHLORIDE 600; 310; 30; 20 MG/100ML; MG/100ML; MG/100ML; MG/100ML
INJECTION, SOLUTION INTRAVENOUS CONTINUOUS PRN
Status: DISCONTINUED | OUTPATIENT
Start: 2020-01-01 | End: 2020-01-01

## 2020-01-01 RX ORDER — PROPOFOL 10 MG/ML
INJECTION, EMULSION INTRAVENOUS
Status: COMPLETED
Start: 2020-01-01 | End: 2020-01-01

## 2020-01-01 RX ORDER — BACLOFEN 10 MG/1
10 TABLET ORAL 3 TIMES DAILY
Status: DISCONTINUED | OUTPATIENT
Start: 2020-01-01 | End: 2020-01-01 | Stop reason: HOSPADM

## 2020-01-01 RX ORDER — CHLORHEXIDINE GLUCONATE 0.12 MG/ML
15 RINSE ORAL EVERY 12 HOURS SCHEDULED
Status: DISCONTINUED | OUTPATIENT
Start: 2020-01-01 | End: 2020-10-25 | Stop reason: HOSPADM

## 2020-01-01 RX ORDER — OXYBUTYNIN CHLORIDE 5 MG/1
10 TABLET, EXTENDED RELEASE ORAL DAILY
Status: DISCONTINUED | OUTPATIENT
Start: 2020-01-01 | End: 2020-01-01

## 2020-01-01 RX ORDER — CEPHALEXIN 500 MG/1
500 CAPSULE ORAL EVERY 8 HOURS SCHEDULED
Qty: 30 CAPSULE | Refills: 0 | Status: SHIPPED | OUTPATIENT
Start: 2020-01-01 | End: 2020-01-01

## 2020-01-01 RX ORDER — POTASSIUM CHLORIDE 20 MEQ/1
20 TABLET, EXTENDED RELEASE ORAL ONCE
Status: COMPLETED | OUTPATIENT
Start: 2020-01-01 | End: 2020-01-01

## 2020-01-01 RX ORDER — SODIUM CHLORIDE, SODIUM LACTATE, POTASSIUM CHLORIDE, CALCIUM CHLORIDE 600; 310; 30; 20 MG/100ML; MG/100ML; MG/100ML; MG/100ML
100 INJECTION, SOLUTION INTRAVENOUS CONTINUOUS
Status: DISCONTINUED | OUTPATIENT
Start: 2020-01-01 | End: 2020-01-01 | Stop reason: HOSPADM

## 2020-01-01 RX ORDER — FENTANYL CITRATE 50 UG/ML
50 INJECTION, SOLUTION INTRAMUSCULAR; INTRAVENOUS
Status: DISCONTINUED | OUTPATIENT
Start: 2020-01-01 | End: 2020-01-01

## 2020-01-01 RX ORDER — MAGNESIUM HYDROXIDE 1200 MG/15ML
LIQUID ORAL AS NEEDED
Status: DISCONTINUED | OUTPATIENT
Start: 2020-01-01 | End: 2020-01-01 | Stop reason: HOSPADM

## 2020-01-01 RX ORDER — ONDANSETRON 2 MG/ML
INJECTION INTRAMUSCULAR; INTRAVENOUS AS NEEDED
Status: DISCONTINUED | OUTPATIENT
Start: 2020-01-01 | End: 2020-01-01

## 2020-01-01 RX ORDER — ONDANSETRON 2 MG/ML
4 INJECTION INTRAMUSCULAR; INTRAVENOUS EVERY 6 HOURS PRN
Status: DISCONTINUED | OUTPATIENT
Start: 2020-01-01 | End: 2020-01-01 | Stop reason: HOSPADM

## 2020-01-01 RX ORDER — FUROSEMIDE 40 MG/1
20 TABLET ORAL DAILY
Qty: 90 TABLET | Refills: 2 | Status: SHIPPED | OUTPATIENT
Start: 2020-01-01 | End: 2020-10-25 | Stop reason: HOSPADM

## 2020-01-01 RX ORDER — FINASTERIDE 5 MG/1
5 TABLET, FILM COATED ORAL DAILY
Qty: 90 TABLET | Refills: 0 | Status: SHIPPED | OUTPATIENT
Start: 2020-01-01 | End: 2020-10-25 | Stop reason: HOSPADM

## 2020-01-01 RX ORDER — POTASSIUM CHLORIDE 29.8 MG/ML
40 INJECTION INTRAVENOUS ONCE
Status: COMPLETED | OUTPATIENT
Start: 2020-01-01 | End: 2020-01-01

## 2020-01-01 RX ORDER — FENTANYL CITRATE/PF 50 MCG/ML
25 SYRINGE (ML) INJECTION
Status: DISCONTINUED | OUTPATIENT
Start: 2020-01-01 | End: 2020-01-01

## 2020-01-01 RX ORDER — TRAMADOL HYDROCHLORIDE 50 MG/1
50 TABLET ORAL EVERY 8 HOURS PRN
Status: DISCONTINUED | OUTPATIENT
Start: 2020-01-01 | End: 2020-01-01

## 2020-01-01 RX ORDER — AMIODARONE HYDROCHLORIDE 200 MG/1
400 TABLET ORAL DAILY
Status: DISCONTINUED | OUTPATIENT
Start: 2020-01-01 | End: 2020-01-01

## 2020-01-01 RX ORDER — ONDANSETRON 2 MG/ML
4 INJECTION INTRAMUSCULAR; INTRAVENOUS ONCE AS NEEDED
Status: DISCONTINUED | OUTPATIENT
Start: 2020-01-01 | End: 2020-01-01

## 2020-01-01 RX ORDER — LIDOCAINE HYDROCHLORIDE 10 MG/ML
10 INJECTION, SOLUTION EPIDURAL; INFILTRATION; INTRACAUDAL; PERINEURAL ONCE
Status: DISCONTINUED | OUTPATIENT
Start: 2020-01-01 | End: 2020-10-25 | Stop reason: HOSPADM

## 2020-01-01 RX ORDER — KETOROLAC TROMETHAMINE 30 MG/ML
15 INJECTION, SOLUTION INTRAMUSCULAR; INTRAVENOUS EVERY 6 HOURS PRN
Status: DISCONTINUED | OUTPATIENT
Start: 2020-01-01 | End: 2020-01-01 | Stop reason: HOSPADM

## 2020-01-01 RX ORDER — PROPOFOL 10 MG/ML
INJECTION, EMULSION INTRAVENOUS CONTINUOUS PRN
Status: DISCONTINUED | OUTPATIENT
Start: 2020-01-01 | End: 2020-01-01

## 2020-01-01 RX ORDER — LIDOCAINE HYDROCHLORIDE 10 MG/ML
INJECTION, SOLUTION EPIDURAL; INFILTRATION; INTRACAUDAL; PERINEURAL AS NEEDED
Status: DISCONTINUED | OUTPATIENT
Start: 2020-01-01 | End: 2020-01-01

## 2020-01-01 RX ORDER — SODIUM CHLORIDE, SODIUM GLUCONATE, SODIUM ACETATE, POTASSIUM CHLORIDE, MAGNESIUM CHLORIDE, SODIUM PHOSPHATE, DIBASIC, AND POTASSIUM PHOSPHATE .53; .5; .37; .037; .03; .012; .00082 G/100ML; G/100ML; G/100ML; G/100ML; G/100ML; G/100ML; G/100ML
1000 INJECTION, SOLUTION INTRAVENOUS ONCE
Status: DISCONTINUED | OUTPATIENT
Start: 2020-01-01 | End: 2020-01-01 | Stop reason: HOSPADM

## 2020-01-01 RX ORDER — ONDANSETRON 2 MG/ML
4 INJECTION INTRAMUSCULAR; INTRAVENOUS EVERY 8 HOURS PRN
Status: CANCELLED | OUTPATIENT
Start: 2020-01-01

## 2020-01-01 RX ORDER — POTASSIUM CHLORIDE 29.8 MG/ML
40 INJECTION INTRAVENOUS ONCE
Status: DISCONTINUED | OUTPATIENT
Start: 2020-01-01 | End: 2020-01-01 | Stop reason: HOSPADM

## 2020-01-01 RX ORDER — FLUTICASONE PROPIONATE 50 MCG
1 SPRAY, SUSPENSION (ML) NASAL DAILY
Status: DISCONTINUED | OUTPATIENT
Start: 2020-01-01 | End: 2020-01-01 | Stop reason: HOSPADM

## 2020-01-01 RX ORDER — FAMOTIDINE 20 MG/1
TABLET, FILM COATED ORAL
Qty: 90 TABLET | Refills: 3 | Status: SHIPPED | OUTPATIENT
Start: 2020-01-01 | End: 2020-10-25 | Stop reason: HOSPADM

## 2020-01-01 RX ORDER — FENTANYL CITRATE-0.9 % NACL/PF 10 MCG/ML
75 PLASTIC BAG, INJECTION (ML) INTRAVENOUS CONTINUOUS
Status: DISCONTINUED | OUTPATIENT
Start: 2020-01-01 | End: 2020-10-25 | Stop reason: HOSPADM

## 2020-01-01 RX ORDER — CHLORHEXIDINE GLUCONATE 0.12 MG/ML
15 RINSE ORAL EVERY 12 HOURS SCHEDULED
Status: DISCONTINUED | OUTPATIENT
Start: 2020-01-01 | End: 2020-01-01

## 2020-01-01 RX ORDER — FENTANYL CITRATE/PF 50 MCG/ML
50 SYRINGE (ML) INJECTION
Status: DISCONTINUED | OUTPATIENT
Start: 2020-01-01 | End: 2020-01-01 | Stop reason: HOSPADM

## 2020-01-01 RX ORDER — ONDANSETRON 2 MG/ML
4 INJECTION INTRAMUSCULAR; INTRAVENOUS EVERY 8 HOURS PRN
Status: DISCONTINUED | OUTPATIENT
Start: 2020-01-01 | End: 2020-01-01 | Stop reason: HOSPADM

## 2020-01-01 RX ORDER — GABAPENTIN 100 MG/1
300 CAPSULE ORAL ONCE
Status: CANCELLED | OUTPATIENT
Start: 2020-01-01 | End: 2020-01-01

## 2020-01-01 RX ORDER — EPINEPHRINE 1 MG/ML
INJECTION, SOLUTION, CONCENTRATE INTRAVENOUS
Status: DISCONTINUED
Start: 2020-01-01 | End: 2020-10-25 | Stop reason: HOSPADM

## 2020-01-01 RX ORDER — APIXABAN 5 MG/1
TABLET, FILM COATED ORAL
Qty: 180 TABLET | Refills: 2 | Status: SHIPPED | OUTPATIENT
Start: 2020-01-01 | End: 2020-10-25 | Stop reason: HOSPADM

## 2020-01-01 RX ORDER — SIMETHICONE 80 MG
80 TABLET,CHEWABLE ORAL EVERY 6 HOURS PRN
Status: DISCONTINUED | OUTPATIENT
Start: 2020-01-01 | End: 2020-10-25 | Stop reason: HOSPADM

## 2020-01-01 RX ORDER — SACCHAROMYCES BOULARDII 250 MG
250 CAPSULE ORAL 2 TIMES DAILY
Status: CANCELLED | OUTPATIENT
Start: 2020-01-01

## 2020-01-01 RX ORDER — DEXTROSE MONOHYDRATE 25 G/50ML
25 INJECTION, SOLUTION INTRAVENOUS ONCE
Status: COMPLETED | OUTPATIENT
Start: 2020-01-01 | End: 2020-01-01

## 2020-01-01 RX ORDER — KETOROLAC TROMETHAMINE 30 MG/ML
15 INJECTION, SOLUTION INTRAMUSCULAR; INTRAVENOUS ONCE AS NEEDED
Status: DISCONTINUED | OUTPATIENT
Start: 2020-01-01 | End: 2020-01-01

## 2020-01-01 RX ORDER — SODIUM CHLORIDE, SODIUM GLUCONATE, SODIUM ACETATE, POTASSIUM CHLORIDE, MAGNESIUM CHLORIDE, SODIUM PHOSPHATE, DIBASIC, AND POTASSIUM PHOSPHATE .53; .5; .37; .037; .03; .012; .00082 G/100ML; G/100ML; G/100ML; G/100ML; G/100ML; G/100ML; G/100ML
1000 INJECTION, SOLUTION INTRAVENOUS ONCE
Status: DISCONTINUED | OUTPATIENT
Start: 2020-01-01 | End: 2020-01-01

## 2020-01-01 RX ORDER — FENTANYL CITRATE 50 UG/ML
50 INJECTION, SOLUTION INTRAMUSCULAR; INTRAVENOUS EVERY 2 HOUR PRN
Status: DISCONTINUED | OUTPATIENT
Start: 2020-01-01 | End: 2020-01-01 | Stop reason: HOSPADM

## 2020-01-01 RX ORDER — PANTOPRAZOLE SODIUM 40 MG/1
40 INJECTION, POWDER, FOR SOLUTION INTRAVENOUS EVERY 12 HOURS SCHEDULED
Status: DISCONTINUED | OUTPATIENT
Start: 2020-01-01 | End: 2020-10-25 | Stop reason: HOSPADM

## 2020-01-01 RX ORDER — SODIUM CHLORIDE 9 MG/ML
10 INJECTION INTRAVENOUS DAILY
Qty: 30 SYRINGE | Refills: 3 | Status: SHIPPED | OUTPATIENT
Start: 2020-01-01 | End: 2020-10-25 | Stop reason: HOSPADM

## 2020-01-01 RX ORDER — TAMSULOSIN HYDROCHLORIDE 0.4 MG/1
0.4 CAPSULE ORAL
Status: DISCONTINUED | OUTPATIENT
Start: 2020-01-01 | End: 2020-01-01

## 2020-01-01 RX ORDER — MIDAZOLAM HYDROCHLORIDE 2 MG/2ML
INJECTION, SOLUTION INTRAMUSCULAR; INTRAVENOUS CODE/TRAUMA/SEDATION MEDICATION
Status: COMPLETED | OUTPATIENT
Start: 2020-01-01 | End: 2020-01-01

## 2020-01-01 RX ORDER — QUETIAPINE FUMARATE 25 MG/1
12.5 TABLET, FILM COATED ORAL
Status: DISCONTINUED | OUTPATIENT
Start: 2020-01-01 | End: 2020-01-01

## 2020-01-01 RX ORDER — CHLORHEXIDINE GLUCONATE 0.12 MG/ML
15 RINSE ORAL EVERY 12 HOURS SCHEDULED
Status: CANCELLED | OUTPATIENT
Start: 2020-01-01

## 2020-01-01 RX ORDER — OXYCODONE HYDROCHLORIDE AND ACETAMINOPHEN 5; 325 MG/1; MG/1
1 TABLET ORAL EVERY 6 HOURS PRN
Qty: 8 TABLET | Refills: 0 | Status: SHIPPED | OUTPATIENT
Start: 2020-01-01 | End: 2020-01-01

## 2020-01-01 RX ORDER — POLYETHYLENE GLYCOL 3350 17 G/17G
17 POWDER, FOR SOLUTION ORAL DAILY
Status: DISCONTINUED | OUTPATIENT
Start: 2020-01-01 | End: 2020-10-25 | Stop reason: HOSPADM

## 2020-01-01 RX ORDER — FUROSEMIDE 40 MG/1
40 TABLET ORAL DAILY
Status: DISCONTINUED | OUTPATIENT
Start: 2020-01-01 | End: 2020-01-01

## 2020-01-01 RX ORDER — DEXTROSE, SODIUM CHLORIDE, AND POTASSIUM CHLORIDE 5; .45; .15 G/100ML; G/100ML; G/100ML
75 INJECTION INTRAVENOUS CONTINUOUS
Status: DISCONTINUED | OUTPATIENT
Start: 2020-01-01 | End: 2020-01-01

## 2020-01-01 RX ORDER — POTASSIUM CHLORIDE 14.9 MG/ML
20 INJECTION INTRAVENOUS
Status: COMPLETED | OUTPATIENT
Start: 2020-01-01 | End: 2020-01-01

## 2020-01-01 RX ORDER — PANTOPRAZOLE SODIUM 40 MG/1
40 INJECTION, POWDER, FOR SOLUTION INTRAVENOUS ONCE
Status: COMPLETED | OUTPATIENT
Start: 2020-01-01 | End: 2020-01-01

## 2020-01-01 RX ORDER — ROCURONIUM BROMIDE 10 MG/ML
INJECTION, SOLUTION INTRAVENOUS AS NEEDED
Status: DISCONTINUED | OUTPATIENT
Start: 2020-01-01 | End: 2020-01-01

## 2020-01-01 RX ORDER — FUROSEMIDE 40 MG/1
40 TABLET ORAL ONCE
Status: COMPLETED | OUTPATIENT
Start: 2020-01-01 | End: 2020-01-01

## 2020-01-01 RX ORDER — FENTANYL CITRATE 50 UG/ML
50 INJECTION, SOLUTION INTRAMUSCULAR; INTRAVENOUS EVERY 2 HOUR PRN
Status: CANCELLED | OUTPATIENT
Start: 2020-01-01

## 2020-01-01 RX ORDER — DOCUSATE SODIUM 100 MG/1
100 CAPSULE, LIQUID FILLED ORAL 2 TIMES DAILY
Status: DISCONTINUED | OUTPATIENT
Start: 2020-01-01 | End: 2020-01-01 | Stop reason: HOSPADM

## 2020-01-01 RX ORDER — POTASSIUM CHLORIDE 14.9 MG/ML
20 INJECTION INTRAVENOUS
Status: DISPENSED | OUTPATIENT
Start: 2020-01-01 | End: 2020-01-01

## 2020-01-01 RX ORDER — ONDANSETRON 4 MG/1
4 TABLET, ORALLY DISINTEGRATING ORAL EVERY 6 HOURS PRN
Qty: 20 TABLET | Refills: 0 | Status: SHIPPED | OUTPATIENT
Start: 2020-01-01 | End: 2020-10-25 | Stop reason: HOSPADM

## 2020-01-01 RX ORDER — NOREPINEPHRINE BITARTRATE 1 MG/ML
INJECTION, SOLUTION INTRAVENOUS
Status: DISCONTINUED
Start: 2020-01-01 | End: 2020-10-25 | Stop reason: HOSPADM

## 2020-01-01 RX ORDER — MAGNESIUM SULFATE HEPTAHYDRATE 40 MG/ML
2 INJECTION, SOLUTION INTRAVENOUS ONCE
Status: COMPLETED | OUTPATIENT
Start: 2020-01-01 | End: 2020-01-01

## 2020-01-01 RX ORDER — PROPOFOL 10 MG/ML
5-50 INJECTION, EMULSION INTRAVENOUS
Status: DISCONTINUED | OUTPATIENT
Start: 2020-01-01 | End: 2020-01-01

## 2020-01-01 RX ORDER — AMIODARONE HYDROCHLORIDE 200 MG/1
400 TABLET ORAL DAILY
Status: DISCONTINUED | OUTPATIENT
Start: 2020-01-01 | End: 2020-01-01 | Stop reason: HOSPADM

## 2020-01-01 RX ORDER — HEPARIN SODIUM 5000 [USP'U]/ML
5000 INJECTION, SOLUTION INTRAVENOUS; SUBCUTANEOUS ONCE
Status: DISCONTINUED | OUTPATIENT
Start: 2020-01-01 | End: 2020-01-01 | Stop reason: HOSPADM

## 2020-01-01 RX ORDER — FUROSEMIDE 10 MG/ML
20 INJECTION INTRAMUSCULAR; INTRAVENOUS ONCE
Status: COMPLETED | OUTPATIENT
Start: 2020-01-01 | End: 2020-01-01

## 2020-01-01 RX ORDER — CALCIUM GLUCONATE 20 MG/ML
1 INJECTION, SOLUTION INTRAVENOUS ONCE
Status: COMPLETED | OUTPATIENT
Start: 2020-01-01 | End: 2020-01-01

## 2020-01-01 RX ORDER — EPHEDRINE SULFATE 50 MG/ML
INJECTION INTRAVENOUS AS NEEDED
Status: DISCONTINUED | OUTPATIENT
Start: 2020-01-01 | End: 2020-01-01

## 2020-01-01 RX ORDER — FUROSEMIDE 10 MG/ML
40 INJECTION INTRAMUSCULAR; INTRAVENOUS ONCE
Status: COMPLETED | OUTPATIENT
Start: 2020-01-01 | End: 2020-01-01

## 2020-01-01 RX ORDER — FENTANYL CITRATE 50 UG/ML
INJECTION, SOLUTION INTRAMUSCULAR; INTRAVENOUS
Status: COMPLETED
Start: 2020-01-01 | End: 2020-01-01

## 2020-01-01 RX ORDER — SACCHAROMYCES BOULARDII 250 MG
250 CAPSULE ORAL 2 TIMES DAILY
Status: DISCONTINUED | OUTPATIENT
Start: 2020-01-01 | End: 2020-01-01

## 2020-01-01 RX ORDER — ONDANSETRON 2 MG/ML
4 INJECTION INTRAMUSCULAR; INTRAVENOUS ONCE AS NEEDED
Status: DISCONTINUED | OUTPATIENT
Start: 2020-01-01 | End: 2020-01-01 | Stop reason: HOSPADM

## 2020-01-01 RX ORDER — ATROPA BELLADONNA AND OPIUM 16.2; 3 MG/1; MG/1
30 SUPPOSITORY RECTAL EVERY 8 HOURS PRN
Status: DISCONTINUED | OUTPATIENT
Start: 2020-01-01 | End: 2020-10-25 | Stop reason: HOSPADM

## 2020-01-01 RX ORDER — FENTANYL CITRATE 50 UG/ML
INJECTION, SOLUTION INTRAMUSCULAR; INTRAVENOUS CODE/TRAUMA/SEDATION MEDICATION
Status: COMPLETED | OUTPATIENT
Start: 2020-01-01 | End: 2020-01-01

## 2020-01-01 RX ORDER — AMIODARONE HYDROCHLORIDE 200 MG/1
200 TABLET ORAL DAILY
Status: CANCELLED | OUTPATIENT
Start: 2020-01-01

## 2020-01-01 RX ORDER — FENTANYL CITRATE 50 UG/ML
50 INJECTION, SOLUTION INTRAMUSCULAR; INTRAVENOUS EVERY 2 HOUR PRN
Status: DISCONTINUED | OUTPATIENT
Start: 2020-01-01 | End: 2020-01-01

## 2020-01-01 RX ORDER — CEPHALEXIN 500 MG/1
500 CAPSULE ORAL EVERY 12 HOURS SCHEDULED
Qty: 10 CAPSULE | Refills: 0 | Status: SHIPPED | OUTPATIENT
Start: 2020-01-01 | End: 2020-01-01

## 2020-01-01 RX ORDER — CEPHALEXIN 500 MG/1
500 CAPSULE ORAL EVERY 12 HOURS SCHEDULED
Status: DISCONTINUED | OUTPATIENT
Start: 2020-01-01 | End: 2020-01-01 | Stop reason: HOSPADM

## 2020-01-01 RX ORDER — CEFAZOLIN SODIUM 2 G/50ML
2000 SOLUTION INTRAVENOUS
Status: DISCONTINUED | OUTPATIENT
Start: 2020-01-01 | End: 2020-01-01

## 2020-01-01 RX ORDER — LANOLIN ALCOHOL/MO/W.PET/CERES
3 CREAM (GRAM) TOPICAL
Status: DISCONTINUED | OUTPATIENT
Start: 2020-01-01 | End: 2020-01-01 | Stop reason: HOSPADM

## 2020-01-01 RX ORDER — MIDAZOLAM HYDROCHLORIDE 2 MG/2ML
INJECTION, SOLUTION INTRAMUSCULAR; INTRAVENOUS AS NEEDED
Status: DISCONTINUED | OUTPATIENT
Start: 2020-01-01 | End: 2020-01-01

## 2020-01-01 RX ORDER — SIMETHICONE 80 MG
80 TABLET,CHEWABLE ORAL EVERY 6 HOURS PRN
Status: DISCONTINUED | OUTPATIENT
Start: 2020-01-01 | End: 2020-01-01 | Stop reason: HOSPADM

## 2020-01-01 RX ORDER — HEPARIN SODIUM 5000 [USP'U]/ML
5000 INJECTION, SOLUTION INTRAVENOUS; SUBCUTANEOUS EVERY 8 HOURS SCHEDULED
Status: DISCONTINUED | OUTPATIENT
Start: 2020-01-01 | End: 2020-01-01

## 2020-01-01 RX ORDER — SACCHAROMYCES BOULARDII 250 MG
250 CAPSULE ORAL 2 TIMES DAILY
Status: DISCONTINUED | OUTPATIENT
Start: 2020-01-01 | End: 2020-01-01 | Stop reason: HOSPADM

## 2020-01-01 RX ORDER — ONDANSETRON 2 MG/ML
INJECTION INTRAMUSCULAR; INTRAVENOUS
Status: DISPENSED
Start: 2020-01-01 | End: 2020-01-01

## 2020-01-01 RX ORDER — SODIUM CHLORIDE, SODIUM GLUCONATE, SODIUM ACETATE, POTASSIUM CHLORIDE, MAGNESIUM CHLORIDE, SODIUM PHOSPHATE, DIBASIC, AND POTASSIUM PHOSPHATE .53; .5; .37; .037; .03; .012; .00082 G/100ML; G/100ML; G/100ML; G/100ML; G/100ML; G/100ML; G/100ML
1000 INJECTION, SOLUTION INTRAVENOUS ONCE
Status: COMPLETED | OUTPATIENT
Start: 2020-01-01 | End: 2020-01-01

## 2020-01-01 RX ORDER — ACETAMINOPHEN 325 MG/1
650 TABLET ORAL EVERY 8 HOURS PRN
Status: CANCELLED | OUTPATIENT
Start: 2020-01-01

## 2020-01-01 RX ORDER — ACETAMINOPHEN 325 MG/1
650 TABLET ORAL EVERY 8 HOURS PRN
Status: DISCONTINUED | OUTPATIENT
Start: 2020-01-01 | End: 2020-01-01 | Stop reason: HOSPADM

## 2020-01-01 RX ORDER — LANOLIN ALCOHOL/MO/W.PET/CERES
3 CREAM (GRAM) TOPICAL
Status: CANCELLED | OUTPATIENT
Start: 2020-01-01

## 2020-01-01 RX ORDER — CEFAZOLIN SODIUM 2 G/50ML
2000 SOLUTION INTRAVENOUS ONCE
Status: COMPLETED | OUTPATIENT
Start: 2020-01-01 | End: 2020-01-01

## 2020-01-01 RX ORDER — SACCHAROMYCES BOULARDII 250 MG
250 CAPSULE ORAL 2 TIMES DAILY
Status: DISCONTINUED | OUTPATIENT
Start: 2020-01-01 | End: 2020-10-25 | Stop reason: HOSPADM

## 2020-01-01 RX ORDER — KETOROLAC TROMETHAMINE 30 MG/ML
15 INJECTION, SOLUTION INTRAMUSCULAR; INTRAVENOUS DAILY PRN
Status: DISCONTINUED | OUTPATIENT
Start: 2020-01-01 | End: 2020-01-01

## 2020-01-01 RX ORDER — ETOMIDATE 2 MG/ML
20 INJECTION INTRAVENOUS ONCE
Status: COMPLETED | OUTPATIENT
Start: 2020-01-01 | End: 2020-01-01

## 2020-01-01 RX ORDER — SUCCINYLCHOLINE/SOD CL,ISO/PF 100 MG/5ML
100 SYRINGE (ML) INTRAVENOUS ONCE
Status: COMPLETED | OUTPATIENT
Start: 2020-01-01 | End: 2020-01-01

## 2020-01-01 RX ORDER — ACETAMINOPHEN 325 MG/1
975 TABLET ORAL ONCE
Status: CANCELLED | OUTPATIENT
Start: 2020-01-01 | End: 2020-01-01

## 2020-01-01 RX ORDER — PHENYLEPHRINE HYDROCHLORIDE 10 MG/ML
INJECTION INTRAVENOUS
Status: DISCONTINUED
Start: 2020-01-01 | End: 2020-10-25 | Stop reason: HOSPADM

## 2020-01-01 RX ORDER — HYDROMORPHONE HCL/PF 1 MG/ML
0.2 SYRINGE (ML) INJECTION EVERY 4 HOURS PRN
Status: DISCONTINUED | OUTPATIENT
Start: 2020-01-01 | End: 2020-01-01

## 2020-01-01 RX ORDER — FLUTICASONE PROPIONATE 50 MCG
SPRAY, SUSPENSION (ML) NASAL
Qty: 32 G | Refills: 2 | Status: SHIPPED | OUTPATIENT
Start: 2020-01-01 | End: 2020-10-25 | Stop reason: HOSPADM

## 2020-01-01 RX ORDER — DEXTROSE MONOHYDRATE 25 G/50ML
INJECTION, SOLUTION INTRAVENOUS
Status: COMPLETED
Start: 2020-01-01 | End: 2020-01-01

## 2020-01-01 RX ORDER — ATROPA BELLADONNA AND OPIUM 16.2; 3 MG/1; MG/1
30 SUPPOSITORY RECTAL EVERY 8 HOURS PRN
Status: DISCONTINUED | OUTPATIENT
Start: 2020-01-01 | End: 2020-01-01 | Stop reason: HOSPADM

## 2020-01-01 RX ORDER — NOREPINEPHRINE BITARTRATE 1 MG/ML
INJECTION, SOLUTION INTRAVENOUS
Status: DISPENSED
Start: 2020-01-01 | End: 2020-01-01

## 2020-01-01 RX ORDER — SIMETHICONE 80 MG
80 TABLET,CHEWABLE ORAL EVERY 6 HOURS PRN
Status: CANCELLED | OUTPATIENT
Start: 2020-01-01

## 2020-01-01 RX ORDER — FUROSEMIDE 10 MG/ML
20 INJECTION INTRAMUSCULAR; INTRAVENOUS
Status: DISCONTINUED | OUTPATIENT
Start: 2020-01-01 | End: 2020-01-01

## 2020-01-01 RX ORDER — PANTOPRAZOLE SODIUM 40 MG/1
40 TABLET, DELAYED RELEASE ORAL
Status: DISCONTINUED | OUTPATIENT
Start: 2020-01-01 | End: 2020-01-01 | Stop reason: HOSPADM

## 2020-01-01 RX ORDER — LORATADINE 10 MG/1
10 TABLET ORAL DAILY
Status: DISCONTINUED | OUTPATIENT
Start: 2020-01-01 | End: 2020-01-01 | Stop reason: HOSPADM

## 2020-01-01 RX ORDER — GABAPENTIN 300 MG/1
300 CAPSULE ORAL ONCE
Status: DISCONTINUED | OUTPATIENT
Start: 2020-01-01 | End: 2020-01-01

## 2020-01-01 RX ADMIN — MIDAZOLAM HYDROCHLORIDE 2 MG: 1 INJECTION, SOLUTION INTRAMUSCULAR; INTRAVENOUS at 13:35

## 2020-01-01 RX ADMIN — Medication 125 MG: at 13:02

## 2020-01-01 RX ADMIN — FUROSEMIDE 40 MG: 40 TABLET ORAL at 10:21

## 2020-01-01 RX ADMIN — PIPERACILLIN AND TAZOBACTAM 3.38 G: 3; .375 INJECTION, POWDER, LYOPHILIZED, FOR SOLUTION INTRAVENOUS at 04:34

## 2020-01-01 RX ADMIN — PROPOFOL 150 MG: 10 INJECTION, EMULSION INTRAVENOUS at 17:31

## 2020-01-01 RX ADMIN — NOREPINEPHRINE BITARTRATE 2 MCG/MIN: 1 INJECTION, SOLUTION, CONCENTRATE INTRAVENOUS at 23:38

## 2020-01-01 RX ADMIN — IOHEXOL 100 ML: 350 INJECTION, SOLUTION INTRAVENOUS at 18:18

## 2020-01-01 RX ADMIN — PROPOFOL 30 MCG/KG/MIN: 10 INJECTION, EMULSION INTRAVENOUS at 17:39

## 2020-01-01 RX ADMIN — HEPARIN SODIUM 5000 UNITS: 5000 INJECTION INTRAVENOUS; SUBCUTANEOUS at 05:47

## 2020-01-01 RX ADMIN — PIPERACILLIN AND TAZOBACTAM 3.38 G: 3; .375 INJECTION, POWDER, LYOPHILIZED, FOR SOLUTION INTRAVENOUS at 10:23

## 2020-01-01 RX ADMIN — HYDROMORPHONE HYDROCHLORIDE 0.5 MG: 1 INJECTION, SOLUTION INTRAMUSCULAR; INTRAVENOUS; SUBCUTANEOUS at 17:20

## 2020-01-01 RX ADMIN — FUROSEMIDE 20 MG: 10 INJECTION, SOLUTION INTRAMUSCULAR; INTRAVENOUS at 07:56

## 2020-01-01 RX ADMIN — TAMSULOSIN HYDROCHLORIDE 0.4 MG: 0.4 CAPSULE ORAL at 17:38

## 2020-01-01 RX ADMIN — FENTANYL CITRATE 50 MCG: 50 INJECTION INTRAMUSCULAR; INTRAVENOUS at 19:50

## 2020-01-01 RX ADMIN — SODIUM CHLORIDE, SODIUM LACTATE, POTASSIUM CHLORIDE, AND CALCIUM CHLORIDE 100 ML/HR: .6; .31; .03; .02 INJECTION, SOLUTION INTRAVENOUS at 15:54

## 2020-01-01 RX ADMIN — MELATONIN 3 MG: 3 TAB ORAL at 21:08

## 2020-01-01 RX ADMIN — METRONIDAZOLE 500 MG: 500 INJECTION, SOLUTION INTRAVENOUS at 16:15

## 2020-01-01 RX ADMIN — Medication 25 MCG/HR: at 04:12

## 2020-01-01 RX ADMIN — POTASSIUM CHLORIDE 20 MEQ: 14.9 INJECTION, SOLUTION INTRAVENOUS at 01:00

## 2020-01-01 RX ADMIN — BACLOFEN 10 MG: 10 TABLET ORAL at 08:16

## 2020-01-01 RX ADMIN — FUROSEMIDE 40 MG: 10 INJECTION, SOLUTION INTRAMUSCULAR; INTRAVENOUS at 10:22

## 2020-01-01 RX ADMIN — PIPERACILLIN AND TAZOBACTAM 3.38 G: 3; .375 INJECTION, POWDER, LYOPHILIZED, FOR SOLUTION INTRAVENOUS at 18:39

## 2020-01-01 RX ADMIN — FENTANYL CITRATE 100 MCG: 50 INJECTION, SOLUTION INTRAMUSCULAR; INTRAVENOUS at 17:28

## 2020-01-01 RX ADMIN — MIDAZOLAM HYDROCHLORIDE 1 MG: 1 INJECTION, SOLUTION INTRAMUSCULAR; INTRAVENOUS at 13:52

## 2020-01-01 RX ADMIN — PROPOFOL 30 MCG/KG/MIN: 10 INJECTION, EMULSION INTRAVENOUS at 11:28

## 2020-01-01 RX ADMIN — PANTOPRAZOLE SODIUM 40 MG: 40 INJECTION, POWDER, FOR SOLUTION INTRAVENOUS at 21:39

## 2020-01-01 RX ADMIN — DEXTROSE MONOHYDRATE 25 ML: 25 INJECTION, SOLUTION INTRAVENOUS at 06:26

## 2020-01-01 RX ADMIN — Medication 125 MG: at 14:12

## 2020-01-01 RX ADMIN — LIDOCAINE HYDROCHLORIDE 50 MG: 10 INJECTION, SOLUTION EPIDURAL; INFILTRATION; INTRACAUDAL; PERINEURAL at 12:29

## 2020-01-01 RX ADMIN — CALCIUM GLUCONATE 1 G: 20 INJECTION, SOLUTION INTRAVENOUS at 19:12

## 2020-01-01 RX ADMIN — FUROSEMIDE 40 MG: 40 TABLET ORAL at 09:59

## 2020-01-01 RX ADMIN — CHLORHEXIDINE GLUCONATE 15 ML: 1.2 RINSE ORAL at 21:27

## 2020-01-01 RX ADMIN — METOPROLOL TARTRATE 25 MG: 25 TABLET, FILM COATED ORAL at 04:33

## 2020-01-01 RX ADMIN — PIPERACILLIN AND TAZOBACTAM 3.38 G: 3; .375 INJECTION, POWDER, LYOPHILIZED, FOR SOLUTION INTRAVENOUS at 20:25

## 2020-01-01 RX ADMIN — PROPOFOL 25 MCG/KG/MIN: 10 INJECTION, EMULSION INTRAVENOUS at 22:17

## 2020-01-01 RX ADMIN — Medication 125 MG: at 02:42

## 2020-01-01 RX ADMIN — POTASSIUM CHLORIDE 20 MEQ: 14.9 INJECTION, SOLUTION INTRAVENOUS at 10:48

## 2020-01-01 RX ADMIN — METOPROLOL TARTRATE 25 MG: 25 TABLET, FILM COATED ORAL at 05:24

## 2020-01-01 RX ADMIN — PHENYLEPHRINE HYDROCHLORIDE 200 MCG: 10 INJECTION INTRAVENOUS at 12:29

## 2020-01-01 RX ADMIN — DEXTROSE, SODIUM CHLORIDE, AND POTASSIUM CHLORIDE 75 ML/HR: 5; .45; .15 INJECTION INTRAVENOUS at 00:39

## 2020-01-01 RX ADMIN — FENTANYL CITRATE 50 MCG: 50 INJECTION INTRAMUSCULAR; INTRAVENOUS at 02:40

## 2020-01-01 RX ADMIN — HEPARIN SODIUM 5000 UNITS: 5000 INJECTION INTRAVENOUS; SUBCUTANEOUS at 21:27

## 2020-01-01 RX ADMIN — LIDOCAINE HYDROCHLORIDE 50 MG: 10 INJECTION, SOLUTION EPIDURAL; INFILTRATION; INTRACAUDAL; PERINEURAL at 13:39

## 2020-01-01 RX ADMIN — METOPROLOL TARTRATE 25 MG: 25 TABLET, FILM COATED ORAL at 04:38

## 2020-01-01 RX ADMIN — PHENYLEPHRINE HYDROCHLORIDE 100 MCG: 10 INJECTION INTRAVENOUS at 12:37

## 2020-01-01 RX ADMIN — METOPROLOL TARTRATE 25 MG: 25 TABLET, FILM COATED ORAL at 16:42

## 2020-01-01 RX ADMIN — Medication 125 MG: at 08:40

## 2020-01-01 RX ADMIN — METOPROLOL TARTRATE 25 MG: 25 TABLET, FILM COATED ORAL at 15:53

## 2020-01-01 RX ADMIN — TAMSULOSIN HYDROCHLORIDE 0.4 MG: 0.4 CAPSULE ORAL at 18:45

## 2020-01-01 RX ADMIN — HEPARIN SODIUM 5000 UNITS: 5000 INJECTION INTRAVENOUS; SUBCUTANEOUS at 13:02

## 2020-01-01 RX ADMIN — PANTOPRAZOLE SODIUM 40 MG: 40 TABLET, DELAYED RELEASE ORAL at 05:03

## 2020-01-01 RX ADMIN — PIPERACILLIN AND TAZOBACTAM 3.38 G: 36; 4.5 INJECTION, POWDER, FOR SOLUTION INTRAVENOUS at 06:08

## 2020-01-01 RX ADMIN — PROPOFOL 40 MCG/KG/MIN: 10 INJECTION, EMULSION INTRAVENOUS at 01:39

## 2020-01-01 RX ADMIN — APIXABAN 5 MG: 5 TABLET, FILM COATED ORAL at 09:59

## 2020-01-01 RX ADMIN — PROPOFOL 10 MCG/KG/MIN: 10 INJECTION, EMULSION INTRAVENOUS at 23:17

## 2020-01-01 RX ADMIN — PIPERACILLIN AND TAZOBACTAM 3.38 G: 36; 4.5 INJECTION, POWDER, FOR SOLUTION INTRAVENOUS at 23:16

## 2020-01-01 RX ADMIN — VANCOMYCIN HYDROCHLORIDE 250 MG: 5 INJECTION, POWDER, LYOPHILIZED, FOR SOLUTION INTRAVENOUS at 11:01

## 2020-01-01 RX ADMIN — PANTOPRAZOLE SODIUM 40 MG: 40 INJECTION, POWDER, FOR SOLUTION INTRAVENOUS at 21:03

## 2020-01-01 RX ADMIN — PROPOFOL 30 MCG/KG/MIN: 10 INJECTION, EMULSION INTRAVENOUS at 21:22

## 2020-01-01 RX ADMIN — FENTANYL CITRATE 25 MCG: 50 INJECTION, SOLUTION INTRAMUSCULAR; INTRAVENOUS at 13:48

## 2020-01-01 RX ADMIN — Medication 100 MG: at 01:26

## 2020-01-01 RX ADMIN — Medication 120 MG: at 12:29

## 2020-01-01 RX ADMIN — TAMSULOSIN HYDROCHLORIDE 0.4 MG: 0.4 CAPSULE ORAL at 17:47

## 2020-01-01 RX ADMIN — DEXTROSE, SODIUM CHLORIDE, AND POTASSIUM CHLORIDE 125 ML/HR: 5; .45; .15 INJECTION INTRAVENOUS at 11:05

## 2020-01-01 RX ADMIN — DOCUSATE SODIUM 100 MG: 100 CAPSULE ORAL at 08:00

## 2020-01-01 RX ADMIN — NOREPINEPHRINE BITARTRATE 9 MCG/MIN: 1 INJECTION, SOLUTION, CONCENTRATE INTRAVENOUS at 16:19

## 2020-01-01 RX ADMIN — Medication 125 MG: at 21:00

## 2020-01-01 RX ADMIN — FAMOTIDINE 20 MG: 20 TABLET, FILM COATED ORAL at 08:00

## 2020-01-01 RX ADMIN — METOPROLOL TARTRATE 25 MG: 25 TABLET, FILM COATED ORAL at 03:33

## 2020-01-01 RX ADMIN — Medication 125 MG: at 05:03

## 2020-01-01 RX ADMIN — ONDANSETRON 4 MG: 2 INJECTION INTRAMUSCULAR; INTRAVENOUS at 12:29

## 2020-01-01 RX ADMIN — POTASSIUM CHLORIDE 20 MEQ: 14.9 INJECTION, SOLUTION INTRAVENOUS at 15:33

## 2020-01-01 RX ADMIN — HEPARIN SODIUM 5000 UNITS: 5000 INJECTION INTRAVENOUS; SUBCUTANEOUS at 05:42

## 2020-01-01 RX ADMIN — PIPERACILLIN AND TAZOBACTAM 3.38 G: 3; .375 INJECTION, POWDER, LYOPHILIZED, FOR SOLUTION INTRAVENOUS at 05:24

## 2020-01-01 RX ADMIN — CHLORHEXIDINE GLUCONATE 15 ML: 1.2 RINSE ORAL at 08:12

## 2020-01-01 RX ADMIN — PANTOPRAZOLE SODIUM 40 MG: 40 INJECTION, POWDER, FOR SOLUTION INTRAVENOUS at 21:27

## 2020-01-01 RX ADMIN — PIPERACILLIN AND TAZOBACTAM 3.38 G: 3; .375 INJECTION, POWDER, LYOPHILIZED, FOR SOLUTION INTRAVENOUS at 03:23

## 2020-01-01 RX ADMIN — LIDOCAINE 5% 1 PATCH: 700 PATCH TOPICAL at 10:21

## 2020-01-01 RX ADMIN — FUROSEMIDE 40 MG: 10 INJECTION, SOLUTION INTRAMUSCULAR; INTRAVENOUS at 17:00

## 2020-01-01 RX ADMIN — POTASSIUM CHLORIDE 20 MEQ: 14.9 INJECTION, SOLUTION INTRAVENOUS at 10:58

## 2020-01-01 RX ADMIN — CHLORHEXIDINE GLUCONATE 15 ML: 1.2 RINSE ORAL at 08:32

## 2020-01-01 RX ADMIN — PANTOPRAZOLE SODIUM 40 MG: 40 INJECTION, POWDER, FOR SOLUTION INTRAVENOUS at 08:06

## 2020-01-01 RX ADMIN — MIDAZOLAM HYDROCHLORIDE 1 MG: 1 INJECTION, SOLUTION INTRAMUSCULAR; INTRAVENOUS at 13:48

## 2020-01-01 RX ADMIN — HEPARIN SODIUM 5000 UNITS: 5000 INJECTION INTRAVENOUS; SUBCUTANEOUS at 14:52

## 2020-01-01 RX ADMIN — FUROSEMIDE 40 MG: 10 INJECTION, SOLUTION INTRAMUSCULAR; INTRAVENOUS at 19:18

## 2020-01-01 RX ADMIN — VASOPRESSIN 0.04 UNITS/MIN: 20 INJECTION INTRAVENOUS at 17:29

## 2020-01-01 RX ADMIN — BACLOFEN 10 MG: 10 TABLET ORAL at 08:01

## 2020-01-01 RX ADMIN — PIPERACILLIN AND TAZOBACTAM 3.38 G: 3; .375 INJECTION, POWDER, LYOPHILIZED, FOR SOLUTION INTRAVENOUS at 15:53

## 2020-01-01 RX ADMIN — KETOROLAC TROMETHAMINE 15 MG: 30 INJECTION, SOLUTION INTRAMUSCULAR at 23:18

## 2020-01-01 RX ADMIN — POTASSIUM CHLORIDE 40 MEQ: 1500 TABLET, EXTENDED RELEASE ORAL at 10:21

## 2020-01-01 RX ADMIN — DEXTROSE MONOHYDRATE 25 ML: 25 INJECTION, SOLUTION INTRAVENOUS at 08:12

## 2020-01-01 RX ADMIN — FUROSEMIDE 20 MG: 10 INJECTION, SOLUTION INTRAMUSCULAR; INTRAVENOUS at 09:24

## 2020-01-01 RX ADMIN — MELATONIN 3 MG: 3 TAB ORAL at 21:15

## 2020-01-01 RX ADMIN — DEXTROSE, SODIUM CHLORIDE, AND POTASSIUM CHLORIDE 125 ML/HR: 5; .45; .15 INJECTION INTRAVENOUS at 20:15

## 2020-01-01 RX ADMIN — GADOBUTROL 12 ML: 604.72 INJECTION INTRAVENOUS at 10:17

## 2020-01-01 RX ADMIN — NOREPINEPHRINE BITARTRATE 3 MCG/MIN: 1 INJECTION INTRAVENOUS at 22:17

## 2020-01-01 RX ADMIN — NOREPINEPHRINE BITARTRATE 10 MCG/MIN: 1 INJECTION, SOLUTION, CONCENTRATE INTRAVENOUS at 06:25

## 2020-01-01 RX ADMIN — EPINEPHRINE 0.03 MG: 0.1 INJECTION, SOLUTION ENDOTRACHEAL; INTRACARDIAC; INTRAVENOUS at 18:07

## 2020-01-01 RX ADMIN — PROPOFOL 20 MCG/KG/MIN: 10 INJECTION, EMULSION INTRAVENOUS at 01:40

## 2020-01-01 RX ADMIN — DOCUSATE SODIUM 100 MG: 100 CAPSULE ORAL at 08:16

## 2020-01-01 RX ADMIN — SODIUM CHLORIDE, SODIUM LACTATE, POTASSIUM CHLORIDE, AND CALCIUM CHLORIDE 100 ML/HR: .6; .31; .03; .02 INJECTION, SOLUTION INTRAVENOUS at 01:05

## 2020-01-01 RX ADMIN — FENTANYL CITRATE 25 MCG: 50 INJECTION, SOLUTION INTRAMUSCULAR; INTRAVENOUS at 13:59

## 2020-01-01 RX ADMIN — NOREPINEPHRINE BITARTRATE 9 MCG/MIN: 1 INJECTION, SOLUTION, CONCENTRATE INTRAVENOUS at 03:31

## 2020-01-01 RX ADMIN — METRONIDAZOLE 500 MG: 500 INJECTION, SOLUTION INTRAVENOUS at 23:34

## 2020-01-01 RX ADMIN — PIPERACILLIN AND TAZOBACTAM 3.38 G: 3; .375 INJECTION, POWDER, LYOPHILIZED, FOR SOLUTION INTRAVENOUS at 16:42

## 2020-01-01 RX ADMIN — CEFAZOLIN SODIUM 2000 MG: 2 SOLUTION INTRAVENOUS at 13:10

## 2020-01-01 RX ADMIN — PIPERACILLIN AND TAZOBACTAM 3.38 G: 3; .375 INJECTION, POWDER, LYOPHILIZED, FOR SOLUTION INTRAVENOUS at 08:50

## 2020-01-01 RX ADMIN — FENTANYL CITRATE 50 MCG: 50 INJECTION, SOLUTION INTRAMUSCULAR; INTRAVENOUS at 20:30

## 2020-01-01 RX ADMIN — Medication 250 MG: at 10:29

## 2020-01-01 RX ADMIN — AMIODARONE HYDROCHLORIDE 200 MG: 200 TABLET ORAL at 10:22

## 2020-01-01 RX ADMIN — PIPERACILLIN AND TAZOBACTAM 3.38 G: 3; .375 INJECTION, POWDER, LYOPHILIZED, FOR SOLUTION INTRAVENOUS at 22:45

## 2020-01-01 RX ADMIN — POTASSIUM PHOSPHATE, MONOBASIC POTASSIUM PHOSPHATE, DIBASIC 21 MMOL: 224; 236 INJECTION, SOLUTION, CONCENTRATE INTRAVENOUS at 09:45

## 2020-01-01 RX ADMIN — PROPOFOL 150 MG: 10 INJECTION, EMULSION INTRAVENOUS at 12:29

## 2020-01-01 RX ADMIN — Medication 125 MG: at 08:13

## 2020-01-01 RX ADMIN — AMIODARONE HYDROCHLORIDE 200 MG: 200 TABLET ORAL at 10:21

## 2020-01-01 RX ADMIN — POTASSIUM CHLORIDE 20 MEQ: 14.9 INJECTION, SOLUTION INTRAVENOUS at 14:45

## 2020-01-01 RX ADMIN — DEXAMETHASONE SODIUM PHOSPHATE 4 MG: 4 INJECTION, SOLUTION INTRAMUSCULAR; INTRAVENOUS at 13:44

## 2020-01-01 RX ADMIN — HYDROCORTISONE SODIUM SUCCINATE 100 MG: 100 INJECTION, POWDER, FOR SOLUTION INTRAMUSCULAR; INTRAVENOUS at 21:08

## 2020-01-01 RX ADMIN — SODIUM CHLORIDE, SODIUM GLUCONATE, SODIUM ACETATE, POTASSIUM CHLORIDE AND MAGNESIUM CHLORIDE 125 ML/HR: 526; 502; 368; 37; 30 INJECTION, SOLUTION INTRAVENOUS at 01:06

## 2020-01-01 RX ADMIN — VANCOMYCIN HYDROCHLORIDE 500 MG: 10 INJECTION, POWDER, LYOPHILIZED, FOR SOLUTION INTRAVENOUS at 19:18

## 2020-01-01 RX ADMIN — ALLOPURINOL 300 MG: 300 TABLET ORAL at 08:16

## 2020-01-01 RX ADMIN — NOREPINEPHRINE BITARTRATE 10 MCG/MIN: 1 INJECTION, SOLUTION, CONCENTRATE INTRAVENOUS at 20:12

## 2020-01-01 RX ADMIN — PROPOFOL 25 MCG/KG/MIN: 10 INJECTION, EMULSION INTRAVENOUS at 03:57

## 2020-01-01 RX ADMIN — ONDANSETRON 4 MG: 2 INJECTION INTRAMUSCULAR; INTRAVENOUS at 21:14

## 2020-01-01 RX ADMIN — DEXTROSE MONOHYDRATE 25 ML: 500 INJECTION PARENTERAL at 12:30

## 2020-01-01 RX ADMIN — Medication 125 MG: at 02:13

## 2020-01-01 RX ADMIN — HYDROMORPHONE HYDROCHLORIDE 0.5 MG: 1 INJECTION, SOLUTION INTRAMUSCULAR; INTRAVENOUS; SUBCUTANEOUS at 20:38

## 2020-01-01 RX ADMIN — FUROSEMIDE 20 MG: 10 INJECTION, SOLUTION INTRAMUSCULAR; INTRAVENOUS at 18:38

## 2020-01-01 RX ADMIN — SODIUM CHLORIDE, SODIUM GLUCONATE, SODIUM ACETATE, POTASSIUM CHLORIDE, MAGNESIUM CHLORIDE, SODIUM PHOSPHATE, DIBASIC, AND POTASSIUM PHOSPHATE 500 ML: .53; .5; .37; .037; .03; .012; .00082 INJECTION, SOLUTION INTRAVENOUS at 03:26

## 2020-01-01 RX ADMIN — NOREPINEPHRINE BITARTRATE 2 MCG/MIN: 1 INJECTION, SOLUTION, CONCENTRATE INTRAVENOUS at 09:49

## 2020-01-01 RX ADMIN — LORATADINE 10 MG: 10 TABLET ORAL at 08:01

## 2020-01-01 RX ADMIN — GADOBENATE DIMEGLUMINE 11 ML: 529 INJECTION, SOLUTION INTRAVENOUS at 16:27

## 2020-01-01 RX ADMIN — HYDROCORTISONE SODIUM SUCCINATE 100 MG: 100 INJECTION, POWDER, FOR SOLUTION INTRAMUSCULAR; INTRAVENOUS at 06:05

## 2020-01-01 RX ADMIN — VANCOMYCIN HYDROCHLORIDE 500 MG: 10 INJECTION, POWDER, LYOPHILIZED, FOR SOLUTION INTRAVENOUS at 08:06

## 2020-01-01 RX ADMIN — SODIUM CHLORIDE, SODIUM GLUCONATE, SODIUM ACETATE, POTASSIUM CHLORIDE AND MAGNESIUM CHLORIDE 125 ML/HR: 526; 502; 368; 37; 30 INJECTION, SOLUTION INTRAVENOUS at 16:19

## 2020-01-01 RX ADMIN — FUROSEMIDE 20 MG: 10 INJECTION, SOLUTION INTRAMUSCULAR; INTRAVENOUS at 11:20

## 2020-01-01 RX ADMIN — DEXTROSE, SODIUM CHLORIDE, AND POTASSIUM CHLORIDE 125 ML/HR: 5; .45; .15 INJECTION INTRAVENOUS at 23:37

## 2020-01-01 RX ADMIN — OXYBUTYNIN 10 MG: 5 TABLET, FILM COATED, EXTENDED RELEASE ORAL at 10:52

## 2020-01-01 RX ADMIN — Medication 25 MCG/HR: at 08:37

## 2020-01-01 RX ADMIN — HEPARIN SODIUM 5000 UNITS: 5000 INJECTION INTRAVENOUS; SUBCUTANEOUS at 21:40

## 2020-01-01 RX ADMIN — ACETAMINOPHEN 650 MG: 325 TABLET, FILM COATED ORAL at 12:13

## 2020-01-01 RX ADMIN — POTASSIUM CHLORIDE 20 MEQ: 14.9 INJECTION, SOLUTION INTRAVENOUS at 16:25

## 2020-01-01 RX ADMIN — PIPERACILLIN AND TAZOBACTAM 3.38 G: 3; .375 INJECTION, POWDER, LYOPHILIZED, FOR SOLUTION INTRAVENOUS at 21:09

## 2020-01-01 RX ADMIN — FUROSEMIDE 40 MG: 40 TABLET ORAL at 11:34

## 2020-01-01 RX ADMIN — KETOROLAC TROMETHAMINE 15 MG: 30 INJECTION, SOLUTION INTRAMUSCULAR at 08:58

## 2020-01-01 RX ADMIN — HEPARIN SODIUM 5000 UNITS: 5000 INJECTION INTRAVENOUS; SUBCUTANEOUS at 21:06

## 2020-01-01 RX ADMIN — LIDOCAINE 5% 1 PATCH: 700 PATCH TOPICAL at 08:32

## 2020-01-01 RX ADMIN — TAMSULOSIN HYDROCHLORIDE 0.4 MG: 0.4 CAPSULE ORAL at 15:53

## 2020-01-01 RX ADMIN — Medication 250 MG: at 17:38

## 2020-01-01 RX ADMIN — POTASSIUM CHLORIDE 20 MEQ: 1500 TABLET, EXTENDED RELEASE ORAL at 16:59

## 2020-01-01 RX ADMIN — FUROSEMIDE 40 MG: 10 INJECTION, SOLUTION INTRAMUSCULAR; INTRAVENOUS at 15:53

## 2020-01-01 RX ADMIN — BACLOFEN 10 MG: 10 TABLET ORAL at 20:16

## 2020-01-01 RX ADMIN — FENTANYL CITRATE 50 MCG: 50 INJECTION INTRAMUSCULAR; INTRAVENOUS at 20:30

## 2020-01-01 RX ADMIN — SODIUM CHLORIDE, SODIUM GLUCONATE, SODIUM ACETATE, POTASSIUM CHLORIDE, MAGNESIUM CHLORIDE, SODIUM PHOSPHATE, DIBASIC, AND POTASSIUM PHOSPHATE 500 ML: .53; .5; .37; .037; .03; .012; .00082 INJECTION, SOLUTION INTRAVENOUS at 16:24

## 2020-01-01 RX ADMIN — Medication 125 MG: at 23:25

## 2020-01-01 RX ADMIN — CALCIUM GLUCONATE 2 G: 20 INJECTION, SOLUTION INTRAVENOUS at 01:12

## 2020-01-01 RX ADMIN — PIPERACILLIN AND TAZOBACTAM 3.38 G: 3; .375 INJECTION, POWDER, LYOPHILIZED, FOR SOLUTION INTRAVENOUS at 21:31

## 2020-01-01 RX ADMIN — PIPERACILLIN AND TAZOBACTAM 4.5 G: 36; 4.5 INJECTION, POWDER, FOR SOLUTION INTRAVENOUS at 09:07

## 2020-01-01 RX ADMIN — FENTANYL CITRATE 50 MCG: 50 INJECTION, SOLUTION INTRAMUSCULAR; INTRAVENOUS at 13:48

## 2020-01-01 RX ADMIN — POTASSIUM PHOSPHATE, MONOBASIC POTASSIUM PHOSPHATE, DIBASIC 21 MMOL: 224; 236 INJECTION, SOLUTION, CONCENTRATE INTRAVENOUS at 08:31

## 2020-01-01 RX ADMIN — HEPARIN SODIUM 5000 UNITS: 5000 INJECTION INTRAVENOUS; SUBCUTANEOUS at 06:03

## 2020-01-01 RX ADMIN — PROPOFOL 20 MCG/KG/MIN: 10 INJECTION, EMULSION INTRAVENOUS at 18:40

## 2020-01-01 RX ADMIN — VANCOMYCIN HYDROCHLORIDE 250 MG: 10 INJECTION, POWDER, LYOPHILIZED, FOR SOLUTION INTRAVENOUS at 01:06

## 2020-01-01 RX ADMIN — VANCOMYCIN HYDROCHLORIDE 500 MG: 10 INJECTION, POWDER, LYOPHILIZED, FOR SOLUTION INTRAVENOUS at 20:22

## 2020-01-01 RX ADMIN — METRONIDAZOLE 500 MG: 500 INJECTION, SOLUTION INTRAVENOUS at 08:07

## 2020-01-01 RX ADMIN — VANCOMYCIN HYDROCHLORIDE 250 MG: 10 INJECTION, POWDER, LYOPHILIZED, FOR SOLUTION INTRAVENOUS at 05:23

## 2020-01-01 RX ADMIN — BACLOFEN 10 MG: 10 TABLET ORAL at 15:53

## 2020-01-01 RX ADMIN — Medication 125 MG: at 20:53

## 2020-01-01 RX ADMIN — PHENYLEPHRINE HYDROCHLORIDE 50 MCG/MIN: 10 INJECTION INTRAVENOUS at 19:00

## 2020-01-01 RX ADMIN — AMIODARONE HYDROCHLORIDE 400 MG: 200 TABLET ORAL at 08:01

## 2020-01-01 RX ADMIN — VANCOMYCIN HYDROCHLORIDE 500 MG: 10 INJECTION, POWDER, LYOPHILIZED, FOR SOLUTION INTRAVENOUS at 14:25

## 2020-01-01 RX ADMIN — PIPERACILLIN AND TAZOBACTAM 4.5 G: 36; 4.5 INJECTION, POWDER, FOR SOLUTION INTRAVENOUS at 02:38

## 2020-01-01 RX ADMIN — FUROSEMIDE 20 MG: 10 INJECTION, SOLUTION INTRAMUSCULAR; INTRAVENOUS at 16:40

## 2020-01-01 RX ADMIN — ONDANSETRON 4 MG: 2 INJECTION INTRAMUSCULAR; INTRAVENOUS at 17:20

## 2020-01-01 RX ADMIN — Medication 125 MG: at 09:00

## 2020-01-01 RX ADMIN — Medication 100 MG: at 17:31

## 2020-01-01 RX ADMIN — ACETAMINOPHEN 650 MG: 325 TABLET, FILM COATED ORAL at 10:49

## 2020-01-01 RX ADMIN — PROPOFOL 100 MG: 10 INJECTION, EMULSION INTRAVENOUS at 13:39

## 2020-01-01 RX ADMIN — DOCUSATE SODIUM 100 MG: 100 CAPSULE ORAL at 17:05

## 2020-01-01 RX ADMIN — Medication 125 MG: at 01:56

## 2020-01-01 RX ADMIN — SODIUM CHLORIDE, SODIUM GLUCONATE, SODIUM ACETATE, POTASSIUM CHLORIDE AND MAGNESIUM CHLORIDE 125 ML/HR: 526; 502; 368; 37; 30 INJECTION, SOLUTION INTRAVENOUS at 06:08

## 2020-01-01 RX ADMIN — PHENYLEPHRINE HYDROCHLORIDE 100 MCG: 10 INJECTION INTRAVENOUS at 12:35

## 2020-01-01 RX ADMIN — ETOMIDATE INJECTION 20 MG: 2 SOLUTION INTRAVENOUS at 01:25

## 2020-01-01 RX ADMIN — PANTOPRAZOLE SODIUM 40 MG: 40 INJECTION, POWDER, FOR SOLUTION INTRAVENOUS at 08:13

## 2020-01-01 RX ADMIN — SODIUM CHLORIDE, SODIUM GLUCONATE, SODIUM ACETATE, POTASSIUM CHLORIDE, MAGNESIUM CHLORIDE, SODIUM PHOSPHATE, DIBASIC, AND POTASSIUM PHOSPHATE 1000 ML: .53; .5; .37; .037; .03; .012; .00082 INJECTION, SOLUTION INTRAVENOUS at 22:34

## 2020-01-01 RX ADMIN — SODIUM CHLORIDE, SODIUM GLUCONATE, SODIUM ACETATE, POTASSIUM CHLORIDE AND MAGNESIUM CHLORIDE 125 ML/HR: 526; 502; 368; 37; 30 INJECTION, SOLUTION INTRAVENOUS at 23:54

## 2020-01-01 RX ADMIN — CALCIUM GLUCONATE 2 G: 20 INJECTION, SOLUTION INTRAVENOUS at 21:09

## 2020-01-01 RX ADMIN — MORPHINE SULFATE 2 MG: 2 INJECTION, SOLUTION INTRAMUSCULAR; INTRAVENOUS at 20:15

## 2020-01-01 RX ADMIN — ACETAMINOPHEN 650 MG: 325 TABLET, FILM COATED ORAL at 13:24

## 2020-01-01 RX ADMIN — SODIUM CHLORIDE 1000 ML: 0.9 INJECTION, SOLUTION INTRAVENOUS at 11:19

## 2020-01-01 RX ADMIN — PROPOFOL 10 MCG/KG/MIN: 10 INJECTION, EMULSION INTRAVENOUS at 12:33

## 2020-01-01 RX ADMIN — Medication 125 MG: at 20:06

## 2020-01-01 RX ADMIN — AMIODARONE HYDROCHLORIDE 400 MG: 200 TABLET ORAL at 09:24

## 2020-01-01 RX ADMIN — GLUCAGON HYDROCHLORIDE 0.5 MG: KIT at 12:48

## 2020-01-01 RX ADMIN — AMIODARONE HYDROCHLORIDE 200 MG: 200 TABLET ORAL at 08:31

## 2020-01-01 RX ADMIN — OXYBUTYNIN 10 MG: 5 TABLET, FILM COATED, EXTENDED RELEASE ORAL at 09:24

## 2020-01-01 RX ADMIN — HEPARIN SODIUM 5000 UNITS: 5000 INJECTION INTRAVENOUS; SUBCUTANEOUS at 15:10

## 2020-01-01 RX ADMIN — VANCOMYCIN HYDROCHLORIDE 500 MG: 10 INJECTION, POWDER, LYOPHILIZED, FOR SOLUTION INTRAVENOUS at 07:18

## 2020-01-01 RX ADMIN — AMIODARONE HYDROCHLORIDE 400 MG: 200 TABLET ORAL at 08:16

## 2020-01-01 RX ADMIN — SODIUM CHLORIDE, SODIUM GLUCONATE, SODIUM ACETATE, POTASSIUM CHLORIDE AND MAGNESIUM CHLORIDE 500 ML: 526; 502; 368; 37; 30 INJECTION, SOLUTION INTRAVENOUS at 09:37

## 2020-01-01 RX ADMIN — VANCOMYCIN HYDROCHLORIDE 500 MG: 10 INJECTION, POWDER, LYOPHILIZED, FOR SOLUTION INTRAVENOUS at 01:31

## 2020-01-01 RX ADMIN — PIPERACILLIN AND TAZOBACTAM 3.38 G: 3; .375 INJECTION, POWDER, LYOPHILIZED, FOR SOLUTION INTRAVENOUS at 21:14

## 2020-01-01 RX ADMIN — INDOMETHACIN 100 MG: 50 SUPPOSITORY RECTAL at 12:31

## 2020-01-01 RX ADMIN — SIMETHICONE 80 MG: 80 TABLET, CHEWABLE ORAL at 19:28

## 2020-01-01 RX ADMIN — OXYCODONE HYDROCHLORIDE 5 MG: 5 TABLET ORAL at 15:14

## 2020-01-01 RX ADMIN — PROPOFOL 30 MCG/KG/MIN: 10 INJECTION, EMULSION INTRAVENOUS at 05:22

## 2020-01-01 RX ADMIN — ALBUMIN, HUMAN INJ 5%: 5 SOLUTION at 18:56

## 2020-01-01 RX ADMIN — MAGNESIUM SULFATE IN WATER 2 G: 40 INJECTION, SOLUTION INTRAVENOUS at 21:22

## 2020-01-01 RX ADMIN — METRONIDAZOLE 500 MG: 500 INJECTION, SOLUTION INTRAVENOUS at 08:12

## 2020-01-01 RX ADMIN — Medication 250 MG: at 08:31

## 2020-01-01 RX ADMIN — FENTANYL CITRATE 50 MCG: 50 INJECTION, SOLUTION INTRAMUSCULAR; INTRAVENOUS at 19:50

## 2020-01-01 RX ADMIN — PANTOPRAZOLE SODIUM 40 MG: 40 INJECTION, POWDER, FOR SOLUTION INTRAVENOUS at 20:06

## 2020-01-01 RX ADMIN — PANTOPRAZOLE SODIUM 40 MG: 40 TABLET, DELAYED RELEASE ORAL at 05:43

## 2020-01-01 RX ADMIN — SODIUM CHLORIDE, SODIUM GLUCONATE, SODIUM ACETATE, POTASSIUM CHLORIDE, MAGNESIUM CHLORIDE, SODIUM PHOSPHATE, DIBASIC, AND POTASSIUM PHOSPHATE 1000 ML: .53; .5; .37; .037; .03; .012; .00082 INJECTION, SOLUTION INTRAVENOUS at 18:48

## 2020-01-01 RX ADMIN — METOPROLOL TARTRATE 25 MG: 25 TABLET, FILM COATED ORAL at 22:53

## 2020-01-01 RX ADMIN — METOPROLOL TARTRATE 25 MG: 25 TABLET, FILM COATED ORAL at 05:03

## 2020-01-01 RX ADMIN — FENTANYL CITRATE 50 MCG: 50 INJECTION INTRAMUSCULAR; INTRAVENOUS at 01:45

## 2020-01-01 RX ADMIN — PANTOPRAZOLE SODIUM 40 MG: 40 INJECTION, POWDER, FOR SOLUTION INTRAVENOUS at 09:05

## 2020-01-01 RX ADMIN — ROCURONIUM BROMIDE 30 MG: 10 INJECTION, SOLUTION INTRAVENOUS at 18:30

## 2020-01-01 RX ADMIN — HEPARIN SODIUM 5000 UNITS: 5000 INJECTION INTRAVENOUS; SUBCUTANEOUS at 05:22

## 2020-01-01 RX ADMIN — OXYBUTYNIN 10 MG: 5 TABLET, FILM COATED, EXTENDED RELEASE ORAL at 10:21

## 2020-01-01 RX ADMIN — FUROSEMIDE 40 MG: 10 INJECTION, SOLUTION INTRAMUSCULAR; INTRAVENOUS at 09:04

## 2020-01-01 RX ADMIN — TRAMADOL HYDROCHLORIDE 50 MG: 50 TABLET, FILM COATED ORAL at 15:02

## 2020-01-01 RX ADMIN — Medication 125 MG: at 14:52

## 2020-01-01 RX ADMIN — METOPROLOL TARTRATE 25 MG: 25 TABLET, FILM COATED ORAL at 18:45

## 2020-01-01 RX ADMIN — METRONIDAZOLE 500 MG: 500 INJECTION, SOLUTION INTRAVENOUS at 07:28

## 2020-01-01 RX ADMIN — ACETAMINOPHEN 650 MG: 325 TABLET, FILM COATED ORAL at 17:47

## 2020-01-01 RX ADMIN — PANTOPRAZOLE SODIUM 40 MG: 40 INJECTION, POWDER, FOR SOLUTION INTRAVENOUS at 22:18

## 2020-01-01 RX ADMIN — SODIUM CHLORIDE, SODIUM LACTATE, POTASSIUM CHLORIDE, AND CALCIUM CHLORIDE: .6; .31; .03; .02 INJECTION, SOLUTION INTRAVENOUS at 12:25

## 2020-01-01 RX ADMIN — SIMETHICONE 80 MG: 80 TABLET, CHEWABLE ORAL at 08:58

## 2020-01-01 RX ADMIN — ALBUMIN, HUMAN INJ 5%: 5 SOLUTION at 18:30

## 2020-01-01 RX ADMIN — PIPERACILLIN AND TAZOBACTAM 3.38 G: 3; .375 INJECTION, POWDER, LYOPHILIZED, FOR SOLUTION INTRAVENOUS at 09:09

## 2020-01-01 RX ADMIN — OXYBUTYNIN 10 MG: 5 TABLET, FILM COATED, EXTENDED RELEASE ORAL at 09:03

## 2020-01-01 RX ADMIN — METOPROLOL TARTRATE 25 MG: 25 TABLET, FILM COATED ORAL at 16:40

## 2020-01-01 RX ADMIN — CALCIUM GLUCONATE 2 G: 20 INJECTION, SOLUTION INTRAVENOUS at 00:18

## 2020-01-01 RX ADMIN — DEXTROSE, SODIUM CHLORIDE, AND POTASSIUM CHLORIDE 125 ML/HR: 5; .45; .15 INJECTION INTRAVENOUS at 09:05

## 2020-01-01 RX ADMIN — CHLORHEXIDINE GLUCONATE 15 ML: 1.2 RINSE ORAL at 08:06

## 2020-01-01 RX ADMIN — CALCIUM GLUCONATE 2 G: 20 INJECTION, SOLUTION INTRAVENOUS at 11:29

## 2020-01-01 RX ADMIN — PIPERACILLIN AND TAZOBACTAM 3.38 G: 3; .375 INJECTION, POWDER, LYOPHILIZED, FOR SOLUTION INTRAVENOUS at 20:10

## 2020-01-01 RX ADMIN — HYDROCORTISONE SODIUM SUCCINATE 50 MG: 100 INJECTION, POWDER, FOR SOLUTION INTRAMUSCULAR; INTRAVENOUS at 17:27

## 2020-01-01 RX ADMIN — SODIUM CHLORIDE 100 ML/HR: 0.9 INJECTION, SOLUTION INTRAVENOUS at 00:33

## 2020-01-01 RX ADMIN — HEPARIN SODIUM 5000 UNITS: 5000 INJECTION INTRAVENOUS; SUBCUTANEOUS at 14:58

## 2020-01-01 RX ADMIN — PANTOPRAZOLE SODIUM 40 MG: 40 INJECTION, POWDER, FOR SOLUTION INTRAVENOUS at 20:53

## 2020-01-01 RX ADMIN — Medication 125 MG: at 17:38

## 2020-01-01 RX ADMIN — FUROSEMIDE 40 MG: 40 TABLET ORAL at 08:31

## 2020-01-01 RX ADMIN — CHLORHEXIDINE GLUCONATE 15 ML: 1.2 RINSE ORAL at 08:13

## 2020-01-01 RX ADMIN — METOPROLOL TARTRATE 25 MG: 25 TABLET, FILM COATED ORAL at 10:52

## 2020-01-01 RX ADMIN — DEXTROSE MONOHYDRATE 25 ML: 25 INJECTION, SOLUTION INTRAVENOUS at 12:30

## 2020-01-01 RX ADMIN — HYDROCORTISONE SODIUM SUCCINATE 100 MG: 100 INJECTION, POWDER, FOR SOLUTION INTRAMUSCULAR; INTRAVENOUS at 14:52

## 2020-01-01 RX ADMIN — PIPERACILLIN AND TAZOBACTAM 3.38 G: 3; .375 INJECTION, POWDER, LYOPHILIZED, FOR SOLUTION INTRAVENOUS at 05:03

## 2020-01-01 RX ADMIN — FUROSEMIDE 40 MG: 10 INJECTION, SOLUTION INTRAMUSCULAR; INTRAVENOUS at 09:22

## 2020-01-01 RX ADMIN — CHLORHEXIDINE GLUCONATE 15 ML: 1.2 RINSE ORAL at 08:36

## 2020-01-01 RX ADMIN — AMIODARONE HYDROCHLORIDE 200 MG: 200 TABLET ORAL at 09:03

## 2020-01-01 RX ADMIN — IOHEXOL 100 ML: 350 INJECTION, SOLUTION INTRAVENOUS at 14:44

## 2020-01-01 RX ADMIN — SODIUM CHLORIDE, SODIUM GLUCONATE, SODIUM ACETATE, POTASSIUM CHLORIDE, MAGNESIUM CHLORIDE, SODIUM PHOSPHATE, DIBASIC, AND POTASSIUM PHOSPHATE 500 ML: .53; .5; .37; .037; .03; .012; .00082 INJECTION, SOLUTION INTRAVENOUS at 11:31

## 2020-01-01 RX ADMIN — PIPERACILLIN AND TAZOBACTAM 3.38 G: 3; .375 INJECTION, POWDER, LYOPHILIZED, FOR SOLUTION INTRAVENOUS at 14:44

## 2020-01-01 RX ADMIN — FENTANYL CITRATE 50 MCG: 50 INJECTION INTRAMUSCULAR; INTRAVENOUS at 13:12

## 2020-01-01 RX ADMIN — SODIUM CHLORIDE, SODIUM LACTATE, POTASSIUM CHLORIDE, CALCIUM CHLORIDE: 600; 310; 30; 20 INJECTION, SOLUTION INTRAVENOUS at 17:18

## 2020-01-01 RX ADMIN — MELATONIN 3 MG: 3 TAB ORAL at 21:31

## 2020-01-01 RX ADMIN — ALLOPURINOL 300 MG: 300 TABLET ORAL at 08:01

## 2020-01-01 RX ADMIN — PIPERACILLIN AND TAZOBACTAM 3.38 G: 36; 4.5 INJECTION, POWDER, FOR SOLUTION INTRAVENOUS at 15:13

## 2020-01-01 RX ADMIN — POTASSIUM CHLORIDE 40 MEQ: 400 INJECTION, SOLUTION INTRAVENOUS at 21:23

## 2020-01-01 RX ADMIN — FAMOTIDINE 20 MG: 20 TABLET, FILM COATED ORAL at 08:16

## 2020-01-01 RX ADMIN — FENTANYL CITRATE 25 MCG: 50 INJECTION, SOLUTION INTRAMUSCULAR; INTRAVENOUS at 13:44

## 2020-01-01 RX ADMIN — METOPROLOL TARTRATE 25 MG: 25 TABLET, FILM COATED ORAL at 18:39

## 2020-01-01 RX ADMIN — SODIUM BICARBONATE 50 MEQ: 84 INJECTION INTRAVENOUS at 19:11

## 2020-01-01 RX ADMIN — NOREPINEPHRINE BITARTRATE 9 MCG/MIN: 1 INJECTION, SOLUTION, CONCENTRATE INTRAVENOUS at 00:26

## 2020-01-01 RX ADMIN — MAGNESIUM SULFATE IN WATER 2 G: 40 INJECTION, SOLUTION INTRAVENOUS at 11:42

## 2020-01-01 RX ADMIN — PIPERACILLIN AND TAZOBACTAM 3.38 G: 3; .375 INJECTION, POWDER, LYOPHILIZED, FOR SOLUTION INTRAVENOUS at 04:57

## 2020-01-01 RX ADMIN — VANCOMYCIN HYDROCHLORIDE 500 MG: 10 INJECTION, POWDER, LYOPHILIZED, FOR SOLUTION INTRAVENOUS at 15:18

## 2020-01-01 RX ADMIN — PANTOPRAZOLE SODIUM 40 MG: 40 INJECTION, POWDER, FOR SOLUTION INTRAVENOUS at 08:37

## 2020-01-01 RX ADMIN — Medication 250 MG: at 11:10

## 2020-01-01 RX ADMIN — CHLORHEXIDINE GLUCONATE 0.12% ORAL RINSE 15 ML: 1.2 LIQUID ORAL at 21:39

## 2020-01-01 RX ADMIN — METRONIDAZOLE 500 MG: 500 INJECTION, SOLUTION INTRAVENOUS at 16:20

## 2020-01-01 RX ADMIN — KETOROLAC TROMETHAMINE 15 MG: 30 INJECTION, SOLUTION INTRAMUSCULAR at 21:14

## 2020-01-01 RX ADMIN — SODIUM CHLORIDE, SODIUM GLUCONATE, SODIUM ACETATE, POTASSIUM CHLORIDE, MAGNESIUM CHLORIDE, SODIUM PHOSPHATE, DIBASIC, AND POTASSIUM PHOSPHATE 500 ML: .53; .5; .37; .037; .03; .012; .00082 INJECTION, SOLUTION INTRAVENOUS at 04:44

## 2020-01-01 RX ADMIN — PIPERACILLIN AND TAZOBACTAM 3.38 G: 3; .375 INJECTION, POWDER, LYOPHILIZED, FOR SOLUTION INTRAVENOUS at 16:00

## 2020-01-01 RX ADMIN — FENTANYL CITRATE 25 MCG: 50 INJECTION, SOLUTION INTRAMUSCULAR; INTRAVENOUS at 13:53

## 2020-01-01 RX ADMIN — METRONIDAZOLE 500 MG: 500 INJECTION, SOLUTION INTRAVENOUS at 00:43

## 2020-01-01 RX ADMIN — Medication 125 MG: at 14:58

## 2020-01-01 RX ADMIN — Medication 125 MG: at 15:11

## 2020-01-01 RX ADMIN — SODIUM CHLORIDE, SODIUM GLUCONATE, SODIUM ACETATE, POTASSIUM CHLORIDE AND MAGNESIUM CHLORIDE 500 ML: 526; 502; 368; 37; 30 INJECTION, SOLUTION INTRAVENOUS at 17:28

## 2020-01-01 RX ADMIN — ACETAMINOPHEN 650 MG: 325 TABLET, FILM COATED ORAL at 13:13

## 2020-01-01 RX ADMIN — PIPERACILLIN AND TAZOBACTAM 3.38 G: 3; .375 INJECTION, POWDER, LYOPHILIZED, FOR SOLUTION INTRAVENOUS at 14:55

## 2020-01-01 RX ADMIN — Medication 50 MEQ: at 20:00

## 2020-01-01 RX ADMIN — METRONIDAZOLE 500 MG: 500 INJECTION, SOLUTION INTRAVENOUS at 00:21

## 2020-01-01 RX ADMIN — DEXTROSE MONOHYDRATE 50 ML: 25 INJECTION, SOLUTION INTRAVENOUS at 00:22

## 2020-01-01 RX ADMIN — CHLORHEXIDINE GLUCONATE 15 ML: 1.2 RINSE ORAL at 21:03

## 2020-01-01 RX ADMIN — METOPROLOL TARTRATE 25 MG: 25 TABLET, FILM COATED ORAL at 17:47

## 2020-01-01 RX ADMIN — APIXABAN 5 MG: 5 TABLET, FILM COATED ORAL at 18:45

## 2020-01-01 RX ADMIN — CALCIUM CHLORIDE 2 G: 100 INJECTION, SOLUTION INTRAVENOUS at 19:15

## 2020-01-01 RX ADMIN — CHLORHEXIDINE GLUCONATE 15 ML: 1.2 RINSE ORAL at 20:54

## 2020-01-01 RX ADMIN — NOREPINEPHRINE BITARTRATE 30 MCG/MIN: 1 INJECTION, SOLUTION, CONCENTRATE INTRAVENOUS at 21:09

## 2020-01-01 RX ADMIN — CEPHALEXIN 500 MG: 500 CAPSULE ORAL at 09:37

## 2020-01-01 RX ADMIN — PIPERACILLIN AND TAZOBACTAM 3.38 G: 3; .375 INJECTION, POWDER, LYOPHILIZED, FOR SOLUTION INTRAVENOUS at 09:58

## 2020-01-01 RX ADMIN — SODIUM CHLORIDE, SODIUM GLUCONATE, SODIUM ACETATE, POTASSIUM CHLORIDE, MAGNESIUM CHLORIDE, SODIUM PHOSPHATE, DIBASIC, AND POTASSIUM PHOSPHATE 100 ML/HR: .53; .5; .37; .037; .03; .012; .00082 INJECTION, SOLUTION INTRAVENOUS at 07:52

## 2020-01-01 RX ADMIN — ONDANSETRON 4 MG: 2 INJECTION INTRAMUSCULAR; INTRAVENOUS at 13:44

## 2020-01-01 RX ADMIN — PANTOPRAZOLE SODIUM 40 MG: 40 INJECTION, POWDER, FOR SOLUTION INTRAVENOUS at 08:32

## 2020-01-01 RX ADMIN — POTASSIUM PHOSPHATE, MONOBASIC AND POTASSIUM PHOSPHATE, DIBASIC 21 MMOL: 224; 236 INJECTION, SOLUTION, CONCENTRATE INTRAVENOUS at 21:08

## 2020-01-01 RX ADMIN — POTASSIUM CHLORIDE 40 MEQ: 29.8 INJECTION, SOLUTION INTRAVENOUS at 23:47

## 2020-01-01 RX ADMIN — IOHEXOL 100 ML: 350 INJECTION, SOLUTION INTRAVENOUS at 18:32

## 2020-01-01 RX ADMIN — SODIUM CHLORIDE, SODIUM GLUCONATE, SODIUM ACETATE, POTASSIUM CHLORIDE AND MAGNESIUM CHLORIDE 500 ML: 526; 502; 368; 37; 30 INJECTION, SOLUTION INTRAVENOUS at 02:42

## 2020-01-01 RX ADMIN — PIPERACILLIN AND TAZOBACTAM 3.38 G: 36; 4.5 INJECTION, POWDER, FOR SOLUTION INTRAVENOUS at 22:54

## 2020-01-01 RX ADMIN — POTASSIUM CHLORIDE 40 MEQ: 1500 TABLET, EXTENDED RELEASE ORAL at 20:25

## 2020-01-01 RX ADMIN — PIPERACILLIN AND TAZOBACTAM 3.38 G: 3; .375 INJECTION, POWDER, LYOPHILIZED, FOR SOLUTION INTRAVENOUS at 04:38

## 2020-01-01 RX ADMIN — PIPERACILLIN AND TAZOBACTAM 3.38 G: 36; 4.5 INJECTION, POWDER, FOR SOLUTION INTRAVENOUS at 15:18

## 2020-01-01 RX ADMIN — PIPERACILLIN AND TAZOBACTAM 4.5 G: 4; .5 INJECTION, POWDER, LYOPHILIZED, FOR SOLUTION INTRAVENOUS at 11:01

## 2020-01-01 RX ADMIN — METOPROLOL TARTRATE 25 MG: 25 TABLET, FILM COATED ORAL at 12:03

## 2020-01-01 RX ADMIN — EPHEDRINE SULFATE 10 MG: 50 INJECTION INTRAVENOUS at 17:36

## 2020-01-01 RX ADMIN — SODIUM CHLORIDE 3 G: 9 INJECTION, SOLUTION INTRAVENOUS at 11:18

## 2020-01-01 RX ADMIN — LORATADINE 10 MG: 10 TABLET ORAL at 08:16

## 2020-01-01 RX ADMIN — HEPARIN SODIUM 5000 UNITS: 5000 INJECTION INTRAVENOUS; SUBCUTANEOUS at 16:24

## 2020-01-01 RX ADMIN — VANCOMYCIN HYDROCHLORIDE 500 MG: 10 INJECTION, POWDER, LYOPHILIZED, FOR SOLUTION INTRAVENOUS at 02:13

## 2020-01-01 RX ADMIN — POTASSIUM CHLORIDE 40 MEQ: 1500 TABLET, EXTENDED RELEASE ORAL at 11:34

## 2020-01-01 RX ADMIN — POTASSIUM CHLORIDE 20 MEQ: 14.9 INJECTION, SOLUTION INTRAVENOUS at 09:24

## 2020-01-01 RX ADMIN — PROPOFOL INJECTABLE EMULSION 25 MCG/KG/MIN: 10 INJECTION, EMULSION INTRAVENOUS at 22:17

## 2020-01-01 RX ADMIN — CHLORHEXIDINE GLUCONATE 15 ML: 1.2 RINSE ORAL at 21:01

## 2020-01-01 RX ADMIN — Medication 25 MCG/HR: at 03:48

## 2020-01-01 RX ADMIN — SODIUM CHLORIDE 100 ML/HR: 0.9 INJECTION, SOLUTION INTRAVENOUS at 10:51

## 2020-01-01 RX ADMIN — Medication 25 MCG/HR: at 02:38

## 2020-01-01 RX ADMIN — METOPROLOL TARTRATE 25 MG: 25 TABLET, FILM COATED ORAL at 04:57

## 2020-01-01 RX ADMIN — FENTANYL CITRATE 50 MCG: 50 INJECTION INTRAMUSCULAR; INTRAVENOUS at 21:50

## 2020-01-01 RX ADMIN — PIPERACILLIN AND TAZOBACTAM 3.38 G: 3; .375 INJECTION, POWDER, LYOPHILIZED, FOR SOLUTION INTRAVENOUS at 13:26

## 2020-01-01 RX ADMIN — METOPROLOL TARTRATE 25 MG: 25 TABLET, FILM COATED ORAL at 04:41

## 2020-01-01 RX ADMIN — CHLORHEXIDINE GLUCONATE 15 ML: 1.2 RINSE ORAL at 20:06

## 2020-01-01 RX ADMIN — HEPARIN SODIUM 5000 UNITS: 5000 INJECTION INTRAVENOUS; SUBCUTANEOUS at 21:02

## 2020-01-01 RX ADMIN — PIPERACILLIN AND TAZOBACTAM 3.38 G: 3; .375 INJECTION, POWDER, LYOPHILIZED, FOR SOLUTION INTRAVENOUS at 22:26

## 2020-01-01 RX ADMIN — FENTANYL CITRATE 50 MCG: 50 INJECTION, SOLUTION INTRAMUSCULAR; INTRAVENOUS at 13:52

## 2020-01-01 RX ADMIN — ONDANSETRON 4 MG: 2 INJECTION INTRAMUSCULAR; INTRAVENOUS at 10:32

## 2020-01-01 RX ADMIN — PIPERACILLIN AND TAZOBACTAM 3.38 G: 36; 4.5 INJECTION, POWDER, FOR SOLUTION INTRAVENOUS at 06:47

## 2020-01-01 RX ADMIN — SODIUM CHLORIDE, SODIUM GLUCONATE, SODIUM ACETATE, POTASSIUM CHLORIDE AND MAGNESIUM CHLORIDE 125 ML/HR: 526; 502; 368; 37; 30 INJECTION, SOLUTION INTRAVENOUS at 06:11

## 2020-01-01 RX ADMIN — METOPROLOL TARTRATE 25 MG: 25 TABLET, FILM COATED ORAL at 17:01

## 2020-01-01 RX ADMIN — ONDANSETRON 4 MG: 2 INJECTION INTRAMUSCULAR; INTRAVENOUS at 17:48

## 2020-01-01 RX ADMIN — NOREPINEPHRINE BITARTRATE 9 MCG/MIN: 1 INJECTION, SOLUTION, CONCENTRATE INTRAVENOUS at 09:07

## 2020-01-01 RX ADMIN — METRONIDAZOLE 500 MG: 500 INJECTION, SOLUTION INTRAVENOUS at 12:44

## 2020-01-01 RX ADMIN — IOHEXOL 45 ML: 240 INJECTION, SOLUTION INTRATHECAL; INTRAVASCULAR; INTRAVENOUS; ORAL at 18:32

## 2020-01-01 RX ADMIN — POLYETHYLENE GLYCOL 3350 17 G: 17 POWDER, FOR SOLUTION ORAL at 11:18

## 2020-01-01 RX ADMIN — AMIODARONE HYDROCHLORIDE 200 MG: 200 TABLET ORAL at 09:59

## 2020-01-15 NOTE — TELEPHONE ENCOUNTER
Pt left a message in our med line  He needs a prescription for straps for his braces in his legs send to [facility]  Please advise

## 2020-02-18 PROBLEM — L97.511 SKIN ULCER OF RIGHT FOOT, LIMITED TO BREAKDOWN OF SKIN (HCC): Status: ACTIVE | Noted: 2020-01-01

## 2020-02-18 NOTE — ASSESSMENT & PLAN NOTE
He is encouraged to return to the person that adjusted his brace to see if this could be adjusted to take pressure off his distal foot

## 2020-02-18 NOTE — PROGRESS NOTES
Assessment/Plan:       Diagnoses and all orders for this visit:    Skin ulcer of right foot, limited to breakdown of skin (HCC)  -     cephalexin (KEFLEX) 500 mg capsule; Take 1 capsule (500 mg total) by mouth every 8 (eight) hours for 10 days  -     VAS lower limb venous duplex study, unilateral/limited; Future  -     Ambulatory referral to Vascular Surgery; Future        Skin ulcer of right foot, limited to breakdown of skin ([facility] [address] )  He is encouraged to return to the person that adjusted his brace to see if this could be adjusted to take pressure off his distal foot  Subjective:      Patient ID: Inocencia Lilly is a 78 y o  male  Patient comes in with blistering of right foot  He recently had adjustment of his leg braces which has caused irritation was right foot  The following portions of the patient's history were reviewed and updated as appropriate:   He has a past medical history of A-fib ([facility] [address] ), [facility] Gout, HTN (hypertension), Leukocytosis, Lumbar spinal stenosis, Neuropathy, Polio, Renal calculi, and Sleep apnea  ,  does not have any pertinent problems on file  ,   has a past surgical history that includes Colon surgery; Hernia repair; Femur fracture surgery (Right); and Other surgical history  ,  family history includes Cancer in his maternal grandmother; Cirrhosis in his brother; Diabetes in his brother; Hodgkin's lymphoma in his mother; Other in his father  ,   reports that he has never smoked  He has never used smokeless tobacco  He reports that he does not drink alcohol or use drugs  ,  is allergic to cymbalta [duloxetine hcl]     Current Outpatient Medications   Medication Sig Dispense Refill    allopurinol (ZYLOPRIM) 300 mg tablet TAKE 1 TABLET BY MOUTH  DAILY 90 tablet 1    amiodarone (PACERONE) 400 MG tablet TAKE 1 TABLET BY MOUTH TWICE A DAY FOR A WEEK AND THEN EVERY DAY 30 tablet 4    amitriptyline (ELAVIL) 25 mg tablet Take 1 tablet (25 mg total) by mouth daily at bedtime 30 tablet 3    baclofen 10 mg tablet TAKE 1 TABLET BY MOUTH 3  TIMES A  tablet 3    cephalexin (KEFLEX) 500 mg capsule Take 1 capsule (500 mg total) by mouth every 8 (eight) hours for 10 days 30 capsule 0    colchicine (COLCRYS) 0 6 mg tablet Take 0 6 mg by mouth as needed       ELIQUIS 5 MG TAKE 1 TABLET BY MOUTH TWO  TIMES DAILY 180 tablet 2    famotidine (PEPCID) 20 mg tablet TAKE 1 TABLET BY MOUTH  DAILY 90 tablet 3    fluticasone (FLONASE) 50 mcg/act nasal spray 1 spray into each nostril daily 3 Bottle 3    furosemide (LASIX) 40 mg tablet Take 0 5 tablets (20 mg total) by mouth daily 90 tablet 2    loratadine (CLARITIN) 10 mg tablet Take 10 mg by mouth daily      metoprolol tartrate (LOPRESSOR) 25 mg tablet Take 1 tablet (25 mg total) by mouth every 12 (twelve) hours 180 tablet 1    mupirocin (BACTROBAN) 2 % ointment Apply topically 3 (three) times a day 22 g 0    omeprazole (PriLOSEC) 20 mg delayed release capsule       tamsulosin (FLOMAX) 0 4 mg TAKE 1 CAPSULE BY MOUTH  DAILY WITH DINNER 90 capsule 3     No current facility-administered medications for this visit  Review of Systems   Constitutional: Negative  Respiratory: Negative  Cardiovascular: Negative  Objective:  Vitals:    02/18/20 1622   BP: 130/80   Pulse: 64   Resp: 16   Temp: 98 6 °F (37 °C)   SpO2: 99%   Weight: 110 kg (243 lb)   Height: 5' 6" (1 676 m)     Body mass index is 39 22 kg/m²  Physical Exam   Skin:   Two dark colored blisters over distal right 5th metatarsal with surrounding redness

## 2020-07-08 NOTE — PLAN OF CARE
Problem: DISCHARGE PLANNING - CARE MANAGEMENT  Goal: Discharge to post-acute care or home with appropriate resources  INTERVENTIONS:  - Conduct assessment to determine patient/family and health care team treatment goals, and need for post-acute services based on payer coverage, community resources, and patient preferences, and barriers to discharge  - Address psychosocial, clinical, and financial barriers to discharge as identified in assessment in conjunction with the patient/family and health care team  - Arrange appropriate level of post-acute services according to patients   needs and preference and payer coverage in collaboration with the physician and health care team  - Communicate with and update the patient/family, physician, and health care team regarding progress on the discharge plan  - Arrange appropriate transportation to post-acute venues   Outcome: Juliann Saxena from [facility] notified that pt has been off the Cardizem drip since yesterday  EEG to be done today  Pt is a possible return to [facility] tomorrow  Prior auth to be done  0

## 2020-07-27 NOTE — TELEPHONE ENCOUNTER
Requested medication(s) are due for refill today: Yes  Patient has already received a courtesy refill: No  Other reason request has been forwarded to provider: no recent labs in chart

## 2020-07-27 NOTE — TELEPHONE ENCOUNTER
Pt need a prescription for a left leg brace, pt is post polio and he broke his brace  Fax to: [phone]- [facility]

## 2020-07-30 NOTE — PROGRESS NOTES
Assessment/Plan:         Problem List Items Addressed This Visit        Nervous and Auditory    Post-polio syndrome - Primary     Replacement of left leg brace is necessary                 Subjective:      Patient ID: Brenna Bautista is a 78 y o  male  Patient is seen after breaking his left leg brace  His K AFO brace requires replacement  The following portions of the patient's history were reviewed and updated as appropriate:   He has a past medical history of A-fib ([address] ), Asbestosis ([facility] [address] ), Gout, HTN (hypertension), Leukocytosis, Lumbar spinal stenosis, Neuropathy, Polio, Renal calculi, and Sleep apnea  ,  does not have any pertinent problems on file  ,   has a past surgical history that includes Colon surgery; Hernia repair; Femur fracture surgery (Right); and Other surgical history  ,  family history includes Cancer in his maternal grandmother; Cirrhosis in his brother; Diabetes in his brother; Hodgkin's lymphoma in his mother; Other in his father  ,   reports that he has never smoked  He has never used smokeless tobacco  He reports that he does not drink alcohol or use drugs  ,  is allergic to cymbalta [duloxetine hcl]     Current Outpatient Medications   Medication Sig Dispense Refill    allopurinol (ZYLOPRIM) 300 mg tablet TAKE 1 TABLET BY MOUTH  DAILY 90 tablet 1    amiodarone (PACERONE) 400 MG tablet TAKE 1 TABLET BY MOUTH ONCE A DAY 90 tablet 4    amitriptyline (ELAVIL) 25 mg tablet Take 1 tablet (25 mg total) by mouth daily at bedtime 30 tablet 3    baclofen 10 mg tablet TAKE 1 TABLET BY MOUTH 3  TIMES A  tablet 3    colchicine (COLCRYS) 0 6 mg tablet Take 0 6 mg by mouth as needed       ELIQUIS 5 MG TAKE 1 TABLET BY MOUTH TWO  TIMES DAILY 180 tablet 2    famotidine (PEPCID) 20 mg tablet TAKE 1 TABLET BY MOUTH  DAILY 90 tablet 3    fluticasone (FLONASE) 50 mcg/act nasal spray USE 1 SPRAY IN EACH NOSTRIL DAILY 32 g 2    furosemide (LASIX) 40 mg tablet Take 0 5 tablets (20 mg total) by mouth daily 90 tablet 2    loratadine (CLARITIN) 10 mg tablet Take 10 mg by mouth daily      metoprolol tartrate (LOPRESSOR) 25 mg tablet TAKE 1 TABLET BY MOUTH  EVERY 12 HOURS 180 tablet 1    omeprazole (PriLOSEC) 20 mg delayed release capsule       tamsulosin (FLOMAX) 0 4 mg TAKE 1 CAPSULE BY MOUTH  DAILY WITH DINNER 90 capsule 3     No current facility-administered medications for this visit  Review of Systems   Constitutional: Negative  Respiratory: Negative  Cardiovascular: Negative  Musculoskeletal: Positive for arthralgias  Neurological: Positive for weakness  Objective:  Vitals:    07/30/20 1057   BP: 130/74   Pulse: 74   Temp: 97 5 °F (36 4 °C)   SpO2: 94%   Weight: 117 kg (257 lb)   Height: 5' 6" (1 676 m)     Body mass index is 41 48 kg/m²  Physical Exam   Constitutional: He is oriented to person, place, and time  He appears well-developed and well-nourished  HENT:   Head: Normocephalic and atraumatic  Right Ear: External ear normal    Left Ear: External ear normal    Pulmonary/Chest: Effort normal    Musculoskeletal:   Bilateral full leg braces  Ambulates with 2 canes  Neurological: He is alert and oriented to person, place, and time  Psychiatric: He has a normal mood and affect   His behavior is normal

## 2020-09-28 NOTE — PROGRESS NOTES
Assessment/Plan:         Problem List Items Addressed This Visit        Endocrine    Acquired hypothyroidism - Primary       Clinically euthyroid, check TSH in 6 months  Relevant Orders    TSH, 3rd generation       Cardiovascular and Mediastinum    Essential hypertension (Chronic)       Controlled, continue metoprolol  Relevant Medications    furosemide (LASIX) 40 mg tablet    Paroxysmal atrial fibrillation (HCC)       Rate controlled with metoprolol and amiodarone, continue Eliquis  Nervous and Auditory    Post-polio syndrome       Stable, follow-up with his podiatrist regarding his foot inversion  Musculoskeletal and Integument    S[facility] ulcer of right foot, [facility] to breakdown [facility]       Continue localized care, follow-up with podiatrist             Other    Mild depression ([facility] )       Controlled, continue to monitor  Other Visit Diagnoses     Edema, unspecified type        Relevant Medications    furosemide (LASIX) 40 mg tablet            Subjective:      Patient ID: Heri Mondragon is a 78 y o  male  Patient comes in today for checkup on his hypothyroidism, hypertension, paroxysmal atrial fibrillation, post-polio syndrome, mild depression  Patient states he is doing well  He does complain of some skin breakdown on the lateral aspect of the right foot this is due to the inversion of his foot due to medial contractures of the ankle  He is seeing his podiatrist for this  They are considering either a tendon release or Botox injections  He is up-to-date with cardiologist, he is taking his medications as prescribed  He did have blood work done and this reviewed with him today  He continues to remain euthyroid despite taking any medications and a slight elevation of TSH        The following portions of the patient's history were reviewed and updated as appropriate:   He has a past medical history of A-fib ([facility] [address] ), Asbestosis ([facility] [address] ), Gout, HTN (hypertension), Leukocytosis, Lumbar spinal stenosis, Neuropathy, Polio, Renal calculi, and Sleep apnea  ,  does not have any pertinent problems on file  ,   has a past surgical history that includes Colon surgery; Hernia repair; Femur fracture surgery (Right); and Other surgical history  ,  family history includes Cancer in his maternal grandmother; Cirrhosis in his brother; Diabetes in his brother; Hodgkin's lymphoma in his mother; Other in his father  ,   reports that he has never smoked  He has never used smokeless tobacco  He reports that he does not drink alcohol or use drugs  ,  is allergic to cymbalta [duloxetine hcl]     Current Outpatient Medications   Medication Sig Dispense Refill    allopurinol (ZYLOPRIM) 300 mg tablet TAKE 1 TABLET BY MOUTH  DAILY 90 tablet 1    amiodarone (PACERONE) 400 MG tablet TAKE 1 TABLET BY MOUTH ONCE A DAY 90 tablet 4    amitriptyline (ELAVIL) 25 mg tablet Take 1 tablet (25 mg total) by mouth daily at bedtime 30 tablet 3    baclofen 10 mg tablet TAKE 1 TABLET BY MOUTH 3  TIMES A  tablet 3    colchicine (COLCRYS) 0 6 mg tablet Take 0 6 mg by mouth as needed       ELIQUIS 5 MG TAKE 1 TABLET BY MOUTH TWO  TIMES DAILY 180 tablet 2    famotidine (PEPCID) 20 mg tablet TAKE 1 TABLET BY MOUTH  DAILY 90 tablet 3    fluticasone (FLONASE) 50 mcg/act nasal spray USE 1 SPRAY IN EACH NOSTRIL DAILY 32 g 2    furosemide (LASIX) 40 mg tablet Take 0 5 tablets (20 mg total) by mouth daily 90 tablet 2    loratadine (CLARITIN) 10 mg tablet Take 10 mg by mouth daily      metoprolol tartrate (LOPRESSOR) 25 mg tablet TAKE 1 TABLET BY MOUTH  EVERY 12 HOURS 180 tablet 1    omeprazole (PriLOSEC) 20 mg delayed release capsule       tamsulosin (FLOMAX) 0 4 mg TAKE 1 CAPSULE BY MOUTH  DAILY WITH DINNER 90 capsule 3     No current facility-administered medications for this visit  Review of Systems   Constitutional: Negative for chills, fatigue and fever     HENT: Negative for congestion, ear pain, hearing loss, postnasal drip, rhinorrhea and sore throat  Eyes: Negative for pain and visual disturbance  Respiratory: Negative for chest tightness, shortness of breath and wheezing  Cardiovascular: Negative for chest pain and leg swelling  Gastrointestinal: Negative for abdominal distention, abdominal pain, constipation, diarrhea and vomiting  Endocrine: Negative for cold intolerance and heat intolerance  Genitourinary: Negative for difficulty urinating, frequency and urgency  Musculoskeletal: Negative for arthralgias and gait problem  Skin: Negative for color change  Breakdown over the lateral aspect of the right foot   Neurological: Negative for dizziness, tremors, syncope, numbness and headaches  Hematological: Negative for adenopathy  Psychiatric/Behavioral: Negative for agitation, confusion and sleep disturbance  The patient is not nervous/anxious  Objective:  Vitals:    09/28/20 1400 09/28/20 1431   BP: 156/84 138/84   Pulse: 65    Temp: 98 1 °F (36 7 °C)    TempSrc: Tympanic    SpO2: 100%    Weight: 117 kg (257 lb 6 4 oz)    Height: 5' 6" (1 676 m)      Body mass index is 41 55 kg/m²  Physical Exam  Vitals signs and nursing note reviewed  Constitutional:       Appearance: He is well-developed  HENT:      Head: Normocephalic  Eyes:      General: No scleral icterus  Conjunctiva/sclera: Conjunctivae normal    Neck:      Musculoskeletal: Normal range of motion  Thyroid: No thyromegaly  Cardiovascular:      Rate and Rhythm: Normal rate and regular rhythm  Heart sounds: Normal heart sounds  No murmur  Pulmonary:      Effort: Pulmonary effort is normal  No respiratory distress  Breath sounds: Normal breath sounds  No wheezing  Abdominal:      General: Bowel sounds are normal  There is no distension  Palpations: Abdomen is soft  Tenderness: There is no abdominal tenderness  Musculoskeletal: Normal range of motion  General: No tenderness  Lymphadenopathy:      Cervical: No cervical adenopathy  Skin:     General: Skin is warm and dry  Coloration: Skin is not pale  Findings: No rash  Comments: Small circular erythematous area of the right lateral foot   Neurological:      Mental Status: He is alert and oriented to person, place, and time  Cranial Nerves: No cranial nerve deficit  Psychiatric:         Behavior: Behavior normal        BMI Counseling: Body mass index is 41 55 kg/m²  The BMI is above normal  Nutrition recommendations include decreasing portion sizes and encouraging healthy choices of fruits and vegetables  Exercise recommendations include moderate physical activity 150 minutes/week  No pharmacotherapy was ordered  Falls Plan of Care: balance, strength, and gait training instructions were provided  Assessed feet and footwear

## 2020-10-06 PROBLEM — N20.0 CALCULUS OF RENAL PELVIS: Status: ACTIVE | Noted: 2020-01-01

## 2020-10-06 PROBLEM — K83.8 BILIARY SLUDGE: Status: ACTIVE | Noted: 2020-01-01

## 2020-10-07 PROBLEM — E66.813 CLASS 3 SEVERE OBESITY IN ADULT (HCC): Status: ACTIVE | Noted: 2020-01-01

## 2020-10-07 PROBLEM — E66.01 CLASS 3 SEVERE OBESITY IN ADULT (HCC): Status: ACTIVE | Noted: 2020-01-01

## 2020-10-11 PROBLEM — K81.0 ACUTE CHOLECYSTITIS: Status: ACTIVE | Noted: 2020-01-01

## 2020-10-11 PROBLEM — R31.9 HEMATURIA: Status: ACTIVE | Noted: 2020-01-01

## 2020-10-17 PROBLEM — A04.72 C. DIFFICILE COLITIS: Status: ACTIVE | Noted: 2020-01-01

## 2020-10-18 PROBLEM — J95.821 ACUTE POSTOPERATIVE RESPIRATORY FAILURE (HCC): Status: ACTIVE | Noted: 2020-01-01

## 2020-10-18 PROBLEM — D62 ACUTE BLOOD LOSS ANEMIA: Status: ACTIVE | Noted: 2020-01-01

## 2020-10-18 PROBLEM — K92.2 GI BLEED: Status: ACTIVE | Noted: 2020-01-01

## 2020-10-18 PROBLEM — K83.09 ACUTE CHOLANGITIS: Status: ACTIVE | Noted: 2020-01-01

## 2020-10-18 PROBLEM — K92.1 BLACK STOOL: Status: ACTIVE | Noted: 2020-01-01

## 2020-10-18 NOTE — ANESTHESIA POSTPROCEDURE EVALUATION
Post-Op Assessment Note    CV Status:  Unstable  Pain Score: 0    Pain management: adequate     Mental Status:  Unresponsive (sedated on propofol infusion)   Hydration Status:  Unstable   PONV Controlled:  None   Airway Patency:  Patent and adequate  Airway: intubated       Staff: CRNA, Anesthesiologist   Comments: Placed on resp ventilator.  / 16/ 100% / 5        No complications documented.    BP   99/64   Temp     Pulse  78   Resp   16   SpO2   100%

## 2020-10-18 NOTE — ANESTHESIA PREPROCEDURE EVALUATION
Procedure:  ERCP    Relevant Problems   CARDIO   (+) Essential hypertension   (+) Mixed hyperlipidemia   (+) Paroxysmal atrial fibrillation (HCC)      ENDO   (+) Acquired hypothyroidism      GI/HEPATIC   (+) Peptic reflux disease      /RENAL   (+) Benign prostatic hyperplasia   (+) Calculus of renal pelvis      MUSCULOSKELETAL   (+) Gout, arthritis      NEURO/PSYCH   (+) Cervical myelopathy (HCC)   (+) Mild depression (HCC)      PULMONARY   (+) DORA (obstructive sleep apnea)     Worsening Hyperbilirubinemia and encephalopathy. Concern for cholangitis. Needs urgent ERCP. Discussed with GI     Physical Exam    Airway    Mallampati score: II         Dental   No notable dental hx     Cardiovascular  Rhythm: regular, Rate: normal,     Pulmonary  Pulmonary exam normal     Other Findings        Anesthesia Plan  ASA Score- 4 Emergent    Anesthesia Type- general with ASA Monitors.         Additional Monitors:   Airway Plan: ETT.          Plan Factors-Exercise tolerance (METS): <4 METS.    Chart reviewed. EKG reviewed. Imaging results reviewed. Existing labs reviewed. Patient summary reviewed.    Patient not instructed to abstain from smoking on day of procedure. Patient did not smoke on day of surgery.        Induction- intravenous.    Postoperative Plan- . Planned trial extubation    Informed Consent- Anesthetic plan and risks discussed with spouse and patient.  I personally reviewed this patient with the CRNA. Discussed and agreed on the Anesthesia Plan with the CRNA..

## 2020-10-24 PROBLEM — J96.01 ACUTE RESPIRATORY FAILURE WITH HYPOXIA (HCC): Status: ACTIVE | Noted: 2020-01-01

## 2020-10-26 LAB
ATRIAL RATE: 104 BPM
P AXIS: 27 DEGREES
PR INTERVAL: 208 MS
QRS AXIS: 73 DEGREES
QRSD INTERVAL: 100 MS
QT INTERVAL: 338 MS
QTC INTERVAL: 445 MS
T WAVE AXIS: -6 DEGREES
VENTRICULAR RATE: 104 BPM

## 2020-10-27 LAB
ATRIAL RATE: 84 BPM
BACTERIA BLD CULT: ABNORMAL
GRAM STN SPEC: ABNORMAL
P AXIS: 17 DEGREES
PR INTERVAL: 129 MS
QRS AXIS: 116 DEGREES
QRSD INTERVAL: 133 MS
QT INTERVAL: 367 MS
QTC INTERVAL: 434 MS
T WAVE AXIS: -44 DEGREES
VENTRICULAR RATE: 84 BPM

## 2020-10-29 LAB — BACTERIA BLD CULT: NORMAL

## 2021-04-06 NOTE — PHYSICAL THERAPY NOTE
PHYSICAL THERAPY EVALUATION NOTE          Patient Name: Christianne RICHARDSON Date: 3/27/2018  AGE:   75 y o  Mrn:   [id number]  ADMIT DX:  Nausea [R11 0]  Paroxysmal atrial fibrillation (HCC) [I48 0]  Polio [A80 9]  Sacral decubitus ulcer [L89 159]  Decubitus ulcer of heel [L89 609]    Past Medical History:   Diagnosis Date    [facility]     Gout     HTN (hypertension)     Neuropathy     Polio     Sleep apnea      Length Of Stay: 3  PHYSICAL THERAPY EVALUATION :    03/27/18 0813   Note Type   Note type Eval/Treat   Pain Assessment   Pain Assessment 0-10   Pain Score No Pain   Home Living   Type of [facility] [address] One level  (stair glide, raised ranch, ramp other entrance)   Bathroom Shower/Tub Walk-in shower   Bathroom Toilet Raised   Bathroom Equipment Grab bars in shower;Grab bars around toilet   [address]   Prior Function   Level of [place] Independent with ADLs and functional mobility   Lives With Spouse   Receives Help From Family   ADL Assistance Needs assistance   IADLs Needs assistance   Falls in the last 6 months 1 to 4   Vocational Retired   Comments good social support, post polio--wears R LL brace, L short leg brace   Restrictions/Precautions   [place] Bearing Precautions Per Order Yes   RLE Weight Bearing Per Order NWB  (known RLE NWB from recent ORIF R femur)   Braces or Orthoses LE Braces   Other Precautions Bed Alarm; Fall Risk;Multiple lines;Telemetry  (afib/anticoagulation)   General   Additional Pertinent History Patient admitted with a fib  Has R femur ORIF about 4-5 weeks ago, NWB  Was in [facility]  Also with diarrhea with c-diff ruled out  Has been in hospital recently at [facility] from [facility] with afib diagnosis new, constipation, fever  No etiology for fever  DX:  SIRS with unknown etiology and increased WBC    Also decreased Na and K+   (cleared for PT [de-identified] : 33yo female hx of x3 natural deliveries, vaginal prolapse, BV s/p removal of IUD, chronic constipation requiring her to apply pressure to her perineum to move her bowels.  Reports being more constipated then normal and is very frustrated.  She tried PFT without any relief.  Now with BRBPR in stools for the past few days.  Describes as a popping feeling with bowel movement.  Also with some indigestion after mealtime.  Currently not on any PPIs.  No dysphagia.  No weight loss. No SOB or CP. Her to schedule an EGD and colonoscopy.  by RN, up with assist, eval/treat)   Family/Caregiver Present Yes   Cognition   Overall Cognitive Status WFL   Arousal/Participation Alert   Attention Within functional limits   Orientation Level Oriented X4   Memory Within functional limits   Following Commands Follows all commands and directions without difficulty   Comments Agreeable to PT   RUE Assessment   RUE Assessment (See OT eval)   LUE Assessment   LUE Assessment (See OT eval)   RLE Assessment   RLE Assessment (hip 2+, knee ext 0, flex NT due to fx, 3+ DF)   LLE Assessment   LLE Assessment (hip 3-, knee ext 2-, knee flex 3+, DF 2+)   Coordination   Movements are Fluid and Coordinated 1  (tight HC's, R knee flexion 0-55 degrees-AA/PROM)   Sensation X   Light Touch   RLE Light Touch Absent   RLE Light Touch Comments no sensation in feet, decrease in lower leg   LLE Light Touch Absent   LLE Light Touch Comments no sensation in feet, decrease in lower leg   Sharp/Dull   RLE Sharp/Dull Not tested   LLE Sharp/Dull Not tested   Proprioception   RLE Proprioception Not tested   LLE Proprioception Not tested   Bed Mobility   Supine to Sit 4  Minimal assistance  (to mod assist)   Additional items HOB elevated; Bedrails; Increased time required;Verbal cues;LE management  (dirk RLE assist)   Transfers   Sit to Stand (unable at this time)   Ambulation/Elevation   Gait pattern (n/a at this time)   Balance   Static Sitting Good   Dynamic Sitting Fair +   Static Standing (unable to perform)   Dynamic Standing (unable to perform)   Ambulatory (unable at this time)   Endurance Deficit   Endurance Deficit Yes   Endurance Deficit Description fatigues   Activity Tolerance   Activity Tolerance Patient limited by fatigue  (discomfort with increased R knee flex)   Nurse Made Aware RN aware of session   Assessment   Prognosis Good   Problem List Decreased strength;Decreased range of motion;Decreased endurance; Impaired balance;Decreased mobility; Decreased skin integrity;Pain;Orthopedic restrictions   Assessment Patient admitted with a fib  Has R femur ORIF about 4-5 weeks ago, NWB  Was in [facility]  Also with diarrhea with c-diff ruled out  Has been in hospital recently at [facility] from [facility] with afib diagnosis new, constipation, fever  No etiology for fever  DX:  SIRS with unknown etiology and increased WBC  Also decreased Na and K+  He presents with the above deficits creating inability to transfer at this time putting him at risk for falls  He is below his baseline level of function requiring mod assist with mobility and will benefit from skilled PT interventions to increase his level of functional mobility and level of independence while decreasing his fall risk as well as caregiver burden  His history presents with more than 3 elements, a high level of complexity  Topics considered with medical/social history included:  Co morbid conditions impacting function and progress, impaired PLF, context of current functional limitations compared to PLF, lack of physical emotional and/or social support, cultural preferences, employment status, recent hospital admissions/readmissions, response to prior treatment approaches, medication management, personal factors impacting POC  (Number of elements:  0=low, 1-2 =moderate, 3 or more=high)  His examination presents with more than 3 elements, a high level of complexity  Examination of body systems included musculoskeletal, sensory, cardiovascular, pulmonary, neuromuscular, integumentary, and communication    Functional limitations include ROM deficits, strength deficits, irregular vital sign response to interventions, difficulty initiating task/action/activity, edema, pain, self care deficits, community and social reintegration deficits, impaired ability to make needs known, impaired level of consciousness and/or orientation, learning barrier, emotional response behavioral response, tone, balance, sensory, coordination and endurance deficits (Number of elements:  1-2 =low, 3=moderate, >3=high)  Objective testing utilizing the Barthel indicates impaired functional mobility  His clinical presentation is presently unstable  Clinicial decision making reveals a high level of evaluation complexity  Barriers to Discharge Inaccessible home environment;Decreased caregiver support  (needs increased assist and equipement needs)   Barriers to Discharge Comments limited by medical condition and mobility at this time, has good social support system  Goals   Patient Goals get better and move better   [facility] Expiration Date 04/03/18   Treatment Day 1   Plan   Treatment/Interventions Functional transfer training;LE strengthening/ROM; Therapeutic exercise; Endurance training;Patient/family training;Equipment eval/education; Bed mobility;Spoke to nursing;Continued evaluation   PT Frequency 5x/wk; Weekend   Recommendation   Recommendation Post acute IP rehab;Short-term skilled PT   Equipment Recommended Wheelchair  (drop arm commode, hayley lift, sliding board, hospital bed)   PT - OK to Discharge No  (not medically cleared, further training needed)   Additional Comments Of note, with patient's present level of functional mobility and that prior to fall, he may need to have braces reassessed to allow for best functional performance  He was able to perform sliding board transfers with assist at [facility] and able to  parallel bars with one person assist and maintain NWB of RLE while performing balance exer in standing prior to recent hospital stay with a fib dx  Barthel Index   Feeding 10   Bathing 0   Grooming Score 0   Dressing Score 5   Bladder Score 5   Bowels Score 10   Toilet Use Score 0   Transfers (Bed/Chair) Score 5   Mobility (Level Surface) Score 0   Stairs Score 0   Barthel Index Score 35     Time In:  0814  Time Out: 0844  Total Time: 30 minutes      S:  I want to do what ever I can  My goal is to get WB on this leg, check into my brace needs, and  better  O:  Patient sitting at EOB with close supervision performing exer with LE's of AA/PROM R knee flexion, heel slides, assist hip flexion bilat, AA/PROM L knee ext, HC stretching  HR increase once sitting at EOB to 110, afib then subsided  Started to transition to attempt to stand with assist of 2, elevated bed, but HR started to increase further to 150  Rested in sitting  Total time in sitting about 7 min  Then continued to increase so returned to supine with dependent assist   RN in to give meds  Post meds, patient repositioned in bed  Increased education and discussion with patient and wife regarding d/c plan at end of session and patient needs as well as resources/equipment needs  Increased training needed for safety of return to home with family and home health services if not d/c's to [facility]  Patient very motivated  Good social support  A:  Patient with low tolerance this session with a fib and increased HR  When medically stable, expect good rehab potential and progress with change in orthopedic restrictions in next few weeks  Expect he may need re evaluation of braces used post fall  P:  Cont per POC  PT to see to continue to assess mobility  Nickie Agosto PT        Skilled PT recommended while in hospital and upon DC to progress pt toward treatment goals       Nickie Agosto PT

## 2022-03-07 NOTE — PROGRESS NOTES
Assessment   1  Gout, arthritis (274 00) (M10 9)   2  Arthropathy (716 90) (M12 9)    Plan   Arthropathy, Gout, arthritis    · (1) COMPREHENSIVE METABOLIC PANEL; Status:Active; Requested for:89Shn9310;    · (1) LYME ANTIBODY PROFILE W/REFLEX TO WESTERN BLOT; Status:Active; Requested for:04Rbs4030;    · (1) SED RATE; Status:Active; Requested for:03Blj9161;    · (1) URIC ACID; Status:Active; Requested for:00Kec6853;     Chief Complaint   Pt in office today c/o gout in right foot  History of Present Illness   Arthritis / Joint Pain (Brief): The patient is being seen for an initial evaluation of joint pain  The patient has been previously evaluated in the emergency room  Past evaluation has included uric acid which was elevated  The patient presents with complaints of gradual onset of moderate right foot joint pain, described as aching starting about 6 weeks ago  His symptoms are caused by no known event  The patient is currently experiencing symptoms  No associated symptoms are reported  (prednisone, allopurinol and colchicine which does not seem to be helping)      Review of Systems        Constitutional: no fever or chills, feels well, no tiredness, no recent weight loss or weight gain  ENT: no complaints of earache, no loss of hearing, no nosebleeds or nasal discharge, no sore throat or hoarseness  Cardiovascular: no complaints of slow or fast heart rate, no chest pain, no palpitations, no leg claudication or lower extremity edema  Respiratory: no complaints of shortness of breath, no wheezing or cough, no dyspnea on exertion, no orthopnea or PND  Gastrointestinal: no complaints of abdominal pain, no constipation, no nausea or vomiting, no diarrhea or bloody stools  Musculoskeletal: as noted in HPI  Neurological: no complaints of headache, no confusion, no numbness or tingling, no dizziness or fainting  Active Problems   1   Anemia due to chronic blood loss (280 0) (D50 0)   2  Arthropathy (716 90) (M12 9)   3  Atrial fibrillation (427 31) (I48 91)   4  Bilateral arm weakness (729 89) (R29 898)   5  Bilateral edema of lower extremity (782 3) (R60 0)   6  BPH (benign prostatic hypertrophy) (600 00) (N40 0)   7  Bronchitis, acute (466 0) (J20 9)   8  Cervical myelopathy (721 1) (G95 9)   9  Chronic rhinitis (472 0) (J31 0)   10  Encounter for prostate cancer screening (V76 44) (Z12 5)   11  Essential hypertension (401 9) (I10)   12  Facet joint disease of lumbosacral region (721 3) (M46 97)   13  Flank pain (789 09) (R10 9)   14  Folliculitis (804 2) (X93 9)   15  Gout, arthritis (274 00) (M10 9)   16  Hematuria (599 70) (R31 9)   17  History of esophagogastroduodenoscopy (EGD) (V15 29) (Z98 890)   18  History of kidney stones (V13 01) (Z87 442)   19  Imbalance (781 2) (R26 89)   20  Kidney stone (592 0) (N20 0)   21  Lower extremity edema (782 3) (R60 0)   22  Medicare annual wellness visit, subsequent (V70 0) (Z00 00)   23  Mixed hyperlipidemia (272 2) (E78 2)   24  Need for influenza vaccination (V04 81) (Z23)   25  Need for pneumococcal vaccination (V03 82) (Z23)   26  Need for Tdap vaccination (V06 1) (Z23)   27  Obstructive sleep apnea (327 23) (G47 33)   28  Osteoporosis (733 00) (M81 0)   29  Peptic reflux disease (530 81) (K21 9)   30  Peripheral neuropathy (356 9) (G62 9)   31  Polio (045 90) (A80 9)   32  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)   33  Prostate cancer screening (V76 44) (Z12 5)   34  Raynaud's disease without gangrene (443 0) (I73 00)   35  Sacroiliitis (720 2) (M46 1)   36  Screening for genitourinary condition (V81 6) (Z13 89)   37  Seasonal allergies (477 9) (J30 2)   38  Shortness of breath (786 05) (R06 02)   39  Strain of lumbar region, initial encounter (847 2) (S39 012A)   40  Subconjunctival hemorrhage of right eye (372 72) (H11 31)   41  Thoracic sprain (847 1) (S23 9XXA)   42  Transient right leg weakness (729 89) (R29 898)   43   URI (upper respiratory infection) (465 9) (J06 9)   44  Vertigo (780 4) (R42)   45  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)    Past Medical History   1  History of Acute upper respiratory infection (465 9) (J06 9)   2  History of Asbestosis (0681 664 48 54) (J61)   3  History of EKG (V15 89) (Z92 89)   4  History of renal calculi (V13 01) (Z87 442)   5  History of Leukocytosis (288 60) (D72 829)   6  History of Lumbar spinal stenosis (724 02) (M48 061)   7  History of Malaise and fatigue (780 79) (R53 81,R53 83)   8  History of Neuropathy (355 9) (G62 9)   9  History of Pain in limb (729 5) (M79 609)    Family History   Mother    1  Denied: Family history of mental disorder   2  Family history of Hodgkin's disease  Father    3  Family history of Polio  Brother    4  Family history of    5  Family history of diabetes mellitus (V18 0) (Z83 3)   6  Family history of hepatic cirrhosis (V18 59) (Z83 79)  Maternal Grandmother    7  Family history of malignant neoplasm (V16 9) (Z80 9)    Social History    · Completed high school   ·    · Never a smoker   · No alcohol use   · No drug use   · Occupation   · retired    · Retired   · Two children    Surgical History   1  History of Partial Colectomy   2  History of Percutaneous Lithotomy   3  History of Ventral Hernia Repair    Current Meds    1  Allopurinol 300 MG Oral Tablet; take 1 tablet by mouth every day; Therapy: 11Juw0797 to (Evaluate:74Kvc4786)  Requested for: 44Hxn8289; Last     Rx:79Lax5600 Ordered   2  Aspirin 81 MG TABS; Take 1 tablet daily; Therapy: (Recorded:28Gkv8765) to Recorded   3  Baclofen 10 MG Oral Tablet; TAKE 1 TABLET 3 times daily; Therapy: 67EAZ9992 to (Evaluate:76Qee2125)  Requested for: 02PAR0597; Last     Rx:03Hfk5099 Ordered   4  Colchicine 0 6 MG Oral Tablet; TAKE 1 TABLET BY MOUTH TWICE A DAY AS NEEDED     FOR GOUT;     Therapy: 76ORD6183 to (Evaluate:69Mjz6754)  Requested for: 10Tum5065; Last     Rx:33Twi0089 Ordered   5  Digoxin 125 MCG Oral Tablet; take 1 tablet every day; Last Rx:31Jan2017 Ordered   6  DULoxetine HCl - 60 MG Oral Capsule Delayed Release Particles; TAKE ONE CAPSULE     BY MOUTH AT BEDTIME; Therapy: 49GDV5770 to (Evaluate:20Apr2018)  Requested for: 58TBR9194; Last     Rx:12Wkw7064 Ordered   7  Eliquis 5 MG Oral Tablet; Take 1 tablet twice daily; Therapy: (Recorded:24Mar2016) to Recorded   8  Fluticasone Propionate 50 MCG/ACT Nasal Suspension; USE 1 TO 2 SPRAYS IN EACH     NOSTRIL ONCE DAILY; Therapy: (Recorded:10Oct2016) to Recorded   9  Furosemide 40 MG Oral Tablet; TAKE 1 TABLET EVERY MORNING; Therapy: 27ULH0375 to ([id number])  Requested for: 40AOL1547 Recorded   10  Loratadine 10 MG Oral Capsule; take 1 capsule daily; Therapy: (Recorded:28Qnr2111) to Recorded   11  Metoprolol Tartrate 50 MG Oral Tablet; TAKE 1 TABLET EVERY 12 HOURS DAILY; Therapy: 20QIU6125 to (HPOPVVYA:05CSV2846) Recorded   12  Multi-Vitamins Oral Tablet; Take 1 tablet daily Recorded   13  PredniSONE 10 MG Oral Tablet; TAKE 6 TABLETS TODAY, THEN DECREASE BY 1      TABLET EACH DAY UNTIL GONE;      Therapy: 00OAF1315 to (Complete:02Jan2018)  Requested for: 93Jte9767; Last      Rx:61Nmy2178 Ordered   14  Tamsulosin HCl - 0 4 MG Oral Capsule; TAKE 1 CAPSULE BY MOUTH EVERY NIGHT AT      BEDTIME; Therapy: 09PQM6713 to (Evaluate:24Apr2018)  Requested for: 29Apr2017; Last      Rx:29Apr2017 Ordered   15  Vitamin B-12 1000 MCG Oral Tablet; TAKE 1 TABLET DAILY AS DIRECTED; Therapy: (Recorded:03Ugb5594) to Recorded    Allergies   1  No Known Drug Allergies  2  No Known Environmental Allergies   3   No Known Food Allergies    Vitals    Recorded: 92TWX8687 02:42PM   Heart Rate 77   Systolic 799   Diastolic 70   Height 5 ft 5 5 in   Weight 221 lb    BMI Calculated 36 22   BSA Calculated 2 08   O2 Saturation 97     Physical Exam        Constitutional      General appearance: No acute distress, well appearing and well nourished  Ears, Nose, Mouth, and Throat      Oropharynx: Normal with no erythema, edema, exudate or lesions  Pulmonary      Auscultation of lungs: Clear to auscultation, equal breath sounds bilaterally, no wheezes, no rales, no rhonci  Cardiovascular      Auscultation of heart: Normal rate and rhythm, normal S1 and S2, without murmurs  Musculoskeletal      Gait and station: Normal        Inspection/palpation of joints, bones, and muscles: Abnormal  -- swelling of the forefeet bilaterally, no erythema  Psychiatric      Orientation to person, place and time: Normal        Mood and affect: Normal           Results/Data   PHQ-9 Adult Depression Screening 61Tpd5690 02:44PM User, Ziios      Test Name Result Flag Reference   PHQ-9 Adult Depression Score 0     Over the last two weeks, how often have you been bothered by any of the following problems? Little interest or pleasure in doing things: Not at all - 0     Feeling down, depressed, or hopeless: Not at all - 0     Trouble falling or staying asleep, or sleeping too much: Not at all - 0     Feeling tired or having little energy: Not at all - 0     Poor appetite or over eating: Not at all - 0     Feeling bad about yourself - or that you are a failure or have let yourself or your family down: Not at all - 0     Trouble concentrating on things, such as reading the newspaper or watching television: Not at all - 0     Moving or speaking so slowly that other people could have noticed   Or the opposite -  being so fidgety or restless that you have been moving around a lot more than usual: Not at all - 0     Thoughts that you would be better off dead, or of hurting yourself in some way: Not at all - 0   PHQ-9 Adult Depression Screening Negative     PHQ-9 Difficulty Level Not difficult at all     PHQ-9 Severity No Depression          Future Appointments      Date/Time Provider Specialty Site   03/16/2018 10:30 AM Mikey Johnston [address] [place] [address]   01/19/2018 09:40 AM Ivelisse Mckinney MD Neurology [facility] [address]    Electronically signed by : Cary Holley DO; Dec 29 2017  3:04PM EST                       (Author) Admission

## 2024-02-20 NOTE — TELEPHONE ENCOUNTER
Chief Complaint  Diabetes (Follow up, med mgt, A1c eval, cgm/pump eval )    Referred By: Krystyna Collins,*    Subjective     {Problem List  Visit Diagnosis   Encounters  Notes  Medications  Labs  Result Review Imaging  Media :23}     Hong Fraser presents to [facility] for diabetes medication management    History of Present Illness    Visit type:  follow-up  Diabetes type:  Type 2  Current diabetes status/concerns/issues: He has had a lot of really high glucose levels over the last week after receiving a steroid injection.  Outside of that his glucose levels have been much better.  He admits to stopping the Metformin.  He has tolerated the Ozempic with some gas issues but this is better.  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; he also was told at his recent eye exam that he had retinopathy ; Location: Unknown; Last Eye Exam:  December 2023 ; Location: [facility]  Amputation/Wounds: None  GI: None  Cardiovascular: Hypertension and Hyperlipidemia  ED: Patient Reported; he uses injection therapy  Other: None  Hypoglycemia:  None reported at this time  Hypoglycemia Symptoms:  No hypoglycemia at this time  Current diabetes treatment:  Omnipod insulin pump using Humalog U200 insulin, Ozempic 0.5 mg once weekly was added at the last appointment  Blood glucose device:  Dexcom CGM  Blood glucose monitoring frequency:  Continuous per CGM  Blood glucose range/average:  The 14-day sensor report shows an  Pt was taken off of Amiodarone 200mg daily according to O/v from 2/25/19  Pt is also on Lasix 20mg daily as per Dr Tiff Johnson  Pt is coming in for an EKG due to feeling that his heart is out of rhythm, pt has a hx of PAF  average glucose of 194 mg/dL, with 43% in target range ( mgdL), 43% in the high range (181-250 mg/dL), 14% in the very high range (>250 mg/dL), 0% in the low range (54-70 mg/dL) and 0% in the very low range (<54 mg/dL).   Glucose Source: Device Reviewed  Diet:  Carbohydrate Counting, Limits high carb/sweet foods, Avoids sugary drinks  Activity/Exercise:  None    Past Medical History:   Diagnosis Date    Diabetes mellitus     History of asthma     History of depression     Hyperlipidemia     Hypertension     Tachycardia      Past Surgical History:   Procedure Laterality Date    HAND SURGERY Left     x2; traumatic injury    HERNIA REPAIR      umbilical    LIPOMA EXCISION      right hip    WISDOM TOOTH EXTRACTION       Family History   Problem Relation Age of Onset    COPD Mother     Lung cancer Mother     Hyperlipidemia Father     Hypertension Father     Coronary artery disease Father     COPD Father     Diabetes Brother      Social History     Socioeconomic History    Marital status:     Number of children: 2   Tobacco Use    Smoking status: Former     Packs/day: 1     Types: Cigarettes     Passive exposure: Never    Smokeless tobacco: Former    Tobacco comments:     pouches   Vaping Use    Vaping Use: Never used   Substance and Sexual Activity    Alcohol use: Yes     Alcohol/week: 1.0 standard drink of alcohol     Types: 1 Cans of beer per week     Comment: occasionally 1 beer a week    Drug use: Never    Sexual activity: Yes     Partners: Female     No Known Allergies    Current Outpatient Medications:     atorvastatin (LIPITOR) 20 MG tablet, Take 1 tablet by mouth Daily., Disp: 90 tablet, Rfl: 3    Continuous Blood Gluc Sensor (Dexcom G6 Sensor), Inject  under the skin into the appropriate area as directed Every 10 (Ten) Days., Disp: 3 each, Rfl: 1    Continuous Blood Gluc Transmit (Dexcom G6 Transmitter) misc, Inject 1 each under the skin into the appropriate area as directed Daily., Disp: 1 each,  "Rfl: 0    Diclofenac Sodium 3 % gel gel, Apply  topically to the appropriate area as directed 2 (Two) Times a Day., Disp: 100 g, Rfl: 2    Glucagon (Gvoke HypoPen 2-Pack) 1 MG/0.2ML solution auto-injector, Inject 1 mg under the skin into the appropriate area as directed As Needed (Severe hypoglycemia)., Disp: 0.2 mL, Rfl: 2    ibuprofen (ADVIL,MOTRIN) 800 MG tablet, Take 1 tablet by mouth Every 8 (Eight) Hours As Needed for Mild Pain., Disp: 90 tablet, Rfl: 2    Insulin Dispos  Accessories (Omnipod Pod Pals) misc, Use 1 each Every 30 (Thirty) Days., Disp: 1 each, Rfl: 11    Insulin Disposable Pump (Omnipod 5 G6 Pod, Gen 5,) misc, Use 1 each Every 3 (Three) Days., Disp: 10 each, Rfl: 5    Insulin Lispro (HumaLOG KwikPen) 200 UNIT/ML solution pen-injector, Inject 150 Units under the skin into the appropriate area as directed Daily for 180 days., Disp: 66 mL, Rfl: 1    lisinopril (PRINIVIL,ZESTRIL) 40 MG tablet, Take 1 tablet by mouth Daily., Disp: 90 tablet, Rfl: 3    meloxicam (MOBIC) 15 MG tablet, Take 1 tablet by mouth Daily., Disp: , Rfl:     metFORMIN (GLUCOPHAGE) 1000 MG tablet, Take 1 tablet by mouth 2 (Two) Times a Day With Meals., Disp: 60 tablet, Rfl: 1    metoprolol succinate XL (TOPROL-XL) 50 MG 24 hr tablet, Take 1 tablet by mouth 2 (Two) Times a Day., Disp: 60 tablet, Rfl: 5    Semaglutide,0.25 or 0.5MG/DOS, (Ozempic, 0.25 or 0.5 MG/DOSE,) 2 MG/3ML solution pen-injector, Inject 0.5 mg under the skin into the appropriate area as directed 1 (One) Time Per Week., Disp: 9 mL, Rfl: 1    Testosterone 1.62 % gel, APPLY ONE PUMPS WORTH OF GEL APPLIED TO AXILLA EVERY DAY, Disp: , Rfl:     Objective     Vitals:    02/22/24 1401   BP: 156/89   BP Location: Right arm   Patient Position: Sitting   Cuff Size: Adult   Pulse: 87   SpO2: 99%   Weight: 119 kg (263 lb)   Height: 182.9 cm (72\")   PainSc:   9     Body mass index is 35.67 kg/m².    Physical Exam  Constitutional:       Appearance: Normal appearance. He is " "obese.      Comments: Severe Obesity with Comorbidity ( BMI 35 - 39.9) Pt Current BMI = 35.67 with comorbidities of hypertension, hyperlipidemia and diabetes   HENT:      Head: Normocephalic and atraumatic.      Right Ear: External ear normal.      Left Ear: External ear normal.      Nose: Nose normal.   Eyes:      Extraocular Movements: Extraocular movements intact.      Conjunctiva/sclera: Conjunctivae normal.   Pulmonary:      Effort: Pulmonary effort is normal.   Musculoskeletal:         General: Normal range of motion.      Cervical back: Normal range of motion.   Skin:     General: Skin is warm and dry.   Neurological:      General: No focal deficit present.      Mental Status: He is alert and oriented to person, place, and time. Mental status is at baseline.   Psychiatric:         Mood and Affect: Mood normal.         Behavior: Behavior normal.         Thought Content: Thought content normal.         Judgment: Judgment normal.         {DIABETIC FOOT EXAM FOR  (Optional):79844::\"Diabetic Foot Exam Performed\":0}    Result Review :{Labs  Result Review  Imaging  Med Tab  Media :23}   The following data was reviewed by: ANJU Humphreys on 02/22/2024:    Most Recent A1C          2/22/2024    14:14   HGBA1C Most Recent   Hemoglobin A1C 8        A1C Last 3 Results          11/20/2023    11:19 1/4/2024    09:53 2/22/2024    14:14   HGBA1C Last 3 Results   Hemoglobin A1C 11.10  10.9  8      A1c collected in the office today is 8%, indicating Uncontrolled Type II diabetes.  This result is down from the prior result of 10.9% collected on 1/4/24     Glucose   Date Value Ref Range Status   02/22/2024 236 (H) 70 - 99 mg/dL Final     Comment:     Serial Number: [id number]:  [id number]     Point of care glucose in the office today is  elevated at 236 mg/dL         {CC Problem List  Visit Diagnosis  ROS  Review (Popup)  Health Maintenance  Quality  BestPractice  Medications  SmartSets  SnapShot " Encounters  Media :23}     Assessment:       Diagnoses and all orders for this visit:    1. Uncontrolled type 1 diabetes mellitus with hyperglycemia (Primary)  -     POC Glycosylated Hemoglobin (Hb A1C)    Other orders  -     POC Glucose        Plan: ***    The patient will monitor his blood glucose levels ***.  If he develops problematic hyperglycemia or hypoglycemia or adverse drug reactions, he will contact the office for further instructions.    {Time Spent (Optional):74928}    Follow Up {Instructions Charge Capture  Follow-up Communications :23}    No follow-ups on file.    Patient was given instructions and counseling regarding his condition or for health maintenance advice. Please see specific information pulled into the AVS if appropriate.     Jenna Marie, APRN  02/22/2024      Dictated Utilizing Dragon Dictation.  Please note that portions of this note were completed with a voice recognition program.  Part of this note may be an electronic transcription/translation of spoken language to printed text using the Dragon Dictation System.

## (undated) DEVICE — CHLORHEXIDINE 4PCT 4 OZ

## (undated) DEVICE — SHEATH URETERAL ACCESS 12/14FR 35CM PROXIS

## (undated) DEVICE — PACK TUR

## (undated) DEVICE — INVIEW CLEAR LEGGINGS: Brand: CONVERTORS

## (undated) DEVICE — CATH URETERAL 5FR X 70 CM FLEX TIP POLYUR BARD

## (undated) DEVICE — GLOVE INDICATOR PI UNDERGLOVE SZ 8 BLUE

## (undated) DEVICE — SPONGE 4 X 4 XRAY 16 PLY STRL LF RFD

## (undated) DEVICE — UROCATCH BAG

## (undated) DEVICE — GLOVE SRG BIOGEL ECLIPSE 7.5

## (undated) DEVICE — LUBRICANT SURGILUBE TUBE 4 OZ  FLIP TOP

## (undated) DEVICE — GUIDEWIRE STRGHT TIP 0.035 IN  SOLO PLUS